# Patient Record
Sex: FEMALE | Race: WHITE | NOT HISPANIC OR LATINO | Employment: FULL TIME | URBAN - METROPOLITAN AREA
[De-identification: names, ages, dates, MRNs, and addresses within clinical notes are randomized per-mention and may not be internally consistent; named-entity substitution may affect disease eponyms.]

---

## 2023-10-25 DIAGNOSIS — M81.0 AGE-RELATED OSTEOPOROSIS WITHOUT CURRENT PATHOLOGICAL FRACTURE: Primary | ICD-10-CM

## 2023-10-25 RX ORDER — ALENDRONATE SODIUM 70 MG/1
70 TABLET ORAL
Qty: 4 TABLET | Refills: 0 | Status: SHIPPED | OUTPATIENT
Start: 2023-10-25

## 2024-02-26 PROBLEM — N18.9 CHRONIC KIDNEY DISEASE: Status: ACTIVE | Noted: 2024-02-26

## 2024-02-26 PROBLEM — E11.9 TYPE 2 DIABETES MELLITUS WITHOUT COMPLICATION (MULTI): Status: ACTIVE | Noted: 2024-02-26

## 2024-02-26 PROBLEM — M81.0 OSTEOPOROSIS: Status: ACTIVE | Noted: 2024-02-26

## 2024-02-26 PROBLEM — E55.9 VITAMIN D DEFICIENCY: Status: ACTIVE | Noted: 2024-02-26

## 2024-02-26 PROBLEM — N18.31 STAGE 3A CHRONIC KIDNEY DISEASE (MULTI): Status: ACTIVE | Noted: 2024-02-26

## 2024-02-26 PROBLEM — E11.8 DISORDER ASSOCIATED WITH TYPE 2 DIABETES MELLITUS (MULTI): Status: ACTIVE | Noted: 2024-02-26

## 2024-02-26 PROBLEM — I10 ESSENTIAL HYPERTENSION: Status: ACTIVE | Noted: 2024-02-26

## 2024-02-26 PROBLEM — E78.5 HYPERLIPIDEMIA: Status: ACTIVE | Noted: 2024-02-26

## 2024-02-26 RX ORDER — GLIMEPIRIDE 2 MG/1
TABLET ORAL EVERY 24 HOURS
COMMUNITY
Start: 2018-08-20 | End: 2024-02-29 | Stop reason: SDUPTHER

## 2024-02-26 RX ORDER — LISINOPRIL 10 MG/1
10 TABLET ORAL DAILY
COMMUNITY
End: 2024-02-29 | Stop reason: SDUPTHER

## 2024-02-26 RX ORDER — AMLODIPINE BESYLATE 10 MG/1
10 TABLET ORAL DAILY
COMMUNITY
End: 2024-02-29 | Stop reason: SDUPTHER

## 2024-02-26 RX ORDER — HYDROCHLOROTHIAZIDE 25 MG/1
TABLET ORAL
COMMUNITY
End: 2024-02-29 | Stop reason: SDUPTHER

## 2024-02-26 RX ORDER — ROSUVASTATIN CALCIUM 10 MG/1
10 TABLET, COATED ORAL DAILY
COMMUNITY

## 2024-02-27 NOTE — PROGRESS NOTES
HPI:       Diabetes Mellitus:          The patient is complaining of nothing         Blood sugar monitoring not done by pt          Hypoglycemic episodes are none         The results of the last HbgA1c were within normal limits at 5.7.  She wants to remain on her sulfonylurea low dose. Today it is 5.8         The microalbumin due.  She does have ckd and was seeing Dr. Camarillo but he says she could return prn based on stable labs and to amanda them yearly.         The feet/last exam done 8/4/23         Last eye exam 8/4/23 by retinavue         Side effects of the medications include none         Compliance with the medical regimen has been Excellent       Hypertension:          The patient has no issues         The patients cardiovascular risk factors include:         Cardiac Risk Factors  Age > 45-male, > 55-female:  YES  +1   Smoking:   YES  +1   Sig. family hx of CHD*:  YES  +1   Hypertension:   YES  +1   Diabetes:   YES  +1   HDL < 35:   NO   HDL > 59:   NO   Total: 5     *- Sig. family h/o CHD per NCEP = MI or sudden death at <56yo in   father or other 1st-degree male relative, or <64yo in mother or   other 1st-degree female relative           The patients adherence to the treatment regimen is Good         Responses to the medications has been Good    Hyperlipidemia:   The patient does not use medications that may worsen dyslipidemias (corticosteroids, progestins, anabolic steroids, diuretics, beta-blockers, amiodarone, cyclosporine, olanzapine). The patient exercises intermittently.  The patient is not known to have coexisting coronary artery disease.      MEDICAL HISTORY:   Past Medical History:   Diagnosis Date    Chronic kidney disease (CKD) stage G3a/A2, moderately decreased glomerular filtration rate (GFR) between 45-59 mL/min/1.73 square meter and albuminuria creatinine ratio between  mg/g (CMS/HCC)     Rabia    HTN (hypertension)     Hyperlipidemia     Osteoporosis     Type 2 diabetes mellitus  (CMS/[facility])     Vitamin D deficiency      MEDICATIONS:   Current Outpatient Medications   Medication Sig Dispense Refill    alendronate (Fosamax) 70 mg tablet TAKE ONE TABLET BY MOUTH ONCE A WEEK 4 tablet 0    rosuvastatin (Crestor) 10 mg tablet Take 1 tablet (10 mg) by mouth once daily.      amLODIPine (Norvasc) 10 mg tablet Take 1 tablet (10 mg) by mouth once daily. 90 tablet 1    glimepiride (AmaryL) 2 mg tablet Take 1 tablet (2 mg) by mouth once daily in the morning. Take before meals. 90 tablet 1    hydroCHLOROthiazide (HYDRODiuril) 25 mg tablet Take 1 tablet (25 mg) by mouth once daily. 90 tablet 1    lisinopril 10 mg tablet Take 1 tablet (10 mg) by mouth once daily. 90 tablet 1     No current facility-administered medications for this visit.     ALLERGIES:   Allergies   Allergen Reactions    Ezetimibe GI Upset    Metformin GI Upset    Pravastatin Sodium Myalgia     FAMILY HISTORY:   Family History   Problem Relation Name Age of Onset    Brain Aneurysm Mother 39 yo     Coronary artery disease Father 53 yo     Heart attack Father 53 yo     Lung cancer Sister       SOCIAL HISTORY:   Social History     Tobacco Use    Smoking status: Every Day     Packs/day: .25     Types: Cigarettes    Smokeless tobacco: Never   Vaping Use    Vaping Use: Never used   Substance Use Topics    Alcohol use: Never    Drug use: Never         ROS:      CONSTITUTION:  alert and oriented     OPHTHALMOLOGY:          no Change in vision.         CARDIOLOGY:          no Chest pain.  no Dyspnea on exertion.  no Shortness of breath.         ENDOCRINOLOGY:          no Excessive thirst.  no Excessive urination.  no Fatigue.  no Skin changes.  no Weight gain.  no Weight loss.         SKIN:          no Healing problems.         NEUROLOGY:          no Loss of sensation in specific body area.  no Burning pain in feet.  no Peripheral neuropathy.  no Tingling/numbness.    LABS: due     VITAL SIGNS:  /76   Pulse 74   Wt 71.7 kg (158 lb)   SpO2  99%   BMI 28.44 kg/m²     General Appearance: awake, alert, oriented, in no acute distress  Lungs: Normal expansion.  Clear to auscultation.  No rales, rhonchi, or wheezing.  Heart: Heart sounds are normal.  Regular rate and rhythm without murmur, gallop or rub.  Extremities: Extremities warm to touch, pink, with no edema.  Neurologic: Alert and oriented x 3, gait normal., reflexes normal and symmetric, strength and  sensation grossly normal    Cynthia was seen today for diabetes and hypertension.  Diagnoses and all orders for this visit:  Type 2 diabetes mellitus without complication, without long-term current use of insulin (CMS/[facility]) (Primary)  -     POCT glycosylated hemoglobin (Hb A1C) manually resulted  -     glimepiride (AmaryL) 2 mg tablet; Take 1 tablet (2 mg) by mouth once daily in the morning. Take before meals.  -     Follow Up In Primary Care - Established; Future  Essential hypertension  -     amLODIPine (Norvasc) 10 mg tablet; Take 1 tablet (10 mg) by mouth once daily.  -     hydroCHLOROthiazide (HYDRODiuril) 25 mg tablet; Take 1 tablet (25 mg) by mouth once daily.  -     lisinopril 10 mg tablet; Take 1 tablet (10 mg) by mouth once daily.  -     Follow Up In Primary Care - Established; Future  Mixed hyperlipidemia  Comments:  cont rosuvastatin  Orders:  -     Lipid Panel; Future  Chronic kidney disease (CKD) stage G3a/A2, moderately decreased glomerular filtration rate (GFR) between 45-59 mL/min/1.73 square meter and albuminuria creatinine ratio between  mg/g (CMS/[facility])  -     Albumin , Urine Random; Future  -     Urinalysis with Reflex Microscopic; Future  -     Renal function panel; Future

## 2024-02-29 ENCOUNTER — LAB (OUTPATIENT)
Dept: LAB | Facility: LAB | Age: 79
End: 2024-02-29
Payer: COMMERCIAL

## 2024-02-29 ENCOUNTER — OFFICE VISIT (OUTPATIENT)
Dept: PRIMARY CARE | Facility: CLINIC | Age: 79
End: 2024-02-29
Payer: COMMERCIAL

## 2024-02-29 VITALS
OXYGEN SATURATION: 99 % | BODY MASS INDEX: 28.44 KG/M2 | DIASTOLIC BLOOD PRESSURE: 76 MMHG | SYSTOLIC BLOOD PRESSURE: 136 MMHG | HEART RATE: 74 BPM | WEIGHT: 158 LBS

## 2024-02-29 DIAGNOSIS — E78.2 MIXED HYPERLIPIDEMIA: ICD-10-CM

## 2024-02-29 DIAGNOSIS — E11.9 TYPE 2 DIABETES MELLITUS WITHOUT COMPLICATION, WITHOUT LONG-TERM CURRENT USE OF INSULIN (MULTI): Primary | ICD-10-CM

## 2024-02-29 DIAGNOSIS — E87.6 HYPOKALEMIA: ICD-10-CM

## 2024-02-29 DIAGNOSIS — N18.31 CHRONIC KIDNEY DISEASE (CKD) STAGE G3A/A2, MODERATELY DECREASED GLOMERULAR FILTRATION RATE (GFR) BETWEEN 45-59 ML/MIN/1.73 SQUARE METER AND ALBUMINURIA CREATININE RATIO BETWEEN 30-299 MG/G (C* (MULTI): ICD-10-CM

## 2024-02-29 DIAGNOSIS — I10 ESSENTIAL HYPERTENSION: ICD-10-CM

## 2024-02-29 DIAGNOSIS — N28.9 ABNORMAL KIDNEY FUNCTION: Primary | ICD-10-CM

## 2024-02-29 LAB
ALBUMIN SERPL BCP-MCNC: 4.4 G/DL (ref 3.4–5)
ANION GAP SERPL CALC-SCNC: 15 MMOL/L (ref 10–20)
APPEARANCE UR: ABNORMAL
BACTERIA #/AREA URNS AUTO: ABNORMAL /HPF
BILIRUB UR STRIP.AUTO-MCNC: NEGATIVE MG/DL
BUN SERPL-MCNC: 36 MG/DL (ref 6–23)
CALCIUM SERPL-MCNC: 9.8 MG/DL (ref 8.6–10.3)
CHLORIDE SERPL-SCNC: 98 MMOL/L (ref 98–107)
CHOLEST SERPL-MCNC: 141 MG/DL (ref 0–199)
CHOLESTEROL/HDL RATIO: 3
CO2 SERPL-SCNC: 29 MMOL/L (ref 21–32)
COLOR UR: ABNORMAL
CREAT SERPL-MCNC: 1.53 MG/DL (ref 0.5–1.05)
CREAT UR-MCNC: 109.7 MG/DL (ref 20–320)
EGFRCR SERPLBLD CKD-EPI 2021: 35 ML/MIN/1.73M*2
GLUCOSE SERPL-MCNC: 100 MG/DL (ref 74–99)
GLUCOSE UR STRIP.AUTO-MCNC: NEGATIVE MG/DL
HDLC SERPL-MCNC: 47.7 MG/DL
KETONES UR STRIP.AUTO-MCNC: NEGATIVE MG/DL
LDLC SERPL CALC-MCNC: 69 MG/DL
LEUKOCYTE ESTERASE UR QL STRIP.AUTO: ABNORMAL
MICROALBUMIN UR-MCNC: 686.8 MG/L
MICROALBUMIN/CREAT UR: 626.1 UG/MG CREAT
MUCOUS THREADS #/AREA URNS AUTO: ABNORMAL /LPF
NITRITE UR QL STRIP.AUTO: POSITIVE
NON HDL CHOLESTEROL: 93 MG/DL (ref 0–149)
PH UR STRIP.AUTO: 5 [PH]
PHOSPHATE SERPL-MCNC: 4.2 MG/DL (ref 2.5–4.9)
POC HEMOGLOBIN A1C: 5.8 % (ref 4.2–6.5)
POTASSIUM SERPL-SCNC: 3.2 MMOL/L (ref 3.5–5.3)
PROT UR STRIP.AUTO-MCNC: ABNORMAL MG/DL
RBC # UR STRIP.AUTO: NEGATIVE /UL
RBC #/AREA URNS AUTO: ABNORMAL /HPF
SODIUM SERPL-SCNC: 139 MMOL/L (ref 136–145)
SP GR UR STRIP.AUTO: 1.01
SQUAMOUS #/AREA URNS AUTO: ABNORMAL /HPF
TRIGL SERPL-MCNC: 123 MG/DL (ref 0–149)
UROBILINOGEN UR STRIP.AUTO-MCNC: <2 MG/DL
VLDL: 25 MG/DL (ref 0–40)
WBC #/AREA URNS AUTO: >50 /HPF

## 2024-02-29 PROCEDURE — 1126F AMNT PAIN NOTED NONE PRSNT: CPT | Performed by: FAMILY MEDICINE

## 2024-02-29 PROCEDURE — 82570 ASSAY OF URINE CREATININE: CPT

## 2024-02-29 PROCEDURE — 1124F ACP DISCUSS-NO DSCNMKR DOCD: CPT | Performed by: FAMILY MEDICINE

## 2024-02-29 PROCEDURE — 36415 COLL VENOUS BLD VENIPUNCTURE: CPT

## 2024-02-29 PROCEDURE — 1159F MED LIST DOCD IN RCRD: CPT | Performed by: FAMILY MEDICINE

## 2024-02-29 PROCEDURE — 3075F SYST BP GE 130 - 139MM HG: CPT | Performed by: FAMILY MEDICINE

## 2024-02-29 PROCEDURE — 83036 HEMOGLOBIN GLYCOSYLATED A1C: CPT | Mod: 91 | Performed by: FAMILY MEDICINE

## 2024-02-29 PROCEDURE — 1160F RVW MEDS BY RX/DR IN RCRD: CPT | Performed by: FAMILY MEDICINE

## 2024-02-29 PROCEDURE — 82043 UR ALBUMIN QUANTITATIVE: CPT

## 2024-02-29 PROCEDURE — 99214 OFFICE O/P EST MOD 30 MIN: CPT | Performed by: FAMILY MEDICINE

## 2024-02-29 PROCEDURE — 3078F DIAST BP <80 MM HG: CPT | Performed by: FAMILY MEDICINE

## 2024-02-29 RX ORDER — LISINOPRIL 10 MG/1
10 TABLET ORAL DAILY
Qty: 90 TABLET | Refills: 1 | Status: SHIPPED | OUTPATIENT
Start: 2024-02-29 | End: 2024-08-27

## 2024-02-29 RX ORDER — GLIMEPIRIDE 2 MG/1
2 TABLET ORAL
Qty: 90 TABLET | Refills: 1 | Status: SHIPPED | OUTPATIENT
Start: 2024-02-29 | End: 2024-08-27

## 2024-02-29 RX ORDER — AMLODIPINE BESYLATE 10 MG/1
10 TABLET ORAL DAILY
Qty: 90 TABLET | Refills: 1 | Status: SHIPPED | OUTPATIENT
Start: 2024-02-29 | End: 2024-08-27

## 2024-02-29 RX ORDER — POTASSIUM CHLORIDE 600 MG/1
8 TABLET, FILM COATED, EXTENDED RELEASE ORAL 2 TIMES DAILY
Qty: 60 TABLET | Refills: 0 | Status: SHIPPED | OUTPATIENT
Start: 2024-02-29 | End: 2024-03-30

## 2024-02-29 RX ORDER — HYDROCHLOROTHIAZIDE 25 MG/1
25 TABLET ORAL DAILY
Qty: 90 TABLET | Refills: 1 | Status: SHIPPED | OUTPATIENT
Start: 2024-02-29 | End: 2024-08-27

## 2024-02-29 ASSESSMENT — ENCOUNTER SYMPTOMS
DEPRESSION: 0
LOSS OF SENSATION IN FEET: 0
OCCASIONAL FEELINGS OF UNSTEADINESS: 0

## 2024-02-29 ASSESSMENT — PATIENT HEALTH QUESTIONNAIRE - PHQ9
1. LITTLE INTEREST OR PLEASURE IN DOING THINGS: NOT AT ALL
2. FEELING DOWN, DEPRESSED OR HOPELESS: NOT AT ALL
SUM OF ALL RESPONSES TO PHQ9 QUESTIONS 1 AND 2: 0

## 2024-02-29 ASSESSMENT — PAIN SCALES - GENERAL: PAINLEVEL: 0-NO PAIN

## 2024-02-29 ASSESSMENT — LIFESTYLE VARIABLES
AUDIT-C TOTAL SCORE: 0
HOW MANY STANDARD DRINKS CONTAINING ALCOHOL DO YOU HAVE ON A TYPICAL DAY: PATIENT DOES NOT DRINK
HOW OFTEN DO YOU HAVE A DRINK CONTAINING ALCOHOL: NEVER
HOW OFTEN DO YOU HAVE SIX OR MORE DRINKS ON ONE OCCASION: NEVER
SKIP TO QUESTIONS 9-10: 1

## 2024-02-29 NOTE — RESULT ENCOUNTER NOTE
1. Bmp is abnormal:  potassium is down so start potassium chloride 8 meq 2 daily and amanda potassium in 1 week.  Her kidney function is also abnormal so will amanda that as well in 1 week; med sent to  and order placed  2.  U/a shows spilling protein but may be due to urine being very concentrated; amanda again in 3 mos; order placed  3.  Lipids are fine; amanda 1 yr

## 2024-03-01 NOTE — RESULT ENCOUNTER NOTE
A/c ratio abnormal: Spilling microprotein into urine c/w other abnormalities.  Will amanda again in 3 mos but drink more fluids before next test; order placed

## 2024-03-14 ENCOUNTER — LAB (OUTPATIENT)
Dept: LAB | Facility: LAB | Age: 79
End: 2024-03-14
Payer: COMMERCIAL

## 2024-03-14 DIAGNOSIS — N28.9 ABNORMAL KIDNEY FUNCTION: ICD-10-CM

## 2024-03-14 LAB
ANION GAP SERPL CALC-SCNC: 14 MMOL/L (ref 10–20)
BUN SERPL-MCNC: 38 MG/DL (ref 6–23)
CALCIUM SERPL-MCNC: 9.7 MG/DL (ref 8.6–10.3)
CHLORIDE SERPL-SCNC: 102 MMOL/L (ref 98–107)
CO2 SERPL-SCNC: 29 MMOL/L (ref 21–32)
CREAT SERPL-MCNC: 1.53 MG/DL (ref 0.5–1.05)
EGFRCR SERPLBLD CKD-EPI 2021: 35 ML/MIN/1.73M*2
GLUCOSE SERPL-MCNC: 171 MG/DL (ref 74–99)
POTASSIUM SERPL-SCNC: 3.3 MMOL/L (ref 3.5–5.3)
SODIUM SERPL-SCNC: 142 MMOL/L (ref 136–145)

## 2024-03-14 PROCEDURE — 36415 COLL VENOUS BLD VENIPUNCTURE: CPT

## 2024-03-14 NOTE — RESULT ENCOUNTER NOTE
Bmp still abnormal; she was supposed to also give urine for amanda on a/c ratio and u/a.  Did she do that as well?

## 2024-09-03 NOTE — PROGRESS NOTES
HPI:       Diabetes Mellitus:          The patient is complaining of nothing         Blood sugar monitoring not done by pt          Hypoglycemic episodes are none         The results of the last HbgA1c were within normal limits at 5.8; today it is: 6.1         The microalbumin is abnormal. She does have ckd and was seeing Dr. Camarillo but he says she could return prn based on stable labs and to amanda them yearly.         The feet/last exam done tpdau 9/5/24         Last eye exam 8/4/23 by retinavue; advised pt she is due         Side effects of the medications include none         Compliance with the medical regimen has been Excellent       Hypertension:          The patient has no issues but ran out of 2 out of her 3 meds for several days.  BP has been elevated; + dizzy.         The patients cardiovascular risk factors include:         Cardiac Risk Factors  Age > 45-male, > 55-female:  YES  +1   Smoking:   YES  +1   Sig. family hx of CHD*:  YES  +1   Hypertension:   YES  +1   Diabetes:   YES  +1   HDL < 35:   NO   HDL > 59:   NO   Total: 5     *- Sig. family h/o CHD per NCEP = MI or sudden death at <56yo in   father or other 1st-degree male relative, or <64yo in mother or   other 1st-degree female relative           The patients adherence to the treatment regimen is Good         Responses to the medications has been Good    Hyperlipidemia:   The patient does not use medications that may worsen dyslipidemias (corticosteroids, progestins, anabolic steroids, diuretics, beta-blockers, amiodarone, cyclosporine, olanzapine). The patient exercises intermittently.  The patient is not known to have coexisting coronary artery disease.      MEDICAL HISTORY:   Past Medical History:   Diagnosis Date    Chronic kidney disease (CKD) stage G3a/A2, moderately decreased glomerular filtration rate (GFR) between 45-59 mL/min/1.73 square meter and albuminuria creatinine ratio between  mg/g (C* (Multi)     Rabia    HTN  (hypertension)     Hyperlipidemia     Osteoporosis     Type 2 diabetes mellitus (Multi)     Vitamin D deficiency      MEDICATIONS:   Current Outpatient Medications   Medication Sig Dispense Refill    alendronate (Fosamax) 70 mg tablet TAKE ONE TABLET BY MOUTH ONCE A WEEK 4 tablet 0    rosuvastatin (Crestor) 10 mg tablet Take 1 tablet (10 mg) by mouth once daily.      amLODIPine (Norvasc) 10 mg tablet Take 1 tablet (10 mg) by mouth once daily. 90 tablet 0    glimepiride (AmaryL) 2 mg tablet Take 1 tablet (2 mg) by mouth once daily in the morning. Take before meals. 90 tablet 1    hydroCHLOROthiazide (HYDRODiuril) 25 mg tablet Take 1 tablet (25 mg) by mouth once daily. 90 tablet 0    lisinopril 10 mg tablet Take 1 tablet (10 mg) by mouth once daily. 90 tablet 0     No current facility-administered medications for this visit.     ALLERGIES:   Allergies   Allergen Reactions    Ezetimibe GI Upset    Metformin GI Upset    Pravastatin Sodium Myalgia     FAMILY HISTORY:   Family History   Problem Relation Name Age of Onset    Brain Aneurysm Mother 37 yo     Coronary artery disease Father 53 yo     Heart attack Father 53 yo     Lung cancer Sister       SOCIAL HISTORY:   Social History     Tobacco Use    Smoking status: Every Day     Current packs/day: 0.25     Types: Cigarettes    Smokeless tobacco: Never   Vaping Use    Vaping status: Never Used   Substance Use Topics    Alcohol use: Never    Drug use: Never         ROS:      CONSTITUTION:  alert and oriented     OPHTHALMOLOGY:          no Change in vision.         CARDIOLOGY:          no Chest pain.  no Dyspnea on exertion.  no Shortness of breath.         ENDOCRINOLOGY:          no Excessive thirst.  no Excessive urination.  no Fatigue.  no Skin changes.  no Weight gain.  no Weight loss.         SKIN:          no Healing problems.         NEUROLOGY:          no Loss of sensation in specific body area.  no Burning pain in feet.  no Peripheral neuropathy.  no  "Tingling/numbness.    LABS:   Lab Results   Component Value Date    GLUCOSE 171 (H) 03/14/2024    CALCIUM 9.7 03/14/2024     03/14/2024    K 3.3 (L) 03/14/2024    CO2 29 03/14/2024     03/14/2024    BUN 38 (H) 03/14/2024    CREATININE 1.53 (H) 03/14/2024     Lab Results   Component Value Date    CHOL 141 02/29/2024    CHOL 130 (L) 08/04/2023    CHOL 141 02/27/2023     Lab Results   Component Value Date    HDL 47.7 02/29/2024    HDL 53 08/04/2023    HDL 52 02/27/2023     Lab Results   Component Value Date    LDLCALC 69 02/29/2024    LDLCALC 42 (L) 08/04/2023    LDLCALC 65 02/27/2023     Lab Results   Component Value Date    TRIG 123 02/29/2024    TRIG 175 (H) 08/04/2023    TRIG 120 02/27/2023     No components found for: \"CHOLHDL\"   Latest Reference Range & Units 02/29/24 10:40   Albumin, Urine Random Not established mg/L 686.8   Creatinine, Urine Random 20.0 - 320.0 mg/dL 109.7   Albumin/Creatinine Ratio <30.0 ug/mg Creat 626.1 (H)   (H): Data is abnormally high     VITAL SIGNS:  /60 (BP Location: Left arm)   Pulse 81   Wt 69.3 kg (152 lb 12.8 oz)   SpO2 98%   BMI 27.50 kg/m²     General Appearance: awake, alert, oriented, in no acute distress  Lungs: Normal expansion.  Clear to auscultation.  No rales, rhonchi, or wheezing.  Heart: Heart sounds are normal.  Regular rate and rhythm without murmur, gallop or rub.  Extremities: Extremities warm to touch, pink, with no edema.  Neurologic: Alert and oriented x 3, gait normal., reflexes normal and symmetric, strength and  sensation grossly normal  Monofilament exam:  normal bilat; pulses palpable; hair present    Cynthia was seen today for diabetes and hypertension.  Diagnoses and all orders for this visit:  Type 2 diabetes mellitus without complication, without long-term current use of insulin (Multi) (Primary)  -     POCT glycosylated hemoglobin (Hb A1C) manually resulted  -     HP Diabetic Foot Exam  -     glimepiride (AmaryL) 2 mg tablet; Take 1 " tablet (2 mg) by mouth once daily in the morning. Take before meals.  Essential hypertension  Comments:  rtc for htn ck once back on meds again  Orders:  -     lisinopril 10 mg tablet; Take 1 tablet (10 mg) by mouth once daily.  -     hydroCHLOROthiazide (HYDRODiuril) 25 mg tablet; Take 1 tablet (25 mg) by mouth once daily.  -     amLODIPine (Norvasc) 10 mg tablet; Take 1 tablet (10 mg) by mouth once daily.  -     Follow Up In Primary Care - Established; Future  Mixed hyperlipidemia  Comments:  cont rosuvastatin  Chronic kidney disease (CKD) stage G3a/A2, moderately decreased glomerular filtration rate (GFR) between 45-59 mL/min/1.73 square meter and albuminuria creatinine ratio between  mg/g (C* (Multi)

## 2024-09-05 ENCOUNTER — OFFICE VISIT (OUTPATIENT)
Dept: PRIMARY CARE | Facility: CLINIC | Age: 79
End: 2024-09-05
Payer: COMMERCIAL

## 2024-09-05 VITALS
SYSTOLIC BLOOD PRESSURE: 180 MMHG | HEART RATE: 81 BPM | BODY MASS INDEX: 27.5 KG/M2 | WEIGHT: 152.8 LBS | DIASTOLIC BLOOD PRESSURE: 60 MMHG | OXYGEN SATURATION: 98 %

## 2024-09-05 DIAGNOSIS — I10 ESSENTIAL HYPERTENSION: ICD-10-CM

## 2024-09-05 DIAGNOSIS — N18.31 CHRONIC KIDNEY DISEASE (CKD) STAGE G3A/A2, MODERATELY DECREASED GLOMERULAR FILTRATION RATE (GFR) BETWEEN 45-59 ML/MIN/1.73 SQUARE METER AND ALBUMINURIA CREATININE RATIO BETWEEN 30-299 MG/G (C* (MULTI): ICD-10-CM

## 2024-09-05 DIAGNOSIS — E78.2 MIXED HYPERLIPIDEMIA: ICD-10-CM

## 2024-09-05 DIAGNOSIS — E11.9 TYPE 2 DIABETES MELLITUS WITHOUT COMPLICATION, WITHOUT LONG-TERM CURRENT USE OF INSULIN (MULTI): Primary | ICD-10-CM

## 2024-09-05 LAB — POC HEMOGLOBIN A1C: 6.1 % (ref 4.2–6.5)

## 2024-09-05 PROCEDURE — 1159F MED LIST DOCD IN RCRD: CPT | Performed by: FAMILY MEDICINE

## 2024-09-05 PROCEDURE — 3077F SYST BP >= 140 MM HG: CPT | Performed by: FAMILY MEDICINE

## 2024-09-05 PROCEDURE — 1160F RVW MEDS BY RX/DR IN RCRD: CPT | Performed by: FAMILY MEDICINE

## 2024-09-05 PROCEDURE — 1126F AMNT PAIN NOTED NONE PRSNT: CPT | Performed by: FAMILY MEDICINE

## 2024-09-05 PROCEDURE — 3078F DIAST BP <80 MM HG: CPT | Performed by: FAMILY MEDICINE

## 2024-09-05 PROCEDURE — 4004F PT TOBACCO SCREEN RCVD TLK: CPT | Performed by: FAMILY MEDICINE

## 2024-09-05 PROCEDURE — 99214 OFFICE O/P EST MOD 30 MIN: CPT | Performed by: FAMILY MEDICINE

## 2024-09-05 PROCEDURE — 83036 HEMOGLOBIN GLYCOSYLATED A1C: CPT | Performed by: FAMILY MEDICINE

## 2024-09-05 RX ORDER — AMLODIPINE BESYLATE 10 MG/1
10 TABLET ORAL DAILY
Qty: 90 TABLET | Refills: 0 | Status: SHIPPED | OUTPATIENT
Start: 2024-09-05 | End: 2024-12-04

## 2024-09-05 RX ORDER — GLIMEPIRIDE 2 MG/1
2 TABLET ORAL
Qty: 90 TABLET | Refills: 1 | Status: SHIPPED | OUTPATIENT
Start: 2024-09-05 | End: 2025-03-04

## 2024-09-05 RX ORDER — LISINOPRIL 10 MG/1
10 TABLET ORAL DAILY
Qty: 90 TABLET | Refills: 0 | Status: SHIPPED | OUTPATIENT
Start: 2024-09-05 | End: 2024-12-04

## 2024-09-05 RX ORDER — HYDROCHLOROTHIAZIDE 25 MG/1
25 TABLET ORAL DAILY
Qty: 90 TABLET | Refills: 0 | Status: SHIPPED | OUTPATIENT
Start: 2024-09-05 | End: 2024-12-04

## 2024-09-05 ASSESSMENT — PATIENT HEALTH QUESTIONNAIRE - PHQ9
1. LITTLE INTEREST OR PLEASURE IN DOING THINGS: NOT AT ALL
SUM OF ALL RESPONSES TO PHQ9 QUESTIONS 1 AND 2: 0
2. FEELING DOWN, DEPRESSED OR HOPELESS: NOT AT ALL

## 2024-09-05 ASSESSMENT — PAIN SCALES - GENERAL: PAINLEVEL: 0-NO PAIN

## 2024-09-27 NOTE — PROGRESS NOTES
HPI:  pt here for htn amanda: was out of 2/3 of her bp meds at appt 3 mos ago and pressures elevated.  Put back on meds and here for amanda       Hypertension:          The patient has no issues          The patients cardiovascular risk factors include:         Cardiac Risk Factors  Age > 45-male, > 55-female:  YES  +1   Smoking:   YES  +1   Sig. family hx of CHD*:  YES  +1   Hypertension:   YES  +1   Diabetes:   YES  +1   HDL < 35:   NO   HDL > 59:   NO   Total: 5     *- Sig. family h/o CHD per NCEP = MI or sudden death at <54yo in   father or other 1st-degree male relative, or <66yo in mother or   other 1st-degree female relative           The patients adherence to the treatment regimen is Good         Responses to the medications has been Good    Hyperlipidemia:   The patient does not use medications that may worsen dyslipidemias (corticosteroids, progestins, anabolic steroids, diuretics, beta-blockers, amiodarone, cyclosporine, olanzapine). The patient exercises intermittently.  The patient is not known to have coexisting coronary artery disease.      MEDICAL HISTORY:   Past Medical History:   Diagnosis Date    Chronic kidney disease (CKD) stage G3a/A2, moderately decreased glomerular filtration rate (GFR) between 45-59 mL/min/1.73 square meter and albuminuria creatinine ratio between  mg/g (C* (Multi)     [place]ock    HTN (hypertension)     Hyperlipidemia     Osteoporosis     Type 2 diabetes mellitus (Multi)     Vitamin D deficiency      MEDICATIONS:   Current Outpatient Medications   Medication Sig Dispense Refill    alendronate (Fosamax) 70 mg tablet TAKE ONE TABLET BY MOUTH ONCE A WEEK 4 tablet 0    amLODIPine (Norvasc) 10 mg tablet Take 1 tablet (10 mg) by mouth once daily. 90 tablet 0    glimepiride (AmaryL) 2 mg tablet Take 1 tablet (2 mg) by mouth once daily in the morning. Take before meals. 90 tablet 1    hydroCHLOROthiazide (HYDRODiuril) 25 mg tablet Take 1 tablet (25 mg) by mouth once daily. 90  tablet 0    lisinopril 10 mg tablet Take 1 tablet (10 mg) by mouth once daily. 90 tablet 0    rosuvastatin (Crestor) 10 mg tablet Take 1 tablet (10 mg) by mouth once daily.       No current facility-administered medications for this visit.     ALLERGIES:   Allergies   Allergen Reactions    Ezetimibe GI Upset    Metformin GI Upset    Pravastatin Sodium Myalgia     FAMILY HISTORY:   Family History   Problem Relation Name Age of Onset    Brain Aneurysm Mother 39 yo     Coronary artery disease Father 51 yo     Heart attack Father 51 yo     Lung cancer Sister       SOCIAL HISTORY:   Social History     Tobacco Use    Smoking status: Every Day     Current packs/day: 0.25     Types: Cigarettes    Smokeless tobacco: Never   Vaping Use    Vaping status: Never Used   Substance Use Topics    Alcohol use: Never    Drug use: Never         ROS:      CONSTITUTION:  alert and oriented     OPHTHALMOLOGY:          no Change in vision.         CARDIOLOGY:          no Chest pain.  no Dyspnea on exertion.  no Shortness of breath.         ENDOCRINOLOGY:          no Excessive thirst.  no Excessive urination.  no Fatigue.  no Skin changes.  no Weight gain.  no Weight loss.         SKIN:          no Healing problems.         NEUROLOGY:          no Loss of sensation in specific body area.  no Burning pain in feet.  no Peripheral neuropathy.  no Tingling/numbness.    LABS:   Lab Results   Component Value Date    GLUCOSE 171 (H) 03/14/2024    CALCIUM 9.7 03/14/2024     03/14/2024    K 3.3 (L) 03/14/2024    CO2 29 03/14/2024     03/14/2024    BUN 38 (H) 03/14/2024    CREATININE 1.53 (H) 03/14/2024     Lab Results   Component Value Date    CHOL 141 02/29/2024    CHOL 130 (L) 08/04/2023    CHOL 141 02/27/2023     Lab Results   Component Value Date    HDL 47.7 02/29/2024    HDL 53 08/04/2023    HDL 52 02/27/2023     Lab Results   Component Value Date    LDLCALC 69 02/29/2024    LDLCALC 42 (L) 08/04/2023    LDLCALC 65 02/27/2023     Lab  Results   Component Value Date    TRIG 123 02/29/2024    TRIG 175 (H) 08/04/2023    TRIG 120 02/27/2023        Latest Reference Range & Units 02/29/24 10:40   Albumin, Urine Random Not established mg/L 686.8   Creatinine, Urine Random 20.0 - 320.0 mg/dL 109.7   Albumin/Creatinine Ratio <30.0 ug/mg Creat 626.1 (H)   (H): Data is abnormally high     VITAL SIGNS:  There were no vitals taken for this visit.    General Appearance: awake, alert, oriented, in no acute distress  Lungs: Normal expansion.  Clear to auscultation.  No rales, rhonchi, or wheezing.  Heart: Heart sounds are normal.  Regular rate and rhythm without murmur, gallop or rub.  Extremities: Extremities warm to touch, pink, with no edema.  Neurologic: Alert and oriented x 3, gait normal., reflexes normal and symmetric, strength and  sensation grossly normal        Diagnoses and all orders for this visit:  Essential hypertension (Primary)  Mixed hyperlipidemia  Chronic kidney disease (CKD) stage G3a/A2, moderately decreased glomerular filtration rate (GFR) between 45-59 mL/min/1.73 square meter and albuminuria creatinine ratio between  mg/g (C* (Multi)

## 2024-09-30 DIAGNOSIS — E78.2 MIXED HYPERLIPIDEMIA: Primary | ICD-10-CM

## 2024-09-30 RX ORDER — ROSUVASTATIN CALCIUM 10 MG/1
10 TABLET, COATED ORAL DAILY
Qty: 90 TABLET | Refills: 1 | Status: SHIPPED | OUTPATIENT
Start: 2024-09-30

## 2024-11-14 ENCOUNTER — APPOINTMENT (OUTPATIENT)
Dept: RADIOLOGY | Facility: HOSPITAL | Age: 79
DRG: 064 | End: 2024-11-14
Payer: COMMERCIAL

## 2024-11-14 ENCOUNTER — HOSPITAL ENCOUNTER (INPATIENT)
Facility: HOSPITAL | Age: 79
DRG: 064 | End: 2024-11-14
Attending: EMERGENCY MEDICINE | Admitting: INTERNAL MEDICINE
Payer: COMMERCIAL

## 2024-11-14 ENCOUNTER — APPOINTMENT (OUTPATIENT)
Dept: CARDIOLOGY | Facility: HOSPITAL | Age: 79
DRG: 064 | End: 2024-11-14
Payer: COMMERCIAL

## 2024-11-14 DIAGNOSIS — E11.9 TYPE 2 DIABETES MELLITUS WITHOUT COMPLICATION, WITHOUT LONG-TERM CURRENT USE OF INSULIN (MULTI): ICD-10-CM

## 2024-11-14 DIAGNOSIS — I10 HYPERTENSION, UNSPECIFIED TYPE: ICD-10-CM

## 2024-11-14 DIAGNOSIS — I63.9 CEREBROVASCULAR ACCIDENT (CVA), UNSPECIFIED MECHANISM (MULTI): Primary | ICD-10-CM

## 2024-11-14 LAB
ALBUMIN SERPL BCP-MCNC: 4.3 G/DL (ref 3.4–5)
ALP SERPL-CCNC: 80 U/L (ref 33–136)
ALT SERPL W P-5'-P-CCNC: 18 U/L (ref 7–45)
ANION GAP SERPL CALCULATED.3IONS-SCNC: 15 MMOL/L (ref 10–20)
APTT PPP: 28.8 SECONDS (ref 22–32.5)
AST SERPL W P-5'-P-CCNC: 19 U/L (ref 9–39)
BASOPHILS # BLD AUTO: 0.1 X10*3/UL (ref 0–0.1)
BASOPHILS NFR BLD AUTO: 0.8 %
BILIRUB SERPL-MCNC: 0.6 MG/DL (ref 0–1.2)
BUN SERPL-MCNC: 50 MG/DL (ref 6–23)
CALCIUM SERPL-MCNC: 9.2 MG/DL (ref 8.6–10.3)
CARDIAC TROPONIN I PNL SERPL HS: 6 NG/L (ref 0–13)
CHLORIDE SERPL-SCNC: 107 MMOL/L (ref 98–107)
CHOLEST SERPL-MCNC: 132 MG/DL (ref 0–199)
CHOLESTEROL/HDL RATIO: 2.8
CO2 SERPL-SCNC: 21 MMOL/L (ref 21–32)
CREAT SERPL-MCNC: 1.8 MG/DL (ref 0.5–1.05)
EGFRCR SERPLBLD CKD-EPI 2021: 29 ML/MIN/1.73M*2
EOSINOPHIL # BLD AUTO: 0.23 X10*3/UL (ref 0–0.4)
EOSINOPHIL NFR BLD AUTO: 1.9 %
ERYTHROCYTE [DISTWIDTH] IN BLOOD BY AUTOMATED COUNT: 15 % (ref 11.5–14.5)
GLUCOSE BLD MANUAL STRIP-MCNC: 119 MG/DL (ref 74–99)
GLUCOSE BLD MANUAL STRIP-MCNC: 121 MG/DL (ref 74–99)
GLUCOSE BLD MANUAL STRIP-MCNC: 124 MG/DL (ref 74–99)
GLUCOSE SERPL-MCNC: 140 MG/DL (ref 74–99)
HCT VFR BLD AUTO: 37.4 % (ref 36–46)
HDLC SERPL-MCNC: 46.6 MG/DL
HGB BLD-MCNC: 12.1 G/DL (ref 12–16)
IMM GRANULOCYTES # BLD AUTO: 0.05 X10*3/UL (ref 0–0.5)
IMM GRANULOCYTES NFR BLD AUTO: 0.4 % (ref 0–0.9)
INR PPP: 1 (ref 0.9–1.2)
LDLC SERPL CALC-MCNC: 59 MG/DL
LYMPHOCYTES # BLD AUTO: 2.75 X10*3/UL (ref 0.8–3)
LYMPHOCYTES NFR BLD AUTO: 22.3 %
MCH RBC QN AUTO: 29.4 PG (ref 26–34)
MCHC RBC AUTO-ENTMCNC: 32.4 G/DL (ref 32–36)
MCV RBC AUTO: 91 FL (ref 80–100)
MONOCYTES # BLD AUTO: 1.29 X10*3/UL (ref 0.05–0.8)
MONOCYTES NFR BLD AUTO: 10.5 %
NEUTROPHILS # BLD AUTO: 7.9 X10*3/UL (ref 1.6–5.5)
NEUTROPHILS NFR BLD AUTO: 64.1 %
NON HDL CHOLESTEROL: 85 MG/DL (ref 0–149)
NRBC BLD-RTO: 0 /100 WBCS (ref 0–0)
PLATELET # BLD AUTO: 280 X10*3/UL (ref 150–450)
POTASSIUM SERPL-SCNC: 3.6 MMOL/L (ref 3.5–5.3)
PROT SERPL-MCNC: 7.7 G/DL (ref 6.4–8.2)
PROTHROMBIN TIME: 10.5 SECONDS (ref 9.3–12.7)
RBC # BLD AUTO: 4.11 X10*6/UL (ref 4–5.2)
SODIUM SERPL-SCNC: 139 MMOL/L (ref 136–145)
TRIGL SERPL-MCNC: 130 MG/DL (ref 0–149)
VLDL: 26 MG/DL (ref 0–40)
WBC # BLD AUTO: 12.3 X10*3/UL (ref 4.4–11.3)

## 2024-11-14 PROCEDURE — 83036 HEMOGLOBIN GLYCOSYLATED A1C: CPT | Mod: TRILAB | Performed by: INTERNAL MEDICINE

## 2024-11-14 PROCEDURE — 2550000001 HC RX 255 CONTRASTS

## 2024-11-14 PROCEDURE — 82947 ASSAY GLUCOSE BLOOD QUANT: CPT

## 2024-11-14 PROCEDURE — 85610 PROTHROMBIN TIME: CPT

## 2024-11-14 PROCEDURE — 36415 COLL VENOUS BLD VENIPUNCTURE: CPT

## 2024-11-14 PROCEDURE — 70551 MRI BRAIN STEM W/O DYE: CPT | Performed by: STUDENT IN AN ORGANIZED HEALTH CARE EDUCATION/TRAINING PROGRAM

## 2024-11-14 PROCEDURE — 85025 COMPLETE CBC W/AUTO DIFF WBC: CPT

## 2024-11-14 PROCEDURE — 2500000004 HC RX 250 GENERAL PHARMACY W/ HCPCS (ALT 636 FOR OP/ED): Mod: JW

## 2024-11-14 PROCEDURE — 80061 LIPID PANEL: CPT | Performed by: INTERNAL MEDICINE

## 2024-11-14 PROCEDURE — 99291 CRITICAL CARE FIRST HOUR: CPT | Mod: 25

## 2024-11-14 PROCEDURE — 70450 CT HEAD/BRAIN W/O DYE: CPT

## 2024-11-14 PROCEDURE — 96374 THER/PROPH/DIAG INJ IV PUSH: CPT

## 2024-11-14 PROCEDURE — 99223 1ST HOSP IP/OBS HIGH 75: CPT | Performed by: INTERNAL MEDICINE

## 2024-11-14 PROCEDURE — 85730 THROMBOPLASTIN TIME PARTIAL: CPT

## 2024-11-14 PROCEDURE — 71045 X-RAY EXAM CHEST 1 VIEW: CPT

## 2024-11-14 PROCEDURE — 2020000001 HC ICU ROOM DAILY

## 2024-11-14 PROCEDURE — 3E03317 INTRODUCTION OF OTHER THROMBOLYTIC INTO PERIPHERAL VEIN, PERCUTANEOUS APPROACH: ICD-10-PCS | Performed by: PSYCHIATRY & NEUROLOGY

## 2024-11-14 PROCEDURE — 93005 ELECTROCARDIOGRAM TRACING: CPT

## 2024-11-14 PROCEDURE — 71045 X-RAY EXAM CHEST 1 VIEW: CPT | Performed by: RADIOLOGY

## 2024-11-14 PROCEDURE — 70450 CT HEAD/BRAIN W/O DYE: CPT | Performed by: RADIOLOGY

## 2024-11-14 PROCEDURE — 70498 CT ANGIOGRAPHY NECK: CPT

## 2024-11-14 PROCEDURE — 70551 MRI BRAIN STEM W/O DYE: CPT

## 2024-11-14 PROCEDURE — 80053 COMPREHEN METABOLIC PANEL: CPT

## 2024-11-14 PROCEDURE — 70496 CT ANGIOGRAPHY HEAD: CPT | Performed by: RADIOLOGY

## 2024-11-14 PROCEDURE — 84484 ASSAY OF TROPONIN QUANT: CPT

## 2024-11-14 PROCEDURE — 70498 CT ANGIOGRAPHY NECK: CPT | Performed by: RADIOLOGY

## 2024-11-14 RX ORDER — HYDROCHLOROTHIAZIDE 25 MG/1
25 TABLET ORAL DAILY
Status: DISCONTINUED | OUTPATIENT
Start: 2024-11-14 | End: 2024-11-18

## 2024-11-14 RX ORDER — ASPIRIN 81 MG/1
81 TABLET ORAL DAILY
Status: DISCONTINUED | OUTPATIENT
Start: 2024-11-16 | End: 2024-11-18 | Stop reason: HOSPADM

## 2024-11-14 RX ORDER — LABETALOL HYDROCHLORIDE 5 MG/ML
10 INJECTION, SOLUTION INTRAVENOUS EVERY 10 MIN PRN
Status: ACTIVE | OUTPATIENT
Start: 2024-11-14 | End: 2024-11-16

## 2024-11-14 RX ORDER — POLYETHYLENE GLYCOL 3350 17 G/17G
17 POWDER, FOR SOLUTION ORAL DAILY
Status: DISCONTINUED | OUTPATIENT
Start: 2024-11-14 | End: 2024-11-18 | Stop reason: HOSPADM

## 2024-11-14 RX ORDER — LISINOPRIL 10 MG/1
10 TABLET ORAL DAILY
Status: DISCONTINUED | OUTPATIENT
Start: 2024-11-14 | End: 2024-11-18 | Stop reason: HOSPADM

## 2024-11-14 RX ORDER — INSULIN LISPRO 100 [IU]/ML
0-5 INJECTION, SOLUTION INTRAVENOUS; SUBCUTANEOUS
Status: DISCONTINUED | OUTPATIENT
Start: 2024-11-14 | End: 2024-11-18 | Stop reason: HOSPADM

## 2024-11-14 RX ORDER — DEXTROSE 50 % IN WATER (D50W) INTRAVENOUS SYRINGE
25
Status: DISCONTINUED | OUTPATIENT
Start: 2024-11-14 | End: 2024-11-18 | Stop reason: HOSPADM

## 2024-11-14 RX ORDER — HYDRALAZINE HYDROCHLORIDE 20 MG/ML
10 INJECTION INTRAMUSCULAR; INTRAVENOUS
Status: ACTIVE | OUTPATIENT
Start: 2024-11-14 | End: 2024-11-16

## 2024-11-14 RX ORDER — GLIMEPIRIDE 2 MG/1
2 TABLET ORAL
Status: DISCONTINUED | OUTPATIENT
Start: 2024-11-15 | End: 2024-11-18 | Stop reason: HOSPADM

## 2024-11-14 RX ORDER — HYDRALAZINE HYDROCHLORIDE 25 MG/1
25 TABLET, FILM COATED ORAL EVERY 6 HOURS PRN
Status: DISCONTINUED | OUTPATIENT
Start: 2024-11-16 | End: 2024-11-18 | Stop reason: HOSPADM

## 2024-11-14 RX ORDER — AMLODIPINE BESYLATE 10 MG/1
10 TABLET ORAL DAILY
Status: DISCONTINUED | OUTPATIENT
Start: 2024-11-14 | End: 2024-11-18 | Stop reason: HOSPADM

## 2024-11-14 RX ORDER — ALENDRONATE SODIUM 10 MG/1
5 TABLET ORAL
Status: DISCONTINUED | OUTPATIENT
Start: 2024-11-15 | End: 2024-11-18 | Stop reason: HOSPADM

## 2024-11-14 RX ORDER — ROSUVASTATIN CALCIUM 10 MG/1
10 TABLET, COATED ORAL DAILY
Status: DISCONTINUED | OUTPATIENT
Start: 2024-11-14 | End: 2024-11-18 | Stop reason: HOSPADM

## 2024-11-14 RX ORDER — DEXTROSE 50 % IN WATER (D50W) INTRAVENOUS SYRINGE
12.5
Status: DISCONTINUED | OUTPATIENT
Start: 2024-11-14 | End: 2024-11-18 | Stop reason: HOSPADM

## 2024-11-14 SDOH — ECONOMIC STABILITY: HOUSING INSECURITY: IN THE LAST 12 MONTHS, WAS THERE A TIME WHEN YOU WERE NOT ABLE TO PAY THE MORTGAGE OR RENT ON TIME?: NO

## 2024-11-14 SDOH — SOCIAL STABILITY: SOCIAL INSECURITY: DO YOU FEEL UNSAFE GOING BACK TO THE PLACE WHERE YOU ARE LIVING?: NO

## 2024-11-14 SDOH — ECONOMIC STABILITY: FOOD INSECURITY: HOW HARD IS IT FOR YOU TO PAY FOR THE VERY BASICS LIKE FOOD, HOUSING, MEDICAL CARE, AND HEATING?: NOT HARD AT ALL

## 2024-11-14 SDOH — SOCIAL STABILITY: SOCIAL INSECURITY: ARE YOU OR HAVE YOU BEEN THREATENED OR ABUSED PHYSICALLY, EMOTIONALLY, OR SEXUALLY BY ANYONE?: NO

## 2024-11-14 SDOH — HEALTH STABILITY: MENTAL HEALTH
DO YOU FEEL STRESS - TENSE, RESTLESS, NERVOUS, OR ANXIOUS, OR UNABLE TO SLEEP AT NIGHT BECAUSE YOUR MIND IS TROUBLED ALL THE TIME - THESE DAYS?: NOT AT ALL

## 2024-11-14 SDOH — HEALTH STABILITY: MENTAL HEALTH: HOW MANY DRINKS CONTAINING ALCOHOL DO YOU HAVE ON A TYPICAL DAY WHEN YOU ARE DRINKING?: PATIENT DOES NOT DRINK

## 2024-11-14 SDOH — ECONOMIC STABILITY: FOOD INSECURITY: WITHIN THE PAST 12 MONTHS, THE FOOD YOU BOUGHT JUST DIDN'T LAST AND YOU DIDN'T HAVE MONEY TO GET MORE.: NEVER TRUE

## 2024-11-14 SDOH — SOCIAL STABILITY: SOCIAL INSECURITY
WITHIN THE LAST YEAR, HAVE YOU BEEN RAPED OR FORCED TO HAVE ANY KIND OF SEXUAL ACTIVITY BY YOUR PARTNER OR EX-PARTNER?: NO

## 2024-11-14 SDOH — ECONOMIC STABILITY: TRANSPORTATION INSECURITY: IN THE PAST 12 MONTHS, HAS LACK OF TRANSPORTATION KEPT YOU FROM MEDICAL APPOINTMENTS OR FROM GETTING MEDICATIONS?: NO

## 2024-11-14 SDOH — HEALTH STABILITY: PHYSICAL HEALTH
HOW OFTEN DO YOU NEED TO HAVE SOMEONE HELP YOU WHEN YOU READ INSTRUCTIONS, PAMPHLETS, OR OTHER WRITTEN MATERIAL FROM YOUR DOCTOR OR PHARMACY?: NEVER

## 2024-11-14 SDOH — ECONOMIC STABILITY: FOOD INSECURITY: WITHIN THE PAST 12 MONTHS, YOU WORRIED THAT YOUR FOOD WOULD RUN OUT BEFORE YOU GOT THE MONEY TO BUY MORE.: NEVER TRUE

## 2024-11-14 SDOH — ECONOMIC STABILITY: HOUSING INSECURITY: AT ANY TIME IN THE PAST 12 MONTHS, WERE YOU HOMELESS OR LIVING IN A SHELTER (INCLUDING NOW)?: NO

## 2024-11-14 SDOH — SOCIAL STABILITY: SOCIAL INSECURITY: ARE THERE ANY APPARENT SIGNS OF INJURIES/BEHAVIORS THAT COULD BE RELATED TO ABUSE/NEGLECT?: NO

## 2024-11-14 SDOH — HEALTH STABILITY: MENTAL HEALTH: HOW OFTEN DO YOU HAVE A DRINK CONTAINING ALCOHOL?: NEVER

## 2024-11-14 SDOH — SOCIAL STABILITY: SOCIAL INSECURITY: WITHIN THE LAST YEAR, HAVE YOU BEEN AFRAID OF YOUR PARTNER OR EX-PARTNER?: NO

## 2024-11-14 SDOH — SOCIAL STABILITY: SOCIAL INSECURITY: HAVE YOU HAD THOUGHTS OF HARMING ANYONE ELSE?: NO

## 2024-11-14 SDOH — HEALTH STABILITY: MENTAL HEALTH: HOW OFTEN DO YOU HAVE SIX OR MORE DRINKS ON ONE OCCASION?: NEVER

## 2024-11-14 SDOH — ECONOMIC STABILITY: HOUSING INSECURITY: IN THE PAST 12 MONTHS, HOW MANY TIMES HAVE YOU MOVED WHERE YOU WERE LIVING?: 0

## 2024-11-14 SDOH — SOCIAL STABILITY: SOCIAL INSECURITY: DOES ANYONE TRY TO KEEP YOU FROM HAVING/CONTACTING OTHER FRIENDS OR DOING THINGS OUTSIDE YOUR HOME?: NO

## 2024-11-14 SDOH — ECONOMIC STABILITY: INCOME INSECURITY: IN THE PAST 12 MONTHS HAS THE ELECTRIC, GAS, OIL, OR WATER COMPANY THREATENED TO SHUT OFF SERVICES IN YOUR HOME?: NO

## 2024-11-14 SDOH — SOCIAL STABILITY: SOCIAL INSECURITY: WITHIN THE LAST YEAR, HAVE YOU BEEN HUMILIATED OR EMOTIONALLY ABUSED IN OTHER WAYS BY YOUR PARTNER OR EX-PARTNER?: NO

## 2024-11-14 SDOH — SOCIAL STABILITY: SOCIAL INSECURITY
WITHIN THE LAST YEAR, HAVE YOU BEEN KICKED, HIT, SLAPPED, OR OTHERWISE PHYSICALLY HURT BY YOUR PARTNER OR EX-PARTNER?: NO

## 2024-11-14 SDOH — SOCIAL STABILITY: SOCIAL INSECURITY: DO YOU FEEL ANYONE HAS EXPLOITED OR TAKEN ADVANTAGE OF YOU FINANCIALLY OR OF YOUR PERSONAL PROPERTY?: NO

## 2024-11-14 SDOH — SOCIAL STABILITY: SOCIAL INSECURITY: ABUSE: ADULT

## 2024-11-14 SDOH — SOCIAL STABILITY: SOCIAL INSECURITY: HAS ANYONE EVER THREATENED TO HURT YOUR FAMILY OR YOUR PETS?: NO

## 2024-11-14 SDOH — SOCIAL STABILITY: SOCIAL INSECURITY: WERE YOU ABLE TO COMPLETE ALL THE BEHAVIORAL HEALTH SCREENINGS?: YES

## 2024-11-14 SDOH — SOCIAL STABILITY: SOCIAL INSECURITY: HAVE YOU HAD ANY THOUGHTS OF HARMING ANYONE ELSE?: NO

## 2024-11-14 ASSESSMENT — ACTIVITIES OF DAILY LIVING (ADL)
HEARING - LEFT EAR: FUNCTIONAL
DRESSING YOURSELF: INDEPENDENT
FEEDING YOURSELF: INDEPENDENT
BATHING: INDEPENDENT
LACK_OF_TRANSPORTATION: NO
GROOMING: INDEPENDENT
PATIENT'S MEMORY ADEQUATE TO SAFELY COMPLETE DAILY ACTIVITIES?: YES
HEARING - RIGHT EAR: FUNCTIONAL
LACK_OF_TRANSPORTATION: NO
TOILETING: INDEPENDENT
JUDGMENT_ADEQUATE_SAFELY_COMPLETE_DAILY_ACTIVITIES: YES
WALKS IN HOME: INDEPENDENT
ADEQUATE_TO_COMPLETE_ADL: YES

## 2024-11-14 ASSESSMENT — LIFESTYLE VARIABLES
PRESCIPTION_ABUSE_PAST_12_MONTHS: NO
AUDIT-C TOTAL SCORE: 0
AUDIT-C TOTAL SCORE: 0
SKIP TO QUESTIONS 9-10: 1
SKIP TO QUESTIONS 9-10: 1
HOW OFTEN DO YOU HAVE A DRINK CONTAINING ALCOHOL: NEVER
AUDIT-C TOTAL SCORE: 0
HOW OFTEN DO YOU HAVE 6 OR MORE DRINKS ON ONE OCCASION: NEVER
SUBSTANCE_ABUSE_PAST_12_MONTHS: NO
HOW MANY STANDARD DRINKS CONTAINING ALCOHOL DO YOU HAVE ON A TYPICAL DAY: PATIENT DOES NOT DRINK

## 2024-11-14 ASSESSMENT — COGNITIVE AND FUNCTIONAL STATUS - GENERAL
MOVING TO AND FROM BED TO CHAIR: A LITTLE
DRESSING REGULAR UPPER BODY CLOTHING: A LITTLE
DRESSING REGULAR UPPER BODY CLOTHING: A LITTLE
DAILY ACTIVITIY SCORE: 18
DRESSING REGULAR LOWER BODY CLOTHING: A LITTLE
TURNING FROM BACK TO SIDE WHILE IN FLAT BAD: A LITTLE
TOILETING: A LITTLE
WALKING IN HOSPITAL ROOM: A LITTLE
WALKING IN HOSPITAL ROOM: A LITTLE
PATIENT BASELINE BEDBOUND: NO
MOVING FROM LYING ON BACK TO SITTING ON SIDE OF FLAT BED WITH BEDRAILS: A LITTLE
HELP NEEDED FOR BATHING: A LITTLE
MOVING FROM LYING ON BACK TO SITTING ON SIDE OF FLAT BED WITH BEDRAILS: A LITTLE
TURNING FROM BACK TO SIDE WHILE IN FLAT BAD: A LITTLE
CLIMB 3 TO 5 STEPS WITH RAILING: A LITTLE
MOVING TO AND FROM BED TO CHAIR: A LITTLE
MOBILITY SCORE: 18
CLIMB 3 TO 5 STEPS WITH RAILING: A LITTLE
HELP NEEDED FOR BATHING: A LITTLE
EATING MEALS: A LITTLE
EATING MEALS: A LITTLE
DAILY ACTIVITIY SCORE: 18
STANDING UP FROM CHAIR USING ARMS: A LITTLE
TOILETING: A LITTLE
STANDING UP FROM CHAIR USING ARMS: A LITTLE
PERSONAL GROOMING: A LITTLE
PERSONAL GROOMING: A LITTLE
DRESSING REGULAR LOWER BODY CLOTHING: A LITTLE
MOBILITY SCORE: 18

## 2024-11-14 ASSESSMENT — COLUMBIA-SUICIDE SEVERITY RATING SCALE - C-SSRS
1. IN THE PAST MONTH, HAVE YOU WISHED YOU WERE DEAD OR WISHED YOU COULD GO TO SLEEP AND NOT WAKE UP?: NO
2. HAVE YOU ACTUALLY HAD ANY THOUGHTS OF KILLING YOURSELF?: NO
6. HAVE YOU EVER DONE ANYTHING, STARTED TO DO ANYTHING, OR PREPARED TO DO ANYTHING TO END YOUR LIFE?: NO

## 2024-11-14 ASSESSMENT — PATIENT HEALTH QUESTIONNAIRE - PHQ9
2. FEELING DOWN, DEPRESSED OR HOPELESS: NOT AT ALL
1. LITTLE INTEREST OR PLEASURE IN DOING THINGS: NOT AT ALL
SUM OF ALL RESPONSES TO PHQ9 QUESTIONS 1 & 2: 0

## 2024-11-14 ASSESSMENT — PAIN - FUNCTIONAL ASSESSMENT: PAIN_FUNCTIONAL_ASSESSMENT: 0-10

## 2024-11-14 ASSESSMENT — PAIN SCALES - GENERAL
PAINLEVEL_OUTOF10: 0 - NO PAIN
PAINLEVEL_OUTOF10: 0 - NO PAIN

## 2024-11-14 NOTE — TELECONSULT
HPI:  79 y.o. female with h/o CKD, HLD, HTN, DM, had acute onset of L sided weakness and facial droop. Strength mildly improving.    Last known Well: 9am  NIH Stroke Scale Reported: 9    Prior Functional Status (Modified Niles Scale):  0 No symptoms at all     Imaging Results:  Head CT: No bleed  Head/Neck CTA/P: No LVO     Assessment:   Working Diagnosis:   Ischemic Stroke       Recommendations:   IV tPA treatment is recommended      Patient is NOT a candidate for endovascular treatment.  Rationale: No LVO     Additional Recommendations:  Admit for stroke care.  MRI brain to eval for stroke     Consult completed by:  TELEPHONE communication was used to provide this telehealth service.  Time includes consultation with ED provider and extensive review of data- history, medical records, test results, and neuroimaging studies: 5 - 10 mins was spent in consultation

## 2024-11-14 NOTE — PROGRESS NOTES
Attestation/Supervisory note for ASA Grayson      The patient is a 79-year-old female presenting to the emergency department by EMS from home for evaluation of a possible stroke.  The patient states that she was with her daughter out in the garage around 0900 and suddenly developed some slurred speech, facial droop, and left-sided weakness.  She states that she does not have any history of prior strokes.  She does smoke daily.  She denies the use of any blood thinners.  She denies any headache or visual changes.  She denies any chest pain or shortness of breath.  No abdominal pain.  No nausea vomiting.  No diarrhea or constipation.  No urinary complaints.  No fever or chills.  No cough or congestion.  All pertinent positives and negatives are recorded above.  All other systems reviewed and otherwise negative.  Vital signs with hypertension but otherwise within normal limits.  Physical exam with a well-nourished well-developed female.  HEENT exam with a left-sided facial droop.  She does have some slurring of her speech as well.  She has no evidence of airway compromise or respiratory distress.  Abdominal exam is benign.  She does have a left-sided upper and lower extremity weakness compared to the right side as well.  NIH stroke scale score of 9 given for her deficits.      EKG with sinus tachycardia at 103 bpm, normal axis, normal voltage, normal ST segment, and a slight diffuse flattening of the T waves      Diagnostic labs with mild leukocytosis and mild renal insufficiency but otherwise unremarkable.      The results of the troponins were pending at the time of my departure.  This is due to the analyzer being inoperable at this facility and the labs having to be taken to another facility by .      XR chest 1 view   Final Result   1.  No evidence of acute cardiopulmonary process.                  MACRO:   None        Signed by: Karolyn Mcdonald 11/14/2024 12:01 PM   Dictation workstation:   [id number]      CT  brain attack angio head and neck W and WO IV contrast   Final Result   1. No large vessel occlusion or significant stenosis of the   intracranial vessels.   2. Atherosclerosis of the bilateral cervical internal carotid   arteries resulting in 50% stenosis on the right and 30% stenosis of   the left by NASCET criteria.        I personally reviewed the images/study and I agree with the findings   as stated by Alejandro Lombardo MD. This study was interpreted at   [facility], [place], [place].        MACRO:   None        Signed by: Von Taylor 11/14/2024 11:31 AM   Dictation workstation:   [id number]      CT brain attack head wo IV contrast   Final Result   No acute intracranial bleed or focal mass effect.        Mild volume loss.        Mild chronic white matter ischemic disease in the deep   periventricular regions.        MACRO:   Vincent Mora discussed the significance and urgency of this critical   finding by epic secure chat with  BRYON ALICEA on 11/14/2024 at   11:07 am.  (**-RCF-**) Findings:  See findings.        Signed by: Vincent Mora 11/14/2024 11:08 AM   Dictation workstation:   [id number]           The patient does have an NIH stroke scale score of 9.  Her last known well time was 0900.  He has not had any significant improvement in her symptoms while in the emergency room.  Her blood pressure was elevated upon arrival and      Telestroke neurology, Dr. Reyes, was consulted and recommended treatment with TNK.  The patient did indicate that she would like to think about it for a little bit.  She was informed that we only have a short window of time.  There was a delay in given TNK because the patient's blood pressure was initially reported as elevated and the patient wanted time to decide whether she was willing to accept the TNK.  It was ultimately given      ASA Alicea will continue manage the patient primarily.  Anticipate that the patient will be admitted for  further management once the troponin results are available.        Impression/diagnosis:  1.  TIA/CVA  2.  Hypertension, unspecified  3.  Left-sided weakness        Critical care time of 33 minutes billed for management of a CVA with assessment of the patient with the NIH stroke scale, consideration for tPA inclusion and exclusion criteria, consultation with telestroke neurology, consultation with the patient regarding her results, monitoring the patient on telemetry and arrangement for admission.  This time excludes time for billable procedures.      critical care time billed for by me is non concurrent with time billed for by ASA Grayson      I personally saw the patient and made/approve the management plan and take responsibility for the patient management.      I independently interpreted the following study (S) EKG and diagnostic labs      I personally discussed the patient's management with the patient      I reviewed the results of the diagnostic labs and diagnostic imaging.  Formal radiology read was completed by the radiologist.      Cora Morton MD

## 2024-11-14 NOTE — CARE PLAN
The patient's goals for the shift include      The clinical goals for the shift include monitor neurological status

## 2024-11-14 NOTE — H&P
History Of Present Illness  Cynthia Muse is a 79 y.o. female presenting with sided facial droop left upper extremity left lower extremity weakness started at 9 AM including dysarthria.  Patient is a very pleasant 79-year-old female with history of CKD 3, hypertension, hyperlipidemia and type 2 diabetes.  Patient states she was feeling unwell around 9 AM she noticed that she was unable to move her upper left extremities along with left lower extremity.  Granddaughter also noticed a left-sided facial droop and changes in her speech.  Therefore patient came into the ED.    Patient was seen by the telemetry neuro stroke team, TNK was recommended which was given at noon.    While in the ED patient's deficits are already improving left upper extremity and left lower extremity weakness is improving along with her facial droop and speech.     Past Medical History  She has a past medical history of Chronic kidney disease (CKD) stage G3a/A2, moderately decreased glomerular filtration rate (GFR) between 45-59 mL/min/1.73 square meter and albuminuria creatinine ratio between  mg/g (C* (Multi), HTN (hypertension), Hyperlipidemia, Osteoporosis, Type 2 diabetes mellitus (Multi), and Vitamin D deficiency.    Surgical History  She has no past surgical history on file.     Social History  She reports that she has been smoking cigarettes. She has never used smokeless tobacco. She reports that she does not drink alcohol and does not use drugs.    Family History  Family History   Problem Relation Name Age of Onset    Brain Aneurysm Mother 37 yo     Coronary artery disease Father 51 yo     Heart attack Father 51 yo     Lung cancer Sister          Allergies  Ezetimibe, Metformin, and Pravastatin sodium    Review of Systems   All other systems reviewed and are negative.       Physical Exam  Vitals reviewed.   Constitutional:       Appearance: Normal appearance.   HENT:      Head: Normocephalic.      Nose: Nose normal.       Mouth/Throat:      Mouth: Mucous membranes are moist.   Cardiovascular:      Rate and Rhythm: Normal rate and regular rhythm.   Pulmonary:      Effort: Pulmonary effort is normal.   Abdominal:      General: Abdomen is flat. Bowel sounds are normal.      Palpations: Abdomen is soft.   Skin:     General: Skin is warm.      Capillary Refill: Capillary refill takes less than 2 seconds.   Neurological:      Mental Status: She is alert.      Comments: Left Sided facial droop left upper extremity left lower extremity weakness 3 out of 5, dysarthria          Last Recorded Vitals  /62   Pulse 94   Temp 36.8 °C (98.2 °F) (Temporal)   Resp (!) 21   Wt 73.3 kg (161 lb 8 oz)   SpO2 96%     Relevant Results  Results for orders placed or performed during the hospital encounter of 11/14/24 (from the past 24 hours)   CBC and Auto Differential   Result Value Ref Range    WBC 12.3 (H) 4.4 - 11.3 x10*3/uL    nRBC 0.0 0.0 - 0.0 /100 WBCs    RBC 4.11 4.00 - 5.20 x10*6/uL    Hemoglobin 12.1 12.0 - 16.0 g/dL    Hematocrit 37.4 36.0 - 46.0 %    MCV 91 80 - 100 fL    MCH 29.4 26.0 - 34.0 pg    MCHC 32.4 32.0 - 36.0 g/dL    RDW 15.0 (H) 11.5 - 14.5 %    Platelets 280 150 - 450 x10*3/uL    Neutrophils % 64.1 40.0 - 80.0 %    Immature Granulocytes %, Automated 0.4 0.0 - 0.9 %    Lymphocytes % 22.3 13.0 - 44.0 %    Monocytes % 10.5 2.0 - 10.0 %    Eosinophils % 1.9 0.0 - 6.0 %    Basophils % 0.8 0.0 - 2.0 %    Neutrophils Absolute 7.90 (H) 1.60 - 5.50 x10*3/uL    Immature Granulocytes Absolute, Automated 0.05 0.00 - 0.50 x10*3/uL    Lymphocytes Absolute 2.75 0.80 - 3.00 x10*3/uL    Monocytes Absolute 1.29 (H) 0.05 - 0.80 x10*3/uL    Eosinophils Absolute 0.23 0.00 - 0.40 x10*3/uL    Basophils Absolute 0.10 0.00 - 0.10 x10*3/uL   Comprehensive metabolic panel   Result Value Ref Range    Glucose 140 (H) 74 - 99 mg/dL    Sodium 139 136 - 145 mmol/L    Potassium 3.6 3.5 - 5.3 mmol/L    Chloride 107 98 - 107 mmol/L    Bicarbonate 21 21 - 32  mmol/L    Anion Gap 15 10 - 20 mmol/L    Urea Nitrogen 50 (H) 6 - 23 mg/dL    Creatinine 1.80 (H) 0.50 - 1.05 mg/dL    eGFR 29 (L) >60 mL/min/1.73m*2    Calcium 9.2 8.6 - 10.3 mg/dL    Albumin 4.3 3.4 - 5.0 g/dL    Alkaline Phosphatase 80 33 - 136 U/L    Total Protein 7.7 6.4 - 8.2 g/dL    AST 19 9 - 39 U/L    Bilirubin, Total 0.6 0.0 - 1.2 mg/dL    ALT 18 7 - 45 U/L   Troponin I, High Sensitivity   Result Value Ref Range    Troponin I, High Sensitivity 6 0 - 13 ng/L   Protime-INR   Result Value Ref Range    Protime 10.5 9.3 - 12.7 seconds    INR 1.0 0.9 - 1.2   APTT   Result Value Ref Range    aPTT 28.8 22.0 - 32.5 seconds   POCT GLUCOSE   Result Value Ref Range    POCT Glucose 124 (H) 74 - 99 mg/dL   ECG 12 lead   Result Value Ref Range    Ventricular Rate 103 BPM    Atrial Rate 103 BPM    NE Interval 168 ms    QRS Duration 58 ms    QT Interval 308 ms    QTC Calculation(Bazett) 403 ms    P Axis 69 degrees    R Axis 50 degrees    T Axis 46 degrees    QRS Count 17 beats    Q Onset 225 ms    P Onset 141 ms    P Offset 188 ms    T Offset 379 ms    QTC Fredericia 369 ms   POCT GLUCOSE   Result Value Ref Range    POCT Glucose 119 (H) 74 - 99 mg/dL     insulin lispro, 0-5 Units, subcutaneous, TID AC      Imaging:  Cta head/neck;  IMPRESSION:  1. No large vessel occlusion or significant stenosis of the  intracranial vessels.  2. Atherosclerosis of the bilateral cervical internal carotid  arteries resulting in 50% stenosis on the right and 30% stenosis of  the left by NASCET criteria.    Head ct:     IMPRESSION:  No acute intracranial bleed or focal mass effect.      Mild volume loss.      Mild chronic white matter ischemic disease in the deep  periventricular regions.         Assessment/Plan   Assessment & Plan  Cerebrovascular accident (CVA), unspecified mechanism (Multi)    1.  Acute CVA s/p TNK  -Patient presented with left-sided facial droop dysarthria left-sided hemiparesis patient was given TNK at noon and patient's  deficits have already started improving  -No platelet agents or blood thinners for 24 hours, blood pressure threshold for as needed medications per stroke protocol post tPA.  -Raul head CT in 24 hours, MRI of the brain ordered, echo with bubble study pending  -Bilateral carotid artery stenosis 50% on the right 30% on the left  -Will be admitted to ICU  -speech, pt/ot    2.  HTN  -hold anti htn    3. DMII  -hold oral meds  -ssi       Ramonita Houston MD

## 2024-11-14 NOTE — ED PROVIDER NOTES
HPI   Chief Complaint   Patient presents with    Cerebrovascular Accident     Patient was at home with family. Patient stated not feeling right and requesting family to stay there with her. This occurred at 0930. LKW 0900. Patient began to have Left sided weakness, slurred speech and Left facial droop. EMS brought to ED. VSS       Patient is a 79-year-old female with past medical history of HTN, HLD, type 2 diabetes, CKD presenting via EMS as a code brain attack.  Report is that for 45 minutes prior to arrival, patient began having left upper and lower extremity weakness as well as slurred speech and left-sided facial droop.  Patient is A and O x 4 upon arrival.  Last known well around 9am.  EMS states patient was also hypertensive.  Patient states she cannot lift her left arm and can barely lift her left leg.  Patient denies fevers, chills, cough, sore throat, runny nose, chest pain, shortness of breath, abdominal pain, nausea, vomiting, diarrhea or urinary complaints.  Patient states she is not on blood thinners at this time.              Patient History   Past Medical History:   Diagnosis Date    Chronic kidney disease (CKD) stage G3a/A2, moderately decreased glomerular filtration rate (GFR) between 45-59 mL/min/1.73 square meter and albuminuria creatinine ratio between  mg/g (C* (Multi)     [place]    HTN (hypertension)     Hyperlipidemia     Osteoporosis     Type 2 diabetes mellitus (Multi)     Vitamin D deficiency      No past surgical history on file.  Family History   Problem Relation Name Age of Onset    Brain Aneurysm Mother 39 yo     Coronary artery disease Father 53 yo     Heart attack Father 53 yo     Lung cancer Sister       Social History     Tobacco Use    Smoking status: Every Day     Current packs/day: 0.25     Types: Cigarettes    Smokeless tobacco: Never   Vaping Use    Vaping status: Never Used   Substance Use Topics    Alcohol use: Never    Drug use: Never       Physical Exam   ED Triage  Vitals [11/14/24 1100]   Temperature Heart Rate Respirations BP   36.8 °C (98.2 °F) 96 20 (!) 176/118      Pulse Ox Temp Source Heart Rate Source Patient Position   96 % Temporal Monitor --      BP Location FiO2 (%)     -- --       Physical Exam  Vitals and nursing note reviewed.   Constitutional:       Appearance: She is well-developed.      Comments: Awake, laying in examination bed   HENT:      Head: Normocephalic and atraumatic.      Mouth/Throat:      Comments: Appreciable left-sided facial droop with dysarthria  Eyes:      Extraocular Movements: Extraocular movements intact.      Conjunctiva/sclera: Conjunctivae normal.      Pupils: Pupils are equal, round, and reactive to light.   Cardiovascular:      Rate and Rhythm: Normal rate and regular rhythm.      Pulses: Normal pulses.      Heart sounds: Normal heart sounds. No murmur heard.  Pulmonary:      Effort: Pulmonary effort is normal. No respiratory distress.      Breath sounds: Normal breath sounds.   Abdominal:      General: Abdomen is flat.      Palpations: Abdomen is soft.      Tenderness: There is no abdominal tenderness.   Musculoskeletal:         General: No swelling.      Cervical back: Normal range of motion and neck supple.      Comments: Right upper and lower extremity range of motion within normal.  Very limited range of motion to left lower extremity and some effort to gravity of the left upper extremity   Skin:     General: Skin is warm and dry.      Capillary Refill: Capillary refill takes less than 2 seconds.   Neurological:      General: No focal deficit present.      Mental Status: She is alert and oriented to person, place, and time.   Psychiatric:         Mood and Affect: Mood normal.         Behavior: Behavior normal.           ED Course & MDM   ED Course as of 11/14/24 1722   Thu Nov 14, 2024   6972 I spoke with the stroke neurology team and after discussion, they recommended going over TNK contraindication checklist with the patient  prior to pushing TNK.   [AR]   1150 Patient was educated on TNK and due to patient being A and O x 4, she states that she would like to consider TNK prior to us administering.  She was given the risks and benefits of medication administration.  Additional delay in TNK administration was related to patients systolic BP being elevated. [AR]      ED Course User Index  [AR] Alf CID OLGA LIDIA Grayson         Diagnoses as of 11/14/24 1722   Cerebrovascular accident (CVA), unspecified mechanism (Multi)   Hypertension, unspecified type                 No data recorded     [place] Coma Scale Score: 15 (11/14/24 1400 : Simi Sarabia, RN)       NIH Stroke Scale: 7 (11/14/24 1400 : Simi Sarabia, ESCOBAR)                   Medical Decision Making  Patient is a 79-year-old female with past medical history of HTN, HLD, type 2 diabetes, CKD presenting via EMS as a code brain attack.  Code brain attack called.  Orders placed.  NIH stroke scale of 9.  Test of skew is negative.  There is no cerebral ataxia present.  Conditions considered include but are not limited to: CVA, TIA, brain hemorrhage.    I saw this patient in conjunction with Dr. Morton.  Radiology reached out and stated that there is no acute findings on CT head.  CT angio without large vessel occlusions.  CBC does show mild leukocytosis with WC of 12.3 but is without signs of anemia.  CMP without significant electrolyte abnormality or renal impairment.  Reevaluation by nurse after TNK administration shows NIH of 7.  PTT and INR normal limits.  Troponin within normal limits.  Dissection without acute cardiopulmonary process.    After discussions with the neurologist and administering TNK, attending physician spoke with admitting provider, Dr. Houston.  After discussion, patient will be admitted to the ICU for further management of stroke requiring TNK.  As I deemed necessary from the patient's history, physical, laboratory and imaging findings about ED course, consider the  above listed diagnoses.    Upon my evaluation, this patient had a high probability of imminent or life threatening deterioration due to CVA requiring TNK, which required my direct attention, intervention, and personal management.    I personally provided 32 minutes to nonconcurrent critical care time exclusive of time spent on separately billable procedures.  Time includes review of laboratory data, radiology results, discussion with consultants, and monitoring for potential decompensation.  Interventions were performed as documented above.    Portions of this note made with Dragon software, please be mindful of potential grammatical errors.        Medications   glucagon (Glucagen) injection 1 mg (has no administration in time range)   dextrose 50 % injection 25 g (has no administration in time range)   glucagon (Glucagen) injection 1 mg (has no administration in time range)   dextrose 50 % injection 12.5 g (has no administration in time range)   insulin lispro injection 0-5 Units (has no administration in time range)   iohexol (OMNIPaque) 350 mg iodine/mL solution 75 mL (75 mL intravenous Given 11/14/24 1101)   tenecteplase (TNKASE) injection for STROKE 18 mg (18 mg intravenous Given 11/14/24 1220)         Labs Reviewed   CBC WITH AUTO DIFFERENTIAL - Abnormal       Result Value    WBC 12.3 (*)     nRBC 0.0      RBC 4.11      Hemoglobin 12.1      Hematocrit 37.4      MCV 91      MCH 29.4      MCHC 32.4      RDW 15.0 (*)     Platelets 280      Neutrophils % 64.1      Immature Granulocytes %, Automated 0.4      Lymphocytes % 22.3      Monocytes % 10.5      Eosinophils % 1.9      Basophils % 0.8      Neutrophils Absolute 7.90 (*)     Immature Granulocytes Absolute, Automated 0.05      Lymphocytes Absolute 2.75      Monocytes Absolute 1.29 (*)     Eosinophils Absolute 0.23      Basophils Absolute 0.10     COMPREHENSIVE METABOLIC PANEL - Abnormal    Glucose 140 (*)     Sodium 139      Potassium 3.6      Chloride 107       Bicarbonate 21      Anion Gap 15      Urea Nitrogen 50 (*)     Creatinine 1.80 (*)     eGFR 29 (*)     Calcium 9.2      Albumin 4.3      Alkaline Phosphatase 80      Total Protein 7.7      AST 19      Bilirubin, Total 0.6      ALT 18     POCT GLUCOSE - Abnormal    POCT Glucose 124 (*)    POCT GLUCOSE - Abnormal    POCT Glucose 119 (*)    TROPONIN I, HIGH SENSITIVITY - Normal    Troponin I, High Sensitivity 6      Narrative:     Less than 99th percentile of normal range cutoff-  Female and children under 18 years old <14 ng/L; Male <21 ng/L: Negative  Repeat testing should be performed if clinically indicated.     Female and children under 18 years old 14-50 ng/L; Male 21-50 ng/L:  Consistent with possible cardiac damage and possible increased clinical   risk. Serial measurements may help to assess extent of myocardial damage.     >50 ng/L: Consistent with cardiac damage, increased clinical risk and  myocardial infarction. Serial measurements may help assess extent of   myocardial damage.      NOTE: Children less than 1 year old may have higher baseline troponin   levels and results should be interpreted in conjunction with the overall   clinical context.     NOTE: Troponin I testing is performed using a different   testing methodology at [facility] than at other   [facility]. Direct result comparisons should only   be made within the same method.   PROTIME-INR - Normal    Protime 10.5      INR 1.0      Narrative:     INR Therapeutic Range: 2.0-3.5   APTT - Normal    aPTT 28.8     POCT GLUCOSE METER         XR chest 1 view   Final Result   1.  No evidence of acute cardiopulmonary process.                  MACRO:   None        Signed by: Karolyn Mcdonald 11/14/2024 12:01 PM   Dictation workstation:   [id number]      CT brain attack angio head and neck W and WO IV contrast   Final Result   1. No large vessel occlusion or significant stenosis of the   intracranial vessels.   2. Atherosclerosis of the  bilateral cervical internal carotid   arteries resulting in 50% stenosis on the right and 30% stenosis of   the left by NASCET criteria.        I personally reviewed the images/study and I agree with the findings   as stated by Alejandro Lombardo MD. This study was interpreted at   [facility], [place], [place].        MACRO:   None        Signed by: Von Taylor 11/14/2024 11:31 AM   Dictation workstation:   [id number]      CT brain attack head wo IV contrast   Final Result   No acute intracranial bleed or focal mass effect.        Mild volume loss.        Mild chronic white matter ischemic disease in the deep   periventricular regions.        MACRO:   Vincent Mora discussed the significance and urgency of this critical   finding by epic secure chat with  BRYON ALICEA on 11/14/2024 at   11:07 am.  (**-RCF-**) Findings:  See findings.        Signed by: Vincent Mora 11/14/2024 11:08 AM   Dictation workstation:   [id number]      MR brain wo IV contrast    (Results Pending)         Procedure  Procedures     Bryon Alicea PA-C  11/14/24 1739

## 2024-11-15 ENCOUNTER — APPOINTMENT (OUTPATIENT)
Dept: CARDIOLOGY | Facility: HOSPITAL | Age: 79
DRG: 061 | End: 2024-11-15
Payer: COMMERCIAL

## 2024-11-15 ENCOUNTER — APPOINTMENT (OUTPATIENT)
Dept: RADIOLOGY | Facility: HOSPITAL | Age: 79
DRG: 064 | End: 2024-11-15
Payer: COMMERCIAL

## 2024-11-15 LAB
ALBUMIN SERPL BCP-MCNC: 4 G/DL (ref 3.4–5)
ALP SERPL-CCNC: 81 U/L (ref 33–136)
ALT SERPL W P-5'-P-CCNC: 13 U/L (ref 7–45)
ANION GAP SERPL CALCULATED.3IONS-SCNC: 18 MMOL/L (ref 10–20)
AORTIC VALVE PEAK VELOCITY: 1.85 M/S
AST SERPL W P-5'-P-CCNC: 17 U/L (ref 9–39)
ATRIAL RATE: 103 BPM
AV PEAK GRADIENT: 14 MMHG
AVA (PEAK VEL): 2.45 CM2
BASOPHILS # BLD AUTO: 0.07 X10*3/UL (ref 0–0.1)
BASOPHILS NFR BLD AUTO: 0.6 %
BILIRUB SERPL-MCNC: 0.7 MG/DL (ref 0–1.2)
BUN SERPL-MCNC: 43 MG/DL (ref 6–23)
CALCIUM SERPL-MCNC: 9.3 MG/DL (ref 8.6–10.3)
CHLORIDE SERPL-SCNC: 107 MMOL/L (ref 98–107)
CO2 SERPL-SCNC: 20 MMOL/L (ref 21–32)
CREAT SERPL-MCNC: 1.69 MG/DL (ref 0.5–1.05)
EGFRCR SERPLBLD CKD-EPI 2021: 31 ML/MIN/1.73M*2
EJECTION FRACTION APICAL 4 CHAMBER: 64.8
EJECTION FRACTION: 58 %
EOSINOPHIL # BLD AUTO: 0.13 X10*3/UL (ref 0–0.4)
EOSINOPHIL NFR BLD AUTO: 1.2 %
ERYTHROCYTE [DISTWIDTH] IN BLOOD BY AUTOMATED COUNT: 15.1 % (ref 11.5–14.5)
EST. AVERAGE GLUCOSE BLD GHB EST-MCNC: 126 MG/DL
GLUCOSE BLD MANUAL STRIP-MCNC: 105 MG/DL (ref 74–99)
GLUCOSE BLD MANUAL STRIP-MCNC: 118 MG/DL (ref 74–99)
GLUCOSE BLD MANUAL STRIP-MCNC: 120 MG/DL (ref 74–99)
GLUCOSE BLD MANUAL STRIP-MCNC: 130 MG/DL (ref 74–99)
GLUCOSE SERPL-MCNC: 101 MG/DL (ref 74–99)
HBA1C MFR BLD: 6 %
HCT VFR BLD AUTO: 36.4 % (ref 36–46)
HGB BLD-MCNC: 11.8 G/DL (ref 12–16)
IMM GRANULOCYTES # BLD AUTO: 0.05 X10*3/UL (ref 0–0.5)
IMM GRANULOCYTES NFR BLD AUTO: 0.5 % (ref 0–0.9)
LEFT ATRIUM VOLUME AREA LENGTH INDEX BSA: 18.3 ML/M2
LEFT VENTRICLE INTERNAL DIMENSION DIASTOLE: 4.22 CM (ref 3.5–6)
LEFT VENTRICULAR OUTFLOW TRACT DIAMETER: 1.8 CM
LYMPHOCYTES # BLD AUTO: 1.54 X10*3/UL (ref 0.8–3)
LYMPHOCYTES NFR BLD AUTO: 14 %
MCH RBC QN AUTO: 29.4 PG (ref 26–34)
MCHC RBC AUTO-ENTMCNC: 32.4 G/DL (ref 32–36)
MCV RBC AUTO: 91 FL (ref 80–100)
MITRAL VALVE E/A RATIO: 0.63
MONOCYTES # BLD AUTO: 1.08 X10*3/UL (ref 0.05–0.8)
MONOCYTES NFR BLD AUTO: 9.8 %
NEUTROPHILS # BLD AUTO: 8.13 X10*3/UL (ref 1.6–5.5)
NEUTROPHILS NFR BLD AUTO: 73.9 %
NRBC BLD-RTO: 0 /100 WBCS (ref 0–0)
P AXIS: 69 DEGREES
P OFFSET: 188 MS
P ONSET: 141 MS
PLATELET # BLD AUTO: 319 X10*3/UL (ref 150–450)
POTASSIUM SERPL-SCNC: 3.6 MMOL/L (ref 3.5–5.3)
PR INTERVAL: 168 MS
PROT SERPL-MCNC: 7.3 G/DL (ref 6.4–8.2)
Q ONSET: 225 MS
QRS COUNT: 17 BEATS
QRS DURATION: 58 MS
QT INTERVAL: 308 MS
QTC CALCULATION(BAZETT): 403 MS
QTC FREDERICIA: 369 MS
R AXIS: 50 DEGREES
RBC # BLD AUTO: 4.01 X10*6/UL (ref 4–5.2)
RIGHT VENTRICLE FREE WALL PEAK S': 18.3 CM/S
RIGHT VENTRICLE PEAK SYSTOLIC PRESSURE: 35.7 MMHG
SODIUM SERPL-SCNC: 141 MMOL/L (ref 136–145)
T AXIS: 46 DEGREES
T OFFSET: 379 MS
TRICUSPID ANNULAR PLANE SYSTOLIC EXCURSION: 2.6 CM
VENTRICULAR RATE: 103 BPM
WBC # BLD AUTO: 11 X10*3/UL (ref 4.4–11.3)

## 2024-11-15 PROCEDURE — 92526 ORAL FUNCTION THERAPY: CPT | Mod: GN | Performed by: SPEECH-LANGUAGE PATHOLOGIST

## 2024-11-15 PROCEDURE — 97530 THERAPEUTIC ACTIVITIES: CPT | Mod: GP

## 2024-11-15 PROCEDURE — G0427 INPT/ED TELECONSULT70: HCPCS | Performed by: STUDENT IN AN ORGANIZED HEALTH CARE EDUCATION/TRAINING PROGRAM

## 2024-11-15 PROCEDURE — 92610 EVALUATE SWALLOWING FUNCTION: CPT | Mod: GN | Performed by: SPEECH-LANGUAGE PATHOLOGIST

## 2024-11-15 PROCEDURE — 2500000001 HC RX 250 WO HCPCS SELF ADMINISTERED DRUGS (ALT 637 FOR MEDICARE OP): Performed by: STUDENT IN AN ORGANIZED HEALTH CARE EDUCATION/TRAINING PROGRAM

## 2024-11-15 PROCEDURE — 70450 CT HEAD/BRAIN W/O DYE: CPT | Performed by: RADIOLOGY

## 2024-11-15 PROCEDURE — 70450 CT HEAD/BRAIN W/O DYE: CPT

## 2024-11-15 PROCEDURE — 92523 SPEECH SOUND LANG COMPREHEN: CPT | Mod: GN | Performed by: SPEECH-LANGUAGE PATHOLOGIST

## 2024-11-15 PROCEDURE — 36415 COLL VENOUS BLD VENIPUNCTURE: CPT | Performed by: INTERNAL MEDICINE

## 2024-11-15 PROCEDURE — 80053 COMPREHEN METABOLIC PANEL: CPT | Performed by: INTERNAL MEDICINE

## 2024-11-15 PROCEDURE — 2020000001 HC ICU ROOM DAILY

## 2024-11-15 PROCEDURE — 93306 TTE W/DOPPLER COMPLETE: CPT | Performed by: INTERNAL MEDICINE

## 2024-11-15 PROCEDURE — 2500000002 HC RX 250 W HCPCS SELF ADMINISTERED DRUGS (ALT 637 FOR MEDICARE OP, ALT 636 FOR OP/ED): Performed by: INTERNAL MEDICINE

## 2024-11-15 PROCEDURE — 97161 PT EVAL LOW COMPLEX 20 MIN: CPT | Mod: GP

## 2024-11-15 PROCEDURE — 85025 COMPLETE CBC W/AUTO DIFF WBC: CPT | Performed by: INTERNAL MEDICINE

## 2024-11-15 PROCEDURE — 82947 ASSAY GLUCOSE BLOOD QUANT: CPT

## 2024-11-15 PROCEDURE — 99233 SBSQ HOSP IP/OBS HIGH 50: CPT | Performed by: INTERNAL MEDICINE

## 2024-11-15 PROCEDURE — 92507 TX SP LANG VOICE COMM INDIV: CPT | Mod: GN | Performed by: SPEECH-LANGUAGE PATHOLOGIST

## 2024-11-15 PROCEDURE — 2500000004 HC RX 250 GENERAL PHARMACY W/ HCPCS (ALT 636 FOR OP/ED): Performed by: INTERNAL MEDICINE

## 2024-11-15 PROCEDURE — 97530 THERAPEUTIC ACTIVITIES: CPT | Mod: GO

## 2024-11-15 PROCEDURE — 93306 TTE W/DOPPLER COMPLETE: CPT

## 2024-11-15 PROCEDURE — 97166 OT EVAL MOD COMPLEX 45 MIN: CPT | Mod: GO

## 2024-11-15 RX ORDER — CLOPIDOGREL BISULFATE 75 MG/1
75 TABLET ORAL DAILY
Status: DISCONTINUED | OUTPATIENT
Start: 2024-11-15 | End: 2024-11-18 | Stop reason: HOSPADM

## 2024-11-15 ASSESSMENT — PAIN - FUNCTIONAL ASSESSMENT
PAIN_FUNCTIONAL_ASSESSMENT: 0-10

## 2024-11-15 ASSESSMENT — PAIN SCALES - GENERAL
PAINLEVEL_OUTOF10: 0 - NO PAIN

## 2024-11-15 ASSESSMENT — COGNITIVE AND FUNCTIONAL STATUS - GENERAL
MOBILITY SCORE: 9
DRESSING REGULAR LOWER BODY CLOTHING: A LOT
DRESSING REGULAR LOWER BODY CLOTHING: TOTAL
PERSONAL GROOMING: A LOT
CLIMB 3 TO 5 STEPS WITH RAILING: TOTAL
TURNING FROM BACK TO SIDE WHILE IN FLAT BAD: A LOT
DRESSING REGULAR UPPER BODY CLOTHING: A LOT
TOILETING: TOTAL
WALKING IN HOSPITAL ROOM: TOTAL
HELP NEEDED FOR BATHING: TOTAL
DAILY ACTIVITIY SCORE: 13
MOVING FROM LYING ON BACK TO SITTING ON SIDE OF FLAT BED WITH BEDRAILS: A LOT
CLIMB 3 TO 5 STEPS WITH RAILING: TOTAL
MOVING TO AND FROM BED TO CHAIR: TOTAL
MOVING TO AND FROM BED TO CHAIR: TOTAL
DAILY ACTIVITIY SCORE: 9
WALKING IN HOSPITAL ROOM: TOTAL
MOVING FROM LYING ON BACK TO SITTING ON SIDE OF FLAT BED WITH BEDRAILS: A LITTLE
HELP NEEDED FOR BATHING: A LOT
PERSONAL GROOMING: A LITTLE
STANDING UP FROM CHAIR USING ARMS: A LOT
STANDING UP FROM CHAIR USING ARMS: TOTAL
EATING MEALS: A LITTLE
MOBILITY SCORE: 9
TURNING FROM BACK TO SIDE WHILE IN FLAT BAD: A LOT
EATING MEALS: A LITTLE
TOILETING: TOTAL
DRESSING REGULAR UPPER BODY CLOTHING: TOTAL

## 2024-11-15 ASSESSMENT — ACTIVITIES OF DAILY LIVING (ADL)
LACK_OF_TRANSPORTATION: NO
ADL_ASSISTANCE: INDEPENDENT
ADL_ASSISTANCE: INDEPENDENT
BATHING_ASSISTANCE: MODERATE

## 2024-11-15 NOTE — PROGRESS NOTES
Inpatient Speech-Language Pathology Clinical Swallow Evaluation             Reason for Consultation: Pt was referred for a Clinical Swallow Evaluation d/t CVA to assess swallow function.     Recommendations:   Risk for Aspiration: Yes   Solid Diet Recommendations : Soft & bite sized/chopped (IDDSI Level 6)   Liquid Diet Recommendations: Thin (IDDSI Level 0)   Compensatory Swallowing Strategies: Check for pocketing of food, Left lingual sweep     Medication Administration Recommendations: Whole, With Liquid      Skilled dysphagia treatment is warranted  at next level of care.     Assessment:  Consistencies Trialed: Consistencies Trialed: Ice Chips, Thin (IDDSI Level 0) - Straw, Thin (IDDSI Level 0) - Cup, Pureed/extremely thick (IDDSI Level 4), Regular (IDDSI Level 7)     SLP Assessment:   Pt participated in a clinical bedside swallowing evaluation. Pt denied hx of dysphagia, PNA, and weight loss. Pt was assessed using the [place] 3oz. Water protocol, an Oral Mechanism Exam, and multiple PO trials. The results are as follows:    Oral Phase: Pt demonstrated adequate mastication during trial of solids with a neha cracker. Pt had adequate oral prep time of less than 10 seconds. Pt was noted to have pocketing in the L buccal cavity post swallow which she was unaware of despite cues to clear. She was unable to clear residue with a finger and lingual sweep, but was successful with utilization of a toothette swab to clear. Pt demonstrated anterior spillage with straw sips of thin liquids but it was reduced with use of cup instead.     Pharyngeal Phase: Pt showed no overt s/s of aspiration during PO trials. Vocal quality remained consistent after PO trials and there was an absence of coughing.      Throughout evaluation pt benefited from cues to clear bolus from her lips on the L side as well as oral residue. Pt was able to feed herself if food was placed in her L hand while she used her right hand to manipulate the bolus or  utensil.     Relevant Imaging Results:  Impressions from Chest X-ray om 11/14/24:  1.  No evidence of acute cardiopulmonary process.    Plan:  SLP Plan: Skilled SLP    Discussed POC: Patient, Nursing, Physician   Discussed Risks/Benefits: Yes, Patient, Nursing   Patient/Caregiver Agreeable: Yes     Skilled dysphagia treatment is warranted due to for further education and training on dysphagia management including instruction on oropharyngeal therapeutic exercises and/or compensatory swallowing strategies      Subjective   Pt was pleasant and cooperative. Pt was upright in a bed upon clinical assessment. Gave verbal consent for participation in evaluation.      General Visit Information:  Past medical history:   Per H&P:   Cynthia Muse is a 79 y.o. female presenting with sided facial droop left upper extremity left lower extremity weakness started at 9 AM including dysarthria.  Patient is a very pleasant 79-year-old female with history of CKD 3, hypertension, hyperlipidemia and type 2 diabetes.  Patient states she was feeling unwell around 9 AM she noticed that she was unable to move her upper left extremities along with left lower extremity.  Granddaughter also noticed a left-sided facial droop and changes in her speech.  Therefore patient came into the ED.     Patient was seen by the telemetry neuro stroke team, TNK was recommended which was given at noon.     While in the ED patient's deficits are already improving left upper extremity and left lower extremity weakness is improving along with her facial droop and speech.     Pain:  Pain Assessment  Pain Assessment: 0-10  0-10 (Numeric) Pain Score: 0 - No pain     Objective:  Dysphagia goals (Established 11/15/24  Target: 2 weeks)    LTG: Pt will tolerate recommended diet without overt s/s of aspiration (I.e coughing or choking) in order to meet nutrition and hydration standards.     STG 1: Pt will tolerate current diet recommendation without overt s/s of aspiration  (I.e. coughing/choking) with 90% accuracy during observed PO trials.   Goals Initiated 11/15/24  Target Date: 2 weeks  Status: Goal Initiated    Progress this Visit: N/A  STG 2: Pt will participate in PO trials with upgraded diet textures without overt s/s of aspiration with 80% accuracy.  Goals Initiated 11/15/24 Target Date: 2 weeks  Status: Goal Intiated   Progress this Visit: N/A  STG 3: Pt will participate in OMEs to increase ability to clear residue out of the oral cavity after the swallow has been completed with 80% accuracy and maximum cues.  Goals Initiated 11/15/24 Target Date: 2 weeks  Status: Goal Intiated   Progress this Visit: N/A     Education:   Patient education completed regarding results and recommendations of SLP evaluation. Pt verbalizes understanding with all patient questions answered to satisfaction.     Care Team involvement:   Communicated with bedside nurse prior to evaluation with clearance for patient participation obtained, Results of the bedside swallowing evaluation were discussed with nurse and verbalized understanding was noted. , and Results of bedside evaluation verbally discussed with physician with verbalized understanding noted.     Disclaimer: This was completed in collaboration with supervising therapist, Corinne Casey M.A. CCC-SLP

## 2024-11-15 NOTE — PROGRESS NOTES
Evaluation/Treatment    Patient Name: Cynthia Muse  MRN: [id number]  Department: [facility] 4 ICU  Room: [address] [place], [place] [address]  Today's Date: 11/15/24  Time Calculation  Start Time: 1352  Stop Time: 1421  Time Calculation (min): 29 min       Assessment:  OT Assessment: 78 yo female admitted with left-sided hemiparesis d/t +right corona radiata infarct presents significantly below baseline functional status.  Pt would benefit from high intensity rehab to provide neuromuscular re-ed and maximize functional capacity.  Prognosis: Good  Barriers to Discharge: Other (Comment) (2 person assist with transfers; assist with ADLs)  Evaluation/Treatment Tolerance: Other (Comment) (Patient limited by medical status (ie dizziness))  Medical Staff Made Aware: Yes  End of Session Communication: Bedside nurse  End of Session Patient Position: Bed, 3 rail up, Alarm on  OT Assessment Results: Decreased ADL status, Decreased endurance, Decreased functional mobility, Decreased gross motor control, Non-functional left upper extremity  Prognosis: Good  Barriers to Discharge: Other (Comment) (2 person assist with transfers; assist with ADLs)  Evaluation/Treatment Tolerance: Other (Comment) (Patient limited by medical status (ie dizziness))  Medical Staff Made Aware: Yes  Strengths: Ability to acquire knowledge, Premorbid level of function  Barriers to Participation: Comorbidities    Plan:  Treatment Interventions: ADL retraining, Functional transfer training, UE strengthening/ROM, Endurance training, Patient/family training, Equipment evaluation/education, Neuromuscular reeducation, Compensatory technique education  OT Frequency: 4 times per week  OT Discharge Recommendations: High intensity level of continued care (robb-walker)  Equipment Recommended upon Discharge: Other (comment) (robb-walker with therapy, otherwise lift)  OT Recommended Transfer Status: Assist of 2  OT - OK to Discharge: Yes  Treatment Interventions: ADL retraining, Functional transfer  training, UE strengthening/ROM, Endurance training, Patient/family training, Equipment evaluation/education, Neuromuscular reeducation, Compensatory technique education        Subjective   Current Problem:  1. Cerebrovascular accident (CVA), unspecified mechanism (Multi)  Transthoracic Echo (TTE) Complete    Transthoracic Echo (TTE) Complete      2. Hypertension, unspecified type          General:   OT Received On: 11/15/24  General  Reason for Referral: Impaired ADLs d/t CVA  Referred By: Dr Houston  Past Medical History Relevant to Rehab: HTN, DM, CKD III, tobacco abuse  Family/Caregiver Present: Yes  Caregiver Feedback: Daughter - Tayler - supportive  Co-Treatment: PT  Co-Treatment Reason: to maximize safety and outcome  Prior to Session Communication: Bedside nurse  Patient Position Received: Bed, 3 rail up, Alarm off, not on at start of session  Preferred Learning Style: verbal, visual  General Comment: Pt admitted with an acute CVA was cleared by nursing for therapy following repeat CT 24 hours after receiving TNK showing no further progression of the infarct and agreeable to same.    Precautions:  Hearing/Visual Limitations: WFL  Medical Precautions: Fall precautions    Vital Sign (Past 2hrs)        Date/Time Vitals Session Patient Position Pulse Resp SpO2 BP MAP (mmHg)    11/15/24 1352 Pre OT  --  92  --  94 %  151/57  --              Pain:  Pain Assessment  Pain Assessment: 0-10  0-10 (Numeric) Pain Score: 0 - No pain    Objective   Cognition:  Overall Cognitive Status: Within Functional Limits  Orientation Level: Oriented X4  Cognition Comments: +dysarthria d/t facial droop    Home Living:  Type of Home: House  Lives With: Alone  Home Adaptive Equipment: Crutches  Home Layout: Multi-level (Split)  Alternate Level Stairs-Rails:  (unilateral)  Alternate Level Stairs-Number of Steps: ~7 up, ~5 down  Home Access: Stairs to enter without rails  Entrance Stairs-Rails: None  Entrance Stairs-Number of Steps:  1  Bathroom Shower/Tub: Tub/shower unit (on both floors)  Bathroom Toilet: Standard  Bathroom Equipment: None  Bathroom Accessibility: full bath on both upstairs and downstairs    Prior Function:  Level of [place]: Independent with ADLs and functional transfers, Independent with homemaking with ambulation  Receives Help From: Family (Daughter, granddaughter if needed)  ADL Assistance: Independent  Homemaking Assistance: Independent  Ambulatory Assistance: Independent  Vocational: Full time employment (at a [employer])  Hand Dominance: Right  Prior Function Comments: Pt drives, denies history of falls    ADL:  Eating Assistance: Minimal  Eating Deficit: Other (Comment), Increased time to complete (cutting food)  Grooming Assistance: Minimal  Grooming Deficit: Increased time to complete, Other (Comment) (managing containers)  Bathing Assistance: Moderate  Bathing Deficit: Right arm, Buttocks, Right lower leg including foot, Left lower leg including foot  UE Dressing Assistance: Maximal  UE Dressing Deficit: Pull around back, Pull down in back, Thread RUE, Fasteners  LE Dressing Assistance: Maximal  LE Dressing Deficit:  (total assist donning socks - remainder of ADLs=per clinical judgement this date)  Toileting Assistance with Device: Total  Toileting Deficit: Urinary Catheter (+pure wick)    Activity Tolerance:  Endurance: Decreased tolerance for upright activites    Bed Mobility/Transfers: Bed Mobility  Bed Mobility: Yes  Bed Mobility 1  Bed Mobility 1: Supine to sitting  Level of Assistance 1: Moderate assistance, +2  Bed Mobility Comments 1: assist with trunk toward the left side of bed with head elevated and to scoot toward the edge of bed  Bed Mobility 2  Bed Mobility  2: Sitting to supine  Level of Assistance 2: Maximum assistance, +2    Transfers  Transfer: Yes  Transfer 1  Transfer From 1: Bed to  Transfer to 1: Stand  Technique 1: Sit to stand, Stand to sit  Transfer Device 1: Gait belt  Transfer Level of  Assistance 1: Maximum assistance, +2  Trials/Comments 1: x2 blocking left LE d/t significant buckling    Sensation:  Sensation Comment: Denies tingling/numbness    Strength:  Strength Comments: RUE=~4-/5; LUE=~1-/5    Hand Function:  Hand Function  Gross Grasp: Impaired (left; right=WFL)  Coordination: Impaired (left; right=intact)    Extremities: RUE   RUE : Within Functional Limits and LUE   LUE: Exceptions to WFL (minimal active mobility throughout; +edema-hand)    Outcome Measures: [facility] Daily Activity  Putting on and taking off regular lower body clothing: A lot  Bathing (including washing, rinsing, drying): A lot  Putting on and taking off regular upper body clothing: A lot  Toileting, which includes using toilet, bedpan or urinal: Total  Taking care of personal grooming such as brushing teeth: A little  Eating Meals: A little  Daily Activity - Total Score: 13    Education Documentation  ADL Training, taught by Kymberly Lyles OT at 11/15/2024  3:18 PM.  Learner: Patient  Readiness: Acceptance  Method: Explanation  Response: Demonstrated Understanding, Needs Reinforcement  Comment: Functional transfer retraining initiated      OP EDUCATION:  Education  Individual(s) Educated: Patient  Education Provided: Fall precautons, Risk and benefits of OT discussed with patient or other, POC discussed and agreed upon  Risk and Benefits Discussed with Patient/Caregiver/Other: yes  Patient/Caregiver Demonstrated Understanding: yes  Plan of Care Discussed and Agreed Upon: yes  Patient Response to Education: Patient/Caregiver Verbalized Understanding of Information    Goals:  Encounter Problems       Encounter Problems (Active)       OT Goals       ADLs (Progressing)       Start:  11/15/24    Expected End:  12/06/24       Patient will complete ADL tasks, with set-up and supervision using AE need in order to increase patient's safety and independence with self-care tasks.           Functional transfers (Progressing)        Start:  11/15/24    Expected End:  12/06/24       Patient will complete functional transfers with minimal assist  using least restrictive device, in order to increase patient's safety and independence with daily tasks.           LUE (Progressing)       Start:  11/15/24    Expected End:  12/06/24       Pt will demonstrate increased LUE mobility, strength, and coordination as evidenced by formal testing and function.

## 2024-11-15 NOTE — CONSULTS
HPI: Telestroke consult at [facility]  79 y.o. female with HTN, CKD, HLD, current smoking who presents with acute onset L sided weakness. S/p TNK. Neurology consulted for this.     Last known Well: 9am   NIH Stroke Scale Reported: 9     Prior Functional Status (Modified Juan Scale):  0 No symptoms at all    Pt presented with sudden onset L sided weakness at home with slurred speech and facial droop. She came to ED for evaluation. Initial /118, glucose 101, NIHSS 9. Pt received TNK 1220. CTA negative for LVO or significant stenosis. Admitted for post TNK care.     Review of imaging, echocardiogram, labs and other data:   MRI: R corona radiata infarct   Vessel imaging: SARAH 50% stenosis, LICA 30% stenosis, intracranially no significant stenosis   Echo: EF 55-60%, LA normal in size, PFO not tested   LDL:   69   A1c:   Results from last 7 days   Lab Units 11/14/24  1107   HEMOGLOBIN A1C % 6.0*     Tobacco use?: current use   Current antithrombotics:  none -- started on aspirin and plavix post 24h TNK      Patient Active Problem List    Diagnosis Date Noted    Cerebrovascular accident (CVA), unspecified mechanism (Multi) 11/14/2024    Essential hypertension 02/26/2024    Hyperlipidemia 02/26/2024    Osteoporosis 02/26/2024    Chronic kidney disease (CKD) stage G3a/A2, moderately decreased glomerular filtration rate (GFR) between 45-59 mL/min/1.73 square meter and albuminuria creatinine ratio between  mg/g (C* (Multi) 02/26/2024    Type 2 diabetes mellitus without complication (Multi) 02/26/2024    Vitamin D deficiency 02/26/2024     Current Facility-Administered Medications   Medication Dose Route Frequency Provider Last Rate Last Admin    [Held by provider] alendronate (Fosamax) tablet 5 mg  5 mg oral Daily before breakfast Ramonita Houston MD        [Held by provider] amLODIPine (Norvasc) tablet 10 mg  10 mg oral Daily Ramonita Houston MD        [START ON 11/16/2024] aspirin EC tablet 81 mg  81 mg oral Daily  Ramonita Houston MD        clopidogrel (Plavix) tablet 75 mg  75 mg oral Daily Jad Lancaster MD   75 mg at 11/15/24 1254    dextrose 50 % injection 12.5 g  12.5 g intravenous q15 min PRN Ramonita Houston MD        dextrose 50 % injection 25 g  25 g intravenous q15 min PRN Ramonita Houston MD        [Held by provider] glimepiride (Amaryl) tablet 2 mg  2 mg oral Daily before breakfast Ramonita Houston MD        glucagon (Glucagen) injection 1 mg  1 mg intramuscular q15 min PRN Ramonita Houston MD        glucagon (Glucagen) injection 1 mg  1 mg intramuscular q15 min PRN Ramonita Houston MD        hydrALAZINE (Apresoline) injection 10 mg  10 mg intravenous q20 min PRN Ramonita Houston MD        Followed by    [START ON 11/16/2024] hydrALAZINE (Apresoline) tablet 25 mg  25 mg oral q6h PRN Ramonita Houston MD        [Held by provider] hydroCHLOROthiazide (HYDRODiuril) tablet 25 mg  25 mg oral Daily Ramonita Houston MD        insulin lispro injection 0-5 Units  0-5 Units subcutaneous TID AC Ramonita Houston MD        labetaloL (Normodyne,Trandate) injection 10 mg  10 mg intravenous q10 min PRN Ramonita Houston MD        [Held by provider] lisinopril tablet 10 mg  10 mg oral Daily Ramonita Houston MD        oxygen (O2) therapy   inhalation Continuous PRN - O2/gases Ramonita Houston MD        perflutren lipid microspheres (Definity) injection 0.5-10 mL of dilution  0.5-10 mL of dilution intravenous Once in imaging Ramonita Houston MD        perflutren protein A microsphere (Optison) injection 0.5 mL  0.5 mL intravenous Once in imaging Ramonita Houston MD        polyethylene glycol (Glycolax, Miralax) packet 17 g  17 g oral Daily Ramonita Houston MD   17 g at 11/15/24 1047    rosuvastatin (Crestor) tablet 10 mg  10 mg oral Daily Ramonita Houston MD   10 mg at 11/15/24 1047    sulfur hexafluoride microsphr (Lumason) injection 24.28 mg  2 mL intravenous Once in imaging Ramonita Houston MD         Allergies: Ezetimibe, Metformin, and Pravastatin  sodium  Past Medical History:   Diagnosis Date    Chronic kidney disease (CKD) stage G3a/A2, moderately decreased glomerular filtration rate (GFR) between 45-59 mL/min/1.73 square meter and albuminuria creatinine ratio between  mg/g (C* (Multi)     Rosplock    HTN (hypertension)     Hyperlipidemia     Osteoporosis     Type 2 diabetes mellitus     Vitamin D deficiency      History reviewed. No pertinent surgical history.  Family History   Problem Relation Name Age of Onset    Brain Aneurysm Mother 39 yo     Coronary artery disease Father 51 yo     Heart attack Father 51 yo     Lung cancer Sister       Social History     Tobacco Use    Smoking status: Every Day     Current packs/day: 0.25     Types: Cigarettes    Smokeless tobacco: Never   Substance Use Topics    Alcohol use: Never       Exam:  Vitals:    11/15/24 1230   BP: 158/64   Pulse: 85   Resp: 18   Temp:    SpO2: 94%     Virtual, limited exam performed  Pt is laying in bed, in NAD  There is significant dysarthria but able to convey information well, no aphasia.   EOM full, no gaze preference   There is L facial droop  L elbow and arm unable to move, LLE strength improved   Sensation intact    NIH Stroke Scale: 6          Imaging Results:  MRI: MR brain wo IV contrast    Result Date: 11/14/2024  Acute ischemic infarct in the right corona radiata on a background of mild chronic small vessel ischemic changes.   No acute intracranial hemorrhage or mass effect.   MACRO: None.   Signed by: Vincent Proctor 11/14/2024 8:17 PM Dictation workstation:   [id number]   CT Head: CT brain attack head wo IV contrast    Result Date: 11/14/2024  No acute intracranial bleed or focal mass effect.   Mild volume loss.   Mild chronic white matter ischemic disease in the deep periventricular regions.   MACRO: Vincent Mora discussed the significance and urgency of this critical finding by epic secure chat with  BRYON ALICEA on 11/14/2024 at 11:07 am.  (**-RCF-**) Findings:   See findings.   Signed by: Vincent Mora 11/14/2024 11:08 AM Dictation workstation:   [id number]   Echo: No echocardiogram results found for the past 14 days    ASSESSMENT:  79 y.o. female with HTN, CKD, HLD, current smoking who presents with acute onset L sided weakness. S/p TNK. MRI shows R corona radiata infarct. Strength still poor but somewhat improved LLE.     Etiology: Ischemic Stroke: Small-vessel disease from HTN, HLD tobacco use    Diagnosis:   Ischemic Stroke   HTN  Active smoking   HLD          RECOMMENDATIONS:   - DAPT with aspirin and plavix 21 days   - can space out neurochecks q4-8h   - continue home statin, LDL at goal   - pt now outside of permissive HTN window, can start to reintroduce home antihypertensives   - smoking cessation education   - PT/OP/SLP as appropriate, anticipate pt would likely benefit from further therapy     Will continue to follow.      Consult completed by: TELEPHONE and VIDEO interactive communication was used to provide this telehealth service. Time includes consultation with ED provider and extensive review of data- history, medical records, lab/radiology/medical test results, neuroimaging studies:  70 minutes was spent in INITIAL telehealth consultation

## 2024-11-15 NOTE — CARE PLAN
"The patient's goals for the shift include  \"get better\"    The clinical goals for the shift include monitor neurological status, document changes, education re: stroke and stroke care    Over the shift, the patient did  make progress toward the following goals.     Barriers to progression include none.     Recommendations to address these barriers include completion of q 1 hr neurological checks, transition to q 4 hrs neuro checks.  Introduction to stroke education, medication management, follow-up care: rehab    "

## 2024-11-15 NOTE — PROGRESS NOTES
Spiritual Care Visit    Clinical Encounter Type  Visited With: Patient, Family  Routine Visit: Introduction     Annotation:  provided patient support while rounding the Unit.  introduced  services of    explained the role of the  in providing emotional and spiritual support for patient's and family while in admitted to the hospital. Patient was resting, stating she was feeling about the same as when she was admitted. Patient states that she had a stroke and was not able to move her right arm.  offered support. Patient states that therapy had her sitting on the edge of the bed.  greeted the patient's daughter at the end of the visit.  No spiritual or Jain needs were expressed. Spiritual care will remain available for support as requested.                                         Taxonomy  Intended Effects: Establish rapport and connectedness, Build relationship of care and support, Demonstrate caring and concern  Methods: Offer support  Interventions: Active listening, Ask guided questions

## 2024-11-15 NOTE — PROGRESS NOTES
Speech-Language Pathology    SLP Adult Inpatient Speech-Language Cognition    Patient Name: Cynthia Muse  MRN: [id number]  Today's Date: 11/15/2024   Time Calculation  Start Time: 1050  Stop Time: 1120  Time Calculation (min): 30 min         Current Problem:   1. Cerebrovascular accident (CVA), unspecified mechanism (Multi)  Transthoracic Echo (TTE) Complete    Transthoracic Echo (TTE) Complete      2. Hypertension, unspecified type              SLP Assessment:  SLP Assessment  SLP Assessment Results: Motor Speech Deficits  Treatment Provided: Yes   Treatment Tolerance: Patient tolerated treatment well  Education Provided: Yes      SLP Plan:  Plan  Inpatient/Swing Bed or Outpatient: Inpatient  Treatment/Interventions: Oral motor exercises (Clear Speech Strategies)  SLP Plan: Skilled SLP  SLP Frequency: 3x per week  Duration: 2 weeks  Next Treatment Priority: diet reinaldo  Discussed POC: Patient, Caregiver/family  Discussed Risks/Benefits: Yes, Patient, Caregiver/Family  Patient/Caregiver Agreeable: Yes      Subjective   Current Problem: Dysarthria s/p acute right corona radiata CVA    Pt was pleasant and participative. A family member was present during evaluation. Pt verbalized agreement to participate in evaluation.     Objective   Pt participated in the Quick Aphasia Battery with scores as follows:  Pt participated in assessment via the Quick Aphasia Battery with the following results:      Summary        Word comprehension 10.00   Sentence comprehension 6.67   Word finding 10.00   Grammatical construction 10.00   Speech motor programming 0.00   Repetition 10.00   Reading 10.00   QAB overall 8.55     Speech and Language Goals: (established 11/15/24  Target: 2 weeks)  LTG 1: Pt will improve increase intelligibility in multiple contexts (I.e words, phrases, conversation) to better communicate personal/medical wants and needs. (Established: 11/15/24  Target Date: 2 weeks)    STG 1: Pt will learn and utilize clear speech  strategies to improve speech intelligibility to be better understood in her environment per report or as observed during the session with 80% accuracy.    Goal Initiated: 11/15/24    Target Date: 2 weeks   Today's Progress: Pt participated in introduction of clear speech strategies. Pt learned the strategies of S.O.S (Speak Up, Over-do, Slow down). She demonstrated understanding of all strategies.   Status: Progressing    STG 2: Pt will participate in OMEs (I.e labial exercises) to increase strength and ROM for adequate motor speech function to increase intelligibly with 80% accuracy.    Goal Initiated: 11/15/24    Target Date: 2 weeks    Today's Progress: Pt participated in 3x5 labial exercises as follows:      - Labial Protrusion     - Labial Retraction     - Labial Press   Status: Progressing  STG 3: Pt will participate in articulatory precision tasks with words/phrase to improve rate of speech with 80% accuracy.  Goal Initiated: 11/15/24  Target Date: 2 weeks   Status: Goal Initiated   Today's Progress: N/A     Pain:  Pain Assessment  Pain Assessment: 0-10  0-10 (Numeric) Pain Score: 0 - No pain      Cognition:  Cognition  Overall Cognitive Status: Within Functional Limits  Orientation Level: Oriented X4  Attention: Within Functional Limits       Motor Speech Production:  Motor Speech Production  Oral Motor : Labial deviation L, Impaired  Automatic Speech: WFL, Counting to 10  Repetition: Impaired  Diadochokinetic Rate: Impaired  Paralinguistic Features: Impaired, Rate  Motor Speech Disorder: Dysarthria      Auditory Comprehension:   Auditory Comprehension  Yes/No Questions: Within Functional Limits  Commands: Within Functional Limits      Reading Comprehension:  Reading Comprehension  Reading Status: Within funtional limits      Verbal:  Verbal Expression  Primary Mode of Expression: Verbal  Primary Language: English  Confrontation Naming: Within Functional Limits  Repetition: Within Functional  Limits  Conversation: Within Functional Limits        Education:  Pt/Family member were educated on results of assessment, techniques/strategies, and POC. Pt verbalized understanding and agreement.    Disclaimer: This was completed in collaboration with supervising therapist, Corinne Casey M.A. CCC-SLP

## 2024-11-15 NOTE — PROGRESS NOTES
11/15/24 1622   Discharge Planning   Living Arrangements Alone   Support Systems Children   Assistance Needed None - Independent and worked PTA   Type of Residence Private residence   Number of Stairs to Enter Residence 2   Number of Stairs Within Residence 6   Type of Animals or Pets Dog   Home or Post Acute Services Post acute facilities (Rehab/SNF/etc)  (Patient prefers Acute Rehab ([facility]) if recommended.  Awaiting therapy evals.  Patient will need AR/SNF lists as appropriate.)   Expected Discharge Disposition Rehab   Does the patient need discharge transport arranged? Yes   RoundTrip coordination needed? Yes   Has discharge transport been arranged? No   Financial Resource Strain   How hard is it for you to pay for the very basics like food, housing, medical care, and heating? Not hard   Housing Stability   In the last 12 months, was there a time when you were not able to pay the mortgage or rent on time? N   At any time in the past 12 months, were you homeless or living in a shelter (including now)? N   Transportation Needs   In the past 12 months, has lack of transportation kept you from medical appointments or from getting medications? no   In the past 12 months, has lack of transportation kept you from meetings, work, or from getting things needed for daily living? No     Patient will need precert prior to discharge.

## 2024-11-15 NOTE — PROGRESS NOTES
Physical Therapy    Physical Therapy Evaluation & Treatment    Patient Name: Cynthia Muse  MRN: [id number]  Department: [facility] 4 ICU  Room: [address] [place], [place] [address]  Today's Date: 11/15/2024   Time Calculation  Start Time: 1349  Stop Time: 1422  Time Calculation (min): 33 min    Assessment/Plan   PT Assessment  PT Assessment Results: Decreased strength, Decreased range of motion, Decreased endurance, Impaired balance, Decreased mobility, Decreased coordination, Impaired tone  Rehab Prognosis: Good  Barriers to Discharge: significant decline from IND baseline, mod/max assist of 2, unable to take steps at this time. HIgh fall risk.  Evaluation/Treatment Tolerance: Patient limited by fatigue (gives good effort)  Medical Staff Made Aware: Yes  Strengths: Ability to acquire knowledge, Premorbid level of function  Barriers to Participation: Comorbidities  End of Session Communication: Bedside nurse  Assessment Comment: Pt is a 79 y.o. female adm for CVA w/ Lt sided weakness and dysarthria. Pt was independent and working full time PTA. Pt required mod/max assist of 2 on eval, gives good effort, but unable to progress to ambulation. Pt would benefit from continued PT services to progress functional strength, balance, endurance nad safe mobility.  End of Session Patient Position: Bed, 3 rail up, Alarm on   IP OR SWING BED PT PLAN  Inpatient or Swing Bed: Inpatient  PT Plan  Treatment/Interventions: Bed mobility, Transfer training, Gait training, Balance training, Strengthening, Endurance training, Range of motion, Therapeutic exercise, Therapeutic activity  PT Plan: Ongoing PT  PT Frequency: 6 times per week  PT Discharge Recommendations: High intensity level of continued care  Equipment Recommended upon Discharge: Other (comment) (TBD)  PT Recommended Transfer Status: Assist x2, Other (comment) (gait belt; non-amb at time of eval)  PT - OK to Discharge: Yes      Subjective     General Visit Information:  General  Reason for Referral:  CVA  Referred By: Dr Houston  Past Medical History Relevant to Rehab: HTN, DM, CKD III, tobacco abuse  Family/Caregiver Present: Yes  Caregiver Feedback: Dtr, [place], supportive  Co-Treatment: OT  Co-Treatment Reason: pt safety and treatment  Prior to Session Communication: Bedside nurse  Patient Position Received: Bed, 3 rail up, Alarm off, not on at start of session  Preferred Learning Style: verbal, visual  General Comment: Pt agreeable to PT eval, cleared per nurse after having spoken w/ neurologist regarding repeat CT, noting no progression of CVA.  Home Living:  Home Living  Type of Home: House  Lives With: Alone  Home Adaptive Equipment: Crutches  Home Layout: Multi-level (split level)  Alternate Level Stairs-Rails:  (unilateral)  Alternate Level Stairs-Number of Steps: ~7 up, ~5 down  Home Access: Stairs to enter without rails  Entrance Stairs-Rails: None  Entrance Stairs-Number of Steps: 1  Bathroom Shower/Tub: Tub/shower unit (both up and downstairs)  Bathroom Toilet: Standard  Bathroom Equipment: None  Bathroom Accessibility: full bath on both upstairs and downstairs  Prior Level of Function:  Prior Function Per Pt/Caregiver Report  Level of [place]: Independent with ADLs and functional transfers, Independent with homemaking with ambulation  Receives Help From: Family (dtr, granddtr if needed)  ADL Assistance: Independent  Homemaking Assistance: Independent  Ambulatory Assistance: Independent  Vocational: Full time employment (deli)  Hand Dominance: Right  Prior Function Comments: Pt IND, drives, no falls, sleeps in a regular bed  Precautions:  Precautions  Hearing/Visual Limitations: no deficits  Medical Precautions: Fall precautions    Vital Signs (Past 2hrs)        Date/Time Vitals Session Patient Position Pulse Resp SpO2 BP MAP (mmHg)    11/15/24 1530 --  --  88  21  90 %  157/65  --     11/15/24 1536 --  --  86  17  94 %  --  --     11/15/24 1600 --  --  83  18  93 %  149/56  --                    Vital Signs Comment: BP at end of session w/ pt supine: 143/66 MAP 88    Objective   Pain:  Pain Assessment  Pain Assessment: 0-10  0-10 (Numeric) Pain Score: 0 - No pain  Cognition:  Cognition  Overall Cognitive Status: Within Functional Limits  Orientation Level: Oriented X4  Cognition Comments: pleasant, cooperative, good eye contact    General Assessments:  General Observation  General Observation: decreased tone LUE>LLE, facial droop, dysarthric speech    Activity Tolerance  Endurance: Decreased tolerance for upright activites  Activity Tolerance Comments: Fair- (easily fatigued)    Sensation  Sensation Comment: pt denied abn sensation     Coordination  Coordination Comment: decreased LUE> LLE    Static Sitting Balance  Static Sitting-Balance Support: Feet supported, Right upper extremity supported  Static Sitting-Level of Assistance: Close supervision  Static Sitting-Comment/Number of Minutes: x 7-8 minutes    Static Standing Balance  Static Standing-Balance Support: Bilateral upper extremity supported  Static Standing-Level of Assistance: Moderate assistance (+2)  Static Standing-Comment/Number of Minutes: ~ 1 minute x 1, 10 sceonds x 1 (cues to weight shift Rt, for increased YANI and erect posture/head up)  Functional Assessments:  Bed Mobility  Bed Mobility: Yes  Bed Mobility 1  Bed Mobility 1: Supine to sitting  Level of Assistance 1: Moderate assistance, +2, Minimal verbal cues  Bed Mobility Comments 1: to Lt EOB, pt able to initiate LEs to EOB, cues to direct activity, assist for trunk up and scooting hips to EOB  Bed Mobility 2  Bed Mobility  2: Sitting to supine  Level of Assistance 2: Maximum assistance, +2  Bed Mobility Comments 2: assist for trunk down and LEs into bed    Transfers  Transfer: Yes  Transfer 1  Technique 1: Sit to stand, Stand to sit  Transfer Device 1: Gait belt  Transfer Level of Assistance 1: Maximum assistance, +2, Moderate verbal cues  Trials/Comments 1: Performed ~ 1 minute x  1, HHA of 2 (LUE supported, Rt hand holding therapist's arm, cues for Rt weight shift and block to Lt knee due to LLE buckle, cues to increase YANI, cues for erect posture/head up. Performed transfer a second time to sit further back on bed. Pt fatigued.    Ambulation/Gait Training  Ambulation/Gait Training Performed: No  Extremity/Trunk Assessments:  RLE   RLE : Within Functional Limits  LLE   LLE : Exceptions to WFL  Strength LLE  L Hip Flexion: 2+/5  L Hip ABduction: 3-/5  L Hip ADduction: 3-/5  L Knee Extension: 2-/5  L Ankle Dorsiflexion: 2-/5  Treatments:  Bed Mobility  Bed Mobility: Yes  Bed Mobility 1  Bed Mobility 1: Supine to sitting  Level of Assistance 1: Moderate assistance, +2, Minimal verbal cues  Bed Mobility Comments 1: to Lt EOB, pt able to initiate LEs to EOB, cues to direct activity, assist for trunk up and scooting hips to EOB  Bed Mobility 2  Bed Mobility  2: Sitting to supine  Level of Assistance 2: Maximum assistance, +2  Bed Mobility Comments 2: assist for trunk down and LEs into bed    Transfers  Transfer: Yes  Transfer 1  Technique 1: Sit to stand, Stand to sit  Transfer Device 1: Gait belt  Transfer Level of Assistance 1: Maximum assistance, +2, Moderate verbal cues  Trials/Comments 1: Performed ~ 1 minute x 1, HHA of 2 (LUE supported, Rt hand holding therapist's arm, cues for Rt weight shift and block to Lt knee due to LLE buckle, cues to increase YANI, cues for erect posture/head up. Performed transfer a second time to sit further back on bed. Pt fatigued.  Outcome Measures:  [facility] Basic Mobility  Turning from your back to your side while in a flat bed without using bedrails: A lot  Moving from lying on your back to sitting on the side of a flat bed without using bedrails: A lot  Moving to and from bed to chair (including a wheelchair): Total  Standing up from a chair using your arms (e.g. wheelchair or bedside chair): A lot  To walk in hospital room: Total  Climbing 3-5 steps with  railing: Total  Basic Mobility - Total Score: 9    Encounter Problems       Encounter Problems (Active)       Balance       LTG - Patient will maintain balance to allow for safe activity (Progressing)       Start:  11/15/24    Expected End:  11/22/24               Mobility       STG - Patient will ambulate 10' w/ LRAD and mod assist of 2 (Progressing)       Start:  11/15/24    Expected End:  11/22/24            Pt will tolerate BLE exercises consistently to promote functional strength, balance and endurance (Progressing)       Start:  11/15/24    Expected End:  11/22/24               PT Transfers       STG - Patient to transfer to and from sit to supine w/ min assist (Progressing)       Start:  11/15/24    Expected End:  11/22/24            STG - Patient will transfer sit to and from stand w/ min assist of 2 (Progressing)       Start:  11/15/24    Expected End:  11/22/24                   Education Documentation  Precautions, taught by Yi Herzog, PT at 11/15/2024  5:03 PM.  Learner: Patient  Readiness: Acceptance  Method: Explanation, Demonstration  Response: Needs Reinforcement    Mobility Training, taught by Yi Herzog, PT at 11/15/2024  5:03 PM.  Learner: Patient  Readiness: Acceptance  Method: Explanation, Demonstration  Response: Needs Reinforcement    Education Comments  No comments found.

## 2024-11-15 NOTE — PROGRESS NOTES
"Cynthia Muse is a 79 y.o. female on day 1 of admission presenting with Cerebrovascular accident (CVA), unspecified mechanism (Multi).    Subjective   Speech seems to have improved, left upper extremity and left lower extremity strength has decreased per the patient.  Was seen by speech cleared for diet.  MRI did confirm a right corona radiata stroke.  Repeat head CT scheduled for noon, resumed patient's home statin blood pressure stable       Objective     Physical Exam  Vitals reviewed.   Constitutional:       Appearance: Normal appearance.   HENT:      Head: Normocephalic.      Mouth/Throat:      Mouth: Mucous membranes are moist.   Cardiovascular:      Rate and Rhythm: Normal rate and regular rhythm.   Pulmonary:      Effort: Pulmonary effort is normal.   Abdominal:      General: Abdomen is flat. Bowel sounds are normal.      Palpations: Abdomen is soft.   Skin:     Capillary Refill: Capillary refill takes less than 2 seconds.   Neurological:      General: No focal deficit present.      Mental Status: She is alert.      Comments: Left upper extremity and left lower extremity strength is 2 out of 5, left-sided facial droop, dysarthria         Last Recorded Vitals  Blood pressure 156/62, pulse 92, temperature 36.6 °C (97.9 °F), temperature source Temporal, resp. rate 20, height 1.6 m (5' 3\"), weight 70.6 kg (155 lb 10.3 oz), SpO2 91%.  Intake/Output last 3 Shifts:  I/O last 3 completed shifts:  In: - (0 mL/kg)   Out: 600 (8.5 mL/kg) [Urine:600 (0.2 mL/kg/hr)]  Weight: 70.6 kg     Relevant Results  Results for orders placed or performed during the hospital encounter of 11/14/24 (from the past 24 hours)   CBC and Auto Differential   Result Value Ref Range    WBC 12.3 (H) 4.4 - 11.3 x10*3/uL    nRBC 0.0 0.0 - 0.0 /100 WBCs    RBC 4.11 4.00 - 5.20 x10*6/uL    Hemoglobin 12.1 12.0 - 16.0 g/dL    Hematocrit 37.4 36.0 - 46.0 %    MCV 91 80 - 100 fL    MCH 29.4 26.0 - 34.0 pg    MCHC 32.4 32.0 - 36.0 g/dL    RDW 15.0 (H) " 11.5 - 14.5 %    Platelets 280 150 - 450 x10*3/uL    Neutrophils % 64.1 40.0 - 80.0 %    Immature Granulocytes %, Automated 0.4 0.0 - 0.9 %    Lymphocytes % 22.3 13.0 - 44.0 %    Monocytes % 10.5 2.0 - 10.0 %    Eosinophils % 1.9 0.0 - 6.0 %    Basophils % 0.8 0.0 - 2.0 %    Neutrophils Absolute 7.90 (H) 1.60 - 5.50 x10*3/uL    Immature Granulocytes Absolute, Automated 0.05 0.00 - 0.50 x10*3/uL    Lymphocytes Absolute 2.75 0.80 - 3.00 x10*3/uL    Monocytes Absolute 1.29 (H) 0.05 - 0.80 x10*3/uL    Eosinophils Absolute 0.23 0.00 - 0.40 x10*3/uL    Basophils Absolute 0.10 0.00 - 0.10 x10*3/uL   Comprehensive metabolic panel   Result Value Ref Range    Glucose 140 (H) 74 - 99 mg/dL    Sodium 139 136 - 145 mmol/L    Potassium 3.6 3.5 - 5.3 mmol/L    Chloride 107 98 - 107 mmol/L    Bicarbonate 21 21 - 32 mmol/L    Anion Gap 15 10 - 20 mmol/L    Urea Nitrogen 50 (H) 6 - 23 mg/dL    Creatinine 1.80 (H) 0.50 - 1.05 mg/dL    eGFR 29 (L) >60 mL/min/1.73m*2    Calcium 9.2 8.6 - 10.3 mg/dL    Albumin 4.3 3.4 - 5.0 g/dL    Alkaline Phosphatase 80 33 - 136 U/L    Total Protein 7.7 6.4 - 8.2 g/dL    AST 19 9 - 39 U/L    Bilirubin, Total 0.6 0.0 - 1.2 mg/dL    ALT 18 7 - 45 U/L   Troponin I, High Sensitivity   Result Value Ref Range    Troponin I, High Sensitivity 6 0 - 13 ng/L   Protime-INR   Result Value Ref Range    Protime 10.5 9.3 - 12.7 seconds    INR 1.0 0.9 - 1.2   APTT   Result Value Ref Range    aPTT 28.8 22.0 - 32.5 seconds   Lipid Panel   Result Value Ref Range    Cholesterol 132 0 - 199 mg/dL    HDL-Cholesterol 46.6 mg/dL    Cholesterol/HDL Ratio 2.8     LDL Calculated 59 <=99 mg/dL    VLDL 26 0 - 40 mg/dL    Triglycerides 130 0 - 149 mg/dL    Non HDL Cholesterol 85 0 - 149 mg/dL   Hemoglobin A1C   Result Value Ref Range    Hemoglobin A1C 6.0 (H) See comment %    Estimated Average Glucose 126 Not Established mg/dL   POCT GLUCOSE   Result Value Ref Range    POCT Glucose 124 (H) 74 - 99 mg/dL   ECG 12 lead   Result Value  Ref Range    Ventricular Rate 103 BPM    Atrial Rate 103 BPM    IA Interval 168 ms    QRS Duration 58 ms    QT Interval 308 ms    QTC Calculation(Bazett) 403 ms    P Axis 69 degrees    R Axis 50 degrees    T Axis 46 degrees    QRS Count 17 beats    Q Onset 225 ms    P Onset 141 ms    P Offset 188 ms    T Offset 379 ms    QTC Fredericia 369 ms   POCT GLUCOSE   Result Value Ref Range    POCT Glucose 119 (H) 74 - 99 mg/dL   POCT GLUCOSE   Result Value Ref Range    POCT Glucose 121 (H) 74 - 99 mg/dL   CBC and Auto Differential   Result Value Ref Range    WBC 11.0 4.4 - 11.3 x10*3/uL    nRBC 0.0 0.0 - 0.0 /100 WBCs    RBC 4.01 4.00 - 5.20 x10*6/uL    Hemoglobin 11.8 (L) 12.0 - 16.0 g/dL    Hematocrit 36.4 36.0 - 46.0 %    MCV 91 80 - 100 fL    MCH 29.4 26.0 - 34.0 pg    MCHC 32.4 32.0 - 36.0 g/dL    RDW 15.1 (H) 11.5 - 14.5 %    Platelets 319 150 - 450 x10*3/uL    Neutrophils % 73.9 40.0 - 80.0 %    Immature Granulocytes %, Automated 0.5 0.0 - 0.9 %    Lymphocytes % 14.0 13.0 - 44.0 %    Monocytes % 9.8 2.0 - 10.0 %    Eosinophils % 1.2 0.0 - 6.0 %    Basophils % 0.6 0.0 - 2.0 %    Neutrophils Absolute 8.13 (H) 1.60 - 5.50 x10*3/uL    Immature Granulocytes Absolute, Automated 0.05 0.00 - 0.50 x10*3/uL    Lymphocytes Absolute 1.54 0.80 - 3.00 x10*3/uL    Monocytes Absolute 1.08 (H) 0.05 - 0.80 x10*3/uL    Eosinophils Absolute 0.13 0.00 - 0.40 x10*3/uL    Basophils Absolute 0.07 0.00 - 0.10 x10*3/uL   Comprehensive Metabolic Panel   Result Value Ref Range    Glucose 101 (H) 74 - 99 mg/dL    Sodium 141 136 - 145 mmol/L    Potassium 3.6 3.5 - 5.3 mmol/L    Chloride 107 98 - 107 mmol/L    Bicarbonate 20 (L) 21 - 32 mmol/L    Anion Gap 18 10 - 20 mmol/L    Urea Nitrogen 43 (H) 6 - 23 mg/dL    Creatinine 1.69 (H) 0.50 - 1.05 mg/dL    eGFR 31 (L) >60 mL/min/1.73m*2    Calcium 9.3 8.6 - 10.3 mg/dL    Albumin 4.0 3.4 - 5.0 g/dL    Alkaline Phosphatase 81 33 - 136 U/L    Total Protein 7.3 6.4 - 8.2 g/dL    AST 17 9 - 39 U/L     Bilirubin, Total 0.7 0.0 - 1.2 mg/dL    ALT 13 7 - 45 U/L   POCT GLUCOSE   Result Value Ref Range    POCT Glucose 105 (H) 74 - 99 mg/dL   Transthoracic Echo (TTE) Complete   Result Value Ref Range    BSA 1.77 m2       Imaging Results  Cta head/neck;  IMPRESSION:  1. No large vessel occlusion or significant stenosis of the  intracranial vessels.  2. Atherosclerosis of the bilateral cervical internal carotid  arteries resulting in 50% stenosis on the right and 30% stenosis of  the left by NASCET criteria.     Head ct:     IMPRESSION:  No acute intracranial bleed or focal mass effect.      Mild volume loss.      Mild chronic white matter ischemic disease in the deep  periventricular regions.    MRI brain:  IMPRESSION:  Acute ischemic infarct in the right corona radiata on a background of  mild chronic small vessel ischemic changes.      No acute intracranial hemorrhage or mass effect.    Medications:  [Held by provider] alendronate, 5 mg, oral, Daily before breakfast  [Held by provider] amLODIPine, 10 mg, oral, Daily  [START ON 11/16/2024] aspirin, 81 mg, oral, Daily  [Held by provider] glimepiride, 2 mg, oral, Daily before breakfast  [Held by provider] hydroCHLOROthiazide, 25 mg, oral, Daily  insulin lispro, 0-5 Units, subcutaneous, TID AC  [Held by provider] lisinopril, 10 mg, oral, Daily  perflutren lipid microspheres, 0.5-10 mL of dilution, intravenous, Once in imaging  perflutren protein A microsphere, 0.5 mL, intravenous, Once in imaging  polyethylene glycol, 17 g, oral, Daily  rosuvastatin, 10 mg, oral, Daily  sulfur hexafluoride microsphr, 2 mL, intravenous, Once in imaging       PRN medications: dextrose, dextrose, glucagon, glucagon, hydrALAZINE **FOLLOWED BY** [START ON 11/16/2024] hydrALAZINE, labetaloL, oxygen     Assessment/Plan   1.  Acute right corona radiata CVA s/p TNK of ember 14th  -Patient presented with left-sided facial droop dysarthria left-sided hemiparesis patient was given TNK at noon member  14th  -No platelet agents or blood thinners for 24 hours, blood pressure threshold for as needed medications per stroke protocol post tPA.  -Head CT at noon today, echo with bubble studies pending  -Bilateral carotid artery stenosis 50% on the right 30% on the left  -speech, pt/ot  -resume crestor  -LDL at goal currently 59     2.  HTN  -hold anti htn     3. DMII  -hold oral meds  -ssi  -pending hga1c    4.CKDIII  -stable    DVT Prophylaxis:  SIDDHARTH Houston MD  [facility]

## 2024-11-16 LAB
ALBUMIN SERPL BCP-MCNC: 4.2 G/DL (ref 3.4–5)
ANION GAP SERPL CALCULATED.3IONS-SCNC: 14 MMOL/L (ref 10–20)
BUN SERPL-MCNC: 36 MG/DL (ref 6–23)
CALCIUM SERPL-MCNC: 9.2 MG/DL (ref 8.6–10.3)
CHLORIDE SERPL-SCNC: 106 MMOL/L (ref 98–107)
CO2 SERPL-SCNC: 23 MMOL/L (ref 21–32)
CREAT SERPL-MCNC: 1.68 MG/DL (ref 0.5–1.05)
EGFRCR SERPLBLD CKD-EPI 2021: 31 ML/MIN/1.73M*2
GLUCOSE BLD MANUAL STRIP-MCNC: 121 MG/DL (ref 74–99)
GLUCOSE BLD MANUAL STRIP-MCNC: 134 MG/DL (ref 74–99)
GLUCOSE BLD MANUAL STRIP-MCNC: 165 MG/DL (ref 74–99)
GLUCOSE BLD MANUAL STRIP-MCNC: 186 MG/DL (ref 74–99)
GLUCOSE SERPL-MCNC: 162 MG/DL (ref 74–99)
MAGNESIUM SERPL-MCNC: 2.04 MG/DL (ref 1.6–2.4)
PHOSPHATE SERPL-MCNC: 3.2 MG/DL (ref 2.5–4.9)
POTASSIUM SERPL-SCNC: 3.8 MMOL/L (ref 3.5–5.3)
SODIUM SERPL-SCNC: 139 MMOL/L (ref 136–145)

## 2024-11-16 PROCEDURE — G0406 INPT/TELE FOLLOW UP 15: HCPCS | Performed by: STUDENT IN AN ORGANIZED HEALTH CARE EDUCATION/TRAINING PROGRAM

## 2024-11-16 PROCEDURE — 97530 THERAPEUTIC ACTIVITIES: CPT | Mod: GP

## 2024-11-16 PROCEDURE — 36415 COLL VENOUS BLD VENIPUNCTURE: CPT | Performed by: INTERNAL MEDICINE

## 2024-11-16 PROCEDURE — 2500000002 HC RX 250 W HCPCS SELF ADMINISTERED DRUGS (ALT 637 FOR MEDICARE OP, ALT 636 FOR OP/ED): Performed by: INTERNAL MEDICINE

## 2024-11-16 PROCEDURE — 80069 RENAL FUNCTION PANEL: CPT | Performed by: INTERNAL MEDICINE

## 2024-11-16 PROCEDURE — 97110 THERAPEUTIC EXERCISES: CPT | Mod: GO

## 2024-11-16 PROCEDURE — 2500000004 HC RX 250 GENERAL PHARMACY W/ HCPCS (ALT 636 FOR OP/ED): Performed by: INTERNAL MEDICINE

## 2024-11-16 PROCEDURE — 2500000001 HC RX 250 WO HCPCS SELF ADMINISTERED DRUGS (ALT 637 FOR MEDICARE OP): Performed by: INTERNAL MEDICINE

## 2024-11-16 PROCEDURE — 82947 ASSAY GLUCOSE BLOOD QUANT: CPT

## 2024-11-16 PROCEDURE — 99233 SBSQ HOSP IP/OBS HIGH 50: CPT | Performed by: INTERNAL MEDICINE

## 2024-11-16 PROCEDURE — 97110 THERAPEUTIC EXERCISES: CPT | Mod: GP

## 2024-11-16 PROCEDURE — 2500000001 HC RX 250 WO HCPCS SELF ADMINISTERED DRUGS (ALT 637 FOR MEDICARE OP): Performed by: STUDENT IN AN ORGANIZED HEALTH CARE EDUCATION/TRAINING PROGRAM

## 2024-11-16 PROCEDURE — 83735 ASSAY OF MAGNESIUM: CPT | Performed by: INTERNAL MEDICINE

## 2024-11-16 PROCEDURE — 2020000001 HC ICU ROOM DAILY

## 2024-11-16 PROCEDURE — 97530 THERAPEUTIC ACTIVITIES: CPT | Mod: GO

## 2024-11-16 RX ORDER — HEPARIN SODIUM 5000 [USP'U]/ML
5000 INJECTION, SOLUTION INTRAVENOUS; SUBCUTANEOUS EVERY 8 HOURS SCHEDULED
Status: DISCONTINUED | OUTPATIENT
Start: 2024-11-16 | End: 2024-11-18 | Stop reason: HOSPADM

## 2024-11-16 RX ORDER — ACETAMINOPHEN 325 MG/1
650 TABLET ORAL EVERY 6 HOURS PRN
Status: DISCONTINUED | OUTPATIENT
Start: 2024-11-16 | End: 2024-11-17

## 2024-11-16 ASSESSMENT — PAIN SCALES - GENERAL
PAINLEVEL_OUTOF10: 0 - NO PAIN
PAINLEVEL_OUTOF10: 7
PAINLEVEL_OUTOF10: 0 - NO PAIN

## 2024-11-16 ASSESSMENT — COGNITIVE AND FUNCTIONAL STATUS - GENERAL
PERSONAL GROOMING: A LITTLE
EATING MEALS: A LITTLE
MOVING TO AND FROM BED TO CHAIR: A LOT
MOBILITY SCORE: 11
MOVING FROM LYING ON BACK TO SITTING ON SIDE OF FLAT BED WITH BEDRAILS: A LOT
HELP NEEDED FOR BATHING: A LOT
DRESSING REGULAR LOWER BODY CLOTHING: A LOT
TOILETING: TOTAL
DAILY ACTIVITIY SCORE: 13
DRESSING REGULAR UPPER BODY CLOTHING: A LOT
WALKING IN HOSPITAL ROOM: A LOT
CLIMB 3 TO 5 STEPS WITH RAILING: TOTAL
STANDING UP FROM CHAIR USING ARMS: A LOT
TURNING FROM BACK TO SIDE WHILE IN FLAT BAD: A LOT

## 2024-11-16 ASSESSMENT — PAIN - FUNCTIONAL ASSESSMENT
PAIN_FUNCTIONAL_ASSESSMENT: 0-10
PAIN_FUNCTIONAL_ASSESSMENT: WONG-BAKER FACES
PAIN_FUNCTIONAL_ASSESSMENT: 0-10
PAIN_FUNCTIONAL_ASSESSMENT: 0-10

## 2024-11-16 ASSESSMENT — PAIN SCALES - WONG BAKER: WONGBAKER_NUMERICALRESPONSE: NO HURT

## 2024-11-16 ASSESSMENT — PAIN DESCRIPTION - LOCATION: LOCATION: FOOT

## 2024-11-16 ASSESSMENT — PAIN DESCRIPTION - ORIENTATION: ORIENTATION: RIGHT

## 2024-11-16 NOTE — PROGRESS NOTES
11/16/24 0841   Discharge Planning   Expected Discharge Disposition Rehab   Does the patient need discharge transport arranged? Yes   RoundTrip coordination needed? Yes   Has discharge transport been arranged? No     MSW was advised that family prefers [facility] upon discharge if appropriate. Upon chart review therapies are recommending high intensity. Referral submitted to  [facility] at this time.     11:01 AM  [facility] received the referral and will follow. Report they will follow up on Monday to see patient's progress from over the weekend. Physician updated. Care Transitions will follow.

## 2024-11-16 NOTE — PROGRESS NOTES
Physical Therapy    Physical Therapy Treatment    Patient Name: Cynthia Muse  MRN: [id number]  Department: [facility] 4 ICU  Room: [address] [place], [place] [address]  Today's Date: 11/16/2024  Time Calculation  Start Time: 1345  Stop Time: 1410  Time Calculation (min): 25 min         Assessment/Plan   PT Assessment  PT Assessment Results: Decreased strength, Decreased range of motion, Decreased endurance, Impaired balance, Decreased mobility, Decreased coordination, Impaired tone  Rehab Prognosis: Good  Barriers to Discharge: significant decline from IND baseline, mod assist of 2. High fall risk.  Evaluation/Treatment Tolerance: Patient tolerated treatment well  Medical Staff Made Aware: Yes  Strengths: Ability to acquire knowledge, Premorbid level of function, Support of extended family/friends  Barriers to Participation: Comorbidities  End of Session Communication: Bedside nurse  Assessment Comment: Pt gave great effort throughout session. Improved LLE AROM range with increased practice duration and repetition. Able to take steps and perform stand pivot transfer from bed to chair with R hemiwalker and modA x 2 for safety and L knee buckling, though recommend nursing transfer her with Marco at this time.  End of Session Patient Position: Alarm on, Up in chair  PT Plan  Inpatient/Swing Bed or Outpatient: Inpatient  PT Plan  Treatment/Interventions: Bed mobility, Transfer training, Gait training, Balance training, Neuromuscular re-education, Strengthening, Endurance training, Range of motion, Therapeutic exercise, Therapeutic activity  PT Plan: Ongoing PT  PT Frequency: 6 times per week  PT Discharge Recommendations: High intensity level of continued care  Equipment Recommended upon Discharge:  (TBD)  PT Recommended Transfer Status: Assist x2, Assistive device, Other (comment) (hemwalker and gait belt with therapy, papo elias with nursing)  PT - OK to Discharge: Yes      General Visit Information:   PT  Visit  PT Received On:  11/16/24  General  Reason for Referral: CVA  Referred By: Dr Houston  Past Medical History Relevant to Rehab: HTN, DM, CKD III, tobacco abuse  Co-Treatment: OT  Co-Treatment Reason: pt safety and treatment  Prior to Session Communication: Bedside nurse  Patient Position Received: Bed, 3 rail up, Alarm off, not on at start of session  Preferred Learning Style: verbal, visual  General Comment: Pt agreeable to PT eval. Pleasant and motivated.    Subjective   Precautions:  Precautions  Hearing/Visual Limitations: no deficits  Medical Precautions: Fall precautions    Vital Signs (Past 2hrs)        Date/Time Vitals Session Patient Position Pulse Resp SpO2 BP MAP (mmHg)    11/16/24 1345 --  --  --  --  --  --  --                   Objective   Pain:  Pain Assessment  Pain Assessment: 0-10  0-10 (Numeric) Pain Score: 0 - No pain  Response to Interventions: No change in pain  Cognition:  Cognition  Overall Cognitive Status: Within Functional Limits  Orientation Level: Oriented X4  Cognition Comments: +dysarthria d/t facial droop  Attention: Within Functional Limits  Coordination:  Coordination Comment: decreased LUE> LLE  Postural Control:  Postural Control  Posture Comment: Slouched seated/standing posture but able to sit/stand more upright with cues, fatigues quickly  Static Sitting Balance  Static Sitting-Balance Support: Feet supported, Right upper extremity supported  Static Sitting-Level of Assistance: Close supervision  Static Standing Balance  Static Standing-Balance Support: Bilateral upper extremity supported  Static Standing-Level of Assistance: Moderate assistance, Minimum assistance (+2)  Extremity/Trunk Assessments:    RLE   RLE : Within Functional Limits  LLE   LLE : Exceptions to WFL  Activity Tolerance:  Activity Tolerance  Endurance: Decreased tolerance for upright activites  Treatments:  Therapeutic Exercise  Therapeutic Exercise Performed: Yes  Therapeutic Exercise Activity 2: Seated L/R hip march, LAQ, ankle  pumps, toe curls, glute sets, isometric hip add/abd all x10 with cues for pacing and bigger movements    Bed Mobility  Bed Mobility: Yes  Bed Mobility 1  Bed Mobility 1: Supine to sitting  Level of Assistance 1: +2, Minimal verbal cues, Maximum assistance  Bed Mobility Comments 1: toward the left side of bed with head elevated ~40 degrees    Ambulation/Gait Training  Ambulation/Gait Training Performed: Yes  Ambulation/Gait Training 1  Surface 1: Level tile  Device 1: Wes Walker (Right side)  Gait Support Devices: Gait belt  Assistance 1: Moderate assistance  Quality of Gait 1: Narrow base of support, Shuffling gait, Foot drop/steppage gait, Forward flexed posture, Ataxic, Knee(s) buckle  Comments/Distance (ft) 1: Standing lateral weight shifts with R hemiwalker and modA x 2 prior to taking steps. Patient took 3-4 steps forward with R hemiwalker prior to pivot transfer to armchair. ModA x2 for trunk and LLE control, L knee block to prevent knee buckling. Retropulsive. Cues for upright posture.  Transfers  Transfer: Yes  Transfer 1  Transfer From 1: Bed to  Transfer to 1: Stand  Technique 1: Sit to stand  Transfer Device 1: Gait belt, Wes-walker  Transfer Level of Assistance 1: +2, Moderate assistance  Transfers 2  Transfer From 2: Chair with arms to  Transfer to 2: Stand  Technique 2: Sit to stand, Stand to sit  Transfer Device 2: Xena Salgado, Gait belt  Transfer Level of Assistance 2: Moderate assistance  Trials/Comments 2: numerous reps for patient and nursing to be familiar with technique    Stairs  Stairs: No    Outcome Measures:  [facility] Basic Mobility  Turning from your back to your side while in a flat bed without using bedrails: A lot  Moving from lying on your back to sitting on the side of a flat bed without using bedrails: A lot  Moving to and from bed to chair (including a wheelchair): A lot  Standing up from a chair using your arms (e.g. wheelchair or bedside chair): A lot  To walk in hospital room: A  lot  Climbing 3-5 steps with railing: Total  Basic Mobility - Total Score: 11    Education Documentation  Precautions, taught by Britton oMore, PT at 11/16/2024  2:33 PM.  Learner: Patient  Readiness: Acceptance  Method: Explanation  Response: Needs Reinforcement    Body Mechanics, taught by Britton Moore, PT at 11/16/2024  2:33 PM.  Learner: Patient  Readiness: Acceptance  Method: Explanation  Response: Needs Reinforcement    Mobility Training, taught by Britton Moore, PT at 11/16/2024  2:33 PM.  Learner: Patient  Readiness: Acceptance  Method: Explanation  Response: Needs Reinforcement    Education Comments  No comments found.        OP EDUCATION:       Encounter Problems       Encounter Problems (Active)       Balance       LTG - Patient will maintain balance to allow for safe activity (Progressing)       Start:  11/15/24    Expected End:  11/22/24               Mobility       STG - Patient will ambulate 10' w/ LRAD and mod assist of 2 (Progressing)       Start:  11/15/24    Expected End:  11/22/24            Pt will tolerate BLE exercises consistently to promote functional strength, balance and endurance (Progressing)       Start:  11/15/24    Expected End:  11/22/24               PT Transfers       STG - Patient to transfer to and from sit to supine w/ min assist (Progressing)       Start:  11/15/24    Expected End:  11/22/24            STG - Patient will transfer sit to and from stand w/ min assist of 2 (Progressing)       Start:  11/15/24    Expected End:  11/22/24

## 2024-11-16 NOTE — CARE PLAN
Problem: Skin  Goal: Participates in plan/prevention/treatment measures  11/16/2024 0722 by Lillie Cornejo RN  Outcome: Progressing  Flowsheets (Taken 11/16/2024 0722)  Participates in plan/prevention/treatment measures:   Discuss with provider PT/OT consult   Elevate heels   Increase activity/out of bed for meals  11/16/2024 0722 by Lillie Cornejo RN  Outcome: Progressing  Flowsheets (Taken 11/16/2024 0722)  Participates in plan/prevention/treatment measures:   Discuss with provider PT/OT consult   Elevate heels   Increase activity/out of bed for meals  Goal: Prevent/manage excess moisture  11/16/2024 0722 by Lillie Cornejo RN  Outcome: Progressing  Flowsheets (Taken 11/16/2024 0722)  Prevent/manage excess moisture:   Cleanse incontinence/protect with barrier cream   Use wicking fabric (obtain order)   Moisturize dry skin   Follow provider orders for dressing changes   Monitor for/manage infection if present  11/16/2024 0722 by Lillie Cornejo RN  Outcome: Progressing  Flowsheets (Taken 11/16/2024 0722)  Prevent/manage excess moisture:   Cleanse incontinence/protect with barrier cream   Use wicking fabric (obtain order)   Moisturize dry skin   Follow provider orders for dressing changes   Monitor for/manage infection if present  Goal: Prevent/minimize sheer/friction injuries  11/16/2024 0722 by Lillie Cornejo RN  Outcome: Progressing  Flowsheets (Taken 11/16/2024 0722)  Prevent/minimize sheer/friction injuries:   Complete micro-shifts as needed if patient unable. Adjust patient position to relieve pressure points, not a full turn   Increase activity/out of bed for meals   HOB 30 degrees or less   Turn/reposition every 2 hours/use positioning/transfer devices   Use pull sheet   Utilize specialty bed per algorithm  11/16/2024 0722 by Lillie Cornejo RN  Outcome: Progressing  Flowsheets (Taken 11/16/2024 0722)  Prevent/minimize sheer/friction injuries:   Complete micro-shifts as needed if patient unable. Adjust patient  position to relieve pressure points, not a full turn   Increase activity/out of bed for meals   HOB 30 degrees or less   Turn/reposition every 2 hours/use positioning/transfer devices   Use pull sheet   Utilize specialty bed per algorithm  Goal: Promote/optimize nutrition  11/16/2024 0722 by Lillie Cornejo RN  Outcome: Progressing  Flowsheets (Taken 11/16/2024 0722)  Promote/optimize nutrition:   Assist with feeding   Offer water/supplements/favorite foods   Discuss with provider if NPO > 2 days   Consume > 50% meals/supplements   Monitor/record intake including meals   Reassess MST if dietician not consulted  11/16/2024 0722 by Lillie Cornejo RN  Outcome: Progressing  Flowsheets (Taken 11/16/2024 0722)  Promote/optimize nutrition:   Assist with feeding   Offer water/supplements/favorite foods   Discuss with provider if NPO > 2 days   Consume > 50% meals/supplements   Monitor/record intake including meals   Reassess MST if dietician not consulted  Goal: Promote skin healing  11/16/2024 0722 by Lillie Cornejo RN  Outcome: Progressing  Flowsheets (Taken 11/16/2024 0722)  Promote skin healing:   Assess skin/pad under line(s)/device(s)   Ensure correct size (line/device) and apply per  instructions   Protective dressings over bony prominences   Rotate device position/do not position patient on device   Turn/reposition every 2 hours/use positioning/transfer devices  11/16/2024 0722 by Lillie Cornejo RN  Outcome: Progressing  Flowsheets (Taken 11/16/2024 0722)  Promote skin healing:   Assess skin/pad under line(s)/device(s)   Ensure correct size (line/device) and apply per  instructions   Protective dressings over bony prominences   Rotate device position/do not position patient on device   Turn/reposition every 2 hours/use positioning/transfer devices     Problem: Discharge Planning  Goal: Discharge to home or other facility with appropriate resources  11/16/2024 0722 by Lillie Cornejo RN  Outcome:  Progressing  11/16/2024 0722 by Lillie Cornejo RN  Outcome: Progressing     Problem: Chronic Conditions and Co-morbidities  Goal: Patient's chronic conditions and co-morbidity symptoms are monitored and maintained or improved  11/16/2024 0722 by Lillie Cornejo RN  Outcome: Progressing  11/16/2024 0722 by Lillie Cornejo RN  Outcome: Progressing     Problem: Fall/Injury  Goal: Verbalize understanding of personal risk factors for fall in the hospital  11/16/2024 0722 by Lillie Cornejo RN  Outcome: Progressing  11/16/2024 0722 by Lillie Cornejo RN  Outcome: Progressing  Goal: Verbalize understanding of risk factor reduction measures to prevent injury from fall in the home  11/16/2024 0722 by Lillie Cornejo RN  Outcome: Progressing  11/16/2024 0722 by Lillie Cornejo RN  Outcome: Progressing  Goal: Use assistive devices by end of the shift  11/16/2024 0722 by Lillie Cornejo RN  Outcome: Progressing  11/16/2024 0722 by Lillie Cornejo RN  Outcome: Progressing  Goal: Pace activities to prevent fatigue by end of the shift  11/16/2024 0722 by Lillie Cornejo RN  Outcome: Progressing  11/16/2024 0722 by Lillie Cornejo RN  Outcome: Progressing     Problem: Diabetes  Goal: Vital signs within normal range for age by end of shift  11/16/2024 0722 by Lillie Cornejo RN  Outcome: Progressing  11/16/2024 0722 by Lillie Cornejo RN  Outcome: Progressing     Problem: General Stroke  Goal: Establish a mutual long term goal with patient by discharge  11/16/2024 0722 by Lillie Cornejo RN  Outcome: Progressing  11/16/2024 0722 by Lillie Cornejo RN  Outcome: Progressing  Goal: Demonstrate improvement in neurological exam throughout the shift  11/16/2024 0722 by Lillie Cornejo RN  Outcome: Progressing  11/16/2024 0722 by Lillie oCrnejo RN  Outcome: Progressing  Goal: Maintain BP within ordered limits throughout shift  11/16/2024 0722 by Lillie Cornejo RN  Outcome: Progressing  11/16/2024 0722 by Lillie Cornejo RN  Outcome: Progressing  Goal: Participate in  treatment (ie., meds, therapy) throughout shift  11/16/2024 0722 by Lillie Cornejo RN  Outcome: Progressing  11/16/2024 0722 by Lillie Cornejo RN  Outcome: Progressing  Goal: No symptoms of aspiration throughout shift  11/16/2024 0722 by Lillie Cornejo RN  Outcome: Progressing  11/16/2024 0722 by Lillie Cornejo RN  Outcome: Progressing  Goal: Out of bed three times today  11/16/2024 0722 by Lillie Cornejo RN  Outcome: Progressing  11/16/2024 0722 by Lillie Cornejo RN  Outcome: Progressing   The patient's goals for the shift include      The clinical goals for the shift include Monitor Neurological status    Over the shift, the patient did not make progress toward the following goals. Barriers to progression include . Recommendations to address these barriers include .

## 2024-11-16 NOTE — PROGRESS NOTES
Occupational Therapy Treatment    Name: Cynthia Muse  MRN: [id number]  Department: [facility] 4 ICU  Room: [address] [place], [place] [address]  Date: 11/16/24  Time Calculation  Start Time: 1330  Stop Time: 1358  Time Calculation (min): 28 min    Assessment:  OT Assessment: Pt demonstrated progress  with functional transfers/mobility this date and was highly receptive to therapy and instructions throughout.  Pt denies dizziness this date and thus demonstrated increased activity tolerance as well.  Prognosis: Good  Barriers to Discharge: Other (Comment) (2 person assist with transfers)  Evaluation/Treatment Tolerance: Patient tolerated treatment well  Medical Staff Made Aware: Yes  End of Session Communication: Bedside nurse  End of Session Patient Position: Up in chair, Alarm on    Plan:  Treatment Interventions: Functional transfer training, UE strengthening/ROM, Endurance training, Patient/family training, Compensatory technique education  OT Frequency: 4 times per week  OT Discharge Recommendations: High intensity level of continued care  Equipment Recommended upon Discharge: Other (comment) (hemwalker and gait belt with therapy, papo elias with nursing)  OT Recommended Transfer Status: Assist of 2  OT - OK to Discharge: Yes        Subjective   Previous Visit Info:  OT Last Visit  OT Received On: 11/16/24    General:  General  Reason for Referral: Impaired ADLs s/p CVA  Past Medical History Relevant to Rehab: HTN, DM, CKD III, tobacco abuse  Family/Caregiver Present: No  Co-Treatment: PT  Co-Treatment Reason: partial cotreat to maximize safety with mobility and outcome  Prior to Session Communication: Bedside nurse  Patient Position Received: Bed, 3 rail up, Alarm off, not on at start of session  Preferred Learning Style: verbal, visual  General Comment: Cleared for therapy and agreeable.    Precautions:  Medical Precautions: Fall precautions    Pain Assessment:  Pain Assessment  Pain Assessment: 0-10  0-10 (Numeric) Pain Score: 0 - No pain      Objective   Cognition:  Overall Cognitive Status: Within Functional Limits  Orientation Level: Oriented X4  Cognition Comments: +dysarthria d/t facial droop    Bed Mobility/Transfers: Bed Mobility 1  Bed Mobility 1: Supine to sitting  Level of Assistance 1: Maximum assistance  Bed Mobility Comments 1: toward the left side of bed with head elevated ~a40 degrees    Transfer 1  Transfer From 1: Bed to  Transfer to 1: Stand  Technique 1: Sit to stand  Transfer Device 1: Robb-walker, Gait belt  Transfer Level of Assistance 1: Moderate assistance, +2  Transfers 2  Transfer From 2: Chair with arms to  Transfer to 2: Stand  Technique 2: Sit to stand, Stand to sit  Transfer Device 2: Xena Stedy, Gait belt  Transfer Level of Assistance 2: Moderate assistance  Trials/Comments 2: multiple times    Functional Mobility:  Functional Mobility  Functional Mobility Performed: Yes (Max assist x 1, mod assist x 1 to take ~5 small turning steps with a robb-walker.  Gait belt used and assist required to advance A.D. as well as block left knee to prevent buckling.)    Sitting Balance:  Static Sitting Balance  Static Sitting-Balance Support: Feet supported, Right upper extremity supported  Static Sitting-Level of Assistance: Close supervision  Static Sitting-Comment/Number of Minutes: with cues for posture    Therapy/Activity: Therapeutic Exercise  Therapeutic Exercise Performed: Yes  Therapeutic Exercise Activity 1:  (Instructed in/performed EMA MCDOWELL exercises x 10 reps each)    Outcome Measures:  [facility] Daily Activity  Putting on and taking off regular lower body clothing: A lot  Bathing (including washing, rinsing, drying): A lot  Putting on and taking off regular upper body clothing: A lot  Toileting, which includes using toilet, bedpan or urinal: Total  Taking care of personal grooming such as brushing teeth: A little  Eating Meals: A little  Daily Activity - Total Score: 13    Education Documentation  Home Exercise Program,  taught by Kymberly Lyles OT at 11/16/2024  2:11 PM.  Learner: Patient  Readiness: Acceptance  Method: Explanation  Response: Demonstrated Understanding, Needs Reinforcement  Comment: UE exercises for progressive strengthening    ADL Training, taught by Kymberly Lyles OT at 11/15/2024  3:18 PM.  Learner: Patient  Readiness: Acceptance  Method: Explanation  Response: Demonstrated Understanding, Needs Reinforcement  Comment: Functional transfer retraining initiated    Goals:  Encounter Problems       Encounter Problems (Active)       OT Goals       ADLs (Progressing)       Start:  11/15/24    Expected End:  12/06/24       Patient will complete ADL tasks, with set-up and supervision using AE need in order to increase patient's safety and independence with self-care tasks.           Functional transfers (Progressing)       Start:  11/15/24    Expected End:  12/06/24       Patient will complete functional transfers with minimal assist  using least restrictive device, in order to increase patient's safety and independence with daily tasks.           LUE (Progressing)       Start:  11/15/24    Expected End:  12/06/24       Pt will demonstrate increased LUE mobility, strength, and coordination as evidenced by formal testing and function.

## 2024-11-16 NOTE — PROGRESS NOTES
Subjective:  Patient seen virtually today.   Reports doing ok. LLE strength improved, speech pt does not feel improved but family notices some improvement.     Objective:  Exam:  Vitals:    11/16/24 1103   BP: 159/84   Pulse: 81   Resp: 21   Temp: 37.1 °C (98.8 °F)   SpO2: 93%     Exam performed virtually via video.  Pt is laying in bed, in NAD  There is significant dysarthria but able to convey information well, no aphasia.   EOM full, no gaze preference   There is L facial droop  L elbow and arm unable to move, LLE strength improved   Sensation intact      NIHSS 6      ASSESSMENT:  79 y.o. female with HTN, CKD, HLD, current smoking who presents with acute onset L sided weakness. S/p TNK. MRI shows R corona radiata infarct. Strength still poor but somewhat improved LLE.      Etiology: Ischemic Stroke: Small-vessel disease from HTN, HLD tobacco use     Diagnosis:   Ischemic Stroke   HTN  Active smoking   HLD          RECOMMENDATIONS:   - DAPT with aspirin and plavix 21 days   - can space out neurochecks q8h   - continue home statin, LDL at goal   - pt now outside of permissive HTN window, can start to reintroduce home antihypertensives, long term goal <130/80  - smoking cessation education   - PT/OP/SLP as appropriate, pt awaiting acceptance at [facility]    Pt is neurologically cleared for discharge.   Pt's stroke evaluation is complete. No further evaluation necessary. Pt should follow up with me in 4-6 weeks post discharge in stroke clinic.     Will sign off but please do not hesitate to reach out with any questions.     Jad Lancaster MD   Neurology and Vascular Neurology     [facility] [place] at [address], [address]  [place] [id number]   Phone: [phone]  Fax: [phone]          Consult completed by: TELEPHONE and VIDEO interactive communication was used to provide this telehealth service. Time includes consultation with ED provider and extensive review of data-  history, medical records, lab/radiology/medical test results, neuroimaging studies:  15 minutes was spent in FOLLOW-UP telehealth consultation

## 2024-11-16 NOTE — PROGRESS NOTES
Cynthia Muse is a 79 y.o. female on day 2 of admission presenting with Cerebrovascular accident (CVA), unspecified mechanism (Multi).      Subjective   Patient was seen and examined at bedside. Patient is doing well. She has Left upper extremity weakness and she feels her left lower extremity is improving     Objective     Last Recorded Vitals  /84 (BP Location: Left arm, Patient Position: Lying)   Pulse 81   Temp 37.1 °C (98.8 °F) (Temporal)   Resp 21   Wt 71.5 kg (157 lb 10.1 oz)   SpO2 93%   Intake/Output last 3 Shifts:    Intake/Output Summary (Last 24 hours) at 11/16/2024 1305  Last data filed at 11/16/2024 0400  Gross per 24 hour   Intake 220 ml   Output 225 ml   Net -5 ml       Admission Weight  Weight: 73.3 kg (161 lb 8 oz) (11/14/24 1203)    Daily Weight  11/16/24 : 71.5 kg (157 lb 10.1 oz)    Image Results      Physical Exam  Constitutional: lying in bed with no distress  HEENT: moist oral mucosa  Neck: No JVD  Lungs: Clear to auscultation bilaterally, no wheezing, no rhonchi   Cardiac: regular rate and rhythm, S1 and S2 present, no rubs, no murmurs, no gallop  Abdomen: bowel sounds present, non tender, non distended  Ext: Left upper extremity weakness 1/5 and left lower extremity improved to 3/5.        Relevant Results  Results for orders placed or performed during the hospital encounter of 11/14/24 (from the past 24 hours)   POCT GLUCOSE   Result Value Ref Range    POCT Glucose 118 (H) 74 - 99 mg/dL   POCT GLUCOSE   Result Value Ref Range    POCT Glucose 120 (H) 74 - 99 mg/dL   POCT GLUCOSE   Result Value Ref Range    POCT Glucose 121 (H) 74 - 99 mg/dL   POCT GLUCOSE   Result Value Ref Range    POCT Glucose 165 (H) 74 - 99 mg/dL      Scheduled medications  [Held by provider] alendronate, 5 mg, oral, Daily before breakfast  amLODIPine, 10 mg, oral, Daily  aspirin, 81 mg, oral, Daily  clopidogrel, 75 mg, oral, Daily  [Held by provider] glimepiride, 2 mg, oral, Daily before breakfast  [Held by  "provider] hydroCHLOROthiazide, 25 mg, oral, Daily  insulin lispro, 0-5 Units, subcutaneous, TID AC  [Held by provider] lisinopril, 10 mg, oral, Daily  perflutren lipid microspheres, 0.5-10 mL of dilution, intravenous, Once in imaging  perflutren protein A microsphere, 0.5 mL, intravenous, Once in imaging  polyethylene glycol, 17 g, oral, Daily  rosuvastatin, 10 mg, oral, Daily  sulfur hexafluoride microsphr, 2 mL, intravenous, Once in imaging      Continuous medications     PRN medications  PRN medications: dextrose, dextrose, glucagon, glucagon, hydrALAZINE **FOLLOWED BY** hydrALAZINE, labetaloL, oxygen     Assessment:   79 year old female with PMHX of HTN, CKD stage III, Hypercholesteremia presented for left sided weakness and dysarthria and found to have acute stroke     PLAN:  Left sided weakness 2/2 to acute stroke        1. Neurology following , appreciate recs        2.  Continue aspirin indefinitely         3.  Continue plavix for 21 days        2. HTN       1. Amlodipine 10 mg po daily resume today         3. BINH on CKD stage III      1. Patient is on lisinopril 10 mg po daily and hydrochlorothiazide 25 mg po daily       2.  Currently on hold due to BINH. Will monitor and adjust bp meds as appropriate     4.  DM      1. Hemoglobin A1C 6      2. Patient is on glimepiride in the outpatient       3. Continue sliding scale while inpatient     5. Prophylaxis        1. SCD\"s + Heparin 5000 units q 8 hours    Disposition:  will continue to monitor creatinine and adjust medication for blood pressure control..   currently working on rehab placement    55 minutes spent coordinating care       Joanna Anderson DO      "

## 2024-11-17 VITALS
RESPIRATION RATE: 20 BRPM | HEIGHT: 63 IN | DIASTOLIC BLOOD PRESSURE: 76 MMHG | BODY MASS INDEX: 28.44 KG/M2 | OXYGEN SATURATION: 96 % | WEIGHT: 160.5 LBS | TEMPERATURE: 97.2 F | HEART RATE: 72 BPM | SYSTOLIC BLOOD PRESSURE: 146 MMHG

## 2024-11-17 LAB
ALBUMIN SERPL BCP-MCNC: 4 G/DL (ref 3.4–5)
ANION GAP SERPL CALCULATED.3IONS-SCNC: 16 MMOL/L (ref 10–20)
BUN SERPL-MCNC: 33 MG/DL (ref 6–23)
CALCIUM SERPL-MCNC: 8.9 MG/DL (ref 8.6–10.3)
CHLORIDE SERPL-SCNC: 106 MMOL/L (ref 98–107)
CO2 SERPL-SCNC: 22 MMOL/L (ref 21–32)
CREAT SERPL-MCNC: 1.61 MG/DL (ref 0.5–1.05)
EGFRCR SERPLBLD CKD-EPI 2021: 33 ML/MIN/1.73M*2
ERYTHROCYTE [DISTWIDTH] IN BLOOD BY AUTOMATED COUNT: 14.8 % (ref 11.5–14.5)
GLUCOSE BLD MANUAL STRIP-MCNC: 128 MG/DL (ref 74–99)
GLUCOSE BLD MANUAL STRIP-MCNC: 129 MG/DL (ref 74–99)
GLUCOSE BLD MANUAL STRIP-MCNC: 135 MG/DL (ref 74–99)
GLUCOSE BLD MANUAL STRIP-MCNC: 237 MG/DL (ref 74–99)
GLUCOSE SERPL-MCNC: 120 MG/DL (ref 74–99)
HCT VFR BLD AUTO: 38.1 % (ref 36–46)
HGB BLD-MCNC: 12.3 G/DL (ref 12–16)
MAGNESIUM SERPL-MCNC: 2.02 MG/DL (ref 1.6–2.4)
MCH RBC QN AUTO: 29.1 PG (ref 26–34)
MCHC RBC AUTO-ENTMCNC: 32.3 G/DL (ref 32–36)
MCV RBC AUTO: 90 FL (ref 80–100)
NRBC BLD-RTO: 0 /100 WBCS (ref 0–0)
PHOSPHATE SERPL-MCNC: 3.5 MG/DL (ref 2.5–4.9)
PLATELET # BLD AUTO: 336 X10*3/UL (ref 150–450)
POTASSIUM SERPL-SCNC: 3.7 MMOL/L (ref 3.5–5.3)
RBC # BLD AUTO: 4.22 X10*6/UL (ref 4–5.2)
SODIUM SERPL-SCNC: 140 MMOL/L (ref 136–145)
WBC # BLD AUTO: 13.4 X10*3/UL (ref 4.4–11.3)

## 2024-11-17 PROCEDURE — 2500000004 HC RX 250 GENERAL PHARMACY W/ HCPCS (ALT 636 FOR OP/ED): Performed by: INTERNAL MEDICINE

## 2024-11-17 PROCEDURE — 2500000001 HC RX 250 WO HCPCS SELF ADMINISTERED DRUGS (ALT 637 FOR MEDICARE OP): Performed by: INTERNAL MEDICINE

## 2024-11-17 PROCEDURE — 2020000001 HC ICU ROOM DAILY

## 2024-11-17 PROCEDURE — 80069 RENAL FUNCTION PANEL: CPT | Performed by: INTERNAL MEDICINE

## 2024-11-17 PROCEDURE — 36415 COLL VENOUS BLD VENIPUNCTURE: CPT | Performed by: INTERNAL MEDICINE

## 2024-11-17 PROCEDURE — 82947 ASSAY GLUCOSE BLOOD QUANT: CPT

## 2024-11-17 PROCEDURE — 2500000001 HC RX 250 WO HCPCS SELF ADMINISTERED DRUGS (ALT 637 FOR MEDICARE OP): Performed by: STUDENT IN AN ORGANIZED HEALTH CARE EDUCATION/TRAINING PROGRAM

## 2024-11-17 PROCEDURE — 85027 COMPLETE CBC AUTOMATED: CPT | Performed by: INTERNAL MEDICINE

## 2024-11-17 PROCEDURE — 83735 ASSAY OF MAGNESIUM: CPT | Performed by: INTERNAL MEDICINE

## 2024-11-17 PROCEDURE — 99232 SBSQ HOSP IP/OBS MODERATE 35: CPT | Performed by: INTERNAL MEDICINE

## 2024-11-17 PROCEDURE — 2500000005 HC RX 250 GENERAL PHARMACY W/O HCPCS: Performed by: INTERNAL MEDICINE

## 2024-11-17 PROCEDURE — 2500000002 HC RX 250 W HCPCS SELF ADMINISTERED DRUGS (ALT 637 FOR MEDICARE OP, ALT 636 FOR OP/ED): Performed by: INTERNAL MEDICINE

## 2024-11-17 RX ORDER — LIDOCAINE 560 MG/1
1 PATCH PERCUTANEOUS; TOPICAL; TRANSDERMAL DAILY
Status: DISCONTINUED | OUTPATIENT
Start: 2024-11-17 | End: 2024-11-18 | Stop reason: HOSPADM

## 2024-11-17 RX ORDER — CARVEDILOL 6.25 MG/1
6.25 TABLET ORAL 2 TIMES DAILY
Status: DISCONTINUED | OUTPATIENT
Start: 2024-11-17 | End: 2024-11-18 | Stop reason: HOSPADM

## 2024-11-17 RX ORDER — OXYCODONE HYDROCHLORIDE 5 MG/1
2.5 TABLET ORAL EVERY 8 HOURS PRN
Status: DISCONTINUED | OUTPATIENT
Start: 2024-11-17 | End: 2024-11-18 | Stop reason: HOSPADM

## 2024-11-17 RX ORDER — ACETAMINOPHEN 325 MG/1
975 TABLET ORAL EVERY 8 HOURS PRN
Status: DISCONTINUED | OUTPATIENT
Start: 2024-11-17 | End: 2024-11-18 | Stop reason: HOSPADM

## 2024-11-17 ASSESSMENT — PAIN SCALES - WONG BAKER: WONGBAKER_NUMERICALRESPONSE: NO HURT

## 2024-11-17 ASSESSMENT — PAIN SCALES - GENERAL
PAINLEVEL_OUTOF10: 0 - NO PAIN
PAINLEVEL_OUTOF10: 1
PAINLEVEL_OUTOF10: 1
PAINLEVEL_OUTOF10: 0 - NO PAIN
PAINLEVEL_OUTOF10: 2
PAINLEVEL_OUTOF10: 10 - WORST POSSIBLE PAIN

## 2024-11-17 ASSESSMENT — PAIN DESCRIPTION - LOCATION
LOCATION: SHOULDER
LOCATION: BACK
LOCATION: HIP

## 2024-11-17 ASSESSMENT — PAIN - FUNCTIONAL ASSESSMENT
PAIN_FUNCTIONAL_ASSESSMENT: 0-10
PAIN_FUNCTIONAL_ASSESSMENT: 0-10

## 2024-11-17 ASSESSMENT — PAIN SCALES - PAIN ASSESSMENT IN ADVANCED DEMENTIA (PAINAD): TOTALSCORE: MEDICATION (SEE MAR)

## 2024-11-17 ASSESSMENT — PAIN DESCRIPTION - ORIENTATION
ORIENTATION: LEFT
ORIENTATION: RIGHT

## 2024-11-17 NOTE — CARE PLAN
Problem: Skin  Goal: Participates in plan/prevention/treatment measures  Outcome: Progressing  Flowsheets (Taken 11/17/2024 0739)  Participates in plan/prevention/treatment measures:   Discuss with provider PT/OT consult   Elevate heels   Increase activity/out of bed for meals  Goal: Prevent/manage excess moisture  Outcome: Progressing  Flowsheets (Taken 11/17/2024 0739)  Prevent/manage excess moisture:   Cleanse incontinence/protect with barrier cream   Monitor for/manage infection if present   Follow provider orders for dressing changes   Use wicking fabric (obtain order)   Moisturize dry skin  Goal: Prevent/minimize sheer/friction injuries  Outcome: Progressing  Flowsheets (Taken 11/17/2024 0739)  Prevent/minimize sheer/friction injuries:   Complete micro-shifts as needed if patient unable. Adjust patient position to relieve pressure points, not a full turn   Increase activity/out of bed for meals   Use pull sheet   HOB 30 degrees or less   Turn/reposition every 2 hours/use positioning/transfer devices   Utilize specialty bed per algorithm  Goal: Promote/optimize nutrition  Outcome: Progressing  Flowsheets (Taken 11/17/2024 0739)  Promote/optimize nutrition:   Assist with feeding   Monitor/record intake including meals   Consume > 50% meals/supplements   Offer water/supplements/favorite foods   Discuss with provider if NPO > 2 days   Reassess MST if dietician not consulted  Goal: Promote skin healing  Outcome: Progressing  Flowsheets (Taken 11/17/2024 0739)  Promote skin healing:   Protective dressings over bony prominences   Assess skin/pad under line(s)/device(s)   Turn/reposition every 2 hours/use positioning/transfer devices   Rotate device position/do not position patient on device   Ensure correct size (line/device) and apply per  instructions     Problem: Discharge Planning  Goal: Discharge to home or other facility with appropriate resources  Outcome: Progressing     Problem: Chronic  Conditions and Co-morbidities  Goal: Patient's chronic conditions and co-morbidity symptoms are monitored and maintained or improved  Outcome: Progressing     Problem: Fall/Injury  Goal: Verbalize understanding of personal risk factors for fall in the hospital  Outcome: Progressing  Goal: Verbalize understanding of risk factor reduction measures to prevent injury from fall in the home  Outcome: Progressing  Goal: Use assistive devices by end of the shift  Outcome: Progressing  Goal: Pace activities to prevent fatigue by end of the shift  Outcome: Progressing     Problem: Diabetes  Goal: Vital signs within normal range for age by end of shift  Outcome: Progressing     Problem: General Stroke  Goal: Establish a mutual long term goal with patient by discharge  Outcome: Progressing  Goal: Demonstrate improvement in neurological exam throughout the shift  Outcome: Progressing  Goal: Maintain BP within ordered limits throughout shift  Outcome: Progressing  Goal: Participate in treatment (ie., meds, therapy) throughout shift  Outcome: Progressing  Goal: No symptoms of aspiration throughout shift  Outcome: Progressing  Goal: Out of bed three times today  Outcome: Progressing   The patient's goals for the shift include      The clinical goals for the shift include NIH, Neuro's, hemodynamic stability    Over the shift, the patient did not make progress toward the following goals. Barriers to progression include . Recommendations to address these barriers include .

## 2024-11-17 NOTE — PROGRESS NOTES
Cynthia Muse is a 79 y.o. female on day 3 of admission presenting with Cerebrovascular accident (CVA), unspecified mechanism (Multi).      Subjective   Patient was seen and examined at bedside. Patient admitted to back pain but feels is mostly due to the bed.  No other concerns at this time    Objective     Last Recorded Vitals  /76 (BP Location: Left arm, Patient Position: Lying)   Pulse 89   Temp 36.7 °C (98.1 °F) (Temporal)   Resp 17   Wt 72.8 kg (160 lb 7.9 oz)   SpO2 96%   Intake/Output last 3 Shifts:    Intake/Output Summary (Last 24 hours) at 11/17/2024 1030  Last data filed at 11/17/2024 0900  Gross per 24 hour   Intake --   Output 850 ml   Net -850 ml       Admission Weight  Weight: 73.3 kg (161 lb 8 oz) (11/14/24 1203)    Daily Weight  11/17/24 : 72.8 kg (160 lb 7.9 oz)    Image Results    Physical Exam  Constitutional: lying in bed with no distress  HEENT: moist oral mucosa, right facial droop  Neck: No JVD  Lungs: Clear to auscultation bilaterally, no wheezing, no rhonchi   Cardiac: regular rate and rhythm, S1 and S2 present, no rubs, no murmurs, no gallop  Abdomen: bowel sounds present, non tender, non distended  Ext: Left upper extremity weakness 1/5 and left lower extremity 2-3/5.       Relevant Results  Results for orders placed or performed during the hospital encounter of 11/14/24 (from the past 24 hours)   POCT GLUCOSE   Result Value Ref Range    POCT Glucose 165 (H) 74 - 99 mg/dL   Renal Function Panel   Result Value Ref Range    Glucose 162 (H) 74 - 99 mg/dL    Sodium 139 136 - 145 mmol/L    Potassium 3.8 3.5 - 5.3 mmol/L    Chloride 106 98 - 107 mmol/L    Bicarbonate 23 21 - 32 mmol/L    Anion Gap 14 10 - 20 mmol/L    Urea Nitrogen 36 (H) 6 - 23 mg/dL    Creatinine 1.68 (H) 0.50 - 1.05 mg/dL    eGFR 31 (L) >60 mL/min/1.73m*2    Calcium 9.2 8.6 - 10.3 mg/dL    Phosphorus 3.2 2.5 - 4.9 mg/dL    Albumin 4.2 3.4 - 5.0 g/dL   Magnesium   Result Value Ref Range    Magnesium 2.04 1.60 - 2.40  mg/dL   POCT GLUCOSE   Result Value Ref Range    POCT Glucose 186 (H) 74 - 99 mg/dL   POCT GLUCOSE   Result Value Ref Range    POCT Glucose 134 (H) 74 - 99 mg/dL   CBC   Result Value Ref Range    WBC 13.4 (H) 4.4 - 11.3 x10*3/uL    nRBC 0.0 0.0 - 0.0 /100 WBCs    RBC 4.22 4.00 - 5.20 x10*6/uL    Hemoglobin 12.3 12.0 - 16.0 g/dL    Hematocrit 38.1 36.0 - 46.0 %    MCV 90 80 - 100 fL    MCH 29.1 26.0 - 34.0 pg    MCHC 32.3 32.0 - 36.0 g/dL    RDW 14.8 (H) 11.5 - 14.5 %    Platelets 336 150 - 450 x10*3/uL   Renal Function Panel   Result Value Ref Range    Glucose 120 (H) 74 - 99 mg/dL    Sodium 140 136 - 145 mmol/L    Potassium 3.7 3.5 - 5.3 mmol/L    Chloride 106 98 - 107 mmol/L    Bicarbonate 22 21 - 32 mmol/L    Anion Gap 16 10 - 20 mmol/L    Urea Nitrogen 33 (H) 6 - 23 mg/dL    Creatinine 1.61 (H) 0.50 - 1.05 mg/dL    eGFR 33 (L) >60 mL/min/1.73m*2    Calcium 8.9 8.6 - 10.3 mg/dL    Phosphorus 3.5 2.5 - 4.9 mg/dL    Albumin 4.0 3.4 - 5.0 g/dL   Magnesium   Result Value Ref Range    Magnesium 2.02 1.60 - 2.40 mg/dL   POCT GLUCOSE   Result Value Ref Range    POCT Glucose 128 (H) 74 - 99 mg/dL      Scheduled medications  [Held by provider] alendronate, 5 mg, oral, Daily before breakfast  amLODIPine, 10 mg, oral, Daily  aspirin, 81 mg, oral, Daily  clopidogrel, 75 mg, oral, Daily  [Held by provider] glimepiride, 2 mg, oral, Daily before breakfast  heparin (porcine), 5,000 Units, subcutaneous, q8h ROSE  [Held by provider] hydroCHLOROthiazide, 25 mg, oral, Daily  insulin lispro, 0-5 Units, subcutaneous, TID AC  lidocaine, 1 patch, transdermal, Daily  [Held by provider] lisinopril, 10 mg, oral, Daily  perflutren lipid microspheres, 0.5-10 mL of dilution, intravenous, Once in imaging  perflutren protein A microsphere, 0.5 mL, intravenous, Once in imaging  polyethylene glycol, 17 g, oral, Daily  rosuvastatin, 10 mg, oral, Daily  sulfur hexafluoride microsphr, 2 mL, intravenous, Once in imaging      Continuous medications    "  PRN medications  PRN medications: acetaminophen, dextrose, dextrose, glucagon, glucagon, [] hydrALAZINE **FOLLOWED BY** hydrALAZINE, oxygen     Assessment:   79 year old female with PMHX of HTN, CKD stage III, Hypercholesteremia presented for left sided weakness and dysarthria and found to have acute stroke      PLAN:  Left sided weakness 2/2 to acute stroke        1. Neurology following , appreciate recs        2. Continue aspirin indefinitely         3. Continue plavix for 21 days        2. HTN       1. Amlodipine 10 mg po daily       2. Lisinopril and hydrochlorothiazide were on hold due to creatinine elevation        3. BP this am after amlodipine 138/76        3. CKD stage III      1. lisinopril 10 mg po daily and hydrochlorothiazide 25 mg were on hold due to elevated creatinine       2. Creatinine now is 1.61  from 1.80 (on admission) back in march seemed to be around 1.53         4.  DM      1. Hemoglobin A1C 6      2. Patient is on glimepiride in the outpatient       3. Continue sliding scale while inpatient      5. Prophylaxis        1. SCD\"s + Heparin 5000 units q 8 hours     Disposition:  Blood pressure improved with amlodipine.  Patient admitted to back pain which she described more like spasm and discomfort due to bed.  Possible SNF tomorrow      55 minutes spent coordinating care     Joanna Anderson DO      "

## 2024-11-18 ENCOUNTER — HOSPITAL ENCOUNTER (INPATIENT)
Facility: HOSPITAL | Age: 79
End: 2024-11-18
Attending: INTERNAL MEDICINE | Admitting: INTERNAL MEDICINE
Payer: COMMERCIAL

## 2024-11-18 ENCOUNTER — APPOINTMENT (OUTPATIENT)
Dept: CARDIOLOGY | Facility: HOSPITAL | Age: 79
DRG: 064 | End: 2024-11-18
Payer: COMMERCIAL

## 2024-11-18 VITALS
HEIGHT: 63 IN | HEART RATE: 75 BPM | BODY MASS INDEX: 28.32 KG/M2 | RESPIRATION RATE: 20 BRPM | SYSTOLIC BLOOD PRESSURE: 142 MMHG | TEMPERATURE: 98.6 F | DIASTOLIC BLOOD PRESSURE: 66 MMHG | WEIGHT: 159.83 LBS | OXYGEN SATURATION: 97 %

## 2024-11-18 DIAGNOSIS — I63.9 ISCHEMIC STROKE (MULTI): Primary | ICD-10-CM

## 2024-11-18 DIAGNOSIS — I63.9 CEREBROVASCULAR ACCIDENT (CVA), UNSPECIFIED MECHANISM (MULTI): ICD-10-CM

## 2024-11-18 DIAGNOSIS — I10 ESSENTIAL HYPERTENSION: ICD-10-CM

## 2024-11-18 LAB
ALBUMIN SERPL BCP-MCNC: 3.8 G/DL (ref 3.4–5)
ANION GAP SERPL CALCULATED.3IONS-SCNC: 17 MMOL/L (ref 10–20)
BUN SERPL-MCNC: 34 MG/DL (ref 6–23)
CALCIUM SERPL-MCNC: 8.7 MG/DL (ref 8.6–10.3)
CHLORIDE SERPL-SCNC: 105 MMOL/L (ref 98–107)
CO2 SERPL-SCNC: 23 MMOL/L (ref 21–32)
CREAT SERPL-MCNC: 1.63 MG/DL (ref 0.5–1.05)
EGFRCR SERPLBLD CKD-EPI 2021: 32 ML/MIN/1.73M*2
ERYTHROCYTE [DISTWIDTH] IN BLOOD BY AUTOMATED COUNT: 14.8 % (ref 11.5–14.5)
GLUCOSE BLD MANUAL STRIP-MCNC: 115 MG/DL (ref 74–99)
GLUCOSE BLD MANUAL STRIP-MCNC: 124 MG/DL (ref 74–99)
GLUCOSE BLD MANUAL STRIP-MCNC: 127 MG/DL (ref 74–99)
GLUCOSE BLD MANUAL STRIP-MCNC: 135 MG/DL (ref 74–99)
GLUCOSE BLD MANUAL STRIP-MCNC: 141 MG/DL (ref 74–99)
GLUCOSE SERPL-MCNC: 117 MG/DL (ref 74–99)
HCT VFR BLD AUTO: 37.8 % (ref 36–46)
HGB BLD-MCNC: 12.4 G/DL (ref 12–16)
MAGNESIUM SERPL-MCNC: 2.03 MG/DL (ref 1.6–2.4)
MCH RBC QN AUTO: 29.6 PG (ref 26–34)
MCHC RBC AUTO-ENTMCNC: 32.8 G/DL (ref 32–36)
MCV RBC AUTO: 90 FL (ref 80–100)
NRBC BLD-RTO: 0 /100 WBCS (ref 0–0)
PHOSPHATE SERPL-MCNC: 3.8 MG/DL (ref 2.5–4.9)
PLATELET # BLD AUTO: 309 X10*3/UL (ref 150–450)
POTASSIUM SERPL-SCNC: 3.8 MMOL/L (ref 3.5–5.3)
RBC # BLD AUTO: 4.19 X10*6/UL (ref 4–5.2)
SODIUM SERPL-SCNC: 141 MMOL/L (ref 136–145)
WBC # BLD AUTO: 14.9 X10*3/UL (ref 4.4–11.3)

## 2024-11-18 PROCEDURE — 2500000005 HC RX 250 GENERAL PHARMACY W/O HCPCS: Performed by: INTERNAL MEDICINE

## 2024-11-18 PROCEDURE — 80069 RENAL FUNCTION PANEL: CPT | Performed by: INTERNAL MEDICINE

## 2024-11-18 PROCEDURE — 83735 ASSAY OF MAGNESIUM: CPT | Performed by: INTERNAL MEDICINE

## 2024-11-18 PROCEDURE — 97535 SELF CARE MNGMENT TRAINING: CPT | Mod: GO,CO

## 2024-11-18 PROCEDURE — 2500000001 HC RX 250 WO HCPCS SELF ADMINISTERED DRUGS (ALT 637 FOR MEDICARE OP): Performed by: INTERNAL MEDICINE

## 2024-11-18 PROCEDURE — 82947 ASSAY GLUCOSE BLOOD QUANT: CPT

## 2024-11-18 PROCEDURE — 2500000004 HC RX 250 GENERAL PHARMACY W/ HCPCS (ALT 636 FOR OP/ED): Performed by: INTERNAL MEDICINE

## 2024-11-18 PROCEDURE — 2500000001 HC RX 250 WO HCPCS SELF ADMINISTERED DRUGS (ALT 637 FOR MEDICARE OP): Performed by: STUDENT IN AN ORGANIZED HEALTH CARE EDUCATION/TRAINING PROGRAM

## 2024-11-18 PROCEDURE — 85027 COMPLETE CBC AUTOMATED: CPT | Performed by: INTERNAL MEDICINE

## 2024-11-18 PROCEDURE — 92526 ORAL FUNCTION THERAPY: CPT | Mod: GN

## 2024-11-18 PROCEDURE — 36415 COLL VENOUS BLD VENIPUNCTURE: CPT | Performed by: INTERNAL MEDICINE

## 2024-11-18 PROCEDURE — 2500000002 HC RX 250 W HCPCS SELF ADMINISTERED DRUGS (ALT 637 FOR MEDICARE OP, ALT 636 FOR OP/ED): Performed by: INTERNAL MEDICINE

## 2024-11-18 PROCEDURE — 97110 THERAPEUTIC EXERCISES: CPT | Mod: GP,CQ

## 2024-11-18 PROCEDURE — 97110 THERAPEUTIC EXERCISES: CPT | Mod: GO,CO

## 2024-11-18 PROCEDURE — 97530 THERAPEUTIC ACTIVITIES: CPT | Mod: GP,CQ

## 2024-11-18 PROCEDURE — 93005 ELECTROCARDIOGRAM TRACING: CPT

## 2024-11-18 PROCEDURE — 1180000001 HC REHAB PRIVATE ROOM DAILY

## 2024-11-18 PROCEDURE — 92507 TX SP LANG VOICE COMM INDIV: CPT | Mod: GN

## 2024-11-18 PROCEDURE — 99239 HOSP IP/OBS DSCHRG MGMT >30: CPT | Performed by: INTERNAL MEDICINE

## 2024-11-18 RX ORDER — ALENDRONATE SODIUM 10 MG/1
10 TABLET ORAL
Status: DISCONTINUED | OUTPATIENT
Start: 2024-11-19 | End: 2024-12-07 | Stop reason: HOSPADM

## 2024-11-18 RX ORDER — BISACODYL 5 MG
10 TABLET, DELAYED RELEASE (ENTERIC COATED) ORAL DAILY PRN
Status: DISCONTINUED | OUTPATIENT
Start: 2024-11-18 | End: 2024-12-07 | Stop reason: HOSPADM

## 2024-11-18 RX ORDER — ROSUVASTATIN CALCIUM 10 MG/1
10 TABLET, COATED ORAL DAILY
Status: DISCONTINUED | OUTPATIENT
Start: 2024-11-19 | End: 2024-12-07 | Stop reason: HOSPADM

## 2024-11-18 RX ORDER — CARVEDILOL 6.25 MG/1
6.25 TABLET ORAL 2 TIMES DAILY
Status: ON HOLD
Start: 2024-11-18

## 2024-11-18 RX ORDER — ASPIRIN 81 MG/1
81 TABLET ORAL DAILY
Status: DISCONTINUED | OUTPATIENT
Start: 2024-11-19 | End: 2024-12-07 | Stop reason: HOSPADM

## 2024-11-18 RX ORDER — GLIMEPIRIDE 2 MG/1
2 TABLET ORAL
Status: DISCONTINUED | OUTPATIENT
Start: 2024-11-19 | End: 2024-12-07 | Stop reason: HOSPADM

## 2024-11-18 RX ORDER — INSULIN LISPRO 100 [IU]/ML
0-5 INJECTION, SOLUTION INTRAVENOUS; SUBCUTANEOUS
Status: ON HOLD
Start: 2024-11-18

## 2024-11-18 RX ORDER — ACETAMINOPHEN 160 MG/5ML
650 SOLUTION ORAL EVERY 4 HOURS PRN
Status: DISCONTINUED | OUTPATIENT
Start: 2024-11-18 | End: 2024-12-07 | Stop reason: HOSPADM

## 2024-11-18 RX ORDER — CLOPIDOGREL BISULFATE 75 MG/1
75 TABLET ORAL DAILY
Status: ON HOLD
Start: 2024-11-19 | End: 2024-12-05

## 2024-11-18 RX ORDER — CARVEDILOL 6.25 MG/1
6.25 TABLET ORAL 2 TIMES DAILY
Status: DISCONTINUED | OUTPATIENT
Start: 2024-11-18 | End: 2024-12-07 | Stop reason: HOSPADM

## 2024-11-18 RX ORDER — ACETAMINOPHEN 325 MG/1
650 TABLET ORAL EVERY 4 HOURS PRN
Status: DISCONTINUED | OUTPATIENT
Start: 2024-11-18 | End: 2024-12-07 | Stop reason: HOSPADM

## 2024-11-18 RX ORDER — POLYETHYLENE GLYCOL 3350 17 G/17G
17 POWDER, FOR SOLUTION ORAL DAILY PRN
Status: DISCONTINUED | OUTPATIENT
Start: 2024-11-18 | End: 2024-12-07 | Stop reason: HOSPADM

## 2024-11-18 RX ORDER — LIDOCAINE 560 MG/1
1 PATCH PERCUTANEOUS; TOPICAL; TRANSDERMAL DAILY
Status: DISCONTINUED | OUTPATIENT
Start: 2024-11-19 | End: 2024-12-07 | Stop reason: HOSPADM

## 2024-11-18 RX ORDER — GLIMEPIRIDE 2 MG/1
2 TABLET ORAL
Status: ON HOLD
Start: 2024-11-19

## 2024-11-18 RX ORDER — ASPIRIN 81 MG/1
81 TABLET ORAL DAILY
Status: ON HOLD
Start: 2024-11-19

## 2024-11-18 RX ORDER — CLOPIDOGREL BISULFATE 75 MG/1
75 TABLET ORAL DAILY
Status: COMPLETED | OUTPATIENT
Start: 2024-11-19 | End: 2024-12-04

## 2024-11-18 RX ORDER — AMLODIPINE BESYLATE 10 MG/1
10 TABLET ORAL DAILY
Status: DISCONTINUED | OUTPATIENT
Start: 2024-11-19 | End: 2024-12-07 | Stop reason: HOSPADM

## 2024-11-18 RX ORDER — ALUMINUM HYDROXIDE, MAGNESIUM HYDROXIDE, AND SIMETHICONE 2400; 240; 2400 MG/30ML; MG/30ML; MG/30ML
30 SUSPENSION ORAL EVERY 6 HOURS PRN
Status: DISCONTINUED | OUTPATIENT
Start: 2024-11-18 | End: 2024-12-07 | Stop reason: HOSPADM

## 2024-11-18 RX ORDER — LIDOCAINE 560 MG/1
1 PATCH PERCUTANEOUS; TOPICAL; TRANSDERMAL DAILY
Status: ON HOLD
Start: 2024-11-19

## 2024-11-18 SDOH — SOCIAL STABILITY: SOCIAL INSECURITY: DOES ANYONE TRY TO KEEP YOU FROM HAVING/CONTACTING OTHER FRIENDS OR DOING THINGS OUTSIDE YOUR HOME?: NO

## 2024-11-18 SDOH — SOCIAL STABILITY: SOCIAL NETWORK: HOW OFTEN DO YOU GET TOGETHER WITH FRIENDS OR RELATIVES?: THREE TIMES A WEEK

## 2024-11-18 SDOH — ECONOMIC STABILITY: HOUSING INSECURITY: IN THE PAST 12 MONTHS, HOW MANY TIMES HAVE YOU MOVED WHERE YOU WERE LIVING?: 0

## 2024-11-18 SDOH — SOCIAL STABILITY: SOCIAL INSECURITY: ARE YOU MARRIED, WIDOWED, DIVORCED, SEPARATED, NEVER MARRIED, OR LIVING WITH A PARTNER?: DIVORCED

## 2024-11-18 SDOH — HEALTH STABILITY: PHYSICAL HEALTH: ON AVERAGE, HOW MANY MINUTES DO YOU ENGAGE IN EXERCISE AT THIS LEVEL?: 0 MIN

## 2024-11-18 SDOH — ECONOMIC STABILITY: HOUSING INSECURITY: IN THE LAST 12 MONTHS, WAS THERE A TIME WHEN YOU WERE NOT ABLE TO PAY THE MORTGAGE OR RENT ON TIME?: NO

## 2024-11-18 SDOH — ECONOMIC STABILITY: FOOD INSECURITY: WITHIN THE PAST 12 MONTHS, YOU WORRIED THAT YOUR FOOD WOULD RUN OUT BEFORE YOU GOT THE MONEY TO BUY MORE.: NEVER TRUE

## 2024-11-18 SDOH — SOCIAL STABILITY: SOCIAL INSECURITY: WITHIN THE LAST YEAR, HAVE YOU BEEN AFRAID OF YOUR PARTNER OR EX-PARTNER?: NO

## 2024-11-18 SDOH — SOCIAL STABILITY: SOCIAL INSECURITY: WITHIN THE LAST YEAR, HAVE YOU BEEN HUMILIATED OR EMOTIONALLY ABUSED IN OTHER WAYS BY YOUR PARTNER OR EX-PARTNER?: NO

## 2024-11-18 SDOH — ECONOMIC STABILITY: HOUSING INSECURITY: AT ANY TIME IN THE PAST 12 MONTHS, WERE YOU HOMELESS OR LIVING IN A SHELTER (INCLUDING NOW)?: NO

## 2024-11-18 SDOH — HEALTH STABILITY: PHYSICAL HEALTH: ON AVERAGE, HOW MANY DAYS PER WEEK DO YOU ENGAGE IN MODERATE TO STRENUOUS EXERCISE (LIKE A BRISK WALK)?: 0 DAYS

## 2024-11-18 SDOH — ECONOMIC STABILITY: TRANSPORTATION INSECURITY
IN THE PAST 12 MONTHS, HAS THE LACK OF TRANSPORTATION KEPT YOU FROM MEDICAL APPOINTMENTS OR FROM GETTING MEDICATIONS?: NO

## 2024-11-18 SDOH — SOCIAL STABILITY: SOCIAL INSECURITY: WERE YOU ABLE TO COMPLETE ALL THE BEHAVIORAL HEALTH SCREENINGS?: YES

## 2024-11-18 SDOH — SOCIAL STABILITY: SOCIAL INSECURITY: HAS ANYONE EVER THREATENED TO HURT YOUR FAMILY OR YOUR PETS?: NO

## 2024-11-18 SDOH — ECONOMIC STABILITY: FOOD INSECURITY: WITHIN THE PAST 12 MONTHS, THE FOOD YOU BOUGHT JUST DIDN'T LAST AND YOU DIDN'T HAVE MONEY TO GET MORE.: NEVER TRUE

## 2024-11-18 SDOH — SOCIAL STABILITY: SOCIAL INSECURITY: ARE THERE ANY APPARENT SIGNS OF INJURIES/BEHAVIORS THAT COULD BE RELATED TO ABUSE/NEGLECT?: NO

## 2024-11-18 SDOH — ECONOMIC STABILITY: INCOME INSECURITY: IN THE PAST 12 MONTHS HAS THE ELECTRIC, GAS, OIL, OR WATER COMPANY THREATENED TO SHUT OFF SERVICES IN YOUR HOME?: NO

## 2024-11-18 SDOH — SOCIAL STABILITY: SOCIAL NETWORK: HOW OFTEN DO YOU ATTEND MEETINGS OF THE CLUBS OR ORGANIZATIONS YOU BELONG TO?: NEVER

## 2024-11-18 SDOH — SOCIAL STABILITY: SOCIAL INSECURITY: HAVE YOU HAD ANY THOUGHTS OF HARMING ANYONE ELSE?: NO

## 2024-11-18 SDOH — SOCIAL STABILITY: SOCIAL NETWORK
IN A TYPICAL WEEK, HOW MANY TIMES DO YOU TALK ON THE PHONE WITH FAMILY, FRIENDS, OR NEIGHBORS?: MORE THAN THREE TIMES A WEEK

## 2024-11-18 SDOH — SOCIAL STABILITY: SOCIAL INSECURITY: ABUSE: ADULT

## 2024-11-18 SDOH — ECONOMIC STABILITY: TRANSPORTATION INSECURITY
IN THE PAST 12 MONTHS, HAS LACK OF TRANSPORTATION KEPT YOU FROM MEETINGS, WORK, OR FROM GETTING THINGS NEEDED FOR DAILY LIVING?: NO

## 2024-11-18 SDOH — SOCIAL STABILITY: SOCIAL INSECURITY: HAVE YOU HAD THOUGHTS OF HARMING ANYONE ELSE?: NO

## 2024-11-18 SDOH — SOCIAL STABILITY: SOCIAL INSECURITY: DO YOU FEEL ANYONE HAS EXPLOITED OR TAKEN ADVANTAGE OF YOU FINANCIALLY OR OF YOUR PERSONAL PROPERTY?: NO

## 2024-11-18 SDOH — SOCIAL STABILITY: SOCIAL NETWORK: HOW OFTEN DO YOU ATTEND CHURCH OR RELIGIOUS SERVICES?: NEVER

## 2024-11-18 SDOH — SOCIAL STABILITY: SOCIAL INSECURITY: DO YOU FEEL UNSAFE GOING BACK TO THE PLACE WHERE YOU ARE LIVING?: NO

## 2024-11-18 SDOH — ECONOMIC STABILITY: FOOD INSECURITY: HOW HARD IS IT FOR YOU TO PAY FOR THE VERY BASICS LIKE FOOD, HOUSING, MEDICAL CARE, AND HEATING?: NOT HARD AT ALL

## 2024-11-18 SDOH — SOCIAL STABILITY: SOCIAL NETWORK
DO YOU BELONG TO ANY CLUBS OR ORGANIZATIONS SUCH AS CHURCH GROUPS, UNIONS, FRATERNAL OR ATHLETIC GROUPS, OR SCHOOL GROUPS?: NO

## 2024-11-18 SDOH — SOCIAL STABILITY: SOCIAL INSECURITY: ARE YOU OR HAVE YOU BEEN THREATENED OR ABUSED PHYSICALLY, EMOTIONALLY, OR SEXUALLY BY ANYONE?: NO

## 2024-11-18 ASSESSMENT — BRIEF INTERVIEW FOR MENTAL STATUS (BIMS)
WHAT DAY OF THE WEEK IS IT: CORRECT
ASKED TO RECALL SOCK: YES, NO CUE REQUIRED
INITIAL REPETITION OF BED BLUE SOCK - FIRST ATTEMPT: 3
ASKED TO RECALL BED: YES, NO CUE REQUIRED
ASKED TO RECALL BLUE: YES, AFTER CUEING ("A COLOR")
WHAT MONTH IS IT: ACCURATE WITHIN 5 DAYS
BIMS SUMMARY SCORE: 14
WHAT YEAR IS IT: CORRECT
COGNITIVE PATTERN ASSESSMENT USED: BIMS

## 2024-11-18 ASSESSMENT — COGNITIVE AND FUNCTIONAL STATUS - GENERAL
TURNING FROM BACK TO SIDE WHILE IN FLAT BAD: A LOT
DRESSING REGULAR LOWER BODY CLOTHING: A LOT
MOVING FROM LYING ON BACK TO SITTING ON SIDE OF FLAT BED WITH BEDRAILS: A LITTLE
TOILETING: A LOT
DRESSING REGULAR LOWER BODY CLOTHING: A LOT
WALKING IN HOSPITAL ROOM: A LOT
DAILY ACTIVITIY SCORE: 13
MOVING TO AND FROM BED TO CHAIR: A LOT
TURNING FROM BACK TO SIDE WHILE IN FLAT BAD: A LOT
MOVING TO AND FROM BED TO CHAIR: A LOT
PERSONAL GROOMING: A LOT
EATING MEALS: A LITTLE
MOBILITY SCORE: 11
DRESSING REGULAR UPPER BODY CLOTHING: A LOT
STANDING UP FROM CHAIR USING ARMS: A LOT
DAILY ACTIVITIY SCORE: 13
TOILETING: TOTAL
HELP NEEDED FOR BATHING: A LOT
CLIMB 3 TO 5 STEPS WITH RAILING: TOTAL
CLIMB 3 TO 5 STEPS WITH RAILING: TOTAL
PERSONAL GROOMING: A LITTLE
MOVING FROM LYING ON BACK TO SITTING ON SIDE OF FLAT BED WITH BEDRAILS: A LOT
WALKING IN HOSPITAL ROOM: TOTAL
DRESSING REGULAR UPPER BODY CLOTHING: A LOT
STANDING UP FROM CHAIR USING ARMS: A LOT
HELP NEEDED FOR BATHING: A LOT
EATING MEALS: A LITTLE
MOBILITY SCORE: 11

## 2024-11-18 ASSESSMENT — PAIN SCALES - GENERAL
PAINLEVEL_OUTOF10: 2
PAINLEVEL_OUTOF10: 0 - NO PAIN
PAINLEVEL_OUTOF10: 0 - NO PAIN
PAINLEVEL_OUTOF10: 5 - MODERATE PAIN
PAINLEVEL_OUTOF10: 0 - NO PAIN
PAINLEVEL_OUTOF10: 5 - MODERATE PAIN
PAINLEVEL_OUTOF10: 2
PAINLEVEL_OUTOF10: 2

## 2024-11-18 ASSESSMENT — PAIN DESCRIPTION - LOCATION
LOCATION: HIP

## 2024-11-18 ASSESSMENT — LIFESTYLE VARIABLES
PRESCIPTION_ABUSE_PAST_12_MONTHS: NO
HOW MANY STANDARD DRINKS CONTAINING ALCOHOL DO YOU HAVE ON A TYPICAL DAY: PATIENT DOES NOT DRINK
HOW OFTEN DO YOU HAVE A DRINK CONTAINING ALCOHOL: NEVER
SKIP TO QUESTIONS 9-10: 1
AUDIT-C TOTAL SCORE: 0
HOW OFTEN DO YOU HAVE 6 OR MORE DRINKS ON ONE OCCASION: NEVER
AUDIT-C TOTAL SCORE: 0
SUBSTANCE_ABUSE_PAST_12_MONTHS: NO

## 2024-11-18 ASSESSMENT — PAIN SCALES - WONG BAKER
WONGBAKER_NUMERICALRESPONSE: NO HURT
WONGBAKER_NUMERICALRESPONSE: HURTS LITTLE BIT

## 2024-11-18 ASSESSMENT — PATIENT HEALTH QUESTIONNAIRE - PHQ9
1. LITTLE INTEREST OR PLEASURE IN DOING THINGS: NOT AT ALL
2. FEELING DOWN, DEPRESSED OR HOPELESS: NOT AT ALL
SUM OF ALL RESPONSES TO PHQ9 QUESTIONS 1 & 2: 0
SUM OF ALL RESPONSES TO PHQ9 QUESTIONS 1 & 2: 0
1. LITTLE INTEREST OR PLEASURE IN DOING THINGS: NOT AT ALL
2. FEELING DOWN, DEPRESSED OR HOPELESS: NOT AT ALL

## 2024-11-18 ASSESSMENT — COLUMBIA-SUICIDE SEVERITY RATING SCALE - C-SSRS
1. IN THE PAST MONTH, HAVE YOU WISHED YOU WERE DEAD OR WISHED YOU COULD GO TO SLEEP AND NOT WAKE UP?: NO
6. HAVE YOU EVER DONE ANYTHING, STARTED TO DO ANYTHING, OR PREPARED TO DO ANYTHING TO END YOUR LIFE?: NO
2. HAVE YOU ACTUALLY HAD ANY THOUGHTS OF KILLING YOURSELF?: NO

## 2024-11-18 ASSESSMENT — PAIN DESCRIPTION - ORIENTATION
ORIENTATION: RIGHT
ORIENTATION: RIGHT
ORIENTATION: LEFT

## 2024-11-18 ASSESSMENT — PAIN - FUNCTIONAL ASSESSMENT
PAIN_FUNCTIONAL_ASSESSMENT: 0-10

## 2024-11-18 ASSESSMENT — ACTIVITIES OF DAILY LIVING (ADL): ASSISTIVE_DEVICE: DENTURES LOWER;DENTURES UPPER

## 2024-11-18 ASSESSMENT — PAIN DESCRIPTION - DESCRIPTORS: DESCRIPTORS: DISCOMFORT

## 2024-11-18 NOTE — PROGRESS NOTES
11/18/24 0936   Discharge Planning   Expected Discharge Disposition Rehab   Does the patient need discharge transport arranged? Yes   RoundTrip coordination needed? Yes   Has discharge transport been arranged? No      [facility] has accepted the patient and are initiating pre-cert at this time as it is a commercial plan. They will advise when determination is received.     11:53 AM   [facility] has obtained authorization for the patient to admit today. Medical team updated.     12:38 PM  Discharge order has been placed. Transportation arranged for pick-up at 3:00 PM.  [facility] and nurse updated to the same.     SonGuero updated and will notify his sister, the patient's daughter.     3:50 PM  Granddaughter at bedside inquiring about cost of CCR with insurance; advised her to talk to the business office after admission. Address for CCR provided to granddaughter at this time also.

## 2024-11-18 NOTE — PROGRESS NOTES
Physical Therapy    Physical Therapy Treatment    Patient Name: Cynthia Muse  MRN: [id number]  Department: [address]  Room: [address] [place], [place] [address]  Today's Date: 11/18/2024  Time Calculation  Start Time: 1050  Stop Time: 1120  Time Calculation (min): 30 min         Assessment/Plan   PT Assessment  Rehab Prognosis: Good  Barriers to Discharge: significant decline from IND baseline, mod assist of 2. High fall risk.  End of Session Communication: Bedside nurse  Assessment Comment: Gives effort but fatiques easily Mod A x 2 for stand pivot transfers with gait belt and wes walker  End of Session Patient Position: Up in chair, Alarm on (Needs at reach)  PT Plan  Inpatient/Swing Bed or Outpatient: Inpatient  PT Plan  Treatment/Interventions: Bed mobility, Transfer training, Gait training, Balance training, Neuromuscular re-education, Endurance training, Strengthening, Range of motion, Therapeutic exercise, Therapeutic activity  PT Plan: Ongoing PT  PT Frequency: 6 times per week  PT Discharge Recommendations: High intensity level of continued care  Equipment Recommended upon Discharge:  (Wes walker with therapy Xena elias with staff)  PT Recommended Transfer Status: Assist x2, Assistive device (Gait belt)  PT - OK to Discharge: Yes      General Visit Information:   PT  Visit  PT Received On: 11/18/24  General  Reason for Referral: CVA  Referred By: Dr Houston  Past Medical History Relevant to Rehab: HTN, DM, CKD III, tobacco abuse  Family/Caregiver Present: No  Co-Treatment: PT  Co-Treatment Reason: 2 person assist for safety with functional mobility  Prior to Session Communication: Bedside nurse  Patient Position Received: Bed, 3 rail up, Alarm on  Preferred Learning Style: verbal, visual  General Comment: Cleared by nurse to see. Patient agreeable to therapy    Subjective   Precautions:  Precautions  Hearing/Visual Limitations: no deficits  Medical Precautions: Fall precautions    Vital Signs (Past 2hrs)        Date/Time Vitals Session  Patient Position Pulse Resp SpO2 BP MAP (mmHg)    11/18/24 1217 --  --  75  20  97 %  142/66  80                         Objective   Pain:  Pain Assessment  Pain Assessment: 0-10  0-10 (Numeric) Pain Score: 2  Pain Type: Chronic pain  Pain Location: Hip  Pain Orientation: Right  Cognition:  Cognition  Overall Cognitive Status: Within Functional Limits  Orientation Level: Oriented X4  Cognition Comments: dysarthiria d/t facial droop  Attention: Within Functional Limits  Coordination:  Coordination Comment: decreased LUE> LLE  Postural Control:  Postural Control  Posture Comment: fatiques quickly  Static Sitting Balance  Static Sitting-Level of Assistance: Close supervision  Static Sitting-Comment/Number of Minutes: 5 minutes  Static Standing Balance  Static Standing-Balance Support: Right upper extremity supported  Static Standing-Level of Assistance: Moderate assistance (x 2)  Static Standing-Comment/Number of Minutes: Cues to weight shift right  Dynamic Standing Balance  Dynamic Standing-Balance Support: Right upper extremity supported (wes walker)  Dynamic Standing-Level of Assistance: Moderate assistance (x 2)  Dynamic Standing-Balance: Turning  Dynamic Standing-Comments: Mod A x 2 to steady Min A x 2 to steady  Extremity/Trunk Assessments:    Activity Tolerance:  Activity Tolerance  Endurance: Decreased tolerance for upright activites  Activity Tolerance Comments: Fair - Fatiques easily  Treatments:  Therapeutic Exercise  Therapeutic Exercise Performed: Yes  Therapeutic Exercise Activity 1: B LE supine ther ex:ankle pumps,quad/gluteal sets, heelslides, SAQs, hip abduction/adduction x 15 AAROM L LE         Bed Mobility  Bed Mobility: Yes  Bed Mobility 1  Bed Mobility 1: Supine to sitting  Level of Assistance 1: Moderate assistance    Transfers  Transfer: Yes  Transfer 1  Transfer From 1: Bed to  Transfer to 1: Stand  Technique 1: Stand pivot  Transfer Device 1: Gait belt, Wes-walker  Transfer Level of  Assistance 1: Moderate assistance, +2  Trials/Comments 1: STS stand pivot transfer with Mod A x 2  Transfers 2  Transfer From 2: Stand to  Transfer to 2: Sit  Technique 2: Stand pivot  Transfer Device 2: Gait belt, Wes-walker  Transfer Level of Assistance 2: Moderate assistance, +2  Trials/Comments 2: STS stand pivot transfers with Mod A x 2  bed to chair  Transfers 3  Transfer From 3: Wheelchair to  Transfer to 3: Chair with arms  Technique 3: Stand pivot  Transfer Device 3: Gait belt, Wes-walker  Transfer Level of Assistance 3: Moderate assistance, +2  Trials/Comments 3: STS stand pivot transfer wheelchair to bedside chair with Mod A x 2    Outcome Measures:  [facility] Basic Mobility  Turning from your back to your side while in a flat bed without using bedrails: A lot  Moving from lying on your back to sitting on the side of a flat bed without using bedrails: A lot  Moving to and from bed to chair (including a wheelchair): A lot  Standing up from a chair using your arms (e.g. wheelchair or bedside chair): A lot  To walk in hospital room: A lot  Climbing 3-5 steps with railing: Total  Basic Mobility - Total Score: 11    Education Documentation  Precautions, taught by Tati Odell PTA at 11/18/2024 12:59 PM.  Learner: Patient  Readiness: Acceptance  Method: Explanation  Response: Verbalizes Understanding, Needs Reinforcement    Body Mechanics, taught by Tati Odell PTA at 11/18/2024 12:59 PM.  Learner: Patient  Readiness: Acceptance  Method: Explanation  Response: Verbalizes Understanding, Needs Reinforcement    Home Exercise Program, taught by Tati Odell PTA at 11/18/2024 12:59 PM.  Learner: Patient  Readiness: Acceptance  Method: Explanation  Response: Verbalizes Understanding, Needs Reinforcement    Mobility Training, taught by Tati Odell PTA at 11/18/2024 12:59 PM.  Learner: Patient  Readiness: Acceptance  Method: Explanation  Response: Verbalizes Understanding, Needs  Reinforcement    Education Comments  No comments found.        OP EDUCATION:       Encounter Problems       Encounter Problems (Active)       Balance       LTG - Patient will maintain balance to allow for safe activity (Progressing)       Start:  11/15/24    Expected End:  11/22/24               Mobility       STG - Patient will ambulate 10' w/ LRAD and mod assist of 2 (Progressing)       Start:  11/15/24    Expected End:  11/22/24            Pt will tolerate BLE exercises consistently to promote functional strength, balance and endurance (Progressing)       Start:  11/15/24    Expected End:  11/22/24               PT Transfers       STG - Patient to transfer to and from sit to supine w/ min assist (Progressing)       Start:  11/15/24    Expected End:  11/22/24            STG - Patient will transfer sit to and from stand w/ min assist of 2 (Progressing)       Start:  11/15/24    Expected End:  11/22/24

## 2024-11-18 NOTE — PROGRESS NOTES
Occupational Therapy    OT Treatment    Patient Name: Cynthia Muse  MRN: [id number]  Department: [address]  Room: [address] [place], [place] [address]  Today's Date: 11/18/2024  Time Calculation  Start Time: 1105  Stop Time: 1158  Time Calculation (min): 53 min        Assessment:  OT Assessment: Pt presents with small movements in LUE during assisted therex. Making good progress with transfer techniques.  Barriers to Discharge:  (2 person assist, needs self care assist)  Evaluation/Treatment Tolerance: Patient tolerated treatment well  End of Session Communication: Bedside nurse  End of Session Patient Position: Up in chair, Alarm on (All needs in reach.)  OT Assessment Results: Decreased ADL status, Decreased endurance, Decreased functional mobility, Decreased gross motor control, Non-functional left upper extremity  Barriers to Discharge:  (2 person assist, needs self care assist)  Evaluation/Treatment Tolerance: Patient tolerated treatment well  Strengths: Attitude of self  Plan:  Treatment Interventions: Functional transfer training, UE strengthening/ROM  OT Frequency: 4 times per week  OT Discharge Recommendations: High intensity level of continued care  Equipment Recommended upon Discharge:  (robb walker with therapy, otherwise Lift)  OT Recommended Transfer Status: Assist of 2  OT - OK to Discharge: Yes  Treatment Interventions: Functional transfer training, UE strengthening/ROM    Subjective   Previous Visit Info:  OT Last Visit  OT Received On: 11/18/24  General:  General  Reason for Referral: CVA  Referred By: Dr Houston  Past Medical History Relevant to Rehab: HTN, DM, CKD III, tobacco abuse  Prior to Session Communication: Bedside nurse  Patient Position Received: Bed, 3 rail up, Alarm on  General Comment: Agreeable to treatment, nurse requesting therapy to transfer to new room.  Precautions:  Medical Precautions: Fall precautions    Vital Signs (Past 2hrs)        Date/Time Vitals Session Patient Position Pulse Resp SpO2 BP MAP (mmHg)     11/18/24 1217 --  --  75  20  97 %  142/66  80                   Pain:  Pain Assessment  Pain Assessment: 0-10  0-10 (Numeric) Pain Score: 0 - No pain    Objective    Cognition:  Cognition  Overall Cognitive Status: Within Functional Limits  Orientation Level: Oriented X4  Coordination:  Movements are Fluid and Coordinated: No  Upper Body Coordination:  (LUE impaired)     Bed Mobility/Transfers: Bed Mobility 1  Bed Mobility 1: Supine to sitting  Level of Assistance 1: Moderate assistance  Bed Mobility Comments 1: HOB elevated ~ 30 degrees    Transfer 1  Transfer From 1: Bed to  Transfer to 1: Wheelchair  Technique 1: Stand pivot, To right  Transfer Device 1: Wes-walker, Gait belt  Transfer Level of Assistance 1: Moderate assistance (x2)  Trials/Comments 1: cues for hand placement  Transfers 2  Transfer From 2: Wheelchair to  Transfer to 2: Chair with arms  Technique 2: To right  Transfer Device 2: Wes-walker, Gait belt  Transfer Level of Assistance 2: Moderate assistance (x2)     Therapy/Activity: Therapeutic Exercise  Therapeutic Exercise Activity 1: Hand over Hand tabletop slides forward/backward, side to side. 2 sets x 10 reps.  Therapeutic Exercise Activity 2: LUE AAROM scap protraction/retraction 1 set x 10 reps, WB therex to all LUE joints x 5 reps with 5 sec. holds  Therapeutic Exercise Activity 3: Pt completes 3 self ranging exercises to LUE with therapist cuing/demonstrating correct techniques.    Outcome Measures:[facility] Daily Activity  Putting on and taking off regular lower body clothing: A lot  Bathing (including washing, rinsing, drying): A lot  Putting on and taking off regular upper body clothing: A lot  Toileting, which includes using toilet, bedpan or urinal: Total  Taking care of personal grooming such as brushing teeth: A little  Eating Meals: A little  Daily Activity - Total Score: 13    IP EDUCATION:  Education  Individual(s) Educated: Patient  Education Provided: Fall precautons, Risk and  benefits of OT discussed with patient or other, POC discussed and agreed upon  Patient Response to Education: Patient/Caregiver Verbalized Understanding of Information, Patient/Caregiver Performed Return Demonstration of Exercises/Activities    Goals:  Encounter Problems       Encounter Problems (Active)       OT Goals       ADLs (Progressing)       Start:  11/15/24    Expected End:  12/06/24       Patient will complete ADL tasks, with set-up and supervision using AE need in order to increase patient's safety and independence with self-care tasks.           Functional transfers (Progressing)       Start:  11/15/24    Expected End:  12/06/24       Patient will complete functional transfers with minimal assist  using least restrictive device, in order to increase patient's safety and independence with daily tasks.           LUE (Progressing)       Start:  11/15/24    Expected End:  12/06/24       Pt will demonstrate increased LUE mobility, strength, and coordination as evidenced by formal testing and function.

## 2024-11-18 NOTE — CARE PLAN
The patient's goals for the shift include  improve mobility    The clinical goals for the shift include  maintain safety

## 2024-11-18 NOTE — NURSING NOTE
Assumed care of patient. Patient arrived via [facility]. Transferred into bed via stretcher. Patient here s/p CVA with left arm flaccid and left leg has very limited movement.. Alert and oriented. Incontinent of B/B. AC/HS. On RA. Educated to the use of call light. Denies any pain or needs at this time. Call light is within reach. Will continue to monitor.

## 2024-11-18 NOTE — DISCHARGE SUMMARY
Discharge Diagnosis  Cerebrovascular accident (CVA), unspecified mechanism (Multi)    Issues Requiring Follow-Up  Acute rehab for recent stroke    Discharge Meds     Medication List      START taking these medications     aspirin 81 mg EC tablet; Take 1 tablet (81 mg) by mouth once daily.;   Start taking on: November 19, 2024   carvedilol 6.25 mg tablet; Commonly known as: Coreg; Take 1 tablet (6.25   mg) by mouth 2 times a day.   clopidogrel 75 mg tablet; Commonly known as: Plavix; Take 1 tablet (75   mg) by mouth once daily for 16 doses.; Start taking on: November 19, 2024   insulin lispro 100 unit/mL injection; Inject 0-5 Units under the skin 3   times a day before meals. Take as directed per insulin instructions.   lidocaine 4 % patch; Place 1 patch over 12 hours on the skin once daily.   Remove & discard patch within 12 hours or as directed by MD.; Start taking   on: November 19, 2024   oxygen gas therapy; Commonly known as: O2; Inhale 1 each once every 24   hours.     CONTINUE taking these medications     alendronate 70 mg tablet; Commonly known as: Fosamax; TAKE ONE TABLET BY   MOUTH ONCE A WEEK   amLODIPine 10 mg tablet; Commonly known as: Norvasc; Take 1 tablet (10   mg) by mouth once daily.   glimepiride 2 mg tablet; Commonly known as: AmaryL; Take 1 tablet (2 mg)   by mouth once daily in the morning. Take before meals. Do not fill before   November 19, 2024.; Start taking on: November 19, 2024   rosuvastatin 10 mg tablet; Commonly known as: Crestor; TAKE ONE TABLET   BY MOUTH EVERY DAY     STOP taking these medications     hydroCHLOROthiazide 25 mg tablet; Commonly known as: HYDRODiuril   lisinopril 10 mg tablet       Test Results Pending At Discharge  Pending Labs       No current pending labs.            Hospital Course   Patient admitted to hospital after having TNK for acute stroke.  She was monitored in the intensive care unit.  Patient had evaluation by neurology.  Neurology recommended DAPT therapy  with Plavix for 21 days and aspirin to continue after.  Patient was evaluated by physical therapy as well as Occupational Therapy patient was a good candidate for acute rehab.  Patient's blood pressure is being managed with Norvasc and carvedilol.  Patient was on lisinopril, patient does have a history of chronic renal insufficiency and had acute renal insufficiency on admission.  Lisinopril was held and we will continue to hold this until evaluation by patient's primary care physician or nephrologist.  Titration of carvedilol can be done at acute rehab if needed.  Restarted the patient's Amaryl sugars will need to be monitored at acute rehab.  Patient's hemoglobin A1c was at goal.  Otherwise from a medical standpoint patient is stable for discharge.    Pertinent Physical Exam At Time of Discharge  Physical Exam  No acute distress  HEENT PERRL EOMI  Cardiovascular S1-S2 regular rate rhythm  Lungs clear to auscultation  Neuro left-sided hemiparesis, unable to lift left upper lower extremity to gravity.  Outpatient Follow-Up  No future appointments.  Total time spent in discharge was 35 minutes    Masood Salcedo MD

## 2024-11-18 NOTE — NURSING NOTE
Pt discharged to [facility] transported by [facility] ambulance . Report was called to receiving nurse at [facility] at 1500

## 2024-11-18 NOTE — PROGRESS NOTES
Speech-Language Pathology    SLP Adult Inpatient   Motor Speech and Dysphagia Treatment Note     Patient Name: Cynthia Muse  MRN: [id number]  Today's Date: 11/18/2024  Time Calculation  Start Time: 1010  Stop Time: 1050  Time Calculation (min): 40 min     Visits in POC: 1/3 sp, 1/3 sw    Current Problem:   1. Cerebrovascular accident (CVA), unspecified mechanism (Multi)  Transthoracic Echo (TTE) Complete    Transthoracic Echo (TTE) Complete      2. Hypertension, unspecified type              SLP Assessment:  SLP TX Intervention Outcome: Making Progress Towards Goals  SLP Assessment Results: Motor Speech Deficits, Dysphagia  Treatment Tolerance: Patient tolerated treatment well  Medical Staff Made Aware: Yes      Plan:  Inpatient/Swing Bed or Outpatient: Inpatient  Treatment/Interventions: Articulation, Oral motor exercises, Other (Comment) (Dysphagia Tx)  SLP TX Plan: Continue Plan of Care  SLP Plan: Skilled SLP  SLP Frequency: 3x per week  Duration: 2 weeks  SLP Discharge Recommendations: Continue skilled SLP services at the next level of care  Next Treatment Priority: Diet tolerance, clear speech strategies  Discussed POC: Patient  Discussed Risks/Benefits: Yes  Patient/Caregiver Agreeable: Yes      Subjective   Current Problem:  Pt pleasant and cooperative, upright in bed for session.    Pain Assessment:  Pain Assessment  Pain Assessment: 0-10  0-10 (Numeric) Pain Score: 0 - No pain      Objective     Therapeutic Swallow:  Therapeutic Swallow Intervention : Compensatory Strategies, PO Trials, Oral Strengthening Techniques  Solid Diet Recommendations: Soft & bite sized/chopped (IDDSI Level 6)  Liquid Diet Recommendations: Thin (IDDSI Level 0)    Dysphagia goals (Established 11/15/24  Target: 2 weeks)     LTG: Pt will tolerate recommended diet without overt s/s of aspiration (I.e coughing or choking) in order to meet nutrition and hydration standards.      STG 1: Pt will tolerate current diet recommendation without  overt s/s of aspiration (I.e. coughing/choking) with 90% accuracy during observed PO trials.   Goals Initiated 11/15/24  Target Date: 2 weeks  Status: 90%; occasional throat clearing noted post swallow successive swallows thin liquids              Progress this Visit: Improving  STG 2: Pt will participate in PO trials with upgraded diet textures without overt s/s of aspiration with 80% accuracy.  Goals Initiated 11/15/24 Target Date: 2 weeks  Status: Not addressed; pt full from breakfast              Progress this Visit: N/A  STG 3: Pt will participate in OMEs to increase ability to clear residue out of the oral cavity after the swallow has been completed with 80% accuracy and maximum cues.  Goals Initiated 11/15/24 Target Date: 2 weeks  Status: Pt completed 10 reps of lingual lateralization and protrusion exercises              Progress this Visit: Improving; good response to skilled instruction    Motor Speech:  Motor Speech Intervention : Compensatory Speech Strategies, Oral Lingual Strengthening Techniques    Speech and Language Goals: (established 11/15/24  Target: 2 weeks)  LTG 1: Pt will improve increase intelligibility in multiple contexts (I.e words, phrases, conversation) to better communicate personal/medical wants and needs. (Established: 11/15/24  Target Date: 2 weeks)     STG 1: Pt will learn and utilize clear speech strategies to improve speech intelligibility to be better understood in her environment per report or as observed during the session with 80% accuracy.               Goal Initiated: 11/15/24    Target Date: 2 weeks   Today's Progress: Pt recalled 3/3 clear speech strategies that had been introduced during previous session using acronym S.O.S (Speak Up, Over-do, Slow down). She demonstrated understanding of all strategies.   Status: Progressing awareness; further instruction/practice for consistent implementation and carryover.     STG 2: Pt will participate in OMEs (I.e labial exercises)  to increase strength and ROM for adequate motor speech function to increase intelligibly with 80% accuracy.               Goal Initiated: 11/15/24    Target Date: 2 weeks               Today's Progress: Pt participated in 3x5 labial exercises as follows:                                         - Labial Protrusion                                      - Labial Retraction                                      - Labial Press              Status: Progressing  STG 3: Pt will participate in articulatory precision tasks with words/phrase to improve rate of speech with 80% accuracy.  Goal Initiated: 11/15/24  Target Date: 2 weeks   Status: Not addressed this session   Today's Progress: N/A        Inpatient Education:   Individual(s) Educated: Patient  Verbal Education : Strategies, POC, diet recommendations, continued treatment in IRF setting  Risk and Benefits Discussed with Patient/Caregiver/Other: yes  Patient/Caregiver Demonstrated Understanding: yes  Plan of Care Discussed and Agreed Upon: yes  Patient Response to Education: Patient/Caregiver Verbalized Understanding of Information

## 2024-11-18 NOTE — DOCUMENTATION CLARIFICATION NOTE
"    PATIENT:               KAUR POOLE  ACCT #:                  [id number]  MRN:                       [id number]  :                       1945  ADMIT DATE:       2024 10:48 AM  DISCH DATE:  RESPONDING PROVIDER #:        [id number]          PROVIDER RESPONSE TEXT:    Non-infectious SIRS as evident by elevated HR & RR with acute organ dysfunction of Acute Ischemic stroke CVA and BINH    CDI QUERY TEXT:    Clarification    Instruction:    Based on your assessment of the patient and the clinical information, please provide the requested documentation by clicking on the appropriate radio button and enter any additional information if prompted.    Question: Please further clarify if there is a diagnosis related to the clinical information    When answering this query, please exercise your independent professional judgment. The fact that a question is being asked, does not imply that any particular answer is desired or expected.    The patient's clinical indicators include:  Clinical Information: 79y.o. F presents via EMS for left-sided weakness, facial droop & slurred speech.  Admitted with Acute CVA, left-sided Hemiparesis, bilateral carotid artery stenosis and HTN.  NIH stroke scale: 9     VS: 36.8, 96, 20, 176/118, 96% on RA     H&P: Acute CVA s/p TNK. Patient presented with left-sided facial droop dysarthria left-sided hemiparesis patient was given TNK at noon and patient's deficits have already started improving. Bilateral carotid artery stenosis 50% on the right 30% on the left     MRI Brain: \" Acute ischemic infarct in the right corona radiata on a background of mild chronic small vessel ischemic changes. \"    11/15 Neuro Consult: MRI shows R corona radiata infarct. Etiology: Ischemic Stroke: Small-vessel disease from HTN, HLD tobacco use    Clinical Indicators:   HR: 96, 95, 94, 92, 98, 101, 97   RR: 22, 25, 23, 31, 27, 22, 25  11/15 HR: 92, 94, 98, 99, 96  11/15 RR: 21, 24, 27, 22, 26, " 21, 27, 23    11/14 Cr 1.80, GFR 29, BUN 50  11/15 Cr 1.69, GFR 31, BUN 43    11/14 H&H 12.1/37.4, WBC 12.3, Plts 280    Treatment:  11/14 TNKASE 18mg IV x1 dose  11/16 Aspirin 81mg po x1 dose  11/15-11/16 Plavix 75mg po x2 doses    Risk Factors: PMHx: HTN, HLD, CKD, current daily smoker.  BMI: 27.57  Options provided:  -- Non-infectious SIRS as evident by elevated HR & RR with acute organ dysfunction of Acute Ischemic stroke CVA and BINH  -- Non-infectious SIRS without acute organ dysfunction  -- Other - I will add my own diagnosis  -- Refer to Clinical Documentation Reviewer    Query created by: Ynes Harrington on 11/16/2024 11:48 AM      Electronically signed by:  KAYE VELASCO MD 11/18/2024 12:04 PM

## 2024-11-19 PROBLEM — G81.94 LEFT HEMIPARESIS (MULTI): Status: ACTIVE | Noted: 2024-11-19

## 2024-11-19 LAB
ANION GAP SERPL CALCULATED.3IONS-SCNC: 15 MMOL/L (ref 10–20)
APPEARANCE UR: ABNORMAL
BACTERIA #/AREA URNS AUTO: ABNORMAL /HPF
BILIRUB UR STRIP.AUTO-MCNC: NEGATIVE MG/DL
BUN SERPL-MCNC: 41 MG/DL (ref 6–23)
CALCIUM SERPL-MCNC: 9.3 MG/DL (ref 8.6–10.3)
CHLORIDE SERPL-SCNC: 106 MMOL/L (ref 98–107)
CO2 SERPL-SCNC: 23 MMOL/L (ref 21–32)
COLOR UR: YELLOW
CREAT SERPL-MCNC: 1.77 MG/DL (ref 0.5–1.05)
EGFRCR SERPLBLD CKD-EPI 2021: 29 ML/MIN/1.73M*2
ERYTHROCYTE [DISTWIDTH] IN BLOOD BY AUTOMATED COUNT: 14.8 % (ref 11.5–14.5)
GLUCOSE BLD MANUAL STRIP-MCNC: 114 MG/DL (ref 74–99)
GLUCOSE BLD MANUAL STRIP-MCNC: 67 MG/DL (ref 74–99)
GLUCOSE BLD MANUAL STRIP-MCNC: 89 MG/DL (ref 74–99)
GLUCOSE SERPL-MCNC: 106 MG/DL (ref 74–99)
GLUCOSE UR STRIP.AUTO-MCNC: NORMAL MG/DL
HCT VFR BLD AUTO: 37.7 % (ref 36–46)
HGB BLD-MCNC: 12.2 G/DL (ref 12–16)
HYALINE CASTS #/AREA URNS AUTO: ABNORMAL /LPF
KETONES UR STRIP.AUTO-MCNC: NEGATIVE MG/DL
LEUKOCYTE ESTERASE UR QL STRIP.AUTO: ABNORMAL
MCH RBC QN AUTO: 29.4 PG (ref 26–34)
MCHC RBC AUTO-ENTMCNC: 32.4 G/DL (ref 32–36)
MCV RBC AUTO: 91 FL (ref 80–100)
MUCOUS THREADS #/AREA URNS AUTO: ABNORMAL /LPF
NITRITE UR QL STRIP.AUTO: NEGATIVE
NRBC BLD-RTO: 0 /100 WBCS (ref 0–0)
PH UR STRIP.AUTO: 5.5 [PH]
PLATELET # BLD AUTO: 352 X10*3/UL (ref 150–450)
POTASSIUM SERPL-SCNC: 3.9 MMOL/L (ref 3.5–5.3)
PROT UR STRIP.AUTO-MCNC: ABNORMAL MG/DL
RBC # BLD AUTO: 4.15 X10*6/UL (ref 4–5.2)
RBC # UR STRIP.AUTO: NEGATIVE /UL
RBC #/AREA URNS AUTO: ABNORMAL /HPF
SODIUM SERPL-SCNC: 140 MMOL/L (ref 136–145)
SP GR UR STRIP.AUTO: 1.02
SQUAMOUS #/AREA URNS AUTO: ABNORMAL /HPF
UROBILINOGEN UR STRIP.AUTO-MCNC: NORMAL MG/DL
WBC # BLD AUTO: 13.7 X10*3/UL (ref 4.4–11.3)
WBC #/AREA URNS AUTO: >50 /HPF
WBC CLUMPS #/AREA URNS AUTO: ABNORMAL /HPF

## 2024-11-19 PROCEDURE — 2500000001 HC RX 250 WO HCPCS SELF ADMINISTERED DRUGS (ALT 637 FOR MEDICARE OP): Performed by: INTERNAL MEDICINE

## 2024-11-19 PROCEDURE — 2500000002 HC RX 250 W HCPCS SELF ADMINISTERED DRUGS (ALT 637 FOR MEDICARE OP, ALT 636 FOR OP/ED): Performed by: INTERNAL MEDICINE

## 2024-11-19 PROCEDURE — 82947 ASSAY GLUCOSE BLOOD QUANT: CPT

## 2024-11-19 PROCEDURE — 97535 SELF CARE MNGMENT TRAINING: CPT | Mod: GO

## 2024-11-19 PROCEDURE — 36415 COLL VENOUS BLD VENIPUNCTURE: CPT | Performed by: INTERNAL MEDICINE

## 2024-11-19 PROCEDURE — 97116 GAIT TRAINING THERAPY: CPT | Mod: GP,CQ

## 2024-11-19 PROCEDURE — 99223 1ST HOSP IP/OBS HIGH 75: CPT | Performed by: INTERNAL MEDICINE

## 2024-11-19 PROCEDURE — 97112 NEUROMUSCULAR REEDUCATION: CPT | Mod: GO,CO

## 2024-11-19 PROCEDURE — 97110 THERAPEUTIC EXERCISES: CPT | Mod: GP

## 2024-11-19 PROCEDURE — 85027 COMPLETE CBC AUTOMATED: CPT | Performed by: INTERNAL MEDICINE

## 2024-11-19 PROCEDURE — 97163 PT EVAL HIGH COMPLEX 45 MIN: CPT | Mod: GP

## 2024-11-19 PROCEDURE — 1180000001 HC REHAB PRIVATE ROOM DAILY

## 2024-11-19 PROCEDURE — 92610 EVALUATE SWALLOWING FUNCTION: CPT | Mod: GN

## 2024-11-19 PROCEDURE — 80048 BASIC METABOLIC PNL TOTAL CA: CPT | Performed by: INTERNAL MEDICINE

## 2024-11-19 PROCEDURE — 97530 THERAPEUTIC ACTIVITIES: CPT | Mod: GP

## 2024-11-19 PROCEDURE — 97166 OT EVAL MOD COMPLEX 45 MIN: CPT | Mod: GO

## 2024-11-19 PROCEDURE — 2500000004 HC RX 250 GENERAL PHARMACY W/ HCPCS (ALT 636 FOR OP/ED): Performed by: INTERNAL MEDICINE

## 2024-11-19 PROCEDURE — 2500000005 HC RX 250 GENERAL PHARMACY W/O HCPCS: Performed by: INTERNAL MEDICINE

## 2024-11-19 PROCEDURE — 87086 URINE CULTURE/COLONY COUNT: CPT | Mod: WESLAB | Performed by: INTERNAL MEDICINE

## 2024-11-19 PROCEDURE — 81001 URINALYSIS AUTO W/SCOPE: CPT | Performed by: INTERNAL MEDICINE

## 2024-11-19 PROCEDURE — 92523 SPEECH SOUND LANG COMPREHEN: CPT | Mod: GN

## 2024-11-19 PROCEDURE — 97530 THERAPEUTIC ACTIVITIES: CPT | Mod: GO

## 2024-11-19 RX ORDER — ENOXAPARIN SODIUM 100 MG/ML
40 INJECTION SUBCUTANEOUS DAILY
Status: DISCONTINUED | OUTPATIENT
Start: 2024-11-19 | End: 2024-11-19

## 2024-11-19 RX ORDER — ENOXAPARIN SODIUM 100 MG/ML
30 INJECTION SUBCUTANEOUS DAILY
Status: DISCONTINUED | OUTPATIENT
Start: 2024-11-19 | End: 2024-12-07 | Stop reason: HOSPADM

## 2024-11-19 RX ORDER — GUAIFENESIN 100 MG/5ML
200 SOLUTION ORAL EVERY 4 HOURS PRN
Status: DISCONTINUED | OUTPATIENT
Start: 2024-11-19 | End: 2024-12-07 | Stop reason: HOSPADM

## 2024-11-19 RX ORDER — CEPHALEXIN 500 MG/1
500 CAPSULE ORAL EVERY 12 HOURS SCHEDULED
Status: DISCONTINUED | OUTPATIENT
Start: 2024-11-19 | End: 2024-12-02

## 2024-11-19 RX ORDER — IBUPROFEN 200 MG
1 TABLET ORAL DAILY
Status: DISCONTINUED | OUTPATIENT
Start: 2024-11-19 | End: 2024-11-21

## 2024-11-19 ASSESSMENT — ACTIVITIES OF DAILY LIVING (ADL)
HOME_MANAGEMENT_TIME_ENTRY: 30
ADL_ASSISTANCE: INDEPENDENT
ADL_ASSISTANCE: INDEPENDENT
BATHING_ASSISTANCE: MODERATE

## 2024-11-19 ASSESSMENT — PAIN SCALES - GENERAL
PAINLEVEL_OUTOF10: 0 - NO PAIN
PAINLEVEL_OUTOF10: 3
PAINLEVEL_OUTOF10: 0 - NO PAIN

## 2024-11-19 ASSESSMENT — COGNITIVE AND FUNCTIONAL STATUS - GENERAL
DAILY ACTIVITIY SCORE: 15
HELP NEEDED FOR BATHING: A LOT
TOILETING: TOTAL
DRESSING REGULAR UPPER BODY CLOTHING: A LOT
DRESSING REGULAR LOWER BODY CLOTHING: A LOT

## 2024-11-19 ASSESSMENT — ENCOUNTER SYMPTOMS
ABDOMINAL PAIN: 0
DIARRHEA: 0
SHORTNESS OF BREATH: 0
FEVER: 0
APPETITE CHANGE: 0
VOMITING: 0
NAUSEA: 0
HEADACHES: 0
COUGH: 0
CHILLS: 0

## 2024-11-19 ASSESSMENT — PAIN SCALES - WONG BAKER: WONGBAKER_NUMERICALRESPONSE: NO HURT

## 2024-11-19 ASSESSMENT — PAIN - FUNCTIONAL ASSESSMENT
PAIN_FUNCTIONAL_ASSESSMENT: 0-10

## 2024-11-19 ASSESSMENT — PAIN DESCRIPTION - ORIENTATION: ORIENTATION: RIGHT

## 2024-11-19 ASSESSMENT — PAIN DESCRIPTION - LOCATION: LOCATION: HIP

## 2024-11-19 NOTE — PROGRESS NOTES
Physical Therapy       11/19/24 [id number]   PT  Visit   PT Received On 11/19/24   Response to Previous Treatment Patient with no complaints from previous session.   General   Reason for Referral CVA   Referred By Dr. Dubose   Past Medical History Relevant to Rehab HTN, DM, CKD III, tobacco abuse   Patient Position Received Up in chair   Preferred Learning Style verbal;visual   General Comment Agreeable to therapy.   Precautions   Medical Precautions Fall precautions   Precautions Comment LUE shoulder support and sling   Pain Assessment   Pain Assessment 0-10   0-10 (Numeric) Pain Score 0 - No pain   Ambulation/Gait Training 1   Surface 1 Level tile   Device 1 Fong rail   Gait Support Devices Gait belt;Wheelchair follow;L AFO   Assistance 1 Moderate assistance   Quality of Gait 1 NBOS;Shuffling gait;Forward flexed posture;Diminished heel strike;Ataxic;Soft knee(s)   Comments/Distance (ft) 1 15 ft x 2, L knee blocked during loading due to buckling.  Toe off AFO trialed and appears to be workihng well for pt at this time.  Pt able to advance LLE without assist but vc's needed for positioning to maintain WBOS.  Vc's also for upright posture.   Transfer 1   Technique 1 Sit to stand;Stand to sit   Transfer Device 1 Gait belt   Transfer Level of Assistance 1 Minimum assistance;Moderate assistance   Trials/Comments 1 vc's for hand placement   Activity Tolerance   Endurance Decreased tolerance for upright activites   PT Assessment   PT Assessment Results Decreased strength;Decreased range of motion;Decreased endurance;Impaired balance;Decreased mobility;Decreased coordination;Impaired tone   Rehab Prognosis Good   Barriers to Discharge lives alone   Evaluation/Treatment Tolerance Patient tolerated treatment well   End of Session Patient Position Up in bathroom   PT Plan   Inpatient/Swing Bed or Outpatient Inpatient   PT Plan   Treatment/Interventions Bed mobility;Transfer training;Gait training;Stair training;Neuromuscular  re-education;Therapeutic exercise;Therapeutic activity   PT Plan Ongoing PT   PT Recommended Transfer Status Assist x2  (Xena Salgado)

## 2024-11-19 NOTE — PROGRESS NOTES
Speech-Language Pathology    SLP Adult IRF Speech-Language Pathology Clinical Swallow Evaluation    Patient Name: Cynthia Muse  MRN: [id number]  Today's Date: 11/19/2024   Time Calculation  Start Time: 1035  Stop Time: 1050  Time Calculation (min): 15 min     1/5sw    Current Problem:   R corona radiata infarct (left body involvement)     Recommendations:  Additional Recommendations: Other (Comment) (dysphagia)  Solid Diet Recommendations : Soft & bite sized/chopped (IDDSI Level 6)  Liquid Diet Recommendations: Thin (IDDSI Level 0)  Compensatory Swallowing Strategies: Small bites/sips, Eat/feed slowly, Alternate solids and liquids  Medication Administration Recommendations: Whole, With Liquid    Assessment:  Prognosis: Good  Medical Staff Made Aware: Yes  Strengths: Motivation    Clinical Swallow Evaluation completed consisting of patient/caregiver interview, oral motor assessment, and analysis of swallow abilities with PO trials (3oz thin liquid via cup and via straw, and Fig Ricks soft cookie.)    ORAL PHASE:   Dentures appear in adequate condition. Oral mucosa appears moist. Mastication of soft solids was rapid. Bolus formation was rapid with ant bolus loss. Pt did not appear aware of bolus loss, A-P transport appeared timely. There were scattered oral residues which pt was able to clear with liquid wash.     PHARYNGEAL PHASE:   Laryngeal movement was visualized with all trials, however adequacy of hyolaryngeal elevation/excursion cannot be determined at bedside. Palpated laryngeal excursion with thin via cup. No overt (immediate or delayed) s/s aspiration were demonstrated with any consistencies. Vocal quality clear post all swallows.     Plan:  Inpatient/Swing Bed or Outpatient: Inpatient  Treatment/Interventions: Assess diet tolerance, Oral motor exercises  SLP Plan: Skilled SLP  SLP Frequency: 5x per week  Duration: 3 weeks  SLP Discharge Recommendations: Other (Comment) (TBD pending progress)  Next Treatment  Priority: diet reinaldo. strat's  Discussed POC: Patient, Nursing  Discussed Risks/Benefits: Yes, Patient, Nursing  Patient/Caregiver Agreeable: Yes    Dysphagia goals (Established 11/19/24, projected discontinuation 12/10/24):  STG 1: Pt will tolerate current diet recommendation without overt s/s of aspiration with 90% accuracy during observed PO trials.   STG 2: Pt will participate in PO trials with upgraded diet textures without overt s/s of aspiration with 80% accuracy.  STG 3: Pt to participate in oral ROM and strengthening exercises to 85% accuracy with cues as needed to improve ability to clear oral residues with least restrictive diet.  STG 4: Pt to demonstrate functional use of compensatory swallowing strategies to 90% to reduce risk of aspiration and s/s dysphagia.    LTG: Pt will tolerate least restrictive diet without overt s/s of aspiration in order to meet nutrition and hydration needs.    Subjective   Pt seen upright in wheelchair. Identity confirmed via pt wristband. Pt seen in conjunction with student SLP, under direct supervision of this SLP.     General Visit Information:  Patient Class: IRF  Reason for Referral: Pt admitted to [facility] with modified diet. R corona radiata infarct with left body involvement  Referred By: Dr. Dubose  Past Medical History Relevant to Rehab: HTN, DM, CKD III, tobacco abuse  Prior to Session Communication: Bedside nurse  [employer] Diet: regular/thin per pt report  Current Diet : soft, bite-size with thin liquids      Objective     Baseline Assessment:  Temperature Spikes Noted: No  Respiratory Status: Room air  Behavior/Cognition: Alert, Cooperative  Vision: Functional for self-feeding  Baseline Vocal Quality: Normal      Pain:  Pain Assessment  0-10 (Numeric) Pain Score: 0 - No pain    Oral/Motor Assessment:  Oral Hygiene: oral mucosa appears moist  Dentition: Dentures (Lower Full), Dentures (Upper Full)  Oral Motor: Impaired Function  Labial Agility: Reduced  Labial Deviation  Left: Reduced  Labial ROM: Reduced left  Labial Symmetry: Abnormal symmetry left  Lingual Deviation Left: Reduced  Lingual ROM: Reduced left  Lingual Symmetry: Abnormal symmetry left  Vocal Quality: Within Functional Limits  Intelligibility: Intelligibility reduced  Intelligibility Ratin%-80%    Consistencies Trialed:  Consistencies Trialed: Yes  Consistencies Trialed: Thin (IDDSI Level 0) - Cup, Thin (IDDSI Level 0) - Straw, Soft & bite sized/chopped (IDDSI Level 6)    Clinical Observations:  Patient Positioning: Upright in Chair  Management of Oral Secretions: Adequate  Anterior Spillage: Soft & Bite Sized/Chopped (IDDSI Level 6)  Oral Residue: Soft & Bite Sized/Chopped (IDDSI Level 6)    Inpatient Education:  Adult Inpatient Education  Individual(s) Educated: Patient  Verbal Education : soft, bite-size solids with thin  Risk and Benefits Discussed with Patient/Caregiver/Other: yes  Patient/Caregiver Demonstrated Understanding: yes  Plan of Care Discussed and Agreed Upon: yes  Patient Response to Education: Patient/Caregiver Verbalized Understanding of Information    Consultations/Referrals/Coordination of Services: Discussed results and recommendations with RN

## 2024-11-19 NOTE — PROGRESS NOTES
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 Subtest performance responses indicate presence of moderate dysarthria. Significant listener effort for responses as well as reduced articulatory precision. L facial, labial, lingual asymmetry.    SLUMS Examination score (see results below) is 13/30 which indicates moderate cognitive-linguistic difficulties (dementia range). Areas of difficulty include calculations, STM/delayed recall/paragraph recall, verbal sequencing, and clock construction task. Only upper left quadrant of clock was filled in with numbers. R side was blank, suggestive of possible visual scanning difficulties.     SLP Plan:  Plan  Inpatient/Swing Bed or Outpatient: Inpatient  Treatment/Interventions: Articulation, Cognitive communication functioning, Oral motor exercises  SLP Plan: Skilled SLP  SLP Frequency: 5x per week  Duration: 3 weeks  SLP Discharge Recommendations: Other (Comment) (TBD pending progress)  Next Treatment Priority: CLQT, OMEs, dysarthria  Discussed POC: Patient, Nursing  Discussed Risks/Benefits: Yes, Patient, Nursing  Patient/Caregiver Agreeable: Yes    Speech and Language Goals: (established 11/19/24, projected discontinuation 12/10/24)  Pt will complete oral-motor exercises with 80% acc given model and cues as needed in order to improve motor speech/articulation skills.              PROGRESS   STATUS  Pt will  utilize speech intelligibility strategies with 75% acc  to improve communication skills.  PROGRESS  STATUS  3.    Pt will  complete articulatory precision tasks with 75% accuracy to improve intelligibility.               PROGRESS   STATUS    LTG: Pt will increase motor speech function to the highest possible speech level for improved functional communication within daily living tasks.      4. Pt will increase STM/recall to 80% accuracy with cues to improve recall of functional daily information.  PROGRESS:  STATUS   5. Pt will improve visual scanning to 75% accuracy with cues to increase visual awareness of environment.  PROGRESS:  STATUS   6. Ongoing assessment if indicated for further goal development or to determine progress, as needed.  PROGRESS:  STATUS   LTG 1: Pt will optimize cognitive-linguistic skills necessary for return to least restrictive living environment and to reduce caregiver dependence.      Subjective   Pt seen upright in wheelchair. Identity confirmed via pt wristband. Pt seen in conjunction with student SLP, under direct supervision of this SLP.     General Visit Information:  General Information  Chart Reviewed: Yes  Reason for Referral: Speech-Language Evaluation on the setting of  Referred By: Dr. Dubose  Past Medical History Relevant to Rehab: HTN, DM, CKD III, tobacco abuse  Prior to Session Communication: Bedside nurse    Objective     Pain:  Pain Assessment  0-10 (Numeric) Pain Score: 0 - No pain      Cognition:  Cognition  Overall Cognitive Status: Impaired  Orientation Level: Oriented X4  Memory: Exceptions to WFL  Short-Term Memory: Impaired  Problem Solving: Exceptions to WFL  Simple Functional Tasks: Impaired  Numeric Reasoning: Exceptions to WFL   Simple Calculations: Impaired        Motor Speech Production:  Motor Speech Production  Assessments Used: QAB  Oral Motor : Impaired, Facial symmetry, Labial deviation L, Labial agility, Lingual deviation L, Labial symmetry, Lingual agility, Lingual symmetry  Automatic Speech:  WFL  Intelligibility: Impaired  Diadochokinetic Rate: Impaired  Paralinguistic Features: Impaired  Motor Speech Disorder: Dysarthria      Auditory Comprehension:   Auditory Comprehension  Yes/No Questions: Within Functional Limits  Commands: Within Functional Limits    Verbal:  Verbal Expression  Primary Mode of Expression: Verbal  Primary Language: English  Confrontation Naming: Within Functional Limits  Repetition: Within Functional Limits  Conversation: Within Functional Limits    Written Expression:  Written Expression  Dominant Hand: Right      SLP Outcome Measures:   The Quick Aphasia Battery (QAB) is a multidimensional language assessment that quantifies strengths and weaknesses across core language domains.  Eight subtest measures are used to develop a language profile : (1) Level of consciousness; (2) Connected speech; (3) Word comprehension; (4) Sentence comprehension; (5) Picture naming; (6) Repetition; (7) Reading aloud; and (8) Motor speech     Summary        Word comprehension 10.00   Sentence comprehension 5.00   Word finding 10.00   Grammatical construction 9.50   Speech motor programming 10.00   Repetition 10.00   Reading 10.00   QAB overall 8.98     QAB overall score        Severity  0.00-4.99                     severe  5.00-7.49                     moderate  7.50-8.89                     mild  8.90-10.00                   no aphasia     The ([facility] [employer] Mental Status : 13/30 = moderate cognitive-linguistic difficulties (dementia range) for level of education  (orientation 3/3, money calculations 1/3, generative naming 3/3, 5 word recall 1/5, mental manipulation 1/2, clock drawing 0/4, shape ID 2/2, paragraph recall 2/8)      The total possible UMS score ranges from 1-30. For persons with high school education a score from 1-20 = Dementia, 21-26 = Mild Neurocognitive Disorder, and 27-30 = Normal.  For persons with less than high school education a score from 1-19 = Dementia,  20-24 = Mild Neurocognitive Disorder, and 25-30 = Normal.     Inpatient Education:  Adult Inpatient Education  Individual(s) Educated: Patient  Verbal Education : QAB and SLUMS Examination results  Risk and Benefits Discussed with Patient/Caregiver/Other: yes  Patient/Caregiver Demonstrated Understanding: yes  Plan of Care Discussed and Agreed Upon: yes  Patient Response to Education: Patient/Caregiver Verbalized Understanding of Information

## 2024-11-19 NOTE — PROGRESS NOTES
Physical Therapy Initial Evaluation and Treatment       11/19/24 [id number]   PT  Visit   PT Received On 11/19/24   General   Reason for Referral CVA   Referred By Dr. Dubose   Past Medical History Relevant to Rehab HTN, DM, CKD III, tobacco abuse   Patient Position Received Up in chair   Preferred Learning Style verbal;visual   Precautions   Hearing/Visual Limitations No glasses. Smooth pursuits, saccades and convergence/ divergence slowed but intact. No visual field cuts identified.   Medical Precautions Fall precautions   Pain Assessment   Pain Assessment 0-10   0-10 (Numeric) Pain Score 0 - No pain   Cognition   Overall Cognitive Status WFL   Orientation Level Oriented X4   Cognition Comments dysarthria   Home Living   Type of Home House   Lives With Alone   Home Adaptive Equipment None   Home Layout Multi-level  (split)   Alternate Level Stairs-Rails   (left handrail to lower level, bilateral rails to upper level)   Alternate Level Stairs-Number of Steps ~7 up, ~5 down   Home Access Stairs to enter without rails   Entrance Stairs-Rails None   Entrance Stairs-Number of Steps 1   Bathroom Shower/Tub Tub/shower unit;Curtain   Bathroom Toilet Standard   Bathroom Equipment None   Bathroom Accessibility full bath on both upstairs and downstairs   Prior Function Per Pt/Caregiver Report   Level of [place] Independent with ADLs and functional transfers;Independent with homemaking with ambulation   Receives Help From Family  (receives help as needed from daughter and grandaughter)   ADL Assistance Independent   Homemaking Assistance Independent   Ambulatory Assistance Independent   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)   Hand Dominance Right   Prior Function Comments Pt drives, denies history of falls   Activity Tolerance   Endurance Decreased tolerance for upright activites   Sensation   Light Touch No apparent deficits   Sensation Comment Denies tingling/numbness   Perception    Inattention/Neglect Appears intact   Initiation Appears intact   Perseveration Not present   Coordination   Movements are Fluid and Coordinated No   Lower Body Coordination slowed rate of movement left LE   Rapid Alternating Movements Bradykinesia   Alternating Toe Taps Bradykinesia   Postural Control   Posture Comment rounded shoulders, forward head, flexed at hips and knees   Static Sitting Balance   Static Sitting-Balance Support Feet supported   Static Sitting-Level of Assistance Close supervision   Dynamic Sitting Balance   Dynamic Sitting-Balance Support Feet supported   Dynamic Sitting-Level of Assistance Contact guard   Dynamic Sitting-Comments FIST 25/56   Static Standing Balance   Static Standing-Balance Support Right upper extremity supported   Static Standing-Level of Assistance Moderate assistance   Static Standing-Comment/Number of Minutes at [facility]   Dynamic Standing Balance   Dynamic Standing-Balance Support Right upper extremity supported   Dynamic Standing-Level of Assistance Moderate assistance   Dynamic Standing-Comments at [facility]   Bed Mobility 1   Bed Mobility 1 Rolling right;Rolling left   Level of Assistance 1 Moderate assistance   Bed Mobility 2   Bed Mobility  2 Sitting to supine;Supine to sitting   Level of Assistance 2 Moderate assistance;Maximum assistance   Bed Mobility Comments 2 assist for left LE and trunk. Mod assist in, max assist out. HOB flat, no rail. Dizziness with position changes that resolves with rest break. Patient reports this is chronic.   Transfer 1   Technique 1 Sit to stand;Stand to sit   Transfer Device 1 Gait belt   Transfer Level of Assistance 1 Moderate assistance   Trials/Comments 1 at right beltrán rail   Transfers 2   Transfer From 2 Wheelchair to;Mat to   Transfer to 2 Mat;Wheelchair   Technique 2 Stand pivot;To right   Transfer Device 2 Gait belt   Transfer Level of Assistance 2 Maximum assistance   Trials/Comments 2 step bt step cues for safe technqiue    Ambulation/Gait Training 1   Surface 1 Level tile   Device 1 Beltrán rail   Gait Support Devices Gait belt;Wheelchair follow;L AFO   Assistance 1 Moderate assistance   Quality of Gait 1 NBOS;Shuffling gait;Forward flexed posture;Diminished heel strike;Ataxic;Soft knee(s)   Comments/Distance (ft) 1 15' x 2 right beltrán rail. First amb without AFO. Effortful advancement of left LE, foot sweep, buckling left knee. Knee blocked. Second amb, left toe off AFO applied with imporved advancement and placement of left LE. Soft left knee and blocked to prevent buckle. Cues for weight shift right to ease advancement of left LE. Flexed posture with cues for as upright as possible.   Stairs   Stairs No  (unsafe due to soft/buckling left knee)    Object From Floor   Comments unsafe at this time   Wheelchair Activities   Propulsion Type 1 Manual   Level 1 Level tile  (carpet)   Method 1 Right upper extremity;Right lower extremity  (left leg rest and lap tray)   Level of Assistance 1 Minimum assistance   Description/Details 1 50' includes turns. Cues for technique and direction due to veers left.   RLE    RLE  WFL  (strength 4+/5 to 5/5 throughout)   LLE    LLE    (ankle df lacks 10 degrees to neutral)   Strength LLE   L Hip Flexion 2+/5   L Hip ABduction 3-/5   L Hip ADduction 3-/5   L Knee Extension 2/5   L Ankle Dorsiflexion 2-/5   L Hip Extension 2+/5   L Knee Flexion 3-/5   L Ankle Plantar Flexion 2-/5   Supine Therapeutic Exercise   Activity Repetitions Weights Comments   SAQ  [] 1 x 15   [x] 2 x 15   []  [x] Bilateral    [x] Right 2#   [] Left        Bridging     [] 1 x 15   [x] 2 x 15   []  [] Bilateral   [] Right    [] Left     [x] Bolster   [] No Bolster   Hip abd /add  [] 1 x 15   [x] 2 x 15   []  [x] Bilateral   [x] Right 2#   [] Left        Heel slides  [] 1 x 15   [x] 2 x 15   []  [x] Bilateral   [x] Right 2#   [] Left            PT Assessment   PT Assessment Results Decreased strength;Decreased range of  motion;Decreased endurance;Impaired balance;Decreased mobility;Decreased coordination;Impaired tone   Rehab Prognosis Good   Barriers to Discharge lives alone   Evaluation/Treatment Tolerance Patient tolerated treatment well   Strengths Attitude of self;Ability to acquire knowledge;Premorbid level of function   Assessment Comment Patient is a 79year old female who was independent prior to CVA . Patient is currently below baseline function. Patient now requires assist for all mobility. Patient presents with decreased strength, balance, activity tolerance, coordination. This has resulted in the inability to transfer, ambulate, and negotiate stairs safely. Patient will benefit from therapeutic exercise to improve strength, ROM and activity tolerance; therapeutic activity to improve safe mobility; neuromuscular reeducation to improve coordination, balance; gait training to promote safe ambulation. Patient will benefit from intense rehab to address all impairments and activity limitations to facilitate functional independence and return to home safely. Will continue to assess for need for AFO.   End of Session Patient Position Up in chair;Alarm on   IP OR SWING BED PT PLAN   Inpatient or Swing Bed Inpatient   PT Plan   Treatment/Interventions Bed mobility;Transfer training;Gait training;Stair training;Balance training;Neuromuscular re-education;Strengthening;Endurance training;Range of motion;Therapeutic exercise;Therapeutic activity;Home exercise program;Orthotic fitting/training;Wheelchair management;Postural re-education   PT Plan Ongoing PT   PT Recommended Transfer Status Assist x2  (Xena Salgado with nursing staff)   PT - OK to Discharge Yes     Problem: IRF PT STG Problem  Goal: Patient will roll right and left with minimal assist to facilitate mobility.  Outcome: Progressing  Goal: Patient will transfer supine to sit and sit to supine with minimal assist to facilitate mobility.  Outcome: Progressing  Goal: Patient  will transfer sit to stand and stand to sit with minimal assist to facilitate mobility.  Outcome: Progressing  Goal: Patient will transfer bed to chair and chair to bed with moderate assist to facilitate mobility.  Outcome: Progressing  Goal: Patient will amb 30 feet least restrictive device including no turns on even surface with moderate assist to facilitate safe mobility.    Outcome: Progressing  Goal: Patient will propel wheelchair 100 feet with two turns on even surface with supervision assist to facilitate safe mobility.   Outcome: Progressing  Goal: Patient will improve FIST score to  35/56  to promote mobility and safety with seated activities.   Outcome: Progressing     Problem: IRF PT LTG Problem  Goal: Patient will roll right and left with independent assist to facilitate mobility.  Outcome: Progressing  Goal: Patient will transfer supine to sit and sit to supine with independent assist to facilitate mobility.  Outcome: Progressing  Goal: Patient will transfer sit to stand and stand to sit with independent assist to facilitate mobility.  Outcome: Progressing  Goal: Patient will transfer bed to chair and chair to bed with independent assist to facilitate mobility.  Outcome: Progressing  Goal: Patient will amb 150 feet least restrictive device including two turns on even surface with supervision assist to facilitate safe mobility.    Outcome: Progressing  Goal: Patient will negotiate 12 stairs with two rail(s) and supervision} assist with no device for in home and community.  Outcome: Progressing  Goal: Patient will propel wheelchair 150 feet with two turns on even surface with independent assist to facilitate safe mobility.   Outcome: Progressing  Goal: Patient will perform TUG with least restrictive assistive device in 30 seconds or less to promote mobility and reduce fall risk with dynamic standing tasks.   Outcome: Progressing  Goal: Patient will improve FIST score to  50/56  to promote mobility and  safety with seated activities.   Outcome: Progressing  Goal: Patient will increase left LE strength to 4-/5 to improve functional mobility.   Outcome: Progressing  Goal: Patient will increase ROM by full range throughout left lower extremity to improve functional mobility.  Outcome: Progressing

## 2024-11-19 NOTE — CARE PLAN
Problem: IRF PT STG Problem  Goal: Patient will roll right and left with minimal assist to facilitate mobility.  Outcome: Progressing  Goal: Patient will transfer supine to sit and sit to supine with minimal assist to facilitate mobility.  Outcome: Progressing  Goal: Patient will transfer sit to stand and stand to sit with minimal assist to facilitate mobility.  Outcome: Progressing  Goal: Patient will transfer bed to chair and chair to bed with moderate assist to facilitate mobility.  Outcome: Progressing  Goal: Patient will amb 30 feet least restrictive device including no turns on even surface with moderate assist to facilitate safe mobility.    Outcome: Progressing  Goal: Patient will propel wheelchair 100 feet with two turns on even surface with supervision assist to facilitate safe mobility.   Outcome: Progressing  Goal: Patient will improve FIST score to  35/56  to promote mobility and safety with seated activities.   Outcome: Progressing     Problem: IRF PT LTG Problem  Goal: Patient will roll right and left with independent assist to facilitate mobility.  Outcome: Progressing  Goal: Patient will transfer supine to sit and sit to supine with independent assist to facilitate mobility.  Outcome: Progressing  Goal: Patient will transfer sit to stand and stand to sit with independent assist to facilitate mobility.  Outcome: Progressing  Goal: Patient will transfer bed to chair and chair to bed with independent assist to facilitate mobility.  Outcome: Progressing  Goal: Patient will amb 150 feet least restrictive device including two turns on even surface with supervision assist to facilitate safe mobility.    Outcome: Progressing  Goal: Patient will negotiate 12 stairs with two rail(s) and supervision} assist with no device for in home and community.  Outcome: Progressing  Goal: Patient will propel wheelchair 150 feet with two turns on even surface with independent assist to facilitate safe mobility.    Outcome: Progressing  Goal: Patient will perform TUG with least restrictive assistive device in 30 seconds or less to promote mobility and reduce fall risk with dynamic standing tasks.   Outcome: Progressing  Goal: Patient will improve FIST score to  50/56  to promote mobility and safety with seated activities.   Outcome: Progressing  Goal: Patient will increase left LE strength to 4-/5 to improve functional mobility.   Outcome: Progressing  Goal: Patient will increase ROM by full range throughout left lower extremity to improve functional mobility.  Outcome: Progressing      No indicators present

## 2024-11-19 NOTE — H&P
[facility]  Admission History and Physical    History of present illness    This is a 79-year-old woman who presented to the hospital with left-sided weakness.  She was given TNK for acute stroke symptoms.  MRI did show acute ischemic infarct in the right corona radiata and chronic small vessel ischemic changes.  She has had some improvement in her strength more so in the leg than in the arm.  He was seen by neurology and placed on dual antiplatelet therapy for 21 days as well as statin.  She says that she is not getting much movement in the hand or arm.  She denies pain.  She is eating well.  The bowels are moving.  She developed acute kidney injury and lisinopril was held and this is improved.  This is in the setting of stage III chronic kidney disease.    Pre-admission functional status: Dependent with ADLs and IADLs    Support System and Family Circumstances: Unknown.    Past Medical History:   Diagnosis Date    Cerebrovascular accident (CVA), unspecified mechanism (Multi) 11/14/2024    Chronic kidney disease (CKD) stage G3a/A2, moderately decreased glomerular filtration rate (GFR) between 45-59 mL/min/1.73 square meter and albuminuria creatinine ratio between  mg/g (C* (Multi)     [place]    HTN (hypertension)     Hyperlipidemia     Ischemic stroke (Multi) 11/18/2024    Osteoporosis     Type 2 diabetes mellitus     Vitamin D deficiency         No past surgical history on file.     Family History   Problem Relation Name Age of Onset    Brain Aneurysm Mother 39 yo     Coronary artery disease Father 53 yo     Heart attack Father 53 yo     Lung cancer Sister         Social History     Socioeconomic History    Marital status: Single     Spouse name: Not on file    Number of children: Not on file    Years of education: Not on file    Highest education level: Not on file   Occupational History     Employer: [employer]   Tobacco Use    Smoking  status: Every Day     Current packs/day: 0.25     Types: Cigarettes    Smokeless tobacco: Never   Vaping Use    Vaping status: Never Used   Substance and Sexual Activity    Alcohol use: Never    Drug use: Never    Sexual activity: Not on file   Other Topics Concern    Not on file   Social History Narrative    Not on file     Social Drivers of Health     Financial Resource Strain: Low Risk  (11/18/2024)    Overall Financial Resource Strain (CARDIA)     Difficulty of Paying Living Expenses: Not hard at all   Food Insecurity: No Food Insecurity (11/18/2024)    Hunger Vital Sign     Worried About Running Out of Food in the Last Year: Never true     Ran Out of Food in the Last Year: Never true   Transportation Needs: No Transportation Needs (11/18/2024)    PRAPARE - Transportation     Lack of Transportation (Medical): No     Lack of Transportation (Non-Medical): No   Physical Activity: Inactive (11/18/2024)    Exercise Vital Sign     Days of Exercise per Week: 0 days     Minutes of Exercise per Session: 0 min   Stress: No Stress Concern Present (11/18/2024)    [place] [employer] - Occupational Stress Questionnaire     Feeling of Stress : Not at all   Social Connections: Socially Isolated (11/18/2024)    Social Connection and Isolation Panel [NHANES]     Frequency of Communication with Friends and Family: More than three times a week     Frequency of Social Gatherings with Friends and Family: Three times a week     Attends Church Services: Never     Active Member of Clubs or Organizations: No     Attends Club or Organization Meetings: Never     Marital Status:    Intimate Partner Violence: Not At Risk (11/18/2024)    Humiliation, Afraid, Rape, and Kick questionnaire     Fear of Current or Ex-Partner: No     Emotionally Abused: No     Physically Abused: No     Sexually Abused: No   Housing Stability: Low Risk  (11/18/2024)    Housing Stability Vital Sign     Unable to Pay for Housing in the  "Last Year: No     Number of Times Moved in the Last Year: 0     Homeless in the Last Year: No       Review of Systems   Constitutional:  Negative for appetite change, chills and fever.   Respiratory:  Negative for cough and shortness of breath.    Cardiovascular:  Negative for chest pain.   Gastrointestinal:  Negative for abdominal pain, diarrhea, nausea and vomiting.   Neurological:  Negative for headaches.   All other systems reviewed and are negative.       Objective   /71 (BP Location: Right arm, Patient Position: Lying)   Pulse 80   Temp 37 °C (98.6 °F) (Temporal)   Resp 16   Ht 1.6 m (5' 2.99\")   Wt 72 kg (158 lb 11.7 oz)   SpO2 93%   BMI 28.13 kg/m²     Physical Exam  Vitals reviewed.   Constitutional:       General: She is not in acute distress.     Appearance: She is not toxic-appearing.   HENT:      Head: Normocephalic and atraumatic.      Mouth/Throat:      Mouth: Mucous membranes are moist.   Eyes:      Pupils: Pupils are equal, round, and reactive to light.   Cardiovascular:      Rate and Rhythm: Normal rate and regular rhythm.      Heart sounds: No murmur heard.  Pulmonary:      Breath sounds: Normal breath sounds. No wheezing, rhonchi or rales.   Abdominal:      General: There is no distension.      Palpations: Abdomen is soft.   Musculoskeletal:      Right lower leg: No edema.      Left lower leg: No edema.   Neurological:      Mental Status: She is alert and oriented to person, place, and time.     Left hemiparesis.  She is essentially flaccid through the hand and wrist.  She can shrug her left shoulder.  She is able to plantarflex, has significant weakness in dorsiflexion on the left.  She can lift her left leg up off the chair from a seated position.    Recent Lab Results:  Results for orders placed or performed during the hospital encounter of 11/18/24 (from the past 24 hours)   POCT GLUCOSE   Result Value Ref Range    POCT Glucose 124 (H) 74 - 99 mg/dL   POCT GLUCOSE   Result Value " Ref Range    POCT Glucose 115 (H) 74 - 99 mg/dL   CBC   Result Value Ref Range    WBC 13.7 (H) 4.4 - 11.3 x10*3/uL    nRBC 0.0 0.0 - 0.0 /100 WBCs    RBC 4.15 4.00 - 5.20 x10*6/uL    Hemoglobin 12.2 12.0 - 16.0 g/dL    Hematocrit 37.7 36.0 - 46.0 %    MCV 91 80 - 100 fL    MCH 29.4 26.0 - 34.0 pg    MCHC 32.4 32.0 - 36.0 g/dL    RDW 14.8 (H) 11.5 - 14.5 %    Platelets 352 150 - 450 x10*3/uL   Basic Metabolic Panel   Result Value Ref Range    Glucose 106 (H) 74 - 99 mg/dL    Sodium 140 136 - 145 mmol/L    Potassium 3.9 3.5 - 5.3 mmol/L    Chloride 106 98 - 107 mmol/L    Bicarbonate 23 21 - 32 mmol/L    Anion Gap 15 10 - 20 mmol/L    Urea Nitrogen 41 (H) 6 - 23 mg/dL    Creatinine 1.77 (H) 0.50 - 1.05 mg/dL    eGFR 29 (L) >60 mL/min/1.73m*2    Calcium 9.3 8.6 - 10.3 mg/dL   POCT GLUCOSE   Result Value Ref Range    POCT Glucose 114 (H) 74 - 99 mg/dL   POCT GLUCOSE   Result Value Ref Range    POCT Glucose 67 (L) 74 - 99 mg/dL        Imaging Results:  CT head wo IV contrast    Result Date: 11/15/2024  Interpreted By:  Benton Arita and Hooper Grayson STUDY: [id number] CT HEAD WO IV CONTRAST   INDICATION: Signs/Symptoms:s/p 24 hrs after tpa   COMPARISON: CT of the head obtained November 14, 2024.   ACCESSION NUMBER(S): [id number]   ORDERING CLINICIAN: KAYE VELASCO   TECHNIQUE: Noncontrast helical CT of the head was performed. Multiplanar reformations in bone and soft tissue algorithm were provided.   FINDINGS: Midline brain structures appear normal on mid sagittal imaging.   No evidence of acute loss of gray-white differentiation. Focal right basal ganglia hypodensity appear stable from the comparison exam and likely represents a remote lacunar infarct. Additional left posterior parietal hypodensity consistent with prominent sulcus.   No evidence of acute intracranial hemorrhage.   No intracranial mass or mass effect.   No midline shift or herniation.   No ventriculomegaly. Normal appearance of the basilar  cisterns.   Minimal mucosal thickening observed in the left maxillary sinus. Otherwise, the paranasal sinuses appear clear. Bilateral benign scleral calcifications noted.. No mastoid effusion. No acute or aggressive appearing calvarial lesion.       1. No CT evidence of acute intracranial hemorrhage. 2. Ischemic microvascular changes of the white matter with evidence of remote right basal ganglia infarct.   I personally reviewed the images/study and I agree with the findings as stated. This study was interpreted at [place], [place].   Signed by: Benton Arita 11/15/2024 4:20 PM Dictation workstation:   [id number]    ECG 12 lead    Result Date: 11/15/2024   Poor data quality, interpretation may be adversely affected Sinus tachycardia Nonspecific ST and T wave abnormality Abnormal ECG No previous ECGs available Confirmed by Pranay Allen ([id number]) on 11/15/2024 11:18:02 AM    Transthoracic Echo (TTE) Complete    Result Date: 11/15/2024            [id number] [facility], [place] [address]             Phone [phone] TRANSTHORACIC ECHOCARDIOGRAM REPORT Patient Name:       KAUR Quiroz Physician:    [id number] Pranay Allen DO Study Date:         11/15/2024           Ordering Provider:    [id number] KAYE VELASCO MRN/PID:            [id number]             Fellow: Accession#:         [id number]         Nurse: Date of Birth/Age:  1945 / 79 years Sonographer:          Cheyenne Herron RDCS Gender Assigned at  F                    Additional Staff: Birth: Height:             160.02 cm            Admit Date: Weight:             70.31 kg             Admission Status:     Inpatient -                                                                Routine BSA  / BMI:          1.74 m2 / 27.46      Department Location:  [facility]                     kg/m2 Blood Pressure: 153 /62 mmHg Study Type:    TRANSTHORACIC ECHO (TTE) COMPLETE Diagnosis/ICD: Cerebral Infarction, unspecified-I63.9 Indication:    Cerebrovascular Accident CPT Codes:     Echo Complete w Full Doppler-[id number] Patient History: Diabetes:          Yes Pertinent History: HTN, Hyperlipidemia and CVA, CKD. Study Detail: The following Echo studies were performed: 2D, M-Mode, Doppler and               color flow.  PHYSICIAN INTERPRETATION: Left Ventricle: Left ventricular ejection fraction is normal, by visual estimate at 55-60%. There are no regional wall motion abnormalities. The left ventricular cavity size is normal. There is normal posterior left ventricular wall thickness. Spectral Doppler shows a Grade I (impaired relaxation pattern) of left ventricular diastolic filling with normal left atrial filling pressure. Left Atrium: The left atrium is normal in size. Right Ventricle: The right ventricle is normal in size. There is normal right ventricular global systolic function. Right Atrium: The right atrium is normal in size. Aortic Valve: The aortic valve is trileaflet. There is no evidence of aortic valve regurgitation. The peak instantaneous gradient of the aortic valve is 14 mmHg. Mitral Valve: The mitral valve is normal in structure. There is trace mitral valve regurgitation. Tricuspid Valve: The tricuspid valve is structurally normal. There is trace tricuspid regurgitation. Pulmonic Valve: The pulmonic valve is not well visualized. The pulmonic valve regurgitation was not assessed. Pericardium: No pericardial effusion noted. Aorta: The aortic root is normal.  CONCLUSIONS:  1. Left ventricular ejection fraction is normal, by visual estimate at 55-60%.  2. There is normal right ventricular global systolic function. QUANTITATIVE DATA SUMMARY:  2D MEASUREMENTS:           Normal Ranges: LAs:             2.10 cm    (2.7-4.0cm) IVSd:            0.47 cm   (0.6-1.1cm) LVPWd:           0.63 cm   (0.6-1.1cm) LVIDd:           4.22 cm   (3.9-5.9cm) LVIDs:           2.76 cm LV Mass Index:   36.7 g/m2 LV % FS          34.6 %  LA VOLUME:                    Normal Ranges: LA Vol A4C:        32.0 ml    (22+/-6mL/m2) LA Vol A2C:        30.3 ml LA Vol BP:         31.7 ml LA Vol Index A4C:  18.4ml/m2 LA Vol Index A2C:  17.4 ml/m2 LA Vol Index BP:   18.3 ml/m2 LA Area A4C:       13.3 cm2 LA Area A2C:       12.7 cm2 LA Major Axis A4C: 4.7 cm LA Major Axis A2C: 4.5 cm LA Volume Index:   17.0 ml/m2 LA Vol A4C:        29.7 ml LA Vol A2C:        28.9 ml LA Vol Index BSA:  16.9 ml/m2  RA VOLUME BY A/L METHOD:          Normal Ranges: RA Area A4C:             12.6 cm2  M-MODE MEASUREMENTS:         Normal Ranges: Ao Root:             2.25 cm (2.0-3.7cm)  AORTA MEASUREMENTS:         Normal Ranges: Asc Ao, d:          2.60 cm (2.1-3.4cm)  LV SYSTOLIC FUNCTION BY 2D PLANIMETRY (MOD):                      Normal Ranges: EF-A4C View:    65 % (>=55%) EF-Visual:      58 % LV EF Reported: 58 %  LV DIASTOLIC FUNCTION:           Normal Ranges: MV Peak E:             0.71 m/s  (0.7-1.2 m/s) MV Peak A:             1.13 m/s  (0.42-0.7 m/s) E/A Ratio:             0.63      (1.0-2.2) MV e'                  0.057 m/s (>8.0) MV lateral e'          0.07 m/s MV medial e'           0.05 m/s E/e' Ratio:            12.42     (<8.0)  MITRAL VALVE:          Normal Ranges: MV DT:        199 msec (150-240msec)  AORTIC VALVE:            Normal Ranges: AoV Vmax:      1.85 m/s  (<=1.7m/s) AoV Peak P.7 mmHg (<20mmHg) LVOT Max Odng:  1.78 m/s  (<=1.1m/s) LVOT Diameter: 1.80 cm   (1.8-2.4cm) AoV Area,Vmax: 2.45 cm2  (2.5-4.5cm2)  RIGHT VENTRICLE: RV Basal 2.84 cm RV Mid   1.70 cm RV Major 6.2 cm TAPSE:   25.7 mm RV s'    0.18 m/s  TRICUSPID VALVE/RVSP:          Normal Ranges: Peak TR Velocity:     2.86 m/s RV Syst Pressure:     36 mmHg  (< 30mmHg) IVC Diam:             0.96  cm  [id number] Pranay Allen DO Electronically signed on 11/15/2024 at 10:43:25 AM  ** Final **     MR brain wo IV contrast    Result Date: 11/14/2024  Interpreted By:  Vincent Proctor, STUDY: MR BRAIN WO IV CONTRAST;  11/14/2024 7:52 pm   INDICATION: Signs/Symptoms:s/p tpa.     COMPARISON: CT head without contrast 11/14/2024   ACCESSION NUMBER(S): [id number]   ORDERING CLINICIAN: KAYE VELASCO   TECHNIQUE: Multiplanar, multi-sequence images of the brain were obtained without contrast.   FINDINGS:   Diffusion weighted images show acute ischemic infarct in the right corona radiata. This demonstrates mild hyperintensity on T2/FLAIR images.   Mild periventricular and subcortical hemispheric T2/FLAIR white matter hyperintensities are most compatible with chronic small vessel ischemic disease.   The major intracranial flow voids are preserved.   There is no acute intraparenchymal hemorrhage, mass, mass-effect, or an extra-axial fluid collection. There is no pathologic susceptibility artifact on GRE images.   The ventricular size and cerebral volume are age-concordant.   Normal morphology of midline structures. The craniovertebral junction is normal.   The orbits and globes are unremarkable.   The paranasal sinuses show no air-fluid levels or hemorrhage. Retention cyst in the left maxillary sinus.   The mastoid air cells are clear.       Acute ischemic infarct in the right corona radiata on a background of mild chronic small vessel ischemic changes.   No acute intracranial hemorrhage or mass effect.   MACRO: None.   Signed by: Vincent Proctor 11/14/2024 8:17 PM Dictation workstation:   [id number]    XR chest 1 view    Result Date: 11/14/2024  Interpreted By:  Karolyn Mcdonald, STUDY: XR CHEST 1 VIEW;  11/14/2024 11:39 am   INDICATION: Signs/Symptoms:left sided weakness, concern for stroke.     COMPARISON: None.   ACCESSION NUMBER(S): [id number]   ORDERING CLINICIAN: BRYON ALICEA   FINDINGS:         CARDIOMEDIASTINAL  SILHOUETTE: Cardiomediastinal silhouette is normal in size and configuration.   LUNGS: Lungs are clear.   ABDOMEN: No remarkable upper abdominal findings.   BONES: No acute osseous changes.       1.  No evidence of acute cardiopulmonary process.       MACRO: None   Signed by: Karolyn Mcdonlad 11/14/2024 12:01 PM Dictation workstation:   [id number]    CT brain attack angio head and neck W and WO IV contrast    Result Date: 11/14/2024  Interpreted By:  Von Taylor,  and Munira Allen STUDY: CT BRAIN ATTACK ANGIO HEAD AND NECK W AND WO IV CONTRAST;  11/14/2024 11:00 am   INDICATION: Signs/Symptoms:left sided weakness, left sided facial droop.   COMPARISON: Same day CT head. CT head 03/19/2012.   ACCESSION NUMBER(S): [id number]   ORDERING CLINICIAN: BRYON ALICEA   TECHNIQUE: Axial images of the head and neck were obtained after uneventful intravenous administration of 75 mL Omnipaque 350. 3D reconstructions were created at a separate workstation and provided for review.   FINDINGS: CTA HEAD:   Anterior circulation: Mild calcification of the cavernous segment of the left internal carotid artery.  The bilateral intracranial internal carotid arteries, bilateral carotid terminals, bilateral proximal anterior and middle cerebral arteries are normal.   Posterior circulation: The bilateral posterior communicating arteries are diminutive. Bilateral intracranial vertebral arteries, vertebrobasilar junction, basilar artery and proximal posterior cerebral arteries are normal.   CTA NECK:   A normal three-vessel arch configuration is noted. Moderate atherosclerosis is noted at the origin of the right vertebral artery.   Right carotid vessels: The common carotid artery is normal. The carotid bifurcation is normal. Focal calcification of the proximal internal carotid artery is noted. There is 50% stenosis by NASCET criteria.   Left carotid vessels: The common carotid artery is normal. Moderate carotid bulb atherosclerosis  extending into the proximal internal carotid artery.  There is 30% stenosis by NASCET criteria.   Vertebral vessels: Mild focal calcification of the distal left V3 segment. The visualized segments of the cervical vertebral arteries are normal in caliber.   Emphysematous changes are noted within the partially visualized lungs.       1. No large vessel occlusion or significant stenosis of the intracranial vessels. 2. Atherosclerosis of the bilateral cervical internal carotid arteries resulting in 50% stenosis on the right and 30% stenosis of the left by NASCET criteria.   I personally reviewed the images/study and I agree with the findings as stated by Alejandro Lombardo MD. This study was interpreted at [place], [place].   MACRO: None   Signed by: Von Taylor 11/14/2024 11:31 AM Dictation workstation:   [id number]    CT brain attack head wo IV contrast    Result Date: 11/14/2024  Interpreted By:  Vincent Mora, STUDY: CT BRAIN ATTACK HEAD WO IV CONTRAST;  11/14/2024 10:59 am   INDICATION: Signs/Symptoms:Stroke Evaluation.   COMPARISON: None.   ACCESSION NUMBER(S): [id number]   ORDERING CLINICIAN: BRYON ALICEA   TECHNIQUE: Routine axial images were obtained from the skull base through the vertex.  Sagittal and coronal reconstruction images were generated. Brain, subdural, and bone windows were reviewed. N/A   N/A   FINDINGS: INTRACRANIAL: Mild prominence of ventricles and sulci. There is mild patchy hypodensity throughout the deep periventricular white matter. No acute intracranial bleed, midline shift, or focal mass effect. No destructive bone lesion. No depressed skull fracture. Skullbase arterial calcifications in the carotid siphons and vertebral arteries.   EXTRACRANIAL: Visualized paranasal sinuses were clear. Visualized mastoid air cells were clear.       No acute intracranial bleed or focal mass effect.   Mild volume loss.   Mild chronic white matter  ischemic disease in the deep periventricular regions.   MACRO: Vincent Morgan discussed the significance and urgency of this critical finding by epic secure chat with  BRYON SANTAMARIARYDER on 11/14/2024 at 11:07 am.  (**-RCF-**) Findings:  See findings.   Signed by: Vincent Mora 11/14/2024 11:08 AM Dictation workstation:   [id number]       Medications:  Scheduled medications  alendronate, 10 mg, oral, Daily before breakfast  amLODIPine, 10 mg, oral, Daily  aspirin, 81 mg, oral, Daily  carvedilol, 6.25 mg, oral, BID  clopidogrel, 75 mg, oral, Daily  glimepiride, 2 mg, oral, Daily with breakfast  lidocaine, 1 patch, transdermal, Daily  nicotine, 1 patch, transdermal, Daily  rosuvastatin, 10 mg, oral, Daily      Continuous medications     PRN medications  PRN medications: acetaminophen **OR** acetaminophen, alum-mag hydroxide-simeth, bisacodyl, polyethylene glycol    Assessment/Plan   Admitted for comprehensive inpatient rehabilitation including PT, OT, RT, SLP, and supportive services to improve functional outcome of impaired mobility, ADLs, transfers, and self-care.     Problem   Ischemic Stroke (Multi)   Left Hemiparesis (Multi)   Essential Hypertension   Hyperlipidemia   Chronic Kidney Disease (Ckd) Stage G3a/A2, Moderately Decreased Glomerular Filtration Rate (Gfr) Between 45-59 Ml/Min/1.73 Square Meter and Albuminuria Creatinine Ratio Between  Mg/G (C* (Multi)      Ischemic stroke - Continue with medical management for secondary prevention: DAPT x 21 days, statin  DVT prophylaxis has been ordered: LMWH  Lifestyle interventions for stroke prevention will also be emphasized.  A falls prevention strategy will be employed.  The importance of follow up with the neurologist will be emphasized.    BINH/CKD - fairly stable; monitor    DM2 - cont URENA, monitor glucose    HTN - amlodipine, coreg    Tobacco use disorder - nicotine patch ordered    HPL - stat    Left hemiparesis    Dysphagia - The patient will be seen and  evaluated by the speech language pathologist and an individualized plan of care in regard to dysphagia treatment will be implemented.  Repeat the modified barium swallow in one to two weeks.  Diet orders have been reviewed and approved.       Rehab Plan of Care :  The Interdisciplinary Team will work collaboratively to address the patient problems and goals to be managed by the Individualized Interdisciplinary Plan of Care. See the IPOC note for a complete review of the plan of care.    Medical Necessity:  This patient requires, and is capable of participating in, an intensive and coordinated interdisciplinary acute inpatient rehabilitation program. He requires close rehabilitation physician monitoring and management to monitor his complex medical conditions and coordinate rehabilitation care. The patient's rehabilitation goals and medical complexity cannot adequately be managed in a less intensive setting. Potential risks for clinical complications include: cardiac complications, falls.    Rehabilitation Potential: felix Dubose MD

## 2024-11-19 NOTE — PROGRESS NOTES
11/19/24 1400 to 1430   General   Reason for Referral CVA   Referred By Dr. Dubose   Past Medical History Relevant to Rehab HTN, DM, CKD III, tobacco abuse   Prior to Session Communication Bedside nurse  (Discussed POC with OTR)   Patient Position Received Up in chair   Preferred Learning Style verbal;visual   General Comment Agreeable to therapy.   Precautions   Hearing/Visual Limitations No glasses. Smooth pursuits, saccades and convergence/ divergence slowed but intact. No visual field cuts identified.   Medical Precautions Fall precautions   Pain Assessment   Pain Assessment 0-10   0-10 (Numeric) Pain Score 0 - No pain   LUE    LUE   (fitted for shoulder harness to prevent subluxation patient educated on wearing schedule and patient showed good teach back.)   IP OT Assessment   End of Session Communication   (Nurse educated on wearing schedule of shoulder harness LUE and skin checks not to be worn at night.)   End of Session Patient Position Up in chair;Alarm on   Education   Individual(s) Educated Patient   Education Provided Other  (Patient educated on subluxation and shoulder harness wearing schedule)   Equipment   (sign in room with wearing schedule donning and doffing)   Risk and Benefits Discussed with Patient/Caregiver/Other yes   Patient/Caregiver Demonstrated Understanding yes   Plan of Care Discussed and Agreed Upon yes   Patient Response to Education Patient/Caregiver Asked Appropriate Questions;Patient/Caregiver Verbalized Understanding of Information   Inpatient/Swing Bed or Outpatient   Inpatient/Swing Bed or Outpatient Inpatient   Inpatient Plan   Treatment Interventions Fine motor coordination activities   OT - OK to Discharge Yes      11/19/24 1400   OT Adult Other Outcome Measures   9 Hole Peg Test RUE 18 sec LUE unable   Box and Block RUE 78 blk/min LUE unable   Other Outcome Measures  strength RUE 41 lbs  LUE unable. bell cancellation using right upper extremity with stylus 3 mins 5  misses and ! distractor.

## 2024-11-19 NOTE — PROGRESS NOTES
Occupational Therapy Evaluation and Treatment     11/19/24 1092-5847   OT Last Visit   OT Received On 11/19/24   General   Reason for Referral CVA   Referred By Dr. Dubose   Past Medical History Relevant to Rehab HTN, DM, CKD III, tobacco abuse   Missed Visit No   Family/Caregiver Present No   Prior to Session Communication Bedside nurse   Patient Position Received Bed, 3 rail up;Alarm on   Preferred Learning Style verbal;visual   General Comment Agreeable to therapy.   Precautions   Hearing/Visual Limitations no deficits   Medical Precautions Fall precautions   Precautions Comment LUE shoulder support and sling   Pain Assessment   Pain Assessment 0-10   0-10 (Numeric) Pain Score 0 - No pain   [place] FACES Pain Rating 0   Cognition   Overall Cognitive Status WFL   Orientation Level Oriented X4   Cognition Comments dysarthria due to facial droop   Attention WFL   Home Living   Type of Home House   Lives With Alone   Home Adaptive Equipment None   Home Layout Multi-level  (split)   Alternate Level Stairs-Rails   (unilateral HR)   Alternate Level Stairs-Number of Steps ~7 up, ~5 down   Home Access Stairs to enter without rails   Entrance Stairs-Rails None   Entrance Stairs-Number of Steps 1   Bathroom Shower/Tub Tub/shower unit;Curtain  (on both showers)   Bathroom Toilet Standard   Bathroom Equipment None   Bathroom Accessibility full bath on both upstairs and downstairs   Prior Function Per Pt/Caregiver Report   Level of [place] Independent with ADLs and functional transfers;Independent with homemaking with ambulation   Receives Help From Family  (receives help as needed from daughter and grandaughter)   ADL Assistance Independent   Homemaking Assistance Independent   Ambulatory Assistance Independent   Vocational Full time employment  (at the [employer])   Hand Dominance Right   Prior Function Comments Pt drives, denies history of falls   IADL History   Homemaking Responsibilities Yes   Meal Prep Responsibility  Primary   Laundry Responsibility Primary  (down the 7 steps)   Cleaning Responsibility Primary   Bill Paying/Finance Responsibility Primary   Shopping Responsibility Primary   Current License Yes   Mode of Transportation Car   Occupation Full time employment   Type of Occupation works in the [employer]   ADL   Equipment Long-handled shoe horn   Eating Assistance Stand by  (thin liquids; soft and bite sized diet)   Eating Deficit Setup  (assist to open containers and packages)   Grooming Assistance Stand by   Grooming Deficit Setup;Wash/dry face  (comb hair)   Bathing Assistance Moderate   Bathing Deficit Steadying;Supervision/safety;Verbal cueing;Increased time to complete ;Left arm;Buttocks;Right lower leg including foot;Left lower leg including foot   UE Dressing Assistance Moderate  (pt ed on robb dressing technique)   UE Dressing Deficit Setup;Verbal cueing;Increased time to complete;Thread LUE;Pull over head;Pull down in back   LE Dressing Assistance Total   LE Dressing Deficit Steadying;Requires assistive device for steadying;Verbal cueing;Don/doff R sock;Don/doff L sock;Tie shoes;Thread RLE into pants;Thread LLE into pants;Pull up over hips;Don/doff R shoe;Don/doff L shoe;Use of adaptive equipment   Toileting Assistance with Device Total   Toileting Deficit Steadying;Verbal cueing;Increased time to complete;Bedside commode;Clothing management up;Clothing management down;Perineal hygiene  (BSC placed over toilet; use of Xena Stedy in stance for posterior care and to manage pants over hips)   ADL Comments pt presents with flaccid LUE affecting pts current functional status at this time.   Activity Tolerance   Endurance Endurance does not limit participation in activity   Activity Tolerance Comments Fair +; requires extra time to complete task   Bed Mobility   Bed Mobility Yes   Bed Mobility 1   Bed Mobility 1 Supine to sitting   Level of Assistance 1 Moderate assistance   Bed Mobility Comments 1 HOB elevated and use  of bed rail with assist for trunk up and BLEs OOB   Functional Mobility   Functional Mobility Performed No   Transfers   Transfer Yes   Transfer 1   Transfer From 1 Bed to   Transfer to 1 Toilet   Technique 1 Sit to stand;Stand to sit  (with use of Xena Stedy)   Transfer Device 1 Gait belt;Xena Stedy   Transfer Level of Assistance 1 Moderate assistance   Trials/Comments 1 Pt presents with L lateral lean reaquiring vcs to correct posture when in stance.   Transfers 2   Transfer From 2 Toilet to   Transfer to 2 Chair without arms  (shower chair)   Technique 2 Sit to stand;Stand to sit   Transfer Device 2 Xena Stedy   Transfer Level of Assistance 2 Dependent  (mod assist x 1)   Trials/Comments 2 pt presents with L lateral lean when in stance requiring vcs to correct posture   Transfers 3   Transfer From 3 Chair without arms to  (shower chair)   Transfer to 3 Wheelchair   Technique 3 To right;To left   Transfer Device 3 Xena Stedy   Transfer Level of Assistance 3 Dependent  (mod assist x 1)   Modalities   Modalities Used No   Static Sitting Balance   Static Sitting-Balance Support Right upper extremity supported;Feet supported   Static Sitting-Level of Assistance Close supervision   Static Sitting-Comment/Number of Minutes seated at EOB with feet on the floor   Dynamic Sitting Balance   Dynamic Sitting-Balance Support Right upper extremity supported   Dynamic Sitting-Level of Assistance Contact guard   Dynamic Sitting-Balance Reaching across midline;Reaching for objects   Dynamic Sitting-Comments while seated on shower chair during shower; pt utilizes right grab bar for support   Static Standing Balance   Static Standing-Balance Support Right upper extremity supported   Static Standing-Level of Assistance Moderate assistance   Static Standing-Comment/Number of Minutes pt presents with L lateral lean and requires verbal and tactile cues to maintain upright standing posture   Vision - Basic Assessment   Current Vision  Does not wear glasses   Vision - Complex Assessment   Tracking WFL   Saccades Decreased speed of pursuit between targets   Convergence WFL   Visual Fields WFL   Visual Screen Results WFL   Diplopia Assessment WFL   Vision Comments appears WFL   Sensation   Sensation Comment Denies tingling/numbness   Strength   Strength Comments RUE 4-/5  LUE 1-/5   Coordination   Movements are Fluid and Coordinated No   Upper Body Coordination LUE flaacid   Lower Body Coordination slowed rate of movement LLE   Coordination Comment LUE flaacid   Hand Function   Gross Grasp Impaired  (LUE)   Coordination Impaired  (LUE)   RUE    RUE  WFL   RUE Strength   RUE Overall Strength Deficits   R Shoulder Flexion 4-/5   R Shoulder Extension 4-/5   R Shoulder ABduction 4-/5   R Shoulder Horizontal ABduction 4-/5   R Shoulder Horizontal ADduction 4-/5   R Elbow Flexion 4-/5   R Elbow Extension 4-/5   R Wrist Flexion 4-/5   R Wrist Extension 4-/5   LUE    LUE X  (LUE flaacid)   LUE Strength   LUE Overall Strength Deficits   L Shoulder Flexion 1/5   IP OT Assessment   OT Assessment Pt presents with decreased balance, strength, LUE ROM, activity tolerance and coordination.  Prior to this hospitalization pt has no recent falls, worked FT in the "[employer] drove, and performed self care and home management activities with MI.  Pt is not currently functioning at baseline and will benefit from skilled OT intervention to promote greater independence with ADLs, IADLS, functional transfers and functional mobility.   Prognosis Good   Barriers to Discharge Inaccessible home environment  (split level home)   Evaluation/Treatment Tolerance Patient tolerated treatment well   Medical Staff Made Aware Yes   End of Session Communication Bedside nurse  (physical therapist)   End of Session Patient Position Up in chair;Alarm on  (physical therapist)   OT Assessment   OT Assessment Results Decreased ADL status;Decreased endurance;Decreased functional mobility;Decreased  gross motor control;Non-functional left upper extremity   Strengths Attitude of self   Barriers to Participation Ability to acquire knowledge   Education   Individual(s) Educated Patient   Education Provided Fall precautons;Risk and benefits of OT discussed with patient or other;POC discussed and agreed upon  (robb dressing technique)   Diagnosis and Precautions fall precautions   Equipment   (LH shoehorn)   Risk and Benefits Discussed with Patient/Caregiver/Other yes   Patient/Caregiver Demonstrated Understanding yes   Plan of Care Discussed and Agreed Upon yes   Patient Response to Education Patient/Caregiver Verbalized Understanding of Information;Patient/Caregiver Performed Return Demonstration of Exercises/Activities   Education Comment pt receptive to education   Inpatient/Swing Bed or Outpatient   Inpatient/Swing Bed or Outpatient Inpatient   Inpatient Plan   Treatment Interventions ADL retraining;Functional transfer training;UE strengthening/ROM;Endurance training;Cognitive reorientation;Patient/family training   OT Frequency 5 times per week   OT Discharge Recommendations High intensity level of continued care   OT Recommended Transfer Status Assist of 1;Moderate assist  (with use of Xena Stedy)   OT - OK to Discharge Yes      11/19/24 0800   [facility] Daily Activity   Putting on and taking off regular lower body clothing 2   Bathing (including washing, rinsing, drying) 2   Putting on and taking off regular upper body clothing 2   Toileting, which includes using toilet, bedpan or urinal 1   Taking care of personal grooming such as brushing teeth 4   Eating Meals 4   Daily Activity - Total Score 15     Problem: ADLs STM Goals  Goal: Patient will perform UB and LB bathing seated with contact guard assist level of assistance and grab bars and shower chair.  Outcome: Progressing  Goal: Patient with complete upper body dressing with contact guard assist level of assistance donning and doffing all UE clothes with no  adaptive equipment while supported sitting.  Outcome: Progressing  Goal: Patient with complete lower body dressing with contact guard assist level of assistance donning and doffing all LE clothes  with PRN adaptive equipment while supported sitting.  Outcome: Progressing  Goal: Patient will complete toileting including  clothing management/hygiene with contact guard assist level of assistance and grab bars and bedside commode.  Outcome: Progressing     Problem: TRANSFERS STM Goals  Goal: Patient will complete functional transfer to toilet with least restrictive device with contact guard assist level of assistance.  Outcome: Progressing  Goal: Pt will complete functional transfer to car with least restrictive device with contact guard assist level.  Outcome: Progressing     Problem: ADLs LTM Goals  Goal: Patient will perform UB and LB bathing seated with modified independent level of assistance and grab bars and shower chair.  Outcome: Progressing  Goal: Patient with complete upper body dressing with modified independent level of assistance donning and doffing all UE clothes with PRN adaptive equipment while supported sitting.  Outcome: Progressing  Goal: Patient with complete lower body dressing with modified independent level of assistance donning and doffing all LE clothes  with PRN adaptive equipment while supported sitting.  Outcome: Progressing  Goal: Patient will complete toileting including clothing management/hygiene with modified independent level of assistance and grab bars and bedside commode.  Outcome: Progressing  Goal: Pt will perform light home management and meal prep activity with modified independence with use of least restrictive device and good use of EC/WS techniques and safety.  Outcome: Progressing     Problem: TRANSFERS LTM Goals  Goal: Patient will complete functional transfer to toilet with least restrictive device with modified independent level of assistance.  Outcome: Progressing  Goal:  Patient will complete functional car transfer with least restrictive device with modified independence.  Outcome: Progressing

## 2024-11-19 NOTE — INDIVIDUALIZED OVERALL PLAN OF CARE NOTE
Physical Medicine and Rehabilitation  Individualized Overall Plan of Care    Patient Name: Cynthia Muse  Medical Record Number: [id number]  YOB: 1945  Sex: Female  Payor Info: Payor: [employer] [place] / Plan: [facility] MED / Product Type: *No Product type* /     Primary Rehab (Etiologic) Diagnosis: Ischemic stroke (Multi)   IGC: Stroke: 01.1 Left Body Involvement (Right Brain)    Estimated Length of Stay: 21 days    Medical functional prognosis is good for the patient to be discharged home at modified independent} with higher level assist for higher level ADL's.    Patient/Family anticipated outcome: Home as independent as possible.    Functional Outcome/Goal:  Bed mobility: modified independent  Transfer sit to stand: modified independent  Ambulation with: modified independent  Upper extremity dressing: modified independent  Lower extremity dressing: minimal assist   Stairs: moderate assist  Communicate needs and wants as independent as able  Tolerate least restrictive diet independently     Anticipated Interventions:  Therapeutic exercises  Gait Training   Transfer training  Balance and high-level mobility training  Exercises to improve balance and mobility  ADL retraining for grooming, bathing, ADL activities  Exercises to improve strength and endurance  Cognitive skills and training  Speech and language training  High level executive functioning training  Swallow advancement and training    Required Therapy:  Physical therapy 90 minutes 5 days/week for 21 days.  Occupational therapy 90 minutes 5 days/week for 21 days.  Speech therapy 45 minutes 5 days/week for 21 days.    Patient has Fair Rehab potential.    Assessment/Plan   Assessment & Plan  Ischemic stroke (Multi)    Essential hypertension    Hyperlipidemia    Chronic kidney disease (CKD) stage G3a/A2, moderately decreased glomerular filtration rate (GFR) between 45-59 mL/min/1.73 square meter and albuminuria creatinine ratio  between  mg/g (C* (Multi)    Left hemiparesis (Multi)             Yunior Dubose MD  11/19/2024 2:04 PM

## 2024-11-20 LAB
GLUCOSE BLD MANUAL STRIP-MCNC: 59 MG/DL (ref 74–99)
GLUCOSE BLD MANUAL STRIP-MCNC: 97 MG/DL (ref 74–99)
GLUCOSE BLD MANUAL STRIP-MCNC: 99 MG/DL (ref 74–99)
HOLD SPECIMEN: NORMAL

## 2024-11-20 PROCEDURE — 92526 ORAL FUNCTION THERAPY: CPT | Mod: GN

## 2024-11-20 PROCEDURE — 97530 THERAPEUTIC ACTIVITIES: CPT | Mod: GO

## 2024-11-20 PROCEDURE — 1180000001 HC REHAB PRIVATE ROOM DAILY

## 2024-11-20 PROCEDURE — 97112 NEUROMUSCULAR REEDUCATION: CPT | Mod: CO,GO

## 2024-11-20 PROCEDURE — 2500000002 HC RX 250 W HCPCS SELF ADMINISTERED DRUGS (ALT 637 FOR MEDICARE OP, ALT 636 FOR OP/ED): Performed by: INTERNAL MEDICINE

## 2024-11-20 PROCEDURE — 97112 NEUROMUSCULAR REEDUCATION: CPT | Mod: GP

## 2024-11-20 PROCEDURE — 97535 SELF CARE MNGMENT TRAINING: CPT | Mod: GO

## 2024-11-20 PROCEDURE — 2500000005 HC RX 250 GENERAL PHARMACY W/O HCPCS: Performed by: INTERNAL MEDICINE

## 2024-11-20 PROCEDURE — S4991 NICOTINE PATCH NONLEGEND: HCPCS | Performed by: INTERNAL MEDICINE

## 2024-11-20 PROCEDURE — 92507 TX SP LANG VOICE COMM INDIV: CPT | Mod: GN

## 2024-11-20 PROCEDURE — 2500000004 HC RX 250 GENERAL PHARMACY W/ HCPCS (ALT 636 FOR OP/ED): Performed by: INTERNAL MEDICINE

## 2024-11-20 PROCEDURE — 97110 THERAPEUTIC EXERCISES: CPT | Mod: GP

## 2024-11-20 PROCEDURE — 97116 GAIT TRAINING THERAPY: CPT | Mod: GP

## 2024-11-20 PROCEDURE — 97530 THERAPEUTIC ACTIVITIES: CPT | Mod: GP

## 2024-11-20 PROCEDURE — 2500000001 HC RX 250 WO HCPCS SELF ADMINISTERED DRUGS (ALT 637 FOR MEDICARE OP): Performed by: INTERNAL MEDICINE

## 2024-11-20 PROCEDURE — 82947 ASSAY GLUCOSE BLOOD QUANT: CPT

## 2024-11-20 ASSESSMENT — ACTIVITIES OF DAILY LIVING (ADL)
HOME_MANAGEMENT_TIME_ENTRY: 30
BATHING_EQUIPMENT_NEEDED: LONG-HANDLED SPONGE;REMOVEABLE SHOWER HEAD
BATHING_LEVEL_OF_ASSISTANCE: MAXIMUM ASSISTANCE
BATHING_WHERE_ASSESSED: SHOWER

## 2024-11-20 ASSESSMENT — PAIN SCALES - GENERAL
PAINLEVEL_OUTOF10: 0 - NO PAIN
PAINLEVEL_OUTOF10: 2
PAINLEVEL_OUTOF10: 0 - NO PAIN
PAINLEVEL_OUTOF10: 2

## 2024-11-20 ASSESSMENT — PAIN SCALES - PAIN ASSESSMENT IN ADVANCED DEMENTIA (PAINAD)
TOTALSCORE: MEDICATION (SEE MAR)
BODYLANGUAGE: RELAXED
FACIALEXPRESSION: SMILING OR INEXPRESSIVE
TOTALSCORE: 0
CONSOLABILITY: NO NEED TO CONSOLE
BREATHING: NORMAL

## 2024-11-20 ASSESSMENT — PAIN SCALES - WONG BAKER
WONGBAKER_NUMERICALRESPONSE: HURTS LITTLE BIT
WONGBAKER_NUMERICALRESPONSE: NO HURT

## 2024-11-20 ASSESSMENT — PAIN - FUNCTIONAL ASSESSMENT
PAIN_FUNCTIONAL_ASSESSMENT: 0-10

## 2024-11-20 ASSESSMENT — PAIN DESCRIPTION - DESCRIPTORS
DESCRIPTORS: DISCOMFORT
DESCRIPTORS: DISCOMFORT

## 2024-11-20 ASSESSMENT — PAIN DESCRIPTION - ORIENTATION: ORIENTATION: RIGHT

## 2024-11-20 NOTE — NURSING NOTE
Nurse to nurse report provided.  The patient is resting in bed at this time, had complaints of generalized pain.  PRN Tylenol provided, Call light within reach.

## 2024-11-20 NOTE — PROGRESS NOTES
11/20/24 1436 to 1506   General   Reason for Referral CVA   Referred By Dr. Dubose   Past Medical History Relevant to Rehab HTN, DM, CKD III, tobacco abuse   Prior to Session Communication Bedside nurse   Patient Position Received Up in chair;Alarm on   Preferred Learning Style verbal;visual   General Comment Seated up in wc upon arrival. Agreeable to participate.   Precautions   Medical Precautions Fall precautions   Precautions Comment left shoulder harness   Pain Assessment   Pain Assessment 0-10   0-10 (Numeric) Pain Score 0 - No pain   Modalities   Modality 1 Timed BEBETO - Electric Stimulation Attended  (e stim on middle deltoid and anterior deltoid. Bicep. Patient instructed to attempt flexion with light on estim)   Balance/Neuromuscular Re-Education   Balance/Neuromuscular Re-Education Activity Performed Yes   Balance/Neuromuscular Re-Education Activity 1 Tapping and joint compression all joints LUE to facillitate movement. Patient educated on neuro recovery.   Balance/Neuromuscular Re-Education Activity 2 table top with resistance applied patient able to complete horizontal adduction 80 degrees no abduction.   Balance/Neuromuscular Re-Education Activity 3 10 reps shoulder shrugs with assist with weight of arm. Scapular retraction 10 reps   Balance/Neuromuscular Re-Education Activity 4 Patient able to complete forward reach and retraction on table top with resistance applied for musle recruitment completed 5 reps.   Balance/Neuromuscular Re-Education Activity 5 trace pronation table top with tapping and joint compression prior.   OT Assessment   OT Assessment Results Decreased ADL status;Decreased endurance;Decreased functional mobility;Decreased gross motor control;Non-functional left upper extremity   Strengths Attitude of self   Barriers to Participation Comorbidities   Inpatient/Swing Bed or Outpatient   Inpatient/Swing Bed or Outpatient Inpatient   Inpatient Plan   Treatment Interventions Neuromuscular  reeducation   OT - OK to Discharge Yes

## 2024-11-20 NOTE — PROGRESS NOTES
Cynthia Muse is a 79 year old female admitted to  [facility] 11.18.24. LSW is following for discharge planning. At baseline, patient lives alone and is independent with ADL's and IADL's. There is 1 step to enter the [facility] level home. No advanced directives in HIM and LSW is available for document completion upon request. PCP is Dr. Izquierdo. Pharmacy of choice is [facility]. LSW will follow as a resource for transition of care and will facilitate family training if needed, updates regarding insurance, meeting with patient to answer questions or concerns, and collaborate with IDT on patient needs to ensure safe return home. IDT held this date and ADOD is evolving due to new admission status.

## 2024-11-20 NOTE — PROGRESS NOTES
"   11/20/24 [id number]   OT Last Visit   OT Received On 11/20/24   General   Reason for Referral CVA   Referred By Dr. Dubose   Past Medical History Relevant to Rehab HTN, DM, CKD III, tobacco abuse   Missed Visit No   Family/Caregiver Present No   Prior to Session Communication Bedside nurse   Patient Position Received Bed, 3 rail up   Preferred Learning Style verbal;visual   General Comment Reports feeling sore from yesterday's therapy sessions. Pt stated, \"that is to be expected\".  Pt agreeable to OT tx session.   Precautions   Hearing/Visual Limitations No glasses. Smooth pursuits, saccades and convergence/ divergence slowed but intact. No visual field cuts identified.   Medical Precautions Fall precautions   Precautions Comment LUE shoulder support and sling   Pain Assessment   Pain Assessment 0-10   0-10 (Numeric) Pain Score 0 - No pain   [place] FACES Pain Rating 0   Cognition   Overall Cognitive Status Impaired   Orientation Level Oriented X4   Cognition Comments dysarthria   Attention WFL   Memory X   Short-Term Memory Impaired   Problem Solving X   Coordination   Movements are Fluid and Coordinated No   Upper Body Coordination LUE flaacid   Lower Body Coordination slowed rate of movement left LE   Coordination Comment LUE flaacid   RUE    RUE  WFL   RUE Strength   RUE Overall Strength Deficits   LUE Strength   LUE Overall Strength Deficits   Grooming   Grooming Level of Assistance Setup   Grooming Where Assessed Sitting sinkside   Grooming Comments oral care and to comb hair   UE Bathing   UE Bathing Level of Assistance Minimum assistance   UE Bathing Where Assessed Shower   UE Bathing Comments seated on shower chair; assist with RUE   LE Bathing   LE Bathing Adaptive Equipment Long-handled sponge;Removeable shower head   LE Bathing Level of Assistance Maximum assistance   LE Bathing Where Assessed Shower   LE Bathing Comments BLEs seated on shower chair and in stance for posterior hygiene   UE Dressing "   UE Dressing Level of Assistance Moderate assistance   UE Dressing Where Assessed Wheelchair   UE Dressing Comments assist to thread LUE through shirt sleeve and overhead; able to thread RUE through shirt sleeve  (pt ed on robb dressing techniques)   LE Dressing   LE Dressing Yes   LE Dressing Adaptive Equipment Reacher   Pants Level of Assistance Dependent   Sock Level of Assistance Dependent   Shoe Level of Assistance Dependent   Adult Briefs Level of Assistance Dependent   LE Dressing Where Assessed Wheelchair   LE Dressing Comments with use of Xena Stedy when in stance to manage brief and pants over hips; pt presents with L lateral lean requiring vcs to correct posture   Toileting   Toileting Level of Assistance Dependent   Where Assessed Bedside commode;Toilet   Toileting Comments with use of Xena Stedy in stance to manage pants over hips; assist with hygiene   Functional Standing Tolerance   Time 1 min   Activity self care   Functional Standing Tolerance Comments with support of Xena Stedy when in stance   Bed Mobility   Bed Mobility Yes   Bed Mobility 1   Bed Mobility 1 Supine to sitting   Level of Assistance 1 Moderate assistance   Bed Mobility Comments 1 assist for LLE OOB and trunk up   Functional Mobility   Functional Mobility Performed No   Transfers   Transfer Yes   Transfer 1   Transfer From 1 Bed to   Transfer to 1 Toilet   Technique 1 Sit to stand;Stand to sit  (with use of Xena Stedy)   Transfer Device 1 Gait belt   Transfer Level of Assistance 1 Moderate assistance   Trials/Comments 1 cues for hand placement   Modalities   Modalities Used Yes   Modality 1 Timed BEBETO - Electric Stimulation Attended  (NMES to L wrist extensors to elicit a motor response; pt demos good response to tx)   Toilet Transfers   Toilet Transfer From Bed   Toilet Transfer Type To   Toilet Transfer to Standard bedside commode  (toilet)   Toilet Transfer Technique Mechanical lift   Toilet Transfers Dependent   Toilet Transfers  Comments mod assist x 1   Shower Transfers   Shower Transfer From Other (Comment)  (Xena Naomi)   Shower Transfer Type To   Shower Transfer to Shower seat with back   Shower Transfer Technique   (Xena Naomi)   Shower Transfers Dependent   Shower Transfers Comments vcs for hand placement   IP OT Assessment   OT Assessment Pt pleasant and cooperative and progressing towards established goals. Initiated NMES tx to LUE to elicit motor response with good tolerance.  Continue OT per POC to promote greater safety and independence with ADLs and ADL transfers,   Prognosis Good   Barriers to Discharge Decreased caregiver support   Evaluation/Treatment Tolerance Patient tolerated treatment well   Medical Staff Made Aware Yes   End of Session Communication Bedside nurse   End of Session Patient Position Up in chair   OT Assessment   OT Assessment Results Decreased ADL status;Decreased endurance;Decreased functional mobility;Decreased gross motor control;Non-functional left upper extremity   Strengths Attitude of self;Ability to acquire knowledge   Barriers to Participation Comorbidities   Education   Individual(s) Educated Patient   Education Provided Fall precautons   Equipment   (reacher, long handled sponge)   Risk and Benefits Discussed with Patient/Caregiver/Other yes   Patient/Caregiver Demonstrated Understanding yes   Plan of Care Discussed and Agreed Upon yes   Patient Response to Education Patient/Caregiver Asked Appropriate Questions;Patient/Caregiver Verbalized Understanding of Information   Education Comment pt receptive to education   Inpatient/Swing Bed or Outpatient   Inpatient/Swing Bed or Outpatient Inpatient   Inpatient Plan   Treatment Interventions ADL retraining;Functional transfer training;Endurance training;Patient/family training;Equipment evaluation/education;Neuromuscular reeducation;Compensatory technique education   OT Frequency 5 times per week   OT Discharge Recommendations High intensity level of  continued care   Equipment Recommended upon Discharge   (TBD)   OT Recommended Transfer Status Assist of 1  (with use of Xena Stedy; and LUE sling)   OT - OK to Discharge Yes

## 2024-11-20 NOTE — CARE PLAN
Problem: Skin  Goal: Prevent/manage excess moisture  Outcome: Progressing  Flowsheets (Taken 11/20/2024 1052)  Prevent/manage excess moisture: Moisturize dry skin  Note: .  Goal: Prevent/minimize sheer/friction injuries  Flowsheets (Taken 11/20/2024 1052)  Prevent/minimize sheer/friction injuries: Increase activity/out of bed for meals  Note: .  Goal: Promote/optimize nutrition  Flowsheets (Taken 11/19/2024 0950 by Laura Whipple RN)  Promote/optimize nutrition:   Monitor/record intake including meals   Consume > 50% meals/supplements  Note: .   The patient's goals for the shift include      The clinical goals for the shift include maintain safety

## 2024-11-20 NOTE — PROGRESS NOTES
Speech-Language Pathology    SLP Adult [facility] Dysphagia and Speech-Language Pathology Treatment     Patient Name: Cynthia Muse  MRN: [id number]  Today's Date: 11/20/2024  Time Calculation  Start Time: 1004  Stop Time: 1054  Time Calculation (min): 50 min     2/5sw +sp    Current Problem:   Ischemic stroke with left body involvement (right brain)    SLP Assessment:  SLP TX Intervention Outcome: Making Progress Towards Goals  SLP Assessment Results: Cognitive Deficits, Motor Speech Deficits, Other (Comment) (dysphagia)  Prognosis: Good  Treatment Tolerance: Patient tolerated treatment well  Medical Staff Made Aware: Yes  Strengths: Motivation  Education Provided: Yes     Plan:  Inpatient/Swing Bed or Outpatient: Inpatient  Treatment/Interventions: Articulation, Cognitive communication functioning, Communication functioning, Other (Comment) (dysphagia)  SLP TX Plan: Continue Plan of Care  SLP Plan: Skilled SLP  SLP Frequency: 5x per week  Duration: 3 weeks  SLP Discharge Recommendations: Other (Comment) (TBD pending progress)  Next Treatment Priority: diet reinaldo., strat's, OMEs, dysarthria  Discussed POC: Patient, Nursing  Discussed Risks/Benefits: Yes, Patient, Nursing  Patient/Caregiver Agreeable: Yes      Subjective   Pt seen upright in wheelchair. L facial, labial weakness.     General Visit Information:   Reason for Referral: Follow-up dysphagia and speech-language treatments  Referred By: Dr. Dubose  Past Medical History Relevant to Rehab: HTN, DM, CKD III, tobacco abuse  Prior to Session Communication: Bedside nurse    Pain Assessment:  Pain Assessment  0-10 (Numeric) Pain Score: 0 - No pain      Objective   Dysphagia goals (Established 11/19/24, projected discontinuation 12/10/24):  STG 1: Pt will tolerate current diet recommendation without overt s/s of aspiration with 90% accuracy during observed PO trials.   PROGRESS: No reported s/s aspiration. Pt demonstrated cough x1 post swallow with soda from can. No  difficulties noted when drinking cola from cup. Vocal quality remained unchanged.   STATUS: continue soft, bite-size textures with thin liquids  STG 2: Pt will participate in PO trials with upgraded diet textures without overt s/s of aspiration with 80% accuracy.   PROGRESS: not clinically indicated at this time.   STATUS: Pt does not wish any diet changes at this time.  STG 3: Pt to participate in oral ROM and strengthening exercises to 85% accuracy with cues as needed to improve ability to clear oral residues with least restrictive diet.  PROGRESS: Provided handout for OMEs. Provided direct model, mirror for feedback, and cues for pacing, full ROM, jaw stability. Able to complete x5 of the following - labial protrusion/retraction/alternating protrusion and retraction/press/vowels/attempted cheek puff and suction, lingual protrusion/retraction/lateralization/pops.   STATUS: continue, progressing with cues  STG 4: Pt to demonstrate functional use of compensatory swallowing strategies to 90% to reduce risk of aspiration and s/s dysphagia.   PROGRESS: Cues to utilize slow rate, and small sips   STATUS: continue, progressing with cues  LTG: Pt will tolerate least restrictive diet without overt s/s of aspiration in order to meet nutrition and hydration needs.    Therapeutic Swallow:  Therapeutic Swallow Intervention : Compensatory Strategies, PO Trials, Oral Strengthening Techniques  Solid Diet Recommendations: Soft & bite sized/chopped (IDDSI Level 6)  Liquid Diet Recommendations: Thin (IDDSI Level 0)    Speech and Language Goals: (established 11/19/24, projected discontinuation 12/10/24)  Pt will complete oral-motor exercises with 80% acc given model and cues as needed in order to improve motor speech/articulation skills.              PROGRESS: see above              STATUS: continue  Pt will  utilize speech intelligibility strategies with 75% acc to improve communication skills.  PROGRESS: Provided handout for clear  speech strategies. Focused on slow rate and overarticulation.  STATUS: continue  3.    Pt will  complete articulatory precision tasks with 75% accuracy to improve intelligibility.   PROGRESS: Pt was able to complete 1-unit and 2-unit DDKs and 2 word phrases with 80% accuracy.              STATUS: continue    LTG: Pt will increase motor speech function to the highest possible speech level for improved functional communication within daily living tasks.       4. Pt will increase STM/recall to 80% accuracy with cues to improve recall of functional daily information.  PROGRESS: See below for CLQT.  STATUS: continue  5. Pt will improve visual scanning to 75% accuracy with cues to increase visual awareness of environment.  PROGRESS: See below for CLQT  STATUS: continue   6. Ongoing assessment if indicated for further goal development or to determine progress, as needed.  PROGRESS: CLQT completed. See below.  STATUS: continue as needed.   LTG 1: Pt will optimize cognitive-linguistic skills necessary for return to least restrictive living environment and to reduce caregiver dependence.     The Cognitive Linguistic Quick Test (CLQT) is a criterion-referenced measure to quickly assess strengths and weaknesses in five cognitive domains.  Attention Domain:   147, mild (3)  Memory Domain:   157, WNL (4)  Executive Function Domain:  14, mild (3)  Language Domain:   29, WNL (4)  Visuospatial Skills Domain:  63, WNL (4)    Composite Severity Rating:   3.6, WNL    Motor Speech:   Motor Speech Intervention : Compensatory Speech Strategies, Oral Lingual Strengthening Techniques, Oral/Facial Agility Techniques    Inpatient Education:  Adult Inpatient Education  Individual(s) Educated: Patient  Verbal Education : soft, bite-size solids with thin  Risk and Benefits Discussed with Patient/Caregiver/Other: yes  Patient/Caregiver Demonstrated Understanding: yes  Plan of Care Discussed and Agreed Upon: yes  Patient Response to Education:  Patient/Caregiver Verbalized Understanding of Information

## 2024-11-20 NOTE — PROGRESS NOTES
Physical Therapy Treatment       11/20/24 3331-4235   PT  Visit   PT Received On 11/20/24   General   Reason for Referral CVA   Referred By Dr. Dubose   Past Medical History Relevant to Rehab HTN, DM, CKD III, tobacco abuse   Missed Visit No   Family/Caregiver Present No   Prior to Session Communication Bedside nurse   Patient Position Received Up in chair;Alarm on   Preferred Learning Style verbal;visual   General Comment Seated up in wc upon arrival. Agreeable to participate.   Precautions   Medical Precautions Fall precautions   Pain Assessment   Pain Assessment 0-10   0-10 (Numeric) Pain Score 0 - No pain   Cognition   Cognition Comments able to follow commands with repeated cueing.  Flat affect.   Coordination   Lower Body Coordination slowed rate of movement left LE   Therapeutic Exercise   Therapeutic Exercise Activity 1 Seated LAQs, RLE 2#, LLE 1# 2x15   Therapeutic Exercise Activity 2 Seated marches B LEs, RLE 2#, LLE 1#   Therapeutic Exercise Activity 3 Seated abduction with green theraband, emphasis on LLE 2x15   Ambulation/Gait Training 1   Surface 1 Level tile   Device 1 Wes Walker   Gait Support Devices Gait belt;Wheelchair follow;L AFO   Assistance 1 Moderate assistance   Quality of Gait 1 Forward flexed posture;Soft knee(s);Inconsistent stride length;Decreased step length;Listing;NBOS   Comments/Distance (ft) 1 12ft x 1, straight path   Ambulation/Gait Training 2   Surface 2 Level tile   Device 2 Wes Walker   Gait Support Devices Gait belt;Wheelchair follow;L AFO   Assistance 2 Moderate assistance   Quality of Gait 2 Forward flexed posture;Listing;NBOS;Inconsistent stride length;Decreased step length;Soft knee(s)   Comments/Distance (ft) 2 about 20 ft with turns.  Instruction provided for each step fwd for wt shifting, proper L foot placement, and to widen YANI. Repeated cueing to promtoe cervical extension into neutral.   Transfer 1   Technique 1 Sit to stand;Stand to sit   Transfer Device 1 Gait  belt;Wes-walker   Transfer Level of Assistance 1 Minimum assistance   Trials/Comments 1 multiple trials during session. cueing for hand placement and control.   Wheelchair Activities   Propulsion Type 1 Manual   Level 1 Level tile   Method 1 Right upper extremity;Right lower extremity  (LLE leg rest, L lap tray)   Level of Assistance 1 Moderate assistance   Activity Tolerance   Endurance Tolerates 30 min exercise with multiple rests   Activity Tolerance Comments requires rest breaks   PT Assessment   PT Assessment Results Decreased strength;Decreased range of motion;Decreased endurance;Impaired balance;Decreased mobility;Decreased coordination;Impaired tone   Rehab Prognosis Good   Barriers to Discharge lives alone   Evaluation/Treatment Tolerance Patient tolerated treatment well   Medical Staff Made Aware Yes   End of Session Communication Bedside nurse   Assessment Comment Amb with wes-cane and mod assist. Improved tolerance and ability with repetition. Fatigues easily.   End of Session Patient Position Up in chair;Alarm on  (with OT)   PT Plan   Treatment/Interventions Transfer training;Gait training;Balance training;Neuromuscular re-education;Strengthening;Endurance training;Therapeutic exercise;Therapeutic activity   PT Plan Ongoing PT   PT Recommended Transfer Status Assist x1   PT - OK to Discharge Yes

## 2024-11-20 NOTE — PROGRESS NOTES
Physical Therapy       11/20/24 [id number]   PT  Visit   PT Received On 11/20/24   Response to Previous Treatment Patient with no complaints from previous session.   General   Reason for Referral CVA   Referred By Dr. Dubose   Past Medical History Relevant to Rehab HTN, DM, CKD III, tobacco abuse   Patient Position Received Up in chair;Alarm on   Preferred Learning Style verbal;visual   General Comment Reports limited sleep last night due to frequent need to use bathroom to urinate. Patient reports this  is not a new situation. Agreeable to therapy.   Precautions   Hearing/Visual Limitations No glasses. Smooth pursuits, saccades and convergence/ divergence slowed but intact. No visual field cuts identified.   Medical Precautions Fall precautions   Precautions Comment left shoulder harness   Pain Assessment   Pain Assessment 0-10   0-10 (Numeric) Pain Score 0 - No pain   Therapeutic Exercise   Therapeutic Exercise Activity 1 SAQ 2 x 15, 2# right, 0# left   Therapeutic Exercise Activity 2 bridging 2 x 15 without roll   Therapeutic Exercise Activity 3 supine hip abd/add 2 x 15, 2# right, 0# left   Therapeutic Exercise Activity 4 supine heel slides 2 x 15, 2# right, 0# left   Balance/Neuromuscular Re-Education   Balance/Neuromuscular Re-Education Activity 1 sit to stand from wheelchair to robb cane x 10 reps, use of mirror for visual feedback of midline, min assist. Left list with left knee flexion. Patient able to self correct.   Balance/Neuromuscular Re-Education Activity 2 cone reach with hemicane min/mod assist   Balance/Neuromuscular Re-Education Activity 3 balloon toss with hemicane min/mod assist   Bed Mobility 1   Bed Mobility 1 Sitting to supine   Level of Assistance 1 Minimum assistance   Bed Mobility Comments 1 for left LE   Bed Mobility 2   Bed Mobility  2 Supine to sitting   Level of Assistance 2 Moderate assistance   Bed Mobility Comments 2 assist for left LE and trunk up   Ambulation/Gait Training 1    Surface 1 Level tile   Device 1 Beltrán rail   Gait Support Devices Gait belt;Wheelchair follow;L AFO   Assistance 1 Moderate assistance   Quality of Gait 1 Forward flexed posture;Soft knee(s);Inconsistent stride length;Decreased step length;Listing;NBOS   Comments/Distance (ft) 1 15' x 2 at right beltrán rail   Ambulation/Gait Training 2   Surface 2 Level tile   Gait Support Devices Gait belt;Wheelchair follow   Assistance 2 Moderate assistance   Quality of Gait 2 Forward flexed posture;Listing;NBOS;Inconsistent stride length;Decreased step length;Soft knee(s)   Comments/Distance (ft) 2 hemicane, 20' step by step cues for sequencing, hemicane placement, step length, weight shift.   Transfer 1   Technique 1 Sit to stand;Stand to sit   Transfer Device 1 Gait belt   Transfer Level of Assistance 1 Minimum assistance   Trials/Comments 1 cues for hand placement   Transfers 2   Transfer From 2 Mat to;Wheelchair to   Transfer to 2 Wheelchair;Mat   Technique 2 Stand pivot;To right   Transfer Device 2 Gait belt   Transfer Level of Assistance 2 Moderate assistance;Maximum assistance   Trials/Comments 2 step by step cues for safe technique   Activity Tolerance   Endurance Tolerates 30 min exercise with multiple rests   PT Assessment   PT Assessment Results Decreased strength;Decreased range of motion;Decreased endurance;Impaired balance;Decreased mobility;Decreased coordination;Impaired tone   Rehab Prognosis Good   Evaluation/Treatment Tolerance Patient tolerated treatment well   Assessment Comment Progressed to amb with robb cane. Transfers improved. AFO improves left foot clearance.   PT Plan   Inpatient/Swing Bed or Outpatient Inpatient   PT Plan   Treatment/Interventions Bed mobility;Transfer training;Gait training;Balance training;Neuromuscular re-education;Strengthening;Therapeutic exercise;Therapeutic activity;Postural re-education;Orthotic fitting/training   PT Plan Ongoing PT   PT Recommended Transfer Status Assist  x1  (transfer towards right)   PT - OK to Discharge Yes

## 2024-11-21 ENCOUNTER — APPOINTMENT (OUTPATIENT)
Dept: PRIMARY CARE | Facility: CLINIC | Age: 79
End: 2024-11-21
Payer: MEDICARE

## 2024-11-21 DIAGNOSIS — E78.2 MIXED HYPERLIPIDEMIA: ICD-10-CM

## 2024-11-21 DIAGNOSIS — I10 ESSENTIAL HYPERTENSION: Primary | ICD-10-CM

## 2024-11-21 DIAGNOSIS — N18.31 CHRONIC KIDNEY DISEASE (CKD) STAGE G3A/A2, MODERATELY DECREASED GLOMERULAR FILTRATION RATE (GFR) BETWEEN 45-59 ML/MIN/1.73 SQUARE METER AND ALBUMINURIA CREATININE RATIO BETWEEN 30-299 MG/G (C* (MULTI): ICD-10-CM

## 2024-11-21 LAB
GLUCOSE BLD MANUAL STRIP-MCNC: 154 MG/DL (ref 74–99)
GLUCOSE BLD MANUAL STRIP-MCNC: 66 MG/DL (ref 74–99)
GLUCOSE BLD MANUAL STRIP-MCNC: 87 MG/DL (ref 74–99)

## 2024-11-21 PROCEDURE — 97530 THERAPEUTIC ACTIVITIES: CPT | Mod: GP,CQ

## 2024-11-21 PROCEDURE — 97116 GAIT TRAINING THERAPY: CPT | Mod: GP,CQ

## 2024-11-21 PROCEDURE — 92526 ORAL FUNCTION THERAPY: CPT | Mod: GN

## 2024-11-21 PROCEDURE — 2500000002 HC RX 250 W HCPCS SELF ADMINISTERED DRUGS (ALT 637 FOR MEDICARE OP, ALT 636 FOR OP/ED): Performed by: INTERNAL MEDICINE

## 2024-11-21 PROCEDURE — 97530 THERAPEUTIC ACTIVITIES: CPT | Mod: GO

## 2024-11-21 PROCEDURE — 1180000001 HC REHAB PRIVATE ROOM DAILY

## 2024-11-21 PROCEDURE — 2500000001 HC RX 250 WO HCPCS SELF ADMINISTERED DRUGS (ALT 637 FOR MEDICARE OP): Performed by: INTERNAL MEDICINE

## 2024-11-21 PROCEDURE — 97112 NEUROMUSCULAR REEDUCATION: CPT | Mod: GP,CQ

## 2024-11-21 PROCEDURE — 2500000004 HC RX 250 GENERAL PHARMACY W/ HCPCS (ALT 636 FOR OP/ED): Performed by: INTERNAL MEDICINE

## 2024-11-21 PROCEDURE — 97535 SELF CARE MNGMENT TRAINING: CPT | Mod: GO,CO

## 2024-11-21 PROCEDURE — 92507 TX SP LANG VOICE COMM INDIV: CPT | Mod: GN

## 2024-11-21 PROCEDURE — 97112 NEUROMUSCULAR REEDUCATION: CPT | Mod: GO

## 2024-11-21 PROCEDURE — 99232 SBSQ HOSP IP/OBS MODERATE 35: CPT | Performed by: INTERNAL MEDICINE

## 2024-11-21 PROCEDURE — 82947 ASSAY GLUCOSE BLOOD QUANT: CPT

## 2024-11-21 ASSESSMENT — PAIN - FUNCTIONAL ASSESSMENT
PAIN_FUNCTIONAL_ASSESSMENT: 0-10

## 2024-11-21 ASSESSMENT — PAIN SCALES - GENERAL
PAINLEVEL_OUTOF10: 0 - NO PAIN

## 2024-11-21 ASSESSMENT — ACTIVITIES OF DAILY LIVING (ADL)
BATHING_WHERE_ASSESSED: SHOWER
BATHING_EQUIPMENT_NEEDED: LONG-HANDLED SPONGE;REMOVEABLE SHOWER HEAD
BATHING_LEVEL_OF_ASSISTANCE: MODERATE ASSISTANCE
HOME_MANAGEMENT_TIME_ENTRY: 30

## 2024-11-21 ASSESSMENT — PAIN SCALES - WONG BAKER: WONGBAKER_NUMERICALRESPONSE: NO HURT

## 2024-11-21 NOTE — PROGRESS NOTES
Physical Therapy       11/21/24 [id number]   PT  Visit   PT Received On 11/21/24   Response to Previous Treatment Patient reporting fatigue but able to participate.   General   Reason for Referral CVA   Referred By Dr. Dubose   Past Medical History Relevant to Rehab HTN, DM, CKD III, tobacco abuse   Patient Position Received Up in chair;Alarm on   Preferred Learning Style verbal;visual   General Comment Agreeable to tx   Precautions   Medical Precautions Fall precautions   Precautions Comment left shoulder harness   Pain Assessment   Pain Assessment 0-10   0-10 (Numeric) Pain Score 0 - No pain   Therapeutic Activity   Therapeutic Activity 1 sit/stands 2x10 reps, Bob.  Vc's for upright posture   Ambulation/Gait Training 1   Surface 1 Level tile   Device 1   (robb cane)   Gait Support Devices Gait belt;Wheelchair follow;L AFO   Assistance 1 Minimum assistance;Moderate assistance   Quality of Gait 1 Forward flexed posture;Soft knee(s);Inconsistent stride length;Decreased step length;Listing;NBOS   Comments/Distance (ft) 1 40 ft, 60 ft, vc's for weight shifting to R and for posture.  Improved weight shifting noted this PM.   Transfer 1   Technique 1 Sit to stand;Stand to sit   Transfer Device 1 Gait belt   Transfer Level of Assistance 1 Minimum assistance   Trials/Comments 1 vc's for ahnd placement   Wheelchair Activities   Propulsion Type 1 Manual   Level 1 Level tile   Method 1 Right upper extremity;Right lower extremity   Level of Assistance 1 Close supervision   Description/Details 1 50 ft, turns, low pile carpet   Activity Tolerance   Endurance Tolerates 30 min exercise with multiple rests   PT Assessment   PT Assessment Results Decreased strength;Decreased range of motion;Decreased endurance;Impaired balance;Decreased mobility;Decreased coordination;Impaired tone   Rehab Prognosis Good   Evaluation/Treatment Tolerance Patient tolerated treatment well   End of Session Patient Position Up in chair;Alarm on   PT Plan    Inpatient/Swing Bed or Outpatient Inpatient   PT Plan   Treatment/Interventions Bed mobility;Transfer training;Gait training;Stair training;Neuromuscular re-education;Therapeutic exercise;Therapeutic activity   PT Plan Ongoing PT   PT Recommended Transfer Status Assist x1

## 2024-11-21 NOTE — PROGRESS NOTES
Speech-Language Pathology    SLP Adult [facility] Speech-Language Pathology Treatment     Patient Name: Cynthia Muse  MRN: [id number]  Today's Date: 11/21/2024  Time Calculation  Start Time: 0815  Stop Time: 0855  Time Calculation (min): 40 min     3/5 sp+sw    Current Problem:   Ischemic stroke with left body involvement (right brain)       SLP Assessment:  SLP TX Intervention Outcome: Making Progress Towards Goals  SLP Assessment Results: Cognitive Deficits, Motor Speech Deficits  Prognosis: Good  Treatment Tolerance: Patient tolerated treatment well  Medical Staff Made Aware: Yes  Strengths: Motivation  Education Provided: Yes      Plan:  Inpatient/Swing Bed or Outpatient: Inpatient  Treatment/Interventions: Articulation, Cognitive communication functioning, Communication functioning, dysphagia  SLP TX Plan: Continue Plan of Care  SLP Plan: Skilled SLP  SLP Frequency: 5x per week  Duration: 3 weeks  SLP Discharge Recommendations: Other (Comment) (TBD pending progress)  Next Treatment Priority: diet reinaldo., strat's, OMEs, dysarthria  Discussed POC: Patient, Nursing  Discussed Risks/Benefits: Yes, Patient, Nursing  Patient/Caregiver Agreeable: Yes      Subjective   Pt pleasant and cooperative, upright in wheelchair for treatment in Speech Therapy Office.    General Visit Information:  Referred By: Dr. Dubose  Past Medical History Relevant to Rehab: HTN, DM, CKD III, tobacco abuse  Prior to Session Communication: Bedside nurse    Pain Assessment:  Pain Assessment  Pain Assessment: 0-10  0-10 (Numeric) Pain Score: 0 - No pain      Objective     Therapeutic Swallow:  Therapeutic Swallow Intervention : Compensatory Strategies  Solid Diet Recommendations: Soft & bite sized/chopped (IDDSI Level 6)  Liquid Diet Recommendations: Thin (IDDSI Level 0)    Dysphagia goals (Established 11/19/24, projected discontinuation 12/10/24):  STG 1: Pt will tolerate current diet recommendation without overt s/s of aspiration with 90% accuracy  during observed PO trials.   PROGRESS: No reported s/s aspiration. No s/s aspiration observed with thin liquids via cup. Vocal quality remained unchanged.              STATUS: continue soft, bite-size textures with thin liquids  STG 2: Pt will participate in PO trials with upgraded diet textures without overt s/s of aspiration with 80% accuracy.  PROGRESS: Not formally addressed with trials; however, pt reported some items received have been pureed (grilled cheese), so pt requesting upgrade from that.                STATUS: SLP to collaborate with Dietary Dep't to ensure diet order is accurate.  STG 3: Pt to participate in oral ROM and strengthening exercises to 85% accuracy with cues as needed to improve ability to clear oral residues with least restrictive diet.  PROGRESS: Provided handout for OMEs. Provided direct model, mirror for feedback, and cues for pacing, full ROM, jaw stability. Able to complete 10 repetitions of each of the following - labial protrusion/retraction/alternating protrusion and retraction/press/vowels/attempted cheek puff and suction, lingual protrusion/retraction/lateralization/pops.              STATUS: continue, progressing with cues  STG 4: Pt to demonstrate functional use of compensatory swallowing strategies to 90% to reduce risk of aspiration and s/s dysphagia.              PROGRESS: Cues to utilize slow rate, and small sips, and tighten lips on cup to prevent anterior oral spillage              STATUS: continue, progressing with cues  LTG: Pt will tolerate least restrictive diet without overt s/s of aspiration in order to meet nutrition and hydration needs.    Motor Speech:  Motor Speech Intervention : Compensatory Speech Strategies    Speech and Language Goals: (established 11/19/24, projected discontinuation 12/10/24)  Pt will complete oral-motor exercises with 80% acc given model and cues as needed in order to improve motor speech/articulation skills.              PROGRESS: see  above              STATUS: continue  Pt will  utilize speech intelligibility strategies with 75% acc to improve communication skills.  PROGRESS: Pt recalled 2/3 strategies previously discussed. Focused on slow rate and overarticulation.  STATUS: continue  3.    Pt will  complete articulatory precision tasks with 75% accuracy to improve intelligibility.   PROGRESS: Not addressed today.  Previous session--Pt was able to complete 1-unit and 2-unit DDKs and 2 word phrases with 80% accuracy.              STATUS: continue    LTG: Pt will increase motor speech function to the highest possible speech level for improved functional communication within daily living tasks.       4. Pt will increase STM/recall to 80% accuracy with cues to improve recall of functional daily information.  PROGRESS: Pt recalled functional info from recent events with 66% acc  STATUS: continue  5. Pt will improve visual scanning to 75% accuracy with cues to increase visual awareness of environment.  PROGRESS: Not addressed this session  STATUS: continue   6. Ongoing assessment if indicated for further goal development or to determine progress, as needed.  PROGRESS: CLQT completed previous session.  STATUS: continue as needed.   LTG 1: Pt will optimize cognitive-linguistic skills necessary for return to least restrictive living environment and to reduce caregiver dependence.      Inpatient Education:  Individual(s) Educated: Patient  Verbal Education : strategies, impairments  Risk and Benefits Discussed with Patient/Caregiver/Other: yes  Patient/Caregiver Demonstrated Understanding: yes  Plan of Care Discussed and Agreed Upon: yes  Patient Response to Education: Patient/Caregiver Verbalized Understanding of Information

## 2024-11-21 NOTE — NURSING NOTE
Assumed care of patient at 1900. Pt. denied pain/discomfort. Call light within reach. Safety measures remain in place  Will cont. to monitor.

## 2024-11-21 NOTE — CARE PLAN
The patient's goals for the shift include      The clinical goals for the shift include Maintain safety/improve ADLs

## 2024-11-21 NOTE — NURSING NOTE
Assumed care of patient at 0730. Patient here s/p CVA with left arm flaccid and left leg has limited movement. Alert and oriented. On RA. Can be incontinent of urine. AC/HS. Small bite sized foods with thin liquids. On Keflex for UTI. Denies any pain or needs at this time. Call light is within reach. Will continue to monitor.

## 2024-11-21 NOTE — PROGRESS NOTES
"[facility]  Physician Progress Note    Subjective   Cynthia Muse is a 79 y.o. female admitted to inpatient rehabilitation unit.    The patient denies chest pain and shortness of breath.  The bowels are moving.  Nursing staff report no new issues.  Daily therapy efforts continue.      Objective   /66 (BP Location: Right arm, Patient Position: Lying)   Pulse 78   Temp 37.1 °C (98.8 °F) (Temporal)   Resp 17   Ht 1.6 m (5' 2.99\")   Wt 72 kg (158 lb 11.7 oz)   SpO2 96%   BMI 28.13 kg/m²      Physical Exam  Constitutional:       General: She is not in acute distress.     Appearance: She is not toxic-appearing.   HENT:      Head: Normocephalic and atraumatic.      Mouth/Throat:      Mouth: Mucous membranes are moist.   Eyes:      Pupils: Pupils are equal, round, and reactive to light.   Cardiovascular:      Rate and Rhythm: Normal rate and regular rhythm.      Heart sounds: No murmur heard.  Pulmonary:      Breath sounds: Normal breath sounds. No wheezing, rhonchi or rales.   Abdominal:      General: There is no distension.      Palpations: Abdomen is soft.   Musculoskeletal:      Right lower leg: No edema.      Left lower leg: No edema.   Neurological:      Mental Status: She is alert and oriented to person, place, and time.      Left hemiparesis.  Getting some movement at the elbow in terms of extension.    Labs  BMP:  Results from last 7 days   Lab Units 11/19/24  0609   CREATININE mg/dL 1.77*   BUN mg/dL 41*   SODIUM mmol/L 140   POTASSIUM mmol/L 3.9   CHLORIDE mmol/L 106   CO2 mmol/L 23     CBC:  Results from last 7 days   Lab Units 11/19/24  0609   WBC AUTO x10*3/uL 13.7*   HEMOGLOBIN g/dL 12.2   HEMATOCRIT % 37.7   MCV fL 91   PLATELETS AUTO x10*3/uL 352     Coagulation:  Results from last 7 days   Lab Units 11/14/24  1107   INR  1.0   PROTIME seconds 10.5        Assesment and Plan    Ischemic Stroke (Multi)   Left Hemiparesis (Multi)   Essential " Hypertension   Hyperlipidemia   Chronic Kidney Disease (Ckd) Stage G3a/A2, Moderately Decreased Glomerular Filtration Rate (Gfr) Between 45-59 Ml/Min/1.73 Square Meter and Albuminuria Creatinine Ratio Between  Mg/G (C* (Multi)      Ischemic stroke - Continue with medical management for secondary prevention: DAPT x 21 days, statin  DVT prophylaxis has been ordered: LMWH       BINH/CKD - fairly stable; monitor    DM2 - cont URENA, monitor glucose     HTN - amlodipine, coreg     Tobacco use disorder - nicotine patch ordered     HPL - stat     Left hemiparesis     Dysphagia - Cont ST efforts.    Yunior Dubose MD

## 2024-11-21 NOTE — PROGRESS NOTES
11/21/24 [id number]   OT Last Visit   OT Received On 11/21/24   General   Reason for Referral CVA   Referred By Dr. Dubose   Past Medical History Relevant to Rehab HTN, DM, CKD III, tobacco abuse   Missed Visit No   Family/Caregiver Present No   Patient Position Received Up in chair;Alarm on   Preferred Learning Style verbal;visual   General Comment Agreeable to tx   Precautions   Hearing/Visual Limitations No glasses. Smooth pursuits, saccades and convergence/ divergence slowed but intact. No visual field cuts identified.   Medical Precautions Fall precautions   Precautions Comment left shoulder harness   Vital Signs   Heart Rate 67   Heart Rate Source Brachial   Resp 18   SpO2 97 %   /63   MAP (mmHg) 97   BP Location Right arm   BP Method Automatic   Pain Assessment   Pain Assessment 0-10   0-10 (Numeric) Pain Score 0 - No pain   [place] FACES Pain Rating 0   Clinical Progression Not changed   Patient's Stated Pain Goal No pain   Cognition   Orientation Level Oriented X4   Attention WFL   Memory X   Short-Term Memory Impaired   Problem Solving X   Simple Functional Tasks Impaired   Numeric Reasoning X   Simple Calculations Impaired   Coordination   Movements are Fluid and Coordinated No   Upper Body Coordination LUE flaacid   Lower Body Coordination slowed rate of movement left LE   Coordination Comment LUE flaacid   RUE    RUE  WFL   RUE Strength   RUE Overall Strength Deficits   LUE    LUE X   LUE Strength   LUE Overall Strength Deficits   UE Dressing   UE Dressing Level of Assistance Minimum assistance   UE Dressing Where Assessed Wheelchair   UE Dressing Comments joan/doff pull over sweatshirt; vcs for robb dressing techniques provided   Modalities   Modalities Used Yes   Modality 1 Timed BEBETO - Electric Stimulation Attended  (NMES treatment to LUE triceps, biceps and wrist extensors to elicit a motor response for functional tasks.  Pt demos good tolerance and response to tx.)   Therapeutic Exercise    Therapeutic Exercise Performed Yes   Therapeutic Exercise Activity 1 L shoulder shrugs 10 reps x 1 set   Therapeutic Exercise Activity 2 L scapular squeezes 10 reps x 1 set   Therapeutic Exercise Activity 3 L shoulder AAROM protraction/retraction   IP OT Assessment   Evaluation/Treatment Tolerance Patient tolerated treatment well   Medical Staff Made Aware Yes   End of Session Communication   (PTA)   End of Session Patient Position Up in chair;Alarm on   OT Assessment   OT Assessment Results Decreased upper extremity range of motion;Decreased upper extremity strength;Decreased endurance;Decreased sensation;Decreased gross motor control   Strengths Ability to acquire knowledge   Barriers to Participation Comorbidities   Education   Individual(s) Educated Patient   Home Program AAROM;Strengthening;Modalities   Risk and Benefits Discussed with Patient/Caregiver/Other yes   Patient/Caregiver Demonstrated Understanding yes   Plan of Care Discussed and Agreed Upon yes   Patient Response to Education Patient/Caregiver Asked Appropriate Questions;Patient/Caregiver Verbalized Understanding of Information   Education Comment pt receptive to education   Inpatient/Swing Bed or Outpatient   Inpatient/Swing Bed or Outpatient Inpatient   Inpatient Plan   Treatment Interventions UE strengthening/ROM;Endurance training;Patient/family training;Neuromuscular reeducation;Compensatory technique education   OT Frequency 5 times per week   OT Discharge Recommendations High intensity level of continued care   OT Recommended Transfer Status Assist of 1   OT - OK to Discharge Yes

## 2024-11-21 NOTE — PROGRESS NOTES
Occupational Therapy       11/21/24 1103-9722   OT Last Visit   OT Received On 11/21/24   General   Reason for Referral CVA   Referred By Dr. Dubose   Past Medical History Relevant to Rehab HTN, DM, CKD III, tobacco abuse   Prior to Session Communication Bedside nurse   Patient Position Received Bed, 3 rail up;Alarm on   Preferred Learning Style verbal;visual   General Comment Pt agreeable to skilled OT intervention   Precautions   Hearing/Visual Limitations No glasses. Smooth pursuits, saccades and convergence/ divergence slowed but intact. No visual field cuts identified.   Medical Precautions Fall precautions   Precautions Comment left shoulder harness   Pain Assessment   Pain Assessment 0-10   0-10 (Numeric) Pain Score 0 - No pain   Cognition   Orientation Level Oriented X4   Coordination   Movements are Fluid and Coordinated No   Upper Body Coordination LUE flaacid   Lower Body Coordination slowed rate of movement left LE   Alternating Toe Taps Bradykinesia   Coordination Comment LUE flaacid   RUE    RUE  WFL   LUE    LUE X  (LUE Flaccid)   Feeding   Feeding Level of Assistance Setup   Feeding Where Assessed Wheelchair   Feeding Comments small bites thin liquids assist cut food, Pt self feed breakfast meal   Grooming   Grooming Level of Assistance Minimum assistance   Grooming Where Assessed Sitting sinkside   Grooming Comments Pt comb hair remove full dentures assist brush   UE Bathing   UE Bathing Adaptive Equipment Removeable shower head   UE Bathing Level of Assistance Contact guard   UE Bathing Where Assessed Shower   UE Bathing Comments all task seated cues robb technique and balance   LE Bathing   LE Bathing Adaptive Equipment Long-handled sponge;Removeable shower head   LE Bathing Level of Assistance Moderate assistance   LE Bathing Where Assessed Shower   LE Bathing Comments LH sponge instructed B feet assist dry L foot and balance in stance Pt wash buttocks 3 point balsance assist balance vc lock L  knee   UE Dressing   UE Dressing Level of Assistance Minimum assistance   UE Dressing Where Assessed Wheelchair   UE Dressing Comments assist  pull down cues robb dressing effortful/increased time   LE Dressing   LE Dressing Yes   LE Dressing Adaptive Equipment Reacher;Sock aide   Pants Level of Assistance Moderate assistance  (Pt thread LH reacher pant partial over L foot full over R foot assist hip in stance trial 3 point balance)   Sock Level of Assistance Moderate assistance  (LH reacher/sock aid Pt doff/don after set up to device)   Shoe Level of Assistance Maximum assistance  (Ptpartial don RLE assist remainder)   Orthotics Level of Assistance Dependent  (don L AFO)   Adult Briefs Level of Assistance Dependent   LE Dressing Where Assessed Wheelchair   LE Dressing Comments instructed robb techniques and AE use balance strategies 3 point balance   Toileting   Toileting Level of Assistance Maximum assistance   Where Assessed Bedside commode  (atop toiloet)   Toileting Comments Pt doff brief prior assist partial hygiene and adjustment over hips   Functional Standing Tolerance   Time 1 minute   Activity ADL skill/transfer   Functional Standing Tolerance Comments RUE ot grab bar   Bed Mobility   Bed Mobility Yes   Bed Mobility 1   Bed Mobility 1 Supine to sitting   Level of Assistance 1 Minimum assistance   Bed Mobility Comments 1 bed to neutral effortful Pt adv ance BLE vc technique and body mechanics use side transfer bar partial  assist trunk up   Bed Mobility 2   Bed Mobility  2 Scooting   Level of Assistance 2 Moderate assistance   Bed Mobility Comments 2 seated to edge of bed draw sheet to advance   Transfers   Transfer Yes   Transfer 1   Transfer From 1 Bed to   Transfer to 1 Wheelchair   Technique 1 Sit pivot;Sit to stand;Stand to sit;To right   Transfer Device 1 Gait belt   Transfer Level of Assistance 1 Minimum assistance   Trials/Comments 1 vc  R hand  placement and LLE positioning sliing in place to  protect  LUE   Dynamic Sitting Balance   Dynamic Sitting-Level of Assistance Contact guard   Dynamic Sitting-Balance Forward lean;Reaching for objects;Trunk control activities   Dynamic Sitting-Comments vc visual locate midline  during ADL skills   Toilet Transfers   Toilet Transfer From Wheelchair   Toilet Transfer Type To and from   Toilet Transfer to Standard bedside commode  (atop toilet)   Toilet Transfer Technique To right;To left;Stand pivot   Toilet Transfers Maximal assistance  (Mod A in/out)   Toilet Transfers Comments vc hand placement , body mechanics to weight shift to advance LLE   Shower Transfers   Shower Transfer From Wheelchair   Shower Transfer Type To and from   Shower Transfer to Shower seat with back   Shower Transfer Technique To right;To left   Shower Transfers Maximal assistance  (Mod A in/out)   Shower Transfers Comments vc grab bar use to off load to advance LLE   IP OT Assessment   OT Assessment Pt highly motivated progressing with established POC.  Will continue to address remaining deficits with skilled OT intervention   Prognosis Good   Barriers to Discharge Decreased caregiver support   Evaluation/Treatment Tolerance Patient tolerated treatment well   Medical Staff Made Aware Yes   End of Session Communication Bedside nurse   End of Session Patient Position Up in chair;Alarm on  (call light in reach Pt demonstrate use eating breakfast meal all needs met.)   OT Assessment   OT Assessment Results Decreased ADL status;Decreased endurance;Decreased functional mobility;Decreased gross motor control;Non-functional left upper extremity   Strengths Ability to acquire knowledge;Attitude of self;Coping skills   Barriers to Participation Comorbidities   Inpatient/Swing Bed or Outpatient   Inpatient/Swing Bed or Outpatient Inpatient   Inpatient Plan   Treatment Interventions ADL retraining;Functional transfer training;Endurance training;Patient/family training;Equipment  evaluation/education;Compensatory technique education   OT Frequency 5 times per week   Equipment Recommended upon Discharge   (TBD)   OT Recommended Transfer Status Assist of 1   OT - OK to Discharge Yes

## 2024-11-21 NOTE — PROGRESS NOTES
"Physical Therapy       11/21/24 6550-0277   PT  Visit   PT Received On 11/21/24   Response to Previous Treatment Patient with no complaints from previous session.   General   Reason for Referral CVA   Referred By Dr. Dubose   Past Medical History Relevant to Rehab HTN, DM, CKD III, tobacco abuse   Patient Position Received Up in chair;Alarm on   Preferred Learning Style verbal;visual   General Comment Agreeable to tx   Precautions   Medical Precautions Fall precautions   Precautions Comment left shoulder harness   Pain Assessment   Pain Assessment 0-10   0-10 (Numeric) Pain Score 0 - No pain   Therapeutic Activity   Therapeutic Activity 1 omnicycle x 5 minutes, level 1 resistance,   Balance/Neuromuscular Re-Education   Balance/Neuromuscular Re-Education Activity 1 side stepping, // bars, UE support.  ModA required with vc's for weight shifting to R and extension of L knee during loading.   Balance/Neuromuscular Re-Education Activity 2 cone reaching, LLE on 6\" step.  Emphasis on leaning to R.  Min/modA with vc's for technique   Balance/Neuromuscular Re-Education Activity 3 // bars with UE support:  forward stepping with emphasis on R hip touching R bar when stepping with L foot and on L foot placement to maintain WBOS.  Effortful for pt.  Vc's for technique.  x4 lengths of // bars.   Ambulation/Gait Training 1   Surface 1 Level tile   Device 1   (robb cane)   Gait Support Devices Gait belt;Wheelchair follow;L AFO   Assistance 1 Minimum assistance;Moderate assistance   Quality of Gait 1 Forward flexed posture;Soft knee(s);Inconsistent stride length;Decreased step length;Listing;NBOS   Comments/Distance (ft) 1 60 ft x 2, turns included.  Pt required consistent vc''s for weight shift to R and for posture.  Cecily initially unitl pt fatigue, then min/modA.  x1 episode of LOB to L with modA to recover.   Transfer 1   Technique 1 Sit to stand;Stand to sit   Transfer Device 1 Gait belt   Transfer Level of Assistance 1 Minimum " assistance   Trials/Comments 1 vc's for technique   Activity Tolerance   Endurance Tolerates 30 min exercise with multiple rests   PT Assessment   PT Assessment Results Decreased strength;Decreased range of motion;Decreased endurance;Impaired balance;Decreased mobility;Decreased coordination;Impaired tone   Rehab Prognosis Good   Assessment Comment Pt continued with use of robb cane during gait training with good results.  Vc's consistently needed for leaning to R throughout all mobility tasks.   End of Session Patient Position Up in chair;Alarm on   PT Plan   Inpatient/Swing Bed or Outpatient Inpatient   PT Plan   Treatment/Interventions Bed mobility;Transfer training;Gait training;Stair training;Neuromuscular re-education;Therapeutic exercise;Therapeutic activity   PT Recommended Transfer Status Assist x1

## 2024-11-22 LAB
BACTERIA UR CULT: ABNORMAL
GLUCOSE BLD MANUAL STRIP-MCNC: 76 MG/DL (ref 74–99)
GLUCOSE BLD MANUAL STRIP-MCNC: 92 MG/DL (ref 74–99)

## 2024-11-22 PROCEDURE — 97116 GAIT TRAINING THERAPY: CPT | Mod: GP,CQ

## 2024-11-22 PROCEDURE — 82947 ASSAY GLUCOSE BLOOD QUANT: CPT

## 2024-11-22 PROCEDURE — 2500000004 HC RX 250 GENERAL PHARMACY W/ HCPCS (ALT 636 FOR OP/ED): Performed by: INTERNAL MEDICINE

## 2024-11-22 PROCEDURE — 97535 SELF CARE MNGMENT TRAINING: CPT | Mod: GO,CO

## 2024-11-22 PROCEDURE — 92507 TX SP LANG VOICE COMM INDIV: CPT | Mod: GN

## 2024-11-22 PROCEDURE — 1180000001 HC REHAB PRIVATE ROOM DAILY

## 2024-11-22 PROCEDURE — 2500000002 HC RX 250 W HCPCS SELF ADMINISTERED DRUGS (ALT 637 FOR MEDICARE OP, ALT 636 FOR OP/ED): Performed by: INTERNAL MEDICINE

## 2024-11-22 PROCEDURE — 97530 THERAPEUTIC ACTIVITIES: CPT | Mod: GP,CQ

## 2024-11-22 PROCEDURE — 99232 SBSQ HOSP IP/OBS MODERATE 35: CPT | Performed by: INTERNAL MEDICINE

## 2024-11-22 PROCEDURE — 92526 ORAL FUNCTION THERAPY: CPT | Mod: GN

## 2024-11-22 PROCEDURE — 97530 THERAPEUTIC ACTIVITIES: CPT | Mod: GO,CO

## 2024-11-22 PROCEDURE — 97110 THERAPEUTIC EXERCISES: CPT | Mod: GO,CO

## 2024-11-22 PROCEDURE — 97110 THERAPEUTIC EXERCISES: CPT | Mod: GP,CQ

## 2024-11-22 PROCEDURE — 2500000001 HC RX 250 WO HCPCS SELF ADMINISTERED DRUGS (ALT 637 FOR MEDICARE OP): Performed by: INTERNAL MEDICINE

## 2024-11-22 ASSESSMENT — PAIN SCALES - GENERAL
PAINLEVEL_OUTOF10: 0 - NO PAIN

## 2024-11-22 ASSESSMENT — ACTIVITIES OF DAILY LIVING (ADL)
BATHING_WHERE_ASSESSED: SHOWER
BATHING_EQUIPMENT_NEEDED: LONG-HANDLED SPONGE;REMOVEABLE SHOWER HEAD
HOME_MANAGEMENT_TIME_ENTRY: 30
BATHING_LEVEL_OF_ASSISTANCE: MINIMUM ASSISTANCE

## 2024-11-22 ASSESSMENT — PAIN - FUNCTIONAL ASSESSMENT
PAIN_FUNCTIONAL_ASSESSMENT: 0-10

## 2024-11-22 NOTE — PROGRESS NOTES
Speech-Language Pathology    SLP Adult IRF CCR Dysphagia and Speech-Language Pathology Treatments     Patient Name: Cynthia Muse  MRN: [id number]  Today's Date: 11/22/2024  Time Calculation  Start Time: 1005  Stop Time: 1050  Time Calculation (min): 45 min     4/5sw +sp    Current Problem:   Ischemic stroke with left body involvement (right brain)     SLP Assessment:  SLP TX Intervention Outcome: Making Progress Towards Goals  SLP Assessment Results: Cognitive Deficits, Motor Speech Deficits, Other (Comment) (dysphagia)  Prognosis: Good  Treatment Tolerance: Patient tolerated treatment well  Medical Staff Made Aware: Yes  Strengths: Motivation  Education Provided: Yes     Plan:  Inpatient/Swing Bed or Outpatient: Inpatient  Treatment/Interventions: Articulation, Cognitive communication functioning, Communication functioning, Other (Comment) (dysphagia)  SLP TX Plan: Continue Plan of Care  SLP Plan: Skilled SLP  SLP Frequency: 5x per week  Duration: 3 weeks  SLP Discharge Recommendations: Other (Comment) (TBD pending progress)  Next Treatment Priority: monitor solids upgrade, OMEs, dysarthria  Discussed POC: Patient, Nursing  Discussed Risks/Benefits: Yes, Patient, Nursing  Patient/Caregiver Agreeable: Yes      Subjective   Pt seen upright in wheelchair.      General Visit Information:   Reason for Referral: Follow-up dysphagia and speech-language treatments  Referred By: Dr. Dubose  Past Medical History Relevant to Rehab: HTN, DM, CKD III, tobacco abuse  Prior to Session Communication: Bedside nurse      Pain Assessment:  Pain Assessment  0-10 (Numeric) Pain Score: 0 - No pain      Objective   Dysphagia goals (Established 11/19/24, projected discontinuation 12/10/24):  STG 1: Pt will tolerate current diet recommendation without overt s/s of aspiration with 90% accuracy during observed PO trials.   PROGRESS: No reported s/s aspiration. No s/s aspiration observed with cola via cup.               STATUS: continue, monitor  upgrade to regular solids with thin liquids. See below.  STG 2: Pt will participate in PO trials with upgraded diet textures without overt s/s of aspiration with 80% accuracy.  PROGRESS: Pt requesting upgrade to more regular textures. She feels that she no longer needs softer foods. Pt was seen with 1 neha cracker. Adequate mastication and bolus formation. A-P transfer appeared adequate. No oral residues were noted. Pt was able to check L buccal cavity with lateral finger sweep or manual finger sweep.              STATUS: Advance diet texture to regular with thin liquids.  STG 3: Pt to participate in oral ROM and strengthening exercises to 85% accuracy with cues as needed to improve ability to clear oral residues with least restrictive diet.  PROGRESS:  Provided direct model, mirror for feedback, and cues for pacing, full ROM, jaw stability. Able to complete x10 repetitions of each of the following - labial protrusion/retraction/alternating protrusion and retraction/press/vowels/attempted cheek puff and suction, lingual protrusion/retraction/lateralization/pops.              STATUS: continue, progressing with cues  STG 4: Pt to demonstrate functional use of compensatory swallowing strategies to 90% to reduce risk of aspiration and s/s dysphagia.  PROGRESS: Cues to utilize slow rate, and small sips, and tighten lips on cup to prevent anterior oral spillage. 11/22: same as previous session.              STATUS: continue, progressing with cues  LTG: Pt will tolerate least restrictive diet without overt s/s of aspiration in order to meet nutrition and hydration needs.    Therapeutic Swallow:  Therapeutic Swallow Intervention : Compensatory Strategies, PO Trials  Solid Diet Recommendations: Regular (IDDSI Level 7)  Liquid Diet Recommendations: Thin (IDDSI Level 0)    Speech and Language Goals: (established 11/19/24, projected discontinuation 12/10/24)  Pt will complete oral-motor exercises with 80% acc given model and  cues as needed in order to improve motor speech/articulation skills.              PROGRESS: see above              STATUS: continue, progressing with cues  Pt will  utilize speech intelligibility strategies with 75% acc to improve communication skills.  PROGRESS: Pt recalled 2/3 strategies previously discussed. Focused on slow rate and overarticulation. 11/22: same as previous tx session.  STATUS: continue  3.    Pt will  complete articulatory precision tasks with 75% accuracy to improve intelligibility.   PROGRESS: 3-unit DDKs, 80% accuracy with cues to differentiate /t/ and /k/ phonemes.              STATUS: continue, progressing with tasks of increased complexity.    LTG: Pt will increase motor speech function to the highest possible speech level for improved functional communication within daily living tasks.       4. Pt will increase STM/recall to 80% accuracy with cues to improve recall of functional daily information.  PROGRESS: 100% for remembering pictures in order via CT cristel.  STATUS: continue  5. Pt will improve visual scanning to 75% accuracy with cues to increase visual awareness of environment.  PROGRESS: 100% for finding the same symbol.  STATUS: continue   6. Ongoing assessment if indicated for further goal development or to determine progress, as needed.  PROGRESS: Not clinically indicated at this time  STATUS: continue as needed.   LTG 1: Pt will optimize cognitive-linguistic skills necessary for return to least restrictive living environment and to reduce caregiver dependence.     Motor Speech:   Motor Speech Intervention : Compensatory Speech Strategies, Oral/Facial Agility Techniques, Oral Lingual Strengthening Techniques    Inpatient Education:  Adult Inpatient Education  Individual(s) Educated: Patient  Verbal Education : regular/thin textures  Risk and Benefits Discussed with Patient/Caregiver/Other: yes  Patient/Caregiver Demonstrated Understanding: yes  Plan of Care Discussed and Agreed  Upon: yes  Patient Response to Education: Patient/Caregiver Verbalized Understanding of Information    Consultations/Referrals/Coordination of Services: Discussed upgrade to regular textures with RN. Notified Dr. Dubose via Secure Chat.

## 2024-11-22 NOTE — PROGRESS NOTES
Occupational Therapy       11/22/24 [id number]   OT Last Visit   OT Received On 11/22/24   General   Reason for Referral CVA   Referred By Dr. Dubose   Past Medical History Relevant to Rehab HTN, DM, CKD III, tobacco abuse   Prior to Session Communication Bedside nurse   Patient Position Received Bed, 3 rail up;Alarm off, not on at start of session   Preferred Learning Style verbal;visual   General Comment Pt agreeable to skilled OT intervention supine upon entrance to room   Precautions   Hearing/Visual Limitations No glasses. Smooth pursuits, saccades and convergence/ divergence slowed but intact. No visual field cuts identified.   Medical Precautions Fall precautions   Precautions Comment left shoulder harness   Pain Assessment   Pain Assessment 0-10   0-10 (Numeric) Pain Score 0 - No pain   Cognition   Overall Cognitive Status Impaired   Orientation Level Oriented X4   Coordination   Movements are Fluid and Coordinated No   Upper Body Coordination LUE flaacid   Lower Body Coordination slowed rate of movement left LE   Coordination Comment LUE flaacid   RUE    RUE  WFL   LUE    LUE X   Grooming   Grooming Level of Assistance Close supervision   Grooming Where Assessed Sitting sinkside   Grooming Comments Pt combing hair remove clean replace full dentures adaptive technique educated  with option suctgion denture brush acquisition provided   UE Bathing   UE Bathing Adaptive Equipment Removeable shower head   UE Bathing Level of Assistance Close supervision   UE Bathing Where Assessed Shower   UE Bathing Comments all task seated cues robb technique and balance , assist LUE shoulder harness   LE Bathing   LE Bathing Adaptive Equipment Long-handled sponge;Removeable shower head   LE Bathing Level of Assistance Minimum assistance   LE Bathing Where Assessed Shower   LE Bathing Comments LH sponge instructed B feet assist dry L foot and balance in stance Pt wash buttocks 3 point balance vc lock L knee   UE Dressing   UE  Dressing Level of Assistance Contact guard   UE Dressing Where Assessed Other (Comment)  (shower chair)   UE Dressing Comments don/doff pull over garment  vc for wes dressing techniques provided   LE Dressing   LE Dressing Yes   LE Dressing Adaptive Equipment Reacher;Sock aide   Pants Level of Assistance Moderate assistance  (joan/doff pull over sweatshirt; vcs for wes dressing techniques provided)   Sock Level of Assistance Close supervision  (Pt doff/don LH reacher sock aid after set up to device)   Shoe Level of Assistance Maximum assistance  (Pt partial don RLE assist remainder)   Orthotics Level of Assistance Dependent  (L AFO)   Adult Briefs Level of Assistance Dependent   LE Dressing Where Assessed Wheelchair;Other (Comment)  (shower chair)   LE Dressing Comments instructed wes techniques and AE use balance strategies 3 point balance   Toileting   Toileting Level of Assistance Moderate assistance   Where Assessed Bedside commode  (atop toilet)   Toileting Comments Pt adjust clothing prior/hygiene assist after   Bed Mobility   Bed Mobility Yes   Bed Mobility 1   Bed Mobility 1 Supine to sitting   Level of Assistance 1 Minimum assistance   Bed Mobility Comments 1 bed to neutral effortful Pt advance BLE vc technique and body mechanics use side transfer bar partial assist trunk up   Bed Mobility 2   Bed Mobility  2 Scooting   Level of Assistance 2 Moderate assistance   Bed Mobility Comments 2 seated to edge of bed draw sheet to advance   Transfers   Transfer Yes   Transfer 1   Transfer From 1 Bed to   Transfer to 1 Wheelchair   Technique 1 Sit to stand;Stand to sit   Transfer Device 1 Gait belt;Wes-walker   Transfer Level of Assistance 1 Moderate assistance   Trials/Comments 1 vc technique and body mechanics   Dynamic Sitting Balance   Dynamic Sitting-Balance Support Feet supported   Dynamic Sitting-Level of Assistance Contact guard   Dynamic Sitting-Balance Forward lean;Reaching for objects;Trunk control  activities   Dynamic Sitting-Comments vc visual locate midline during ADL skills   Toilet Transfers   Toilet Transfer From Wheelchair   Toilet Transfer Type To and from   Toilet Transfer to Standard bedside commode  (atop toilet)   Toilet Transfer Technique To right;To left;Stand pivot   Toilet Transfers Moderate assistance  (min A iin/out)   Toilet Transfers Comments vc hand placement , body mechanics to weight shift to advance LLE   Shower Transfers   Shower Transfer From Wheelchair   Shower Transfer Type To and from   Shower Transfer to Shower seat with back   Shower Transfer Technique To right;To left   Shower Transfers Moderate assistance  (min A in/out)   Shower Transfers Comments vc grab bar use and to off load   IP OT Assessment   OT Assessment Pt highly motivated progressing with established POC. Will continue to address remaining deficits with skilled OT intervention   Prognosis Good   Barriers to Discharge Decreased caregiver support   Evaluation/Treatment Tolerance Patient tolerated treatment well   Medical Staff Made Aware Yes   End of Session Communication Bedside nurse   End of Session Patient Position Up in chair;Alarm on  (call light in reach  Pt demonstrate use all needs met.  Pt eating breakfast meal)   OT Assessment   OT Assessment Results Decreased upper extremity range of motion;Decreased upper extremity strength;Decreased endurance;Decreased sensation;Decreased gross motor control   Strengths Ability to acquire knowledge;Attitude of self;Coping skills   Barriers to Participation Comorbidities   Inpatient/Swing Bed or Outpatient   Inpatient/Swing Bed or Outpatient Inpatient   Inpatient Plan   Treatment Interventions ADL retraining;Functional transfer training;Endurance training;Patient/family training;Equipment evaluation/education;Compensatory technique education   OT Frequency 5 times per week   Equipment Recommended upon Discharge Other (comment)  (TBD)   OT Recommended Transfer Status  Assist of 1   OT - OK to Discharge Yes

## 2024-11-22 NOTE — PROGRESS NOTES
IDT held this date to review patient progress and discharge plan. Patient continues to work toward goal of return home. ADOD is 12.10.24. LSW will follow for equipment recommendations and post discharge care and therapy needs and collaborate with patient on discharge plan.

## 2024-11-22 NOTE — NURSING NOTE
Assumed care of pt @ 0730, report received, pt here s/p CVA with left sided involvement, pt has severe weakness to LUE, pt is able to shrug her shoulder and some movement in her upper arm but unable to squeeze her left hand, pt speech is garbled but comprehensible, pt axox4, pleasant, denies any chest pain or SOB, denies any pain, pt LLE stronger and has more movement in left leg, pt has scattered bruising to abdomen and right arm from lovenox shots, pt has shearing noted to coccyx-barrier cream applied, call light within reach, continuing to monitor.

## 2024-11-22 NOTE — PROGRESS NOTES
Occupational Therapy       11/22/24 [id number]   OT Last Visit   OT Received On 11/22/24   General   Reason for Referral CVA   Referred By Dr. Dubose   Past Medical History Relevant to Rehab HTN, DM, CKD III, tobacco abuse   Patient Position Received Up in chair  (w/c)   General Comment Agreeable to treatment.   Precautions   Hearing/Visual Limitations No glasses. Smooth pursuits, saccades and convergence/ divergence slowed but intact. No visual field cuts identified.   Medical Precautions Fall precautions   Pain Assessment   Pain Assessment 0-10   0-10 (Numeric) Pain Score 0 - No pain   Therapeutic Exercise   Therapeutic Exercise Activity 1 scap protraction/retraction 3 sets x 10 reps, AAROM   Therapeutic Exercise Activity 2 WB to all LUE joints, 5 reps, 5 second holds   Therapeutic Exercise Activity 3 table top slides with pt using RUE to assist forward/backward, lateral, and circular. 15 reps each   Balance/Neuromuscular Re-Education   Balance/Neuromuscular Re-Education Activity Performed Yes   Balance/Neuromuscular Re-Education Activity 1 E stim to LUE for wrist extension x 8 minutes with pt achieving neutral position, assist to increase range.   IP OT Assessment   End of Session Patient Position Up in chair;Alarm on   Inpatient Plan   Treatment Interventions Neuromuscular reeducation;UE strengthening/ROM   OT Frequency 5 times per week   OT Discharge Recommendations High intensity level of continued care   OT Recommended Transfer Status Assist of 1   OT - OK to Discharge Yes

## 2024-11-22 NOTE — PROGRESS NOTES
Physical Therapy       11/22/24 [phone]   PT  Visit   PT Received On 11/22/24   Response to Previous Treatment Patient reporting fatigue but able to participate.   General   Reason for Referral CVA   Referred By Dr. Dubose   Past Medical History Relevant to Rehab HTN, DM, CKD III, tobacco abuse   Patient Position Received Up in chair   Preferred Learning Style verbal;visual   General Comment Agreeable to treatment.   Precautions   Medical Precautions Fall precautions   Precautions Comment left shoulder harness   Pain Assessment   Pain Assessment 0-10   0-10 (Numeric) Pain Score 0 - No pain   Therapeutic Activity   Therapeutic Activity 1 stacked orange cone  from 4-inch step.  Bob, mild L list.   Therapeutic Activity 2 step-ups with LLE onto 4-inch step, + 1 UE support on rail.  Bob   Ambulation/Gait Training 1   Surface 1 Level tile   Device 1   (wes cane)   Gait Support Devices Gait belt;Wheelchair follow;L AFO   Assistance 1 Minimum assistance   Quality of Gait 1 Forward flexed posture;Soft knee(s);Inconsistent stride length;Decreased step length;Listing;NBOS   Comments/Distance (ft) 1 75 ft, 85 ft, turns included.  Vc's frequently needed for upright posture/looking forward.   Transfer 1   Technique 1 Sit to stand;Stand to sit   Transfer Device 1 Gait belt;Wes-walker   Transfer Level of Assistance 1 Contact guard;Minimum assistance   Trials/Comments 1 vc's for hand placement   Wheelchair Activities   Propulsion Type 1 Manual   Level 1 Level tile   Method 1 Right upper extremity;Right lower extremity   Level of Assistance 1 Set up   Description/Details 1 ad barbara around facility   Activity Tolerance   Endurance Tolerates 30 min exercise with multiple rests   PT Assessment   PT Assessment Results Decreased strength;Decreased range of motion;Decreased endurance;Impaired balance;Decreased mobility;Decreased coordination;Impaired tone   Rehab Prognosis Good   Evaluation/Treatment Tolerance Patient tolerated  treatment well   End of Session Patient Position Up in chair;Alarm on   PT Plan   Inpatient/Swing Bed or Outpatient Inpatient   PT Plan   Treatment/Interventions Bed mobility;Transfer training;Gait training;Stair training;Neuromuscular re-education;Therapeutic exercise;Therapeutic activity   PT Plan Ongoing PT   PT Recommended Transfer Status Assist x1;Assistive device

## 2024-11-22 NOTE — PROGRESS NOTES
Physical Therapy       11/22/24 [id number]   PT  Visit   PT Received On 11/22/24   Response to Previous Treatment Patient reporting fatigue but able to participate.   General   Reason for Referral CVA   Referred By Dr. Dubose   Past Medical History Relevant to Rehab HTN, DM, CKD III, tobacco abuse   Patient Position Received Up in chair;Alarm on   Preferred Learning Style verbal;visual   General Comment Pt agreeable to tx   Precautions   Medical Precautions Fall precautions   Precautions Comment left shoulder harness   Pain Assessment   Pain Assessment 0-10   0-10 (Numeric) Pain Score 0 - No pain   Therapeutic Exercise   Therapeutic Exercise Activity 1 supine bridging, 2x15 reps.   Therapeutic Exercise Activity 2 supine SAQ, 2x15 reps, 3# RLE, 1.5# LLE   Therapeutic Exercise Activity 3 supine hip abduction, 2x15 reps, 1.5# LLE, 3# RLE   Therapeutic Exercise Activity 4 supine heel slides, 2x15 reps, 1.5# LLE, 3# RLE   Therapeutic Activity   Therapeutic Activity 1 omnicycle x 10 minutes, level 1 resistance, 98% activity, 2.2 km   Bed Mobility 1   Bed Mobility 1 Sitting to supine;Supine to sitting   Level of Assistance 1 Close supervision;Minimal verbal cues   Bed Mobility Comments 1 flat mat, no rail   Ambulation/Gait Training 1   Surface 1 Level tile   Device 1   (wes cane)   Gait Support Devices Gait belt;Wheelchair follow;L AFO   Assistance 1 Minimum assistance   Quality of Gait 1 Forward flexed posture;Soft knee(s);Inconsistent stride length;Decreased step length;Listing;NBOS   Comments/Distance (ft) 1 70 ft x 2, turns included.  Vc's and tactile cues for R weight shift as well as L foot placement more wide and for upright posture.  Good carryover .   Transfer 1   Technique 1 Sit to stand;Stand to sit   Transfer Device 1 Gait belt;Wes-walker   Transfer Level of Assistance 1 Minimum assistance   Trials/Comments 1 vc's for hand placement   Transfers 2   Transfer From 2 Mat to;Wheelchair to   Transfer to 2  Wheelchair;Mat   Technique 2 Stand pivot;To right   Transfer Device 2 Gait belt   Transfer Level of Assistance 2 Minimum assistance   Trials/Comments 2 no AD, vc's for foot placement   Stairs   Rails 1 Right   Device 1 Railing   Support Devices 1 Gait belt;Left Ankle-foot orthosis   Assistance 1 Minimum assistance;Moderate assistance   Comment/Number of Steps 1 up/down 6 4-inch steps, step-to pattern.  Vc's for foot placement for WBOS and for weight shifting toward R.   Wheelchair Activities   Propulsion Type 1 Manual   Level 1 Level tile   Method 1 Right upper extremity;Right lower extremity   Level of Assistance 1 Set up   Description/Details 1 ad barbara around facility   Activity Tolerance   Endurance Tolerates 30 min exercise with multiple rests   PT Assessment   PT Assessment Results Decreased strength;Decreased range of motion;Decreased endurance;Impaired balance;Decreased mobility;Decreased coordination;Impaired tone   Rehab Prognosis Good   Assessment Comment Overall amb distance increased this AM with less assist needed.    Bed mobility improving to close supervision with extra time needed to complete.  Pt still exhibiting L lean with cuing needed frequently to correct.  Stair negotiation began this AM with good results.   End of Session Patient Position Up in chair;Alarm on   PT Plan   Inpatient/Swing Bed or Outpatient Inpatient   PT Plan   Treatment/Interventions Bed mobility;Transfer training;Gait training;Stair training;Neuromuscular re-education;Therapeutic exercise;Therapeutic activity   PT Plan Ongoing PT   PT Recommended Transfer Status Assist x1

## 2024-11-22 NOTE — PROGRESS NOTES
"[facility]  Physician Progress Note    Subjective   Cynthia Muse is a 79 y.o. female admitted to inpatient rehabilitation unit.    The patient continues to participate well with all therapy disciplines involved.  No new issues are identified by the nursing staff.  The patient denies chest pain and dyspnea.      Objective   /74 (BP Location: Right arm, Patient Position: Lying)   Pulse 77   Temp 37.1 °C (98.8 °F) (Tympanic)   Resp 16   Ht 1.6 m (5' 2.99\")   Wt 72 kg (158 lb 11.7 oz)   SpO2 97%   BMI 28.13 kg/m²      Physical Exam  Constitutional:       General: She is not in acute distress.     Appearance: She is not toxic-appearing.   HENT:      Head: Normocephalic and atraumatic.      Mouth/Throat:      Mouth: Mucous membranes are moist.   Eyes:      Pupils: Pupils are equal, round, and reactive to light.   Cardiovascular:      Rate and Rhythm: Normal rate and regular rhythm.      Heart sounds: No murmur heard.  Pulmonary:      Breath sounds: Normal breath sounds. No wheezing, rhonchi or rales.   Abdominal:      General: There is no distension.      Palpations: Abdomen is soft.   Musculoskeletal:      Right lower leg: No edema.      Left lower leg: No edema.   Neurological:      Mental Status: She is alert and oriented to person, place, and time.      Left hemiparesis.  Getting some movement at the elbow in terms of extension.    Labs  BMP:  Results from last 7 days   Lab Units 11/19/24  0609   CREATININE mg/dL 1.77*   BUN mg/dL 41*   SODIUM mmol/L 140   POTASSIUM mmol/L 3.9   CHLORIDE mmol/L 106   CO2 mmol/L 23     CBC:  Results from last 7 days   Lab Units 11/19/24  0609   WBC AUTO x10*3/uL 13.7*   HEMOGLOBIN g/dL 12.2   HEMATOCRIT % 37.7   MCV fL 91   PLATELETS AUTO x10*3/uL 352     Coagulation:           Assesment and Plan    Ischemic Stroke (Multi)   Left Hemiparesis (Multi)   Essential Hypertension   Hyperlipidemia   Chronic Kidney Disease (Ckd) " Stage G3a/A2, Moderately Decreased Glomerular Filtration Rate (Gfr) Between 45-59 Ml/Min/1.73 Square Meter and Albuminuria Creatinine Ratio Between  Mg/G (C* (Multi)      Ischemic stroke - Continue with medical management for secondary prevention: DAPT x 21 days, statin  DVT prophylaxis has been ordered: LMWH       BINH/CKD - fairly stable; monitor    DM2 - cont URENA, monitor glucose     HTN - amlodipine, coreg     Tobacco use disorder - nicotine patch ordered     HPL - stat     Left hemiparesis     Dysphagia - Cont ST efforts.    Yunior Dubose MD

## 2024-11-23 LAB
GLUCOSE BLD MANUAL STRIP-MCNC: 105 MG/DL (ref 74–99)
GLUCOSE BLD MANUAL STRIP-MCNC: 78 MG/DL (ref 74–99)

## 2024-11-23 PROCEDURE — 97530 THERAPEUTIC ACTIVITIES: CPT | Mod: GO

## 2024-11-23 PROCEDURE — 1180000001 HC REHAB PRIVATE ROOM DAILY

## 2024-11-23 PROCEDURE — 97535 SELF CARE MNGMENT TRAINING: CPT | Mod: GO

## 2024-11-23 PROCEDURE — 2500000001 HC RX 250 WO HCPCS SELF ADMINISTERED DRUGS (ALT 637 FOR MEDICARE OP): Performed by: INTERNAL MEDICINE

## 2024-11-23 PROCEDURE — 97110 THERAPEUTIC EXERCISES: CPT | Mod: GP,CQ

## 2024-11-23 PROCEDURE — 97112 NEUROMUSCULAR REEDUCATION: CPT | Mod: GO

## 2024-11-23 PROCEDURE — 97110 THERAPEUTIC EXERCISES: CPT | Mod: GO

## 2024-11-23 PROCEDURE — 97530 THERAPEUTIC ACTIVITIES: CPT | Mod: GP,CQ

## 2024-11-23 PROCEDURE — 2500000002 HC RX 250 W HCPCS SELF ADMINISTERED DRUGS (ALT 637 FOR MEDICARE OP, ALT 636 FOR OP/ED): Performed by: INTERNAL MEDICINE

## 2024-11-23 PROCEDURE — 82947 ASSAY GLUCOSE BLOOD QUANT: CPT

## 2024-11-23 PROCEDURE — 2500000004 HC RX 250 GENERAL PHARMACY W/ HCPCS (ALT 636 FOR OP/ED): Performed by: INTERNAL MEDICINE

## 2024-11-23 PROCEDURE — 97116 GAIT TRAINING THERAPY: CPT | Mod: GP,CQ

## 2024-11-23 ASSESSMENT — PAIN - FUNCTIONAL ASSESSMENT
PAIN_FUNCTIONAL_ASSESSMENT: 0-10

## 2024-11-23 ASSESSMENT — PAIN SCALES - WONG BAKER
WONGBAKER_NUMERICALRESPONSE: NO HURT
WONGBAKER_NUMERICALRESPONSE: NO HURT

## 2024-11-23 ASSESSMENT — PAIN SCALES - GENERAL
PAINLEVEL_OUTOF10: 0 - NO PAIN

## 2024-11-23 ASSESSMENT — ACTIVITIES OF DAILY LIVING (ADL)
BATHING_EQUIPMENT_NEEDED: LONG-HANDLED SPONGE;REMOVEABLE SHOWER HEAD
BATHING_LEVEL_OF_ASSISTANCE: MINIMUM ASSISTANCE
BATHING_WHERE_ASSESSED: SHOWER
HOME_MANAGEMENT_TIME_ENTRY: 30

## 2024-11-23 NOTE — PROGRESS NOTES
Occupational Therapy       11/23/24 4908-7778   OT Last Visit   OT Received On 11/23/24   General   Reason for Referral CVA   Referred By Dr. Dubose   Past Medical History Relevant to Rehab HTN, DM, CKD III, tobacco abuse   Missed Visit No   Family/Caregiver Present No   Prior to Session Communication Bedside nurse   Patient Position Received Up in chair   Preferred Learning Style verbal;visual   General Comment Agreeable to treatment.   Precautions   Hearing/Visual Limitations No glasses. Smooth pursuits, saccades and convergence/ divergence slowed but intact. No visual field cuts identified.   Medical Precautions Fall precautions   Precautions Comment left shoulder harness   Pain Assessment   Pain Assessment 0-10   0-10 (Numeric) Pain Score 0 - No pain   S[place] FACES Pain Rating 0   Cognition   Overall Cognitive Status Impaired   Orientation Level Oriented X4   Cognition Comments able to follow commands   Attention WFL   Coordination   Movements are Fluid and Coordinated No   Upper Body Coordination LUE improving   Lower Body Coordination slowed rate of movement left LE   RUE    RUE  WFL   Therapeutic Exercise   Therapeutic Exercise Performed Yes   Therapeutic Exercise Activity 1 LUE shoulder protraction/retraction with active assist   Therapeutic Exercise Activity 2 LUE shoulder elevation/depression   Therapeutic Exercise Activity 3 LUE elbow AAROM flexion/extension   Therapeutic Activity   Therapeutic Activity Performed Yes   Therapeutic Activity 1 BITS cognitive activity with 75% accuracy   Therapeutic Activity 2 BITs peripheral vision activity   Therapeutic Activity 3 Pinch pins with RUE cross midline towards the L side of body to promote visual scanning to the affected side   IP OT Assessment   Evaluation/Treatment Tolerance Patient tolerated treatment well   Medical Staff Made Aware Yes   End of Session Communication Bedside nurse   End of Session Patient Position Up in chair;Alarm on   OT Assessment    OT Assessment Results Decreased upper extremity strength;Decreased cognition;Decreased endurance;Decreased sensation;Decreased gross motor control;Decreased fine motor control   Strengths Ability to acquire knowledge   Barriers to Participation Comorbidities   Education   Individual(s) Educated Patient   Education Provided Fall precautons   Home Program AAROM;Strengthening;Modalities   Risk and Benefits Discussed with Patient/Caregiver/Other yes   Patient/Caregiver Demonstrated Understanding yes   Plan of Care Discussed and Agreed Upon yes   Patient Response to Education Patient/Caregiver Asked Appropriate Questions;Patient/Caregiver Verbalized Understanding of Information   Education Comment pt receptive to education   Inpatient/Swing Bed or Outpatient   Inpatient/Swing Bed or Outpatient Inpatient   Inpatient Plan   Treatment Interventions UE strengthening/ROM;Endurance training;Patient/family training;Neuromuscular reeducation;Fine motor coordination activities;Compensatory technique education   OT Frequency 5 times per week   OT Discharge Recommendations High intensity level of continued care   OT Recommended Transfer Status Assist of 1;Moderate assist   OT - OK to Discharge Yes

## 2024-11-23 NOTE — PROGRESS NOTES
11/23/24 [id number]   OT Last Visit   OT Received On 11/23/24   General   Reason for Referral CVA   Referred By Dr. Dubose   Past Medical History Relevant to Rehab HTN, DM, CKD III, tobacco abuse   Missed Visit No   Family/Caregiver Present No   Prior to Session Communication Bedside nurse   Patient Position Received Up in chair   Preferred Learning Style verbal;visual   General Comment Agreeable to treatment.   Precautions   Hearing/Visual Limitations No glasses. Smooth pursuits, saccades and convergence/ divergence slowed but intact. No visual field cuts identified.   Medical Precautions Fall precautions   Precautions Comment left shoulder harness   Pain Assessment   Pain Assessment 0-10   0-10 (Numeric) Pain Score 0 - No pain   S[place] FACES Pain Rating 0   Cognition   Orientation Level Oriented X4   Cognition Comments able to follow commands   Attention WFL   Coordination   Movements are Fluid and Coordinated No   Upper Body Coordination LUE improving  (pt presents with shoulder elevation, shoulder protraction/retraction (gravity eliminated), elbow flexion/extension (gravity eliminated))   Lower Body Coordination slowed rate of movement left LE   RUE    RUE  WFL   LUE    LUE X   LUE Strength   LUE Overall Strength Deficits   Grooming   Grooming Level of Assistance Close supervision   Grooming Where Assessed Sitting sinkside   Grooming Comments comb hair   UE Bathing   UE Bathing Adaptive Equipment Removeable shower head   UE Bathing Level of Assistance Close supervision   UE Bathing Where Assessed Shower   UE Bathing Comments seated on shower chair throughout   LE Bathing   LE Bathing Adaptive Equipment Long-handled sponge;Removeable shower head   LE Bathing Level of Assistance Minimum assistance   LE Bathing Where Assessed Shower   LE Bathing Comments vcs for safe technique   UE Dressing   UE Dressing Level of Assistance Minimum assistance   UE Dressing Where Assessed Wheelchair   UE Dressing Comments  joan/doff tight sweatshirt; pt ed on robb dressing techniques   LE Dressing   LE Dressing Yes   LE Dressing Adaptive Equipment Reacher;Sock aide   Pants Level of Assistance Moderate assistance   Sock Level of Assistance Close supervision  (after set up)   Adult Briefs Level of Assistance Dependent   LE Dressing Where Assessed Wheelchair   LE Dressing Comments Pt ed on robb dressing techniques and 3 point stance   Functional Standing Tolerance   Time 1 minute   Activity self care   Functional Standing Tolerance Comments RUE support of grab bar   Modalities   Modalities Used Yes   Modality 1 Timed BEBETO - Electric Stimulation Attended  (NMES (27 MHz) to wrist extensors to elicit motor response for ADLs. Pt demos good tolerance to treatment.)   Shower Transfers   Shower Transfer From Wheelchair   Shower Transfer Type To and from   Shower Transfer to Shower seat with back   Shower Transfer Technique To right;To left   Shower Transfers Moderate assistance   Shower Transfers Comments RUE support of grab bar   IP OT Assessment   OT Assessment Pt progressing with ADL transfers and no longer requires the Xena Stedy.  Pts BUE shoulder/elbow ROM improving (anti gravity).  Continue OT per POC to promote greater independence and safety with ADLs.   Prognosis Good   Barriers to Discharge Decreased caregiver support   Evaluation/Treatment Tolerance Patient tolerated treatment well   Medical Staff Made Aware Yes   End of Session Communication Bedside nurse   End of Session Patient Position Up in chair;Alarm on   OT Assessment   OT Assessment Results Decreased ADL status;Decreased upper extremity range of motion;Decreased upper extremity strength;Decreased endurance;Decreased sensation;Decreased functional mobility;Decreased gross motor control;Decreased IADLs   Strengths Ability to acquire knowledge   Barriers to Participation Comorbidities   Education   Individual(s) Educated Patient   Education Provided Fall precautons   Risk and  Benefits Discussed with Patient/Caregiver/Other yes   Patient/Caregiver Demonstrated Understanding yes   Plan of Care Discussed and Agreed Upon yes   Patient Response to Education Patient/Caregiver Asked Appropriate Questions;Patient/Caregiver Verbalized Understanding of Information   Education Comment pt receptive to education   Inpatient/Swing Bed or Outpatient   Inpatient/Swing Bed or Outpatient Inpatient   Inpatient Plan   Treatment Interventions ADL retraining;Functional transfer training;UE strengthening/ROM;Endurance training;Patient/family training;Equipment evaluation/education;Neuromuscular reeducation;Compensatory technique education   OT Frequency 5 times per week   OT Discharge Recommendations High intensity level of continued care   Equipment Recommended upon Discharge   (TBD)   OT Recommended Transfer Status Assist of 1;Moderate assist   OT - OK to Discharge Yes

## 2024-11-23 NOTE — PROGRESS NOTES
Physical Therapy       11/23/24 [id number]   PT  Visit   PT Received On 11/23/24   Response to Previous Treatment Patient reporting fatigue but able to participate.   General   Reason for Referral CVA   Referred By Dr. Dubose   Past Medical History Relevant to Rehab HTN, DM, CKD III, tobacco abuse   Patient Position Received Up in chair   Preferred Learning Style verbal;visual   General Comment Agreeable to treatment.   Precautions   Medical Precautions Fall precautions   Precautions Comment left shoulder harness   Pain Assessment   Pain Assessment 0-10   0-10 (Numeric) Pain Score 0 - No pain   Therapeutic Exercise   Therapeutic Exercise Activity 1 standing heel raises, 2x15 reps, BLE   Therapeutic Exercise Activity 2 standing hip flexion, 2x15 reps, 0# LLE only   Therapeutic Exercise Activity 3 standing hip abduction, 2x15 reps, 0# LLE only   Therapeutic Exercise Activity 4 standing knee flexion, 2x15 reps, 0# LLE only   Therapeutic Exercise Activity 5 standing hip extention, 2x15 reps, 0# LLE only   Therapeutic Exercise Activity 6 standing mini squats, 2x15 reps, vc's for correct technique   Ambulation/Gait Training 1   Surface 1 Level tile   Device 1   (wes cane)   Gait Support Devices L AFO;Gait belt   Assistance 1 Minimum assistance;Moderate assistance   Quality of Gait 1 Forward flexed posture;Soft knee(s);Inconsistent stride length;Decreased step length;Listing;NBOS   Comments/Distance (ft) 1 80 ft x 2, turns included.  NO w/c follow.  L foot catch with resulting LOB periodically with fatigue.  Min/modA to recover, otherwise Bob and good weight shifting to R noted.   Transfer 1   Technique 1 Sit to stand;Stand to sit   Transfer Device 1 Gait belt;Wes-walker   Transfer Level of Assistance 1 Contact guard;Minimum assistance   Trials/Comments 1 vc's for hand placement and lining self up with seat prior to sitting.  Multiple trials performed.   Transfers 2   Transfer From 2 Wheelchair to;Commode-standard to    Transfer to 2 Commode-standard;Wheelchair   Technique 2 Stand pivot;To right;To left   Transfer Device 2 Gait belt   Transfer Level of Assistance 2 Minimum assistance   Trials/Comments 2 use of grab bar   Stairs   Rails 1 Right   Device 1 Railing   Support Devices 1 Gait belt;Left Ankle-foot orthosis   Assistance 1 Minimum assistance;Moderate assistance   Comment/Number of Steps 1 up/down 8 6-inch steps, step-to pattern.  Vc's for correct sequencing/WBOS/weigfht shifting toward right.   Wheelchair Activities   Propulsion Type 1 Manual   Level 1 Level tile   Method 1 Right upper extremity;Right lower extremity   Level of Assistance 1 Set up   Description/Details 1 ad barbara around facility   Other Activity   Other Activity 1 extra time spent using restroom.  Pt dependent with pants up/down and yanique care.   Activity Tolerance   Endurance Tolerates 30 min exercise with multiple rests   PT Assessment   PT Assessment Results Decreased strength;Decreased range of motion;Decreased endurance;Impaired balance;Decreased mobility;Decreased coordination;Impaired tone   Rehab Prognosis Good   Assessment Comment Pt reported feeling a little fatigued this AM, but continues to put forth good effort in tx.  6-inch steps negotiated for 1st time this AM with good inityial results.   End of Session Patient Position Up in chair;Alarm on   PT Plan   Inpatient/Swing Bed or Outpatient Inpatient   PT Plan   Treatment/Interventions Bed mobility;Transfer training;Gait training;Stair training;Neuromuscular re-education;Therapeutic exercise;Therapeutic activity   PT Plan Ongoing PT   PT Recommended Transfer Status Assist x1;Assistive device

## 2024-11-23 NOTE — NURSING NOTE
Assumed care of patient. 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 A&O X 3. On RA. AC/HS. Small bite sized foods with thin liquids.  Denies any pain or needs at this time. Call light is within reach. Will continue  to monitor.

## 2024-11-23 NOTE — PROGRESS NOTES
Physical Therapy       11/23/24 [id number]   PT  Visit   PT Received On 11/23/24   Response to Previous Treatment Patient reporting fatigue but able to participate.   General   Reason for Referral CVA   Referred By Dr. Dubose   Past Medical History Relevant to Rehab HTN, DM, CKD III, tobacco abuse   Patient Position Received Up in chair   Preferred Learning Style verbal;visual   General Comment Agreeable to treatment.   Precautions   Medical Precautions Fall precautions   Precautions Comment left shoulder harness   Pain Assessment   Pain Assessment 0-10   0-10 (Numeric) Pain Score 0 - No pain   Therapeutic Exercise   Therapeutic Exercise Activity 1 seated hip abduction, green theraband, 2x15 reps   Therapeutic Exercise Activity 2 seated isometric hip adduction/ball squeezes, 2x15 reps   Therapeutic Activity   Therapeutic Activity 1 sit/stands 1x10 reps, Bob   Ambulation/Gait Training 1   Surface 1 Level tile;Carpet   Device 1   (wes cane)   Gait Support Devices L AFO;Gait belt   Assistance 1 Minimum assistance;Moderate assistance   Quality of Gait 1 Forward flexed posture;Soft knee(s);Inconsistent stride length;Decreased step length;Listing;NBOS   Comments/Distance (ft) 1 60 ft x 2, turns included,  Bob mostly but min/modA at times due to L list and with fatigue.   Transfer 1   Technique 1 Sit to stand;Stand to sit   Transfer Device 1 Gait belt;Wes-walker   Transfer Level of Assistance 1 Contact guard;Minimum assistance   Trials/Comments 1 vc's for weight shift forward   Wheelchair Activities   Propulsion Type 1 Manual   Level 1 Level tile   Method 1 Right upper extremity;Right lower extremity   Level of Assistance 1 Set up   Description/Details 1 ad barbara around facility   Activity Tolerance   Endurance Tolerates 30 min exercise with multiple rests   PT Assessment   PT Assessment Results Decreased strength;Decreased range of motion;Decreased endurance;Impaired balance;Decreased mobility;Decreased  coordination;Impaired tone   Rehab Prognosis Good   Evaluation/Treatment Tolerance Patient tolerated treatment well   End of Session Patient Position Up in chair;Alarm on   PT Plan   Inpatient/Swing Bed or Outpatient Inpatient   PT Plan   Treatment/Interventions Bed mobility;Transfer training;Gait training;Stair training;Neuromuscular re-education;Therapeutic exercise;Therapeutic activity   PT Plan Ongoing PT   PT Recommended Transfer Status Assist x1;Assistive device

## 2024-11-24 VITALS
TEMPERATURE: 98.2 F | RESPIRATION RATE: 18 BRPM | WEIGHT: 158.73 LBS | SYSTOLIC BLOOD PRESSURE: 138 MMHG | HEIGHT: 63 IN | BODY MASS INDEX: 28.12 KG/M2 | HEART RATE: 68 BPM | OXYGEN SATURATION: 96 % | DIASTOLIC BLOOD PRESSURE: 65 MMHG

## 2024-11-24 LAB
GLUCOSE BLD MANUAL STRIP-MCNC: 77 MG/DL (ref 74–99)
GLUCOSE BLD MANUAL STRIP-MCNC: 96 MG/DL (ref 74–99)

## 2024-11-24 PROCEDURE — 2500000004 HC RX 250 GENERAL PHARMACY W/ HCPCS (ALT 636 FOR OP/ED): Performed by: INTERNAL MEDICINE

## 2024-11-24 PROCEDURE — 1180000001 HC REHAB PRIVATE ROOM DAILY

## 2024-11-24 PROCEDURE — 82947 ASSAY GLUCOSE BLOOD QUANT: CPT

## 2024-11-24 PROCEDURE — 2500000002 HC RX 250 W HCPCS SELF ADMINISTERED DRUGS (ALT 637 FOR MEDICARE OP, ALT 636 FOR OP/ED): Performed by: INTERNAL MEDICINE

## 2024-11-24 PROCEDURE — 2500000001 HC RX 250 WO HCPCS SELF ADMINISTERED DRUGS (ALT 637 FOR MEDICARE OP): Performed by: INTERNAL MEDICINE

## 2024-11-24 ASSESSMENT — PAIN SCALES - WONG BAKER: WONGBAKER_NUMERICALRESPONSE: NO HURT

## 2024-11-24 ASSESSMENT — PAIN SCALES - GENERAL
PAINLEVEL_OUTOF10: 0 - NO PAIN

## 2024-11-24 ASSESSMENT — PAIN - FUNCTIONAL ASSESSMENT
PAIN_FUNCTIONAL_ASSESSMENT: 0-10

## 2024-11-24 NOTE — CARE PLAN
The patient's goals for the shift include improve mobility    The clinical goals for the shift include maintain safety    Over the shift, the patient did not make progress toward the following goals. Barriers to progression include noted weakness. Recommendations to address these barriers include continue current treatment plan.

## 2024-11-25 LAB
APPEARANCE UR: CLEAR
BACTERIA #/AREA URNS AUTO: ABNORMAL /HPF
BILIRUB UR STRIP.AUTO-MCNC: NEGATIVE MG/DL
COLOR UR: ABNORMAL
GLUCOSE BLD MANUAL STRIP-MCNC: 69 MG/DL (ref 74–99)
GLUCOSE BLD MANUAL STRIP-MCNC: 94 MG/DL (ref 74–99)
GLUCOSE UR STRIP.AUTO-MCNC: NORMAL MG/DL
KETONES UR STRIP.AUTO-MCNC: NEGATIVE MG/DL
LEUKOCYTE ESTERASE UR QL STRIP.AUTO: NEGATIVE
MUCOUS THREADS #/AREA URNS AUTO: ABNORMAL /LPF
NITRITE UR QL STRIP.AUTO: NEGATIVE
PH UR STRIP.AUTO: 5.5 [PH]
PROT UR STRIP.AUTO-MCNC: ABNORMAL MG/DL
RBC # UR STRIP.AUTO: NEGATIVE /UL
RBC #/AREA URNS AUTO: ABNORMAL /HPF
SP GR UR STRIP.AUTO: 1.02
SQUAMOUS #/AREA URNS AUTO: ABNORMAL /HPF
UROBILINOGEN UR STRIP.AUTO-MCNC: NORMAL MG/DL
WBC #/AREA URNS AUTO: ABNORMAL /HPF

## 2024-11-25 PROCEDURE — 81001 URINALYSIS AUTO W/SCOPE: CPT | Performed by: INTERNAL MEDICINE

## 2024-11-25 PROCEDURE — 97116 GAIT TRAINING THERAPY: CPT | Mod: GP,CQ

## 2024-11-25 PROCEDURE — 97110 THERAPEUTIC EXERCISES: CPT | Mod: GP,CQ

## 2024-11-25 PROCEDURE — 97530 THERAPEUTIC ACTIVITIES: CPT | Mod: GP,CQ

## 2024-11-25 PROCEDURE — 1180000001 HC REHAB PRIVATE ROOM DAILY

## 2024-11-25 PROCEDURE — 92526 ORAL FUNCTION THERAPY: CPT | Mod: GN

## 2024-11-25 PROCEDURE — 92507 TX SP LANG VOICE COMM INDIV: CPT | Mod: GN

## 2024-11-25 PROCEDURE — 97535 SELF CARE MNGMENT TRAINING: CPT | Mod: GO,CO

## 2024-11-25 PROCEDURE — 2500000004 HC RX 250 GENERAL PHARMACY W/ HCPCS (ALT 636 FOR OP/ED): Performed by: INTERNAL MEDICINE

## 2024-11-25 PROCEDURE — 2500000001 HC RX 250 WO HCPCS SELF ADMINISTERED DRUGS (ALT 637 FOR MEDICARE OP): Performed by: INTERNAL MEDICINE

## 2024-11-25 PROCEDURE — 2500000005 HC RX 250 GENERAL PHARMACY W/O HCPCS: Performed by: INTERNAL MEDICINE

## 2024-11-25 PROCEDURE — 97110 THERAPEUTIC EXERCISES: CPT | Mod: GO,CO

## 2024-11-25 PROCEDURE — 2500000002 HC RX 250 W HCPCS SELF ADMINISTERED DRUGS (ALT 637 FOR MEDICARE OP, ALT 636 FOR OP/ED): Performed by: INTERNAL MEDICINE

## 2024-11-25 PROCEDURE — 97112 NEUROMUSCULAR REEDUCATION: CPT | Mod: GP,CQ

## 2024-11-25 PROCEDURE — 99232 SBSQ HOSP IP/OBS MODERATE 35: CPT | Performed by: INTERNAL MEDICINE

## 2024-11-25 PROCEDURE — 82947 ASSAY GLUCOSE BLOOD QUANT: CPT

## 2024-11-25 PROCEDURE — 97530 THERAPEUTIC ACTIVITIES: CPT | Mod: GO,CO

## 2024-11-25 ASSESSMENT — ACTIVITIES OF DAILY LIVING (ADL)
HOME_MANAGEMENT_TIME_ENTRY: 30
BATHING_WHERE_ASSESSED: SHOWER
BATHING_LEVEL_OF_ASSISTANCE: MINIMUM ASSISTANCE
BATHING_EQUIPMENT_NEEDED: LONG-HANDLED SPONGE;REMOVEABLE SHOWER HEAD

## 2024-11-25 ASSESSMENT — PAIN SCALES - GENERAL
PAINLEVEL_OUTOF10: 0 - NO PAIN

## 2024-11-25 ASSESSMENT — PAIN - FUNCTIONAL ASSESSMENT
PAIN_FUNCTIONAL_ASSESSMENT: 0-10

## 2024-11-25 NOTE — NURSING NOTE
Assumed care of patient at report. Patient in bed sleeping with no signs of pain at this time. Reported that daughter brought in [place] paperwork for herself to care for MOM at discharge.  Will monitor nor any changes through shift

## 2024-11-25 NOTE — PROGRESS NOTES
"Physical Therapy       11/25/24 [id number]   PT  Visit   PT Received On 11/25/24   Response to Previous Treatment Patient reporting fatigue but able to participate.   General   Reason for Referral CVA   Referred By Dr. Dubose   Past Medical History Relevant to Rehab HTN, DM, CKD III, tobacco abuse   Patient Position Received Up in chair;Alarm on   Preferred Learning Style verbal;visual   General Comment Agreeable to treatment.   Precautions   Medical Precautions Fall precautions   Precautions Comment left shoulder harness   Pain Assessment   Pain Assessment 0-10   0-10 (Numeric) Pain Score 0 - No pain   Balance/Neuromuscular Re-Education   Balance/Neuromuscular Re-Education Activity 1 side stepping in // bars, CGA, + UE support on bar   Balance/Neuromuscular Re-Education Activity 2 // bars: squat walking with 1 UE support on bar, forward/retro amb, 2x12 ft each direction.  CG/Bob   Ambulation/Gait Training 1   Surface 1 Level tile   Device 1 Rolling walker  (R hand )   Gait Support Devices L AFO;Gait belt   Assistance 1 Minimum assistance;Moderate assistance   Quality of Gait 1 Forward flexed posture;Soft knee(s);Inconsistent stride length;Decreased step length;Listing;NBOS   Comments/Distance (ft) 1 80 ft x 2, turns included.  Vc's for weight shift to R and WBOS with LLE.  Good inital trial with RW, though pt tends to step\"hard\" with R foot due to not feeling LLE is stable.   Transfer 1   Technique 1 Sit to stand;Stand to sit   Transfer Device 1 Gait belt   Transfer Level of Assistance 1 Contact guard   Wheelchair Activities   Propulsion Type 1 Manual   Level 1 Level tile   Method 1 Right upper extremity;Right lower extremity   Level of Assistance 1 Modified independent   Description/Details 1 ad barbara around facility   Activity Tolerance   Endurance Tolerates 30 min exercise with multiple rests   PT Assessment   PT Assessment Results Decreased strength;Decreased range of motion;Decreased endurance;Impaired " balance;Decreased mobility;Decreased coordination;Impaired tone   Rehab Prognosis Good   Evaluation/Treatment Tolerance Patient tolerated treatment well   End of Session Patient Position Up in chair;Alarm on   PT Plan   Inpatient/Swing Bed or Outpatient Inpatient   PT Plan   Treatment/Interventions Bed mobility;Transfer training;Gait training;Stair training;Neuromuscular re-education;Therapeutic exercise;Therapeutic activity   PT Plan Ongoing PT   PT Recommended Transfer Status Assist x1;Assistive device

## 2024-11-25 NOTE — PROGRESS NOTES
Physical Therapy       11/25/24 [id number]   PT  Visit   PT Received On 11/25/24   Response to Previous Treatment Patient with no complaints from previous session.   General   Reason for Referral CVA   Referred By Dr. Dubose   Past Medical History Relevant to Rehab HTN, DM, CKD III, tobacco abuse   Patient Position Received Up in chair;Alarm on   Preferred Learning Style verbal;visual   General Comment Agreeable to treatment.   Precautions   Medical Precautions Fall precautions   Precautions Comment left shoulder harness   Pain Assessment   Pain Assessment 0-10   0-10 (Numeric) Pain Score 0 - No pain   Therapeutic Exercise   Therapeutic Exercise Activity 1 supine bridging, 2x15 reps,   Therapeutic Exercise Activity 2 supine heel slides, 2x15 reps, 3# LLE, 4# LLE   Therapeutic Exercise Activity 3 supine hip abduction, 2x15 reps, 3# LLE, 4#RLE   Therapeutic Activity   Therapeutic Activity 1 omnicycle x 10 minutes, level 2 resistance, 2.2 km, 98% activity   Balance/Neuromuscular Re-Education   Balance/Neuromuscular Re-Education Activity 1 55.93 sec with robb cane   Balance/Neuromuscular Re-Education Activity 2 FIST:  55/56   Bed Mobility 1   Bed Mobility 1 Supine to sitting;Sitting to supine   Level of Assistance 1 Close supervision   Bed Mobility Comments 1 flat mat, no rail   Bed Mobility 2   Bed Mobility  2 Scooting;Rolling right;Rolling left   Level of Assistance 2 Close supervision   Bed Mobility 3   Bed Mobility 3 Rolling right;Rolling left   Level of Assistance 3 Close supervision   Bed Mobility Comments 3 flat mat, no rail, extra time to roll to R   Ambulation/Gait Training 1   Surface 1 Level tile   Device 1   (robb cane)   Gait Support Devices L AFO;Gait belt   Assistance 1 Minimum assistance;Moderate assistance   Quality of Gait 1 Forward flexed posture;Soft knee(s);Inconsistent stride length;Decreased step length;Listing;NBOS   Comments/Distance (ft) 1 80 ft x 2, turns included.  Mild LOB x 1 to left when  turning L, with min/modA to recover.  Vc's for WBOS and posture.  WIth straight open spaces, Bob for amb.   Transfer 1   Technique 1 Sit to stand;Stand to sit   Transfer Device 1 Gait belt   Transfer Level of Assistance 1 Contact guard   Trials/Comments 1 vc's for safety   Transfers 2   Transfer From 2 Mat to;Wheelchair to   Transfer to 2 Wheelchair;Mat   Technique 2 Stand pivot   Transfer Device 2 Gait belt   Transfer Level of Assistance 2 Contact guard;Minimum assistance   Trials/Comments 2 lead to right   Stairs   Rails 1 Right   Device 1 Railing   Support Devices 1 Gait belt;Left Ankle-foot orthosis   Assistance 1 Contact guard;Minimum assistance   Comment/Number of Steps 1 up/down 1 flight, CGA up/Bob down.  Vc's for    Object From Floor   Devices Reacher   Assist Level Contact guard;Minimum assist   Comments small cone from floor with reacher   Wheelchair Activities   Propulsion Type 1 Manual   Level 1 Level tile   Method 1 Right upper extremity;Right lower extremity   Level of Assistance 1 Modified independent   Description/Details 1 ad barbara around facility   Activity Tolerance   Endurance Tolerates 30 min exercise with multiple rests   PT Assessment   PT Assessment Results Decreased strength;Decreased range of motion;Decreased endurance;Impaired balance;Decreased mobility;Decreased coordination;Impaired tone   Rehab Prognosis Good   Assessment Comment Pt has met all STG at this time.  FIST score improved from 25/56 to 55/56.  TUG score was 55 seconds with robb cane.  Pt continues with LLE instability and soft knee with cuing needed during gait training for posture and WBOS, sy with turnsing to L.  Pt puts forth good effort in each tx.   End of Session Patient Position Up in chair;Alarm on   PT Plan   Inpatient/Swing Bed or Outpatient Inpatient   PT Plan   Treatment/Interventions Bed mobility;Transfer training;Gait training;Stair training;Neuromuscular re-education;Therapeutic exercise;Therapeutic  activity   PT Plan Ongoing PT   PT Recommended Transfer Status Assist x1;Assistive device

## 2024-11-25 NOTE — PROGRESS NOTES
Occupational Therapy       11/25/24 9646-9610   OT Last Visit   OT Received On 11/25/24   General   Reason for Referral CVA   Referred By Dr. Dubose   Past Medical History Relevant to Rehab HTN, DM, CKD III, tobacco abuse   Patient Position Received Bed, 3 rail up;Alarm off, not on at start of session   Preferred Learning Style verbal;visual   General Comment Agreeable to treatment.   Precautions   Hearing/Visual Limitations No glasses. Smooth pursuits, saccades and convergence/ divergence slowed but intact. No visual field cuts identified.   Medical Precautions Fall precautions   Precautions Comment left shoulder harness   Pain Assessment   Pain Assessment 0-10   0-10 (Numeric) Pain Score 0 - No pain   Cognition   Overall Cognitive Status Impaired   Orientation Level Oriented X4   Attention WFL   Memory X   Short-Term Memory Impaired   Problem Solving X   Simple Functional Tasks Impaired   Simple Calculations Impaired   Coordination   Coordination Comment LUE weakness.   ELISEO GOMES  WFL   Feeding   Feeding Level of Assistance Setup   Feeding Where Assessed Bed level   Feeding Comments small bite sized/ thin liquids.   Grooming   Grooming Level of Assistance Setup   Grooming Where Assessed Sitting sinkside   Grooming Comments to comb hair and oral care.   UE Bathing   UE Bathing Adaptive Equipment Removeable shower head   UE Bathing Level of Assistance Close supervision   UE Bathing Where Assessed Shower   UE Bathing Comments Seated on shower chair   LE Bathing   LE Bathing Adaptive Equipment Long-handled sponge;Removeable shower head   LE Bathing Level of Assistance Minimum assistance   LE Bathing Where Assessed Shower   LE Bathing Comments In stance to wash buttocks.   UE Dressing   UE Dressing Level of Assistance Minimum assistance   UE Dressing Where Assessed Wheelchair   UE Dressing Comments Wes dressing techniques. VC's for LUE threading.   LE Dressing   LE Dressing Adaptive Equipment Reacher;Sock aide    Pants Level of Assistance Moderate assistance   Sock Level of Assistance Setup   Shoe Level of Assistance Maximum assistance   Orthotics Level of Assistance Dependent   Adult Briefs Level of Assistance Dependent   LE Dressing Where Assessed Wheelchair   LE Dressing Comments Reviewed robb dressing techniques. CS and VC's for pant threading with reacher. Mod assist for standing hiking. Set-up on sockaide to don socks.   Toileting   Toileting Level of Assistance Moderate assistance   Where Assessed Bedside commode  (Over toilet.)   Toileting Comments For pericare and clothing management.   Bed Mobility 1   Bed Mobility 1 Supine to sitting   Level of Assistance 1 Contact guard   Bed Mobility Comments 1 Min dizziness. HOB elevated with use of bed rail.   Transfer 1   Transfer From 1 Bed to   Transfer to 1 Wheelchair   Technique 1 Stand pivot   Transfer Device 1 Gait belt   Transfer Level of Assistance 1 Contact guard   Trials/Comments 1 VC's for safety.   Transfers 2   Transfer From 2 Sit to   Transfer to 2 Stand   Technique 2 Sit to stand;Stand to sit   Transfer Device 2 Gait belt   Transfer Level of Assistance 2 Contact guard   Trials/Comments 2 With use of grab bar.   Toilet Transfers   Toilet Transfer From Wheelchair   Toilet Transfer Type To and from   Toilet Transfer to Standard bedside commode   Toilet Transfer Technique To right;To left   Toilet Transfers Minimal assistance   Toilet Transfers Comments VC's on hand placement and robb techniques.  (Performed transfer x2.)   Shower Transfers   Shower Transfer From Wheelchair   Shower Transfer Type To and from   Shower Transfer to Shower seat with back   Shower Transfer Technique To right;To left   Shower Transfers Minimal assistance   Shower Transfers Comments VC's for technique and use of LUE   Therapeutic Exercise   Therapeutic Exercise Activity 1 Seated B/L UE shoulder shrugs x10. Movement noted in L shoulder shrug.   IP OT Assessment   OT Assessment Pt is  progressing with goals in POC. Regaining strength. Still below baseline. Will continue to works towards goals for safe D/C home.   Prognosis Good   Barriers to Discharge Decreased caregiver support   Evaluation/Treatment Tolerance Patient tolerated treatment well   Medical Staff Made Aware Yes   End of Session Communication Bedside nurse   End of Session Patient Position Up in chair;Alarm on   OT Assessment   OT Assessment Results Decreased upper extremity strength;Decreased cognition;Decreased endurance;Decreased sensation;Decreased gross motor control;Decreased fine motor control   Strengths Ability to acquire knowledge   Barriers to Participation Comorbidities   Education   Individual(s) Educated Patient   Education Provided Fall precautons   Home Program AAROM;Strengthening;Modalities   Diagnosis and Precautions fall precautions   Patient Response to Education Patient/Caregiver Asked Appropriate Questions;Patient/Caregiver Verbalized Understanding of Information   Education Comment pt receptive to education   Inpatient/Swing Bed or Outpatient   Inpatient/Swing Bed or Outpatient Inpatient   Inpatient Plan   Treatment Interventions ADL retraining;Functional transfer training;UE strengthening/ROM;Endurance training;Neuromuscular reeducation   OT Frequency 5 times per week   OT Discharge Recommendations High intensity level of continued care   OT Recommended Transfer Status Moderate assist   OT - OK to Discharge Yes

## 2024-11-25 NOTE — PROGRESS NOTES
"[facility]  Physician Progress Note    Subjective   Cynthia Muse is a 79 y.o. female admitted to inpatient rehabilitation unit.    The patient brings forth no new complaints and is doing well overall.  Denies chest pain and shortness of breath.  The bowels are moving.  Pain is adequately controlled.  I discussed the patient's condition with the nursing staff.      Objective   /67 (BP Location: Right arm, Patient Position: Lying)   Pulse 68   Temp 36.8 °C (98.2 °F) (Temporal)   Resp 16   Ht 1.6 m (5' 2.99\")   Wt 72 kg (158 lb 11.7 oz)   SpO2 95%   BMI 28.13 kg/m²      Physical Exam  Constitutional:       General: She is not in acute distress.     Appearance: She is not toxic-appearing.   HENT:      Head: Normocephalic and atraumatic.      Mouth/Throat:      Mouth: Mucous membranes are moist.   Eyes:      Pupils: Pupils are equal, round, and reactive to light.   Cardiovascular:      Rate and Rhythm: Normal rate and regular rhythm.      Heart sounds: No murmur heard.  Pulmonary:      Breath sounds: Normal breath sounds. No wheezing, rhonchi or rales.   Abdominal:      General: There is no distension.      Palpations: Abdomen is soft.   Musculoskeletal:      Right lower leg: No edema.      Left lower leg: No edema.   Neurological:      Mental Status: She is alert and oriented to person, place, and time.      Left hemiparesis.  Getting some movement at the elbow in terms of extension.    Labs  BMP:  Results from last 7 days   Lab Units 11/19/24  0609   CREATININE mg/dL 1.77*   BUN mg/dL 41*   SODIUM mmol/L 140   POTASSIUM mmol/L 3.9   CHLORIDE mmol/L 106   CO2 mmol/L 23     CBC:  Results from last 7 days   Lab Units 11/19/24  0609   WBC AUTO x10*3/uL 13.7*   HEMOGLOBIN g/dL 12.2   HEMATOCRIT % 37.7   MCV fL 91   PLATELETS AUTO x10*3/uL 352     Coagulation:           Assesment and Plan    Ischemic Stroke (Multi)   Left Hemiparesis (Multi)   Essential " Hypertension   Hyperlipidemia   Chronic Kidney Disease (Ckd) Stage G3a/A2, Moderately Decreased Glomerular Filtration Rate (Gfr) Between 45-59 Ml/Min/1.73 Square Meter and Albuminuria Creatinine Ratio Between  Mg/G (C* (Multi)      Ischemic stroke - Continue with medical management for secondary prevention: DAPT x 21 days, statin  DVT prophylaxis has been ordered: LMWH       BINH/CKD - fairly stable; monitor    DM2 - cont URENA, monitor glucose     HTN - amlodipine, coreg     Tobacco use disorder - nicotine patch ordered     HPL - stat     Left hemiparesis     Dysphagia - Cont ST efforts.    Yunior Dubose MD

## 2024-11-25 NOTE — PROGRESS NOTES
Occupational Therapy       11/25/24 3769-4037   OT Last Visit   OT Received On 11/25/24   General   Reason for Referral CVA   Referred By Dr. Dubose   Past Medical History Relevant to Rehab HTN, DM, CKD III, tobacco abuse   Patient Position Received Up in chair;Alarm on   Preferred Learning Style verbal;visual   General Comment Agreeable to treatment.   Precautions   Medical Precautions Fall precautions   Precautions Comment left shoulder harness   Pain Assessment   Pain Assessment 0-10   0-10 (Numeric) Pain Score 0 - No pain   Cognition   Overall Cognitive Status Impaired   Orientation Level Oriented X4   Attention WFL   Coordination   Upper Body Coordination LUE improving   Coordination Comment LUE weakness.   LUE AROM (degrees)   L Shoulder Flexion  0-170 20 Degrees   L Elbow Flexion/Extension 0-135-150 90 °   LUE Strength   L Shoulder Flexion 2/5   Transfer 1   Transfer From 1 Sit to   Transfer to 1 Stand   Technique 1 Sit to stand;Stand to sit   Transfer Device 1 Gait belt   Transfer Level of Assistance 1 Contact guard   Trials/Comments 1 In stance with wes walker.   Car Transfers   Car Transfer From Other (Comment)  (Wes walker)   Car Transfer Technique Ambulating   Car Transfers Minimal assistance   Car Transfers Comments Min assist and min VC's on technique. performed with wes walker. Will continue to progress.   Therapeutic Exercise   Therapeutic Exercise Activity 1 Shoulder flexion x8 LUE- seated.   Therapeutic Exercise Activity 2 Table top table glides x10 shoulder flexion/ extension.   Therapeutic Exercise Activity 3 x5 L wrist supination/ pronation- trace supination.   Therapeutic Exercise Activity 4 x5 B/L UE scapular retraction.   Therapeutic Exercise Activity 5 Table top glides elbow flexion- lateral x8   IP OT Assessment   OT Assessment Pt is progressing with goals in POC. Regaining strength. Still below baseline. Will continue to works towards goals for safe D/C home.   Prognosis Good   Barriers  to Discharge Decreased caregiver support   Evaluation/Treatment Tolerance Patient tolerated treatment well   Medical Staff Made Aware Yes   End of Session Communication Bedside nurse   End of Session Patient Position Up in chair;Alarm on   OT Assessment   OT Assessment Results Decreased upper extremity strength;Decreased cognition;Decreased endurance;Decreased sensation;Decreased gross motor control;Decreased fine motor control   Strengths Ability to acquire knowledge   Barriers to Participation Comorbidities   Education   Individual(s) Educated Patient   Education Provided Fall precautons   Home Program AAROM;Strengthening;Modalities   Diagnosis and Precautions fall precautions   Patient Response to Education Patient/Caregiver Asked Appropriate Questions;Patient/Caregiver Verbalized Understanding of Information   Education Comment pt receptive to education   Inpatient/Swing Bed or Outpatient   Inpatient/Swing Bed or Outpatient Inpatient   Inpatient Plan   Treatment Interventions UE strengthening/ROM;Neuromuscular reeducation   OT Frequency 5 times per week   OT Discharge Recommendations High intensity level of continued care   OT Recommended Transfer Status Moderate assist   OT - OK to Discharge Yes

## 2024-11-25 NOTE — CONSULTS
Inpatient consult to Podiatry  Consult performed by: Jorgito Aguiar DPM  Consult ordered by: Yunior Dubose MD        Reason For Consult  Routine foot check/ toe pain    History Of Present Illness  Cynthia Muse is a 79 y.o. female Currently admitted to [facility] secondary to stroke.  MRI did show acute ischemic infarct of the right corona radiata and small vessel ischemic changes.  Neurology placed her on dual antiplatelet therapy for 21 days as well as a statin.  She has been making some progress with therapy.Podiatry was consulted secondary to significant toenail pain.  Patient is unable to trim her own nail plate secondary to her current predicament and circumstance. She does have left hemiparesis essentially flaccid through the hand and wrist as well as significant weakness with foot drop.       Past Medical History  She has a past medical history of Cerebrovascular accident (CVA), unspecified mechanism (Multi) (11/14/2024), Chronic kidney disease (CKD) stage G3a/A2, moderately decreased glomerular filtration rate (GFR) between 45-59 mL/min/1.73 square meter and albuminuria creatinine ratio between  mg/g (C* (Multi), HTN (hypertension), Hyperlipidemia, Ischemic stroke (Multi) (11/18/2024), Osteoporosis, Type 2 diabetes mellitus, and Vitamin D deficiency.    Surgical History  She has no past surgical history on file.     Social History  She reports that she has been smoking cigarettes. She has never used smokeless tobacco. She reports that she does not drink alcohol and does not use drugs.    Family History  Family History   Problem Relation Name Age of Onset    Brain Aneurysm Mother 39 yo     Coronary artery disease Father 51 yo     Heart attack Father 51 yo     Lung cancer Sister          Allergies  Ezetimibe, Metformin, and Pravastatin sodium    Review of Systems    REVIEW OF SYSTEMS  GENERAL:  Negative for malaise, significant weight loss, fever  HEENT:  No changes in  "hearing or vision, no nose bleeds or other nasal problems and Negative for frequent or significant headaches  NECK:  Negative for lumps, goiter, pain and significant neck swelling  RESPIRATORY:  Negative for cough, wheezing and shortness of breath  CARDIOVASCULAR:  Negative for chest pain, leg swelling and palpitations  GI:  Negative for abdominal discomfort, blood in stools or black stools and change in bowel habits  :  Negative for dysuria, frequency and incontinence  MUSCULOSKELETAL:  Weakness left lower extremity, left upper extremity  SKIN:  Negative for lesions, rash, and itching  PSYCH:  Negative for sleep disturbance, mood disorder and recent psychosocial stressors  HEMATOLOGY/LYMPHOLOGY:  Negative for prolonged bleeding, bruising easily, and swollen nodes.  ENDOCRINE:  Negative for cold or heat intolerance, polyuria, polydipsia and goiter  NEURO: Weakness left lower extremity, left upper extremity    Objective:   Vasc: DP and PT pulses are Faintlypalpable bilateral.  CFT is less than 3 seconds bilateral.  Skin temperature is warm to cool proximal to distal bilateral.      Neuro:  Left-sided hemiparesis with some weakness with left foot plantarflexion compared to the right.  She does have evidence of foot drop to the left foot     Derm: Nails 1, 2, 3, 4, 5 with significant elongation, thickness, yellow substance crumbly debris noted consistent with mycotic changes.  There is pain with palpation to the nail plates.  No signs of ingrown or bacterial infection.  No active drainage..    Ortho: Pain present with palpation of the nail plates with focus on bilateral hallux.  She does have left-sided foot drop.     Physical Exam     Last Recorded Vitals  Blood pressure 164/65, pulse 71, temperature 36.8 °C (98.2 °F), temperature source Temporal, resp. rate 18, height 1.6 m (5' 2.99\"), weight 72 kg (158 lb 11.7 oz), SpO2 97%.    Assessment/Plan   # Pain in toe left foot  # pain in toe right foot  # onychomycosis  # " left foot drop    Overall patient is doing well today.  I did debride nail plates 1, 2, 3, 4, 5 on bilateral feet with sterile nail nipper secondary to pain and mycotic changes.  She is unable to perform this herself secondary to left hemiparesis from recent stroke.  I did perform a full pedal exam today.  She does have foot drop to the left foot and has the appropriate dorsiflexor assisted AFO brace.  This appears to work well for her.  We did discuss outpatient she may establish care with me.  I believe discharge instructions for the patient.  We did discuss the importance of smoking cessation especially when she is discharged.  She is agreeable and understanding.  Please reconsult as needed, podiatry to sign off.  Thank you for the consult.    I spent 50 minutes in the professional and overall care of this patient.

## 2024-11-25 NOTE — PROGRESS NOTES
Followed up with patient to review status and discharge plan. Patient remains aware of tentative date of 12.10.24. Patient;s daughter, Dorina, is also aware. Patient continues to prefer return home and declines alternate plan to be discussed at this time. Will continue to follow.

## 2024-11-25 NOTE — PROGRESS NOTES
Speech-Language Pathology    SLP Adult IRF CCR Dysphagia and Speech-Language Pathology Treatments     Patient Name: Cynthia Muse  MRN: [id number]  Today's Date: 11/25/2024  Time Calculation  Start Time: 1001  Stop Time: 1051  Time Calculation (min): 50 min     5/5sw +sp    Current Problem:   Ischemic stroke with left body involvement (right brain)      SLP Assessment:  SLP TX Intervention Outcome: Making Progress Towards Goals  SLP Assessment Results: Cognitive Deficits, Other (Comment), Motor Speech Deficits (dysphagia)  Prognosis: Good  Treatment Tolerance: Patient tolerated treatment well  Medical Staff Made Aware: Yes  Strengths: Motivation  Education Provided: Yes     Plan:  Inpatient/Swing Bed or Outpatient: Inpatient  Treatment/Interventions: Articulation, Cognitive communication functioning, Communication functioning, Other (Comment) (dysphagia)  SLP TX Plan: Continue Plan of Care  SLP Plan: Skilled SLP  SLP Frequency: 5x per week  Duration: 3 weeks  SLP Discharge Recommendations: Other (Comment) (TBD pending progress)  Next Treatment Priority: monitor solids upgrade, OMEs, dysarthria, attn, visual scanning  Discussed POC: Patient, Nursing  Discussed Risks/Benefits: Yes, Patient, Nursing  Patient/Caregiver Agreeable: Yes      Subjective   Pt wheeled self to scheduled ST appointment time.     General Visit Information:   Reason for Referral: Follow-up dysphagia and speech-language treatments  Referred By: Dr. Dubose  Past Medical History Relevant to Rehab: HTN, DM, CKD III, tobacco abuse  Prior to Session Communication: Bedside nurse    Pain Assessment:  Pain Assessment  0-10 (Numeric) Pain Score: 0 - No pain      Objective   Dysphagia goals (Established 11/19/24, projected discontinuation 12/10/24):  STG 1: Pt will tolerate current diet recommendation without overt s/s of aspiration with 90% accuracy during observed PO trials.   PROGRESS: No reported s/s aspiration. Pt has been receiving soft/bite-size textures.  Discussed with kitchen staff who updated meal ticket. No overt s/s aspiration with ginger-alvarez and Fig Ricks during tx session. Mild L sided ant bolus loss.               STATUS: continue, monitor upgrade to regular solids with thin liquids. See below.  STG 2: Pt will participate in PO trials with upgraded diet textures without overt s/s of aspiration with 80% accuracy.  PROGRESS: As above, meal ticket was updated by kitchen staff.  STATUS: Advance diet texture to regular with thin liquids. 11/25: continue to monitor advancing diet textures.  STG 3: Pt to participate in oral ROM and strengthening exercises to 85% accuracy with cues as needed to improve ability to clear oral residues with least restrictive diet.  PROGRESS:  Provided direct model, mirror for feedback, and cues for pacing, full ROM, jaw stability. Able to complete x10 repetitions of each of the following - labial protrusion/retraction/alternating protrusion and retraction/press/vowels/attempted cheek puff and suction, lingual protrusion/retraction/lateralization/pops. Provided handout and instruction for facial  circulation techniques. Utilized direct model.              STATUS: continue, progressing with cues  STG 4: Pt to demonstrate functional use of compensatory swallowing strategies to 90% to reduce risk of aspiration and s/s dysphagia.  PROGRESS: Cues to utilize slow rate, and small sips, and tighten lips on cup to prevent anterior oral spillage. 11/25: same as previous session.              STATUS: continue, progressing with cues    LTG: Pt will tolerate least restrictive diet without overt s/s of aspiration in order to meet nutrition and hydration needs.    Therapeutic Swallow:  Therapeutic Swallow Intervention : Compensatory Strategies, PO Trials, Oral Strengthening Techniques  Solid Diet Recommendations: Regular (IDDSI Level 7)  Liquid Diet Recommendations: Thin (IDDSI Level 0)    Speech and Language Goals: (established 11/19/24, projected  discontinuation 12/10/24)  Pt will complete oral-motor exercises with 80% acc given model and cues as needed in order to improve motor speech/articulation skills.              PROGRESS: see above              STATUS: continue, progressing with cues  Pt will  utilize speech intelligibility strategies with 75% acc to improve communication skills.  PROGRESS: Pt recalled 2/3 strategies previously discussed. Focused on slow rate and overarticulation. 11/25: same as previous tx session.  STATUS: continue  3.    Pt will  complete articulatory precision tasks with 75% accuracy to improve intelligibility.   PROGRESS: Not addressed during this tx session.              STATUS: continue, progressing with tasks of increased complexity.    LTG: Pt will increase motor speech function to the highest possible speech level for improved functional communication within daily living tasks.       4. Pt will increase STM/recall to 80% accuracy with cues to improve recall of functional daily information.  PROGRESS: Pt was able to recall 3/3 breakfast items, daily tx routine/schedule. She was able to describe 1 task completed during each OT/PT session.  STATUS: continue  5. Pt will improve visual scanning to 75% accuracy with cues to increase visual awareness of environment.  PROGRESS: Able to sequence numbers 1-25, alternate letters/numbers, and only 1 miss on Bell Cancellation. All completed via BITS  STATUS: continue   6. Ongoing assessment if indicated for further goal development or to determine progress, as needed.  PROGRESS: Not clinically indicated at this time  STATUS: continue as needed.   LTG 1: Pt will optimize cognitive-linguistic skills necessary for return to least restrictive living environment and to reduce caregiver dependence.   Motor Speech:   Motor Speech Intervention : Compensatory Speech Strategies, Oral/Facial Agility Techniques, Oral Lingual Strengthening Techniques    Cognitive Linguistics:  Cognitive Linguistics  Interventions: Short term memory, Other (Comment) (visual scanning)    Inpatient Education:  Adult Inpatient Education  Individual(s) Educated: Patient  Verbal Education : regular/thin textures  Risk and Benefits Discussed with Patient/Caregiver/Other: yes  Patient/Caregiver Demonstrated Understanding: yes  Plan of Care Discussed and Agreed Upon: yes  Patient Response to Education: Patient/Caregiver Verbalized Understanding of Information

## 2024-11-25 NOTE — CARE PLAN
The patient's goals for the shift include improve mobility    The clinical goals for the shift include maintain safety    Over the shift, the patient did not make progress toward the following goals. Barriers to progression include . Recommendations to address these barriers include .

## 2024-11-26 LAB
GLUCOSE BLD MANUAL STRIP-MCNC: 73 MG/DL (ref 74–99)
GLUCOSE BLD MANUAL STRIP-MCNC: 93 MG/DL (ref 74–99)

## 2024-11-26 PROCEDURE — 92507 TX SP LANG VOICE COMM INDIV: CPT | Mod: GN

## 2024-11-26 PROCEDURE — 97535 SELF CARE MNGMENT TRAINING: CPT | Mod: GO,CO

## 2024-11-26 PROCEDURE — 2500000002 HC RX 250 W HCPCS SELF ADMINISTERED DRUGS (ALT 637 FOR MEDICARE OP, ALT 636 FOR OP/ED): Performed by: INTERNAL MEDICINE

## 2024-11-26 PROCEDURE — 92526 ORAL FUNCTION THERAPY: CPT | Mod: GN

## 2024-11-26 PROCEDURE — 1180000001 HC REHAB PRIVATE ROOM DAILY

## 2024-11-26 PROCEDURE — 97530 THERAPEUTIC ACTIVITIES: CPT | Mod: GP,CQ

## 2024-11-26 PROCEDURE — 82947 ASSAY GLUCOSE BLOOD QUANT: CPT

## 2024-11-26 PROCEDURE — 2500000004 HC RX 250 GENERAL PHARMACY W/ HCPCS (ALT 636 FOR OP/ED): Performed by: INTERNAL MEDICINE

## 2024-11-26 PROCEDURE — 97116 GAIT TRAINING THERAPY: CPT | Mod: GP,CQ

## 2024-11-26 PROCEDURE — 2500000001 HC RX 250 WO HCPCS SELF ADMINISTERED DRUGS (ALT 637 FOR MEDICARE OP): Performed by: INTERNAL MEDICINE

## 2024-11-26 PROCEDURE — 99232 SBSQ HOSP IP/OBS MODERATE 35: CPT | Performed by: INTERNAL MEDICINE

## 2024-11-26 PROCEDURE — 97112 NEUROMUSCULAR REEDUCATION: CPT | Mod: GO,CO

## 2024-11-26 PROCEDURE — 97110 THERAPEUTIC EXERCISES: CPT | Mod: GP,CQ

## 2024-11-26 PROCEDURE — 2500000005 HC RX 250 GENERAL PHARMACY W/O HCPCS: Performed by: INTERNAL MEDICINE

## 2024-11-26 ASSESSMENT — PAIN - FUNCTIONAL ASSESSMENT
PAIN_FUNCTIONAL_ASSESSMENT: 0-10

## 2024-11-26 ASSESSMENT — COGNITIVE AND FUNCTIONAL STATUS - GENERAL
DAILY ACTIVITIY SCORE: 18
DRESSING REGULAR UPPER BODY CLOTHING: A LITTLE
DRESSING REGULAR LOWER BODY CLOTHING: A LOT
HELP NEEDED FOR BATHING: A LITTLE
TOILETING: A LOT

## 2024-11-26 ASSESSMENT — ACTIVITIES OF DAILY LIVING (ADL)
HOME_MANAGEMENT_TIME_ENTRY: 10
BATHING_LEVEL_OF_ASSISTANCE: CONTACT GUARD
BATHING_WHERE_ASSESSED: SHOWER
BATHING_EQUIPMENT_NEEDED: LONG-HANDLED SPONGE;REMOVEABLE SHOWER HEAD
HOME_MANAGEMENT_TIME_ENTRY: 60

## 2024-11-26 ASSESSMENT — PAIN SCALES - GENERAL
PAINLEVEL_OUTOF10: 0 - NO PAIN

## 2024-11-26 NOTE — CARE PLAN
Problem: IRF PT STG Problem  Goal: Patient will roll right and left with minimal assist to facilitate mobility.  Outcome: Met  Goal: Patient will transfer supine to sit and sit to supine with minimal assist to facilitate mobility.  Outcome: Met  Goal: Patient will transfer sit to stand and stand to sit with minimal assist to facilitate mobility.  Outcome: Met  Goal: Patient will transfer bed to chair and chair to bed with moderate assist to facilitate mobility.  Outcome: Met  Goal: Patient will amb 30 feet least restrictive device including no turns on even surface with moderate assist to facilitate safe mobility.    Outcome: Met  Goal: Patient will propel wheelchair 100 feet with two turns on even surface with supervision assist to facilitate safe mobility.   Outcome: Met  Goal: Patient will improve FIST score to  35/56  to promote mobility and safety with seated activities.   Outcome: Met     Problem: IRF PT LTG Problem  Goal: Patient will roll right and left with independent assist to facilitate mobility.  Outcome: Met  Goal: Patient will transfer supine to sit and sit to supine with independent assist to facilitate mobility.  Outcome: Progressing  Goal: Patient will transfer sit to stand and stand to sit with independent assist to facilitate mobility.  Outcome: Progressing  Goal: Patient will transfer bed to chair and chair to bed with independent assist to facilitate mobility.  Outcome: Progressing  Goal: Patient will amb 150 feet least restrictive device including two turns on even surface with supervision assist to facilitate safe mobility.    Outcome: Progressing  Goal: Patient will negotiate 12 stairs with two rail(s) and supervision} assist with no device for in home and community.  Outcome: Progressing  Goal: Patient will propel wheelchair 150 feet with two turns on even surface with independent assist to facilitate safe mobility.   Outcome: Progressing  Goal: Patient will perform TUG with least  restrictive assistive device in 30 seconds or less to promote mobility and reduce fall risk with dynamic standing tasks.   Outcome: Progressing  Goal: Patient will improve FIST score to  50/56  to promote mobility and safety with seated activities.   Outcome: Met  Goal: Patient will increase left LE strength to 4-/5 to improve functional mobility.   Outcome: Progressing  Goal: Patient will increase ROM by full range throughout left lower extremity to improve functional mobility.  Outcome: Met

## 2024-11-26 NOTE — PROGRESS NOTES
Occupational Therapy    WEEKLY PROGRESS NOTE  Evaluation date: 11/19/2024    Treatment Rx provided: See daily flowsheet        PRECAUTIONS  Precautions  Hearing/Visual Limitations: No glasses. Smooth pursuits, saccades and convergence/ divergence slowed but intact. No visual field cuts identified.  Medical Precautions: Fall precautions  Precautions Comment: left shoulder harness    ADL/ Functional Status:  Eating Setup   Grooming Setup   Toilet Hygiene Moderate assistance   Shower/Bathe Upper:  Setup  Lower:  Contact guard   Upper Body Dress Close supervision   Lower Body Dress Moderate assistance (able to thread right leg in pants partial thread of left with reach partial pull up required assist with left side. Patient instructed to utilize 3 piont stance)   On/Off Footwear Socks:  Setup (sockaide)  Shoes:  Maximum assistance (able to joan left assist to fasten. Assist to joan right and fasten)   Toilet Transfer Minimal assistance   Car Transfer Minimal assistance   Meal Preparation  N/A   Kitchen Mobility  N/A     Standardized Tests:  Box of blocks Box and Block: RUE 98 blks/min LUE unable   9 hole peg test 9 Hole Peg Test: RUE 21 secs LUE unable    strength  Bits Cruz cancellation  Clayton making A/B Other Outcome Measures: RUE 52lbs improved 9 lbs LUE unable Cruz cancellation right hand stylus 3 mins 51 secs 2 misses 0 distractors. Improved from missing 5 to missing 2       ROM - LEFT Upper Extremity  Shoulder:   Flex L Shoulder Flexion  0-170: 35 Degrees  Ext L Shoulder Extension  0-60: 35 Degrees  Abd L Shoulder ABduction 0-160/180: 60 Degrees Elbow:     Flex/ext L Elbow Flexion/Extension 0-135-150: 60 °     Forearm:   Supination  0  Pronation  0 Wrist:   Flex L Wrist Flexion 0-80:  (trace flexion)  Ext:     Wrist Deviation:   Radial    Ulnar       [facility] Daily Activity  Putting on and taking off regular lower body clothing: A lot  Bathing (including washing, rinsing, drying): A little  Putting on and taking  off regular upper body clothing: A little  Toileting, which includes using toilet, bedpan or urinal: A lot  Taking care of personal grooming such as brushing teeth: None  Eating Meals: None  Daily Activity - Total Score: 18    Plan Of Care: Pt is progressing towards established goals.  Pt presents with improvement with L shoulder and elbow ROM.  Continue OT per POC to address remaining goals and to promote greater safety and independence with ADLs.       Problem: ADLs STM Goals  Goal: Patient with complete lower body dressing with contact guard assist level of assistance donning and doffing all LE clothes  with PRN adaptive equipment while supported sitting.  Outcome: Progressing  Goal: Patient will complete toileting including  clothing management/hygiene with contact guard assist level of assistance and grab bars and bedside commode.  Outcome: Progressing     Problem: TRANSFERS STM Goals  Goal: Patient will complete functional transfer to toilet with least restrictive device with contact guard assist level of assistance.  Outcome: Progressing  Goal: Pt will complete functional transfer to car with least restrictive device with contact guard assist level.  Outcome: Progressing     Problem: ADLs LTM Goals  Goal: Patient will perform UB and LB bathing seated with modified independent level of assistance and grab bars and shower chair.  Outcome: Progressing  Goal: Patient with complete upper body dressing with modified independent level of assistance donning and doffing all UE clothes with PRN adaptive equipment while supported sitting.  Outcome: Progressing  Goal: Patient with complete lower body dressing with modified independent level of assistance donning and doffing all LE clothes  with PRN adaptive equipment while supported sitting.  Outcome: Progressing  Goal: Patient will complete toileting including clothing management/hygiene with modified independent level of assistance and grab bars and bedside  commode.  Outcome: Progressing  Goal: Pt will perform light home management and meal prep activity with modified independence with use of least restrictive device and good use of EC/WS techniques and safety.  Outcome: Progressing     Problem: TRANSFERS LTM Goals  Goal: Patient will complete functional transfer to toilet with least restrictive device with modified independent level of assistance.  Outcome: Progressing  Goal: Patient will complete functional car transfer with least restrictive device with modified independence.  Outcome: Progressing

## 2024-11-26 NOTE — CARE PLAN
Problem: ADLs STM Goals  Goal: Patient with complete lower body dressing with contact guard assist level of assistance donning and doffing all LE clothes  with PRN adaptive equipment while supported sitting.  Outcome: Progressing  Goal: Patient will complete toileting including  clothing management/hygiene with contact guard assist level of assistance and grab bars and bedside commode.  Outcome: Progressing     Problem: TRANSFERS STM Goals  Goal: Patient will complete functional transfer to toilet with least restrictive device with contact guard assist level of assistance.  Outcome: Progressing  Goal: Pt will complete functional transfer to car with least restrictive device with contact guard assist level.  Outcome: Progressing     Problem: ADLs LTM Goals  Goal: Patient will perform UB and LB bathing seated with modified independent level of assistance and grab bars and shower chair.  Outcome: Progressing  Goal: Patient with complete upper body dressing with modified independent level of assistance donning and doffing all UE clothes with PRN adaptive equipment while supported sitting.  Outcome: Progressing  Goal: Patient with complete lower body dressing with modified independent level of assistance donning and doffing all LE clothes  with PRN adaptive equipment while supported sitting.  Outcome: Progressing  Goal: Patient will complete toileting including clothing management/hygiene with modified independent level of assistance and grab bars and bedside commode.  Outcome: Progressing  Goal: Pt will perform light home management and meal prep activity with modified independence with use of least restrictive device and good use of EC/WS techniques and safety.  Outcome: Progressing     Problem: TRANSFERS LTM Goals  Goal: Patient will complete functional transfer to toilet with least restrictive device with modified independent level of assistance.  Outcome: Progressing  Goal: Patient will complete functional car  transfer with least restrictive device with modified independence.  Outcome: Progressing

## 2024-11-26 NOTE — PROGRESS NOTES
"[facility]  Physician Progress Note    Subjective   Cynthia Muse is a 79 y.o. female admitted to inpatient rehabilitation unit.    The patient is doing well overall and expresses no particular concerns.  Participating well with therapy.  Eating well and the bowels are moving.  No chest pain or shortness of breath is reported.    Stand pivot - CGA to min.    Ambulates w/ hemicane 110 feet min assist.    An interdisciplinary team meeting was held today.  I was in attendance and discussed this patient's functional progress with the physical and occupational therapist, as well as the speech therapist if they are involved in the patient's care.  Also present were representatives from the nursing staff and case management.  PLEASE REFER TO THE INTERDISCIPLINARY TEAM DOCUMENTATION FOR ADDITIONAL COMMENTS ON THE PATIENT'S CLINICAL AND FUNCTIONAL CONDITION.  I agree with the decisions made by the team.  The patient's medical and functional condition continue to require frequent physician visits, 24 hour rehabilitation nursing care and an interdisciplinary approach, such that this level of care could not be provided outside of an inpatient rehabilitation facility.  The patient's anticipated discharge plan and date was reviewed and approved by me.        Objective   BP (!) 177/91 (BP Location: Right arm, Patient Position: Sitting)   Pulse 71   Temp 36.9 °C (98.4 °F) (Temporal)   Resp 16   Ht 1.6 m (5' 2.99\")   Wt 72 kg (158 lb 11.7 oz)   SpO2 97%   BMI 28.13 kg/m²      Physical Exam  Constitutional:       General: She is not in acute distress.     Appearance: She is not toxic-appearing.   HENT:      Head: Normocephalic and atraumatic.      Mouth/Throat:      Mouth: Mucous membranes are moist.   Eyes:      Pupils: Pupils are equal, round, and reactive to light.   Cardiovascular:      Rate and Rhythm: Normal rate and regular rhythm.      Heart sounds: No murmur " heard.  Pulmonary:      Breath sounds: Normal breath sounds. No wheezing, rhonchi or rales.   Abdominal:      General: There is no distension.      Palpations: Abdomen is soft.   Musculoskeletal:      Right lower leg: No edema.      Left lower leg: No edema.   Neurological:      Mental Status: She is alert and oriented to person, place, and time.      Left hemiparesis.  Getting some movement at the elbow in terms of extension.    Labs  BMP:        CBC:        Coagulation:           Assesment and Plan    Ischemic Stroke (Multi)   Left Hemiparesis (Multi)   Essential Hypertension   Hyperlipidemia   Chronic Kidney Disease (Ckd) Stage G3a/A2, Moderately Decreased Glomerular Filtration Rate (Gfr) Between 45-59 Ml/Min/1.73 Square Meter and Albuminuria Creatinine Ratio Between  Mg/G (C* (Multi)      Ischemic stroke - Continue with medical management for secondary prevention: DAPT x 21 days, statin  DVT prophylaxis has been ordered: LMWH       BINH/CKD - fairly stable; monitor    DM2 - cont URENA, monitor glucose     HTN - amlodipine, coreg     Tobacco use disorder - nicotine patch ordered     HPL - stat     Left hemiparesis     Dysphagia - Cont ST efforts.    Yunior Dubose MD

## 2024-11-26 NOTE — CARE PLAN
The patient's goals for the shift include improve mobility    The clinical goals for the shift include maintain safety    Over the shift, the patient did not make progress toward the following goals. Barriers to progression include left upper extremity weakness. Recommendations to address these barriers include continue current treatment plan.

## 2024-11-26 NOTE — PROGRESS NOTES
11/26/24 1300 to 1330   General   Reason for Referral CVA   Referred By Dr. Dubose   Past Medical History Relevant to Rehab HTN, DM, CKD III, tobacco abuse   Prior to Session Communication Bedside nurse   Patient Position Received Up in chair;Alarm on   Preferred Learning Style verbal;visual   General Comment Agreeable to treatment.   Precautions   Hearing/Visual Limitations No glasses. Smooth pursuits, saccades and convergence/ divergence slowed but intact. No visual field cuts identified.   Medical Precautions Fall precautions   Precautions Comment left shoulder harness                                      Pain Assessment   Pain Assessment 0-10   0-10 (Numeric) Pain Score 0 - No pain   Tub Transfers   Tub Transfer From   (robb cane)   Tub Transfer Type To and from   Tub Transfer to Transfer tub bench   Tub Transfer Technique Ambulating;To left;To right   Tub Transfers Contact guard   Tub Transfers Comments educated on transfer able to complete with cues and contact guard dry transfer   IP OT Assessment   End of Session Communication Bedside nurse   End of Session Patient Position Up in chair;Alarm on   Education   Individual(s) Educated Patient   Equipment   (Issued handout for ttb, grab bars and bed side commode provided with a community resource sheet.)   Risk and Benefits Discussed with Patient/Caregiver/Other yes   Patient/Caregiver Demonstrated Understanding yes   Plan of Care Discussed and Agreed Upon yes   Patient Response to Education Patient/Caregiver Asked Appropriate Questions;Patient/Caregiver Verbalized Understanding of Information   Inpatient/Swing Bed or Outpatient   Inpatient/Swing Bed or Outpatient Inpatient   Inpatient Plan   Treatment Interventions Neuromuscular reeducation;Fine motor coordination activities   OT - OK to Discharge Yes      11/26/24 1300   OT Adult Other Outcome Measures   9 Hole Peg Test RUE 21 secs LUE unable   Box and Block RUE 98 blks/min LUE unable   Other Outcome  Measures RUE 52lbs improved 9 lbs LUE unable Cruz cancellation right hand stylus 3 mins 51 secs 2 misses 0 distractors. Improved from missing 5 to missing 2

## 2024-11-26 NOTE — PROGRESS NOTES
Speech-Language Pathology    SLP Adult IRF [facility] Dysphagia and Speech-Language Pathology Treatments     Patient Name: Cynthia Muse  MRN: [id number]  Today's Date: 11/26/2024  Time Calculation  Start Time: 0910  Stop Time: 0950  Time Calculation (min): 40 min     1/4sw +sp    Current Problem:   Ischemic stroke with left body involvement (right brain)     SLP Assessment:  SLP TX Intervention Outcome: Making Progress Towards Goals  SLP Assessment Results: Cognitive Deficits, Motor Speech Deficits  Prognosis: Good  Treatment Tolerance: Patient tolerated treatment well  Medical Staff Made Aware: Yes  Strengths: Motivation  Education Provided: Yes     Plan:  SLP TX Plan: Continue Plan of Care, Other (Comment) (change frequency to 4x/wk, d/c dysphagia POC, continue s-l POC)  SLP Frequency: 4x per week  Duration: 3 weeks  SLP Discharge Recommendations: Other (Comment) (TBD pending progress)  Next Treatment Priority: OMEs, dysarthria, CLQT reassess  Discussed POC: Patient, Nursing  Discussed Risks/Benefits: Yes, Patient, Nursing  Patient/Caregiver Agreeable: Yes    Subjective   Pt seen upright in wheelchair.      General Visit Information:   Reason for Referral: Follow-up dysphagia and speech-language treatments  Referred By: Dr. Dubose  Past Medical History Relevant to Rehab: HTN, DM, CKD III, tobacco abuse  Prior to Session Communication: Bedside nurse    Pain Assessment:  Pain Assessment  0-10 (Numeric) Pain Score: 0 - No pain      Objective   Dysphagia goals (Established 11/19/24, projected discontinuation 12/10/24):  STG 1: Pt will tolerate current diet recommendation without overt s/s of aspiration with 90% accuracy during observed PO trials.   PROGRESS: No reported s/s aspiration. Pt reports that she had ham for dinner and sausage for breakfast, no difficulties. Pt was seen with Cheetos. No difficulties noted. No overt s/s aspiration with continuous straw drink via small diameter straw. ACHIEVED- 11/26  STATUS: Pt is  tolerating diet upgrade to regular solids with thin liquids. GOAL MET - 11/26  STG 2: Pt will participate in PO trials with upgraded diet textures without overt s/s of aspiration with 80% accuracy.  PROGRESS: ACHIEVED - 11/26  STATUS: GOAL MET - 11/26  STG 3: Pt to participate in oral ROM and strengthening exercises to 85% accuracy with cues as needed to improve ability to clear oral residues with least restrictive diet.  PROGRESS:  Provided direct model, mirror for feedback, and cues for pacing, full ROM, jaw stability. Able to complete x10 repetitions of each of the following - labial protrusion/retraction/alternating protrusion and retraction/press/vowels/attempted cheek puff and suction, lingual protrusion/retraction/lateralization/pops. Provided handout and instruction for facial  circulation techniques. Utilized direct model. 11/26: OMEs can be addressed on s-l POC.              STATUS: continue, address via s-l POC.  STG 4: Pt to demonstrate functional use of compensatory swallowing strategies to 90% to reduce risk of aspiration and s/s dysphagia.  PROGRESS: Pt has independently been asking staff to cut food into small, bite size pieces. Adequate seal on small diameter straw. ACHIEVED - 11/26              STATUS: GOAL MET - 11/26     LTG: Pt will tolerate least restrictive diet without overt s/s of aspiration in order to meet nutrition and hydration needs.    Therapeutic Swallow:  Solid Diet Recommendations: Regular (IDDSI Level 7)  Liquid Diet Recommendations: Thin (IDDSI Level 0)    Speech and Language Goals: (established 11/19/24, projected discontinuation 12/10/24)  Pt will complete oral-motor exercises with 80% acc given model and cues as needed in order to improve motor speech/articulation skills.  PROGRESS: see above. Not addressed during this tx session as focus was on diet tolerance.              STATUS: continue, progressing with cues  Pt will  utilize speech intelligibility strategies with 75% acc  to improve communication skills.  PROGRESS: Cues to utilize slow rate, overarticulation, and pauses.  STATUS: continue, progressing with cues  3.    Pt will  complete articulatory precision tasks with 75% accuracy to improve intelligibility.   PROGRESS: 70% with cues for alliterative sentences (bilabials). Noted air escapage (L).              STATUS: continue, progressing with cues   LTG: Pt will increase motor speech function to the highest possible speech level for improved functional communication within daily living tasks.       4. Pt will increase STM/recall to 80% accuracy with cues to improve recall of functional daily information.  PROGRESS: 80% for understanding a voicemail. Cues to repeat/replay message prior to response.  STATUS: continue, pt benefits from repetition cues  5. Pt will improve visual scanning to 75% accuracy with cues to increase visual awareness of environment.  PROGRESS: 90% for reading a clock. 90% for clock math. ACHIEVED - 11/26  STATUS: GOAL MET - 11/26  6. Ongoing assessment if indicated for further goal development or to determine progress, as needed.  PROGRESS: Not clinically indicated at this time  STATUS: continue as needed.     LTG 1: Pt will optimize cognitive-linguistic skills necessary for return to least restrictive living environment and to reduce caregiver dependence.     Motor Speech:   Motor Speech Intervention : Compensatory Speech Strategies    Cognitive Linguistics:  Cognitive Linguistics Interventions: Short term memory, Attention, Other (Comment) (visual scanning)    Inpatient Education:  Adult Inpatient Education  Individual(s) Educated: Patient  Verbal Education : discharge dysphagia POC, continue s-l POC, progress  Risk and Benefits Discussed with Patient/Caregiver/Other: yes  Patient/Caregiver Demonstrated Understanding: yes  Plan of Care Discussed and Agreed Upon: yes  Patient Response to Education: Patient/Caregiver Verbalized Understanding of  Information    Consultations/Referrals/Coordination of Services: Discussed with RN

## 2024-11-26 NOTE — NURSING NOTE
Assumed care of patient at report. Patient sitting up to chair eating breakfast, has been t the restrrom already and denies pain currently. Bp was elevated this AM, report to be given meds and will recheck before med pass. Will monitor for any changes through shift

## 2024-11-26 NOTE — PROGRESS NOTES
11/26/24 0800 to 0900   General   Reason for Referral CVA   Referred By Dr. Dubose   Past Medical History Relevant to Rehab HTN, DM, CKD III, tobacco abuse   Prior to Session Communication Bedside nurse   Patient Position Received Up in chair;Alarm on   Preferred Learning Style verbal;visual   General Comment Agreeable to treatment.   Precautions   Hearing/Visual Limitations No glasses. Smooth pursuits, saccades and convergence/ divergence slowed but intact. No visual field cuts identified.   Medical Precautions Fall precautions   Precautions Comment left shoulder harness   Pain Assessment   Pain Assessment 0-10   0-10 (Numeric) Pain Score 0 - No pain   LUE AROM (degrees)   L Shoulder Flexion  0-170 35 Degrees   L Shoulder Extension  0-60 35 Degrees   L Shoulder ABduction 0-160/180 60 Degrees   L Elbow Flexion/Extension 0-135-150 60 °   L Forearm Pronation  0-80-90   (trace)   L Wrist Flexion 0-80   (trace flexion)   Feeding   Feeding Level of Assistance Setup   Feeding Where Assessed Bed level   Feeding Comments bite size bites thin liquid   Grooming   Grooming Level of Assistance Setup   Grooming Where Assessed Sitting sinkside   UE Bathing   UE Bathing Adaptive Equipment Removeable shower head   UE Bathing Level of Assistance Setup   UE Bathing Where Assessed Shower   UE Bathing Comments seatedin shower utilized modified bathing techniques   LE Bathing   LE Bathing Adaptive Equipment Long-handled sponge;Removeable shower head   LE Bathing Level of Assistance Contact guard   LE Bathing Where Assessed Shower   LE Bathing Comments patient utilized 3 point stance and able to complete rear hygiene in standing with contact guard   UE Dressing   UE Dressing Level of Assistance Close supervision   UE Dressing Where Assessed Wheelchair   UE Dressing Comments cues for left side robb dressing   LE Dressing   LE Dressing Yes   LE Dressing Adaptive Equipment Reacher;Sock aide   Pants Level of Assistance Moderate  assistance  (able to thread right leg in pants partial thread of left with reach partial pull up required assist with left side. Patient instructed to utilize 3 piont stance)   Sock Level of Assistance Setup  (sockaide)   Shoe Level of Assistance Maximum assistance  (able to joan left assist to fasten. Assist to joan right and fasten)   Orthotics Level of Assistance Dependent   Adult Briefs Level of Assistance Dependent   LE Dressing Where Assessed Wheelchair   Toileting   Toileting Level of Assistance Moderate assistance   Where Assessed Bedside commode   Toileting Comments able to complete hygiene assist with pants up and down   Transfers   Transfer Yes   Transfer 1   Transfer From 1 Sit to   Transfer to 1 Stand   Technique 1 Sit to stand;Stand to sit   Transfer Device 1 Gait belt   Transfer Level of Assistance 1 Contact guard   Trials/Comments 1 cues for hand placement   Toilet Transfers   Toilet Transfer From Wheelchair   Toilet Transfer Type To and from   Toilet Transfer to Standard bedside commode  (atop toilet)   Toilet Transfer Technique To right;To left   Toilet Transfers Minimal assistance   Toilet Transfers Comments cues for handplacement  and locking brakes   Shower Transfers   Shower Transfer From Wheelchair   Shower Transfer Type To and from   Shower Transfer to Shower seat with back   Shower Transfer Technique To right;To left   Shower Transfers Contact guard   Shower Transfers Comments cues to properly align with chair prior to sitting   IP OT Assessment   OT Assessment Pt is progressing with goals in POC. Regaining strength. Still below baseline. Will continue to works towards goals for safe D/C home.   Prognosis Good   Barriers to Discharge None   Evaluation/Treatment Tolerance Patient tolerated treatment well   Medical Staff Made Aware Yes   End of Session Communication Bedside nurse   End of Session Patient Position Up in chair;Alarm on   OT Assessment   OT Assessment Results Decreased upper  extremity strength;Decreased cognition;Decreased endurance;Decreased sensation;Decreased gross motor control;Decreased fine motor control   Strengths Access to adaptive/assistive products   Barriers to Participation Comorbidities   Education   Individual(s) Educated Patient   Education Provided   (Patient educated on 3 point stance for pants pull up)   Risk and Benefits Discussed with Patient/Caregiver/Other yes   Patient/Caregiver Demonstrated Understanding yes   Plan of Care Discussed and Agreed Upon yes   Patient Response to Education Patient/Caregiver Asked Appropriate Questions;Patient/Caregiver Verbalized Understanding of Information   Inpatient/Swing Bed or Outpatient   Inpatient/Swing Bed or Outpatient Inpatient   Inpatient Plan   Treatment Interventions ADL retraining   OT - OK to Discharge Yes

## 2024-11-26 NOTE — PROGRESS NOTES
Physical Therapy       11/26/24 [id number]   PT  Visit   PT Received On 11/26/24   Response to Previous Treatment Patient with no complaints from previous session.   General   Reason for Referral CVA   Referred By Dr. Dubose   Past Medical History Relevant to Rehab HTN, DM, CKD III, tobacco abuse   Patient Position Received Up in chair;Alarm on   Preferred Learning Style verbal;visual   General Comment Agreeable to treatment.   Precautions   Medical Precautions Fall precautions   Precautions Comment left shoulder harness   Pain Assessment   Pain Assessment 0-10   0-10 (Numeric) Pain Score 0 - No pain   Therapeutic Exercise   Therapeutic Exercise Activity 1 seated hip flexion, 3# RLE, 2.5# LLE, 2x15 reps   Therapeutic Exercise Activity 2 seated LAQ, 2x15 reps, 2.5# LLE, 3# RLE   Therapeutic Activity   Therapeutic Activity 1 sit/stands, no UE assist, 1x15 reps, Bob to modA with fatigue.   Therapeutic Activity 2 chair push ups, 1x15 reps, assist for holding RUE in position.   Ambulation/Gait Training 1   Surface 1 Level tile   Device 1   (robb cane)   Gait Support Devices L AFO;Gait belt  (3/8 inch heel lift)   Assistance 1 Minimum assistance;Moderate assistance   Quality of Gait 1 Forward flexed posture;Soft knee(s);Inconsistent stride length;Decreased step length;Listing;NBOS   Comments/Distance (ft) 1 60 ft x 2, turns included.  Heel lift used to reduce genu recurvatum with fair results noted.  AFO changed BACK to Toe Off per pt report of being most comfortable.   Transfer 1   Technique 1 Sit to stand;Stand to sit   Transfer Device 1 Gait belt   Transfer Level of Assistance 1 Contact guard   Wheelchair Activities   Propulsion Type 1 Manual   Level 1 Level tile   Method 1 Right upper extremity;Right lower extremity   Level of Assistance 1 Modified independent   Description/Details 1 ad barbara around facility   Other Activity   Other Activity 1 extra time spent donning Toe Off AFO and 3/8 inch heel lift   Activity  Tolerance   Endurance Tolerates 30 min exercise with multiple rests   PT Assessment   PT Assessment Results Decreased strength;Decreased range of motion;Decreased endurance;Impaired balance;Decreased mobility;Decreased coordination;Impaired tone   Rehab Prognosis Good   Evaluation/Treatment Tolerance Patient tolerated treatment well   PT Plan   Inpatient/Swing Bed or Outpatient Inpatient   PT Plan   Treatment/Interventions Bed mobility;Transfer training;Gait training;Stair training;Neuromuscular re-education;Therapeutic exercise;Therapeutic activity   PT Plan Ongoing PT   PT Recommended Transfer Status Assist x1;Assistive device

## 2024-11-26 NOTE — PROGRESS NOTES
The patient is seen today for evaluation and face-to-face encounter with a diagnosis of stoke presenting with foot drop on the left.  The patient has difficulty advancing the affected lower extremity when ambulating and would benefit from an ankle-foot orthosis to minimize foot drop and promote safe ambulation and reduce the risk of falling.  I agree with the orthotist's assessment and would like my patient to be fit with an ankle-foot orthosis.

## 2024-11-26 NOTE — PROGRESS NOTES
Physical Therapy Weekly Progress Note       11/26/24 [id number]   PT  Visit   PT Received On 11/26/24   Response to Previous Treatment Patient with no complaints from previous session.   General   Reason for Referral CVA   Referred By Dr. Dubose   Past Medical History Relevant to Rehab HTN, DM, CKD III, tobacco abuse   Patient Position Received Up in chair;Alarm on   Preferred Learning Style verbal;visual   General Comment Agreeable to treatment.   Precautions   Medical Precautions Fall precautions   Precautions Comment left shoulder harness   Pain Assessment   Pain Assessment 0-10   0-10 (Numeric) Pain Score 0 - No pain   Strength RLE   R Hip Flexion 5/5   R Hip Extension 5/5   R Hip ABduction 5/5   R Hip ADduction 5/5   R Knee Flexion 5/5   R Knee Extension 5/5   R Ankle Dorsiflexion 5/5   Strength LLE   L Hip Flexion 3+/5   L Hip ABduction 3+/5   L Hip ADduction 4/5   L Knee Extension 4+/5   L Ankle Dorsiflexion 2+/5   L Hip Extension 3+/5   L Knee Flexion 4-/5   L Ankle Plantar Flexion 2+/5   Bed Mobility 1   Bed Mobility 1 Supine to sitting;Sitting to supine   Level of Assistance 1 Set up   Bed Mobility Comments 1 flat mat, no rail, extra time for LLE up/down   Bed Mobility 2   Bed Mobility  2 Scooting   Level of Assistance 2 Set up   Bed Mobility 3   Bed Mobility 3 Rolling right;Rolling left   Level of Assistance 3 Modified independent   Bed Mobility Comments 3 flat mat, no rail   Ambulation/Gait Training 1   Surface 1 Level tile   Device 1 Rolling walker  (L ahnd )   Gait Support Devices L AFO;Gait belt   Assistance 1 Minimum assistance;Moderate assistance   Quality of Gait 1 Forward flexed posture;Soft knee(s);Inconsistent stride length;Decreased step length;Listing;NBOS   Comments/Distance (ft) 1 60 ft x 4, turns included, trial different AFO's with best resutls using dynamic walk (small left).  Periods of min/modA needed but mostly Bob.  Vc's for lateral weight shift toward R, posture, and WBOS with  LLE.   Ambulation/Gait Training 2   Surface 2 Level tile;Carpet   Device 2 Wes Walker   Gait Support Devices Gait belt;Wheelchair follow;L AFO   Assistance 2 Minimum assistance   Quality of Gait 2 Forward flexed posture;Listing;NBOS;Inconsistent stride length;Decreased step length;Soft knee(s)   Comments/Distance (ft) 2 110 ft x 2, turns included.  Improved lateral weight shift to R noted with better L foot clearance.  Vc's needed for upright posture (pt tends to anteriorly weight shift) and smaller step with LLE.  Cuing also for slower pace to concentrate on L foot placement and for softer step wtih RLE   Transfer 1   Technique 1 Sit to stand;Stand to sit   Transfer Device 1 Gait belt   Transfer Level of Assistance 1 Contact guard   Trials/Comments 1 cues for hand placement   Transfers 2   Transfer From 2 Mat to;Wheelchair to   Transfer to 2 Wheelchair;Mat   Technique 2 Stand pivot   Transfer Device 2 Gait belt   Transfer Level of Assistance 2 Contact guard;Minimum assistance   Trials/Comments 2 lead to right   Stairs   Rails 1 Right   Device 1 Railing   Support Devices 1 Gait belt;Left Ankle-foot orthosis   Assistance 1 Contact guard;Minimum assistance   Comment/Number of Steps 1 up/down 1 flight, CG/Bob to Bob with fatigue.  Vc's and assist for weight shifting to R and for L foot placement for WBOS.  Step-to pattern.  LEss genurecurvatum noted with dynamic walk AFO vs Toe Off AFO.    Object From Floor   Devices Reacher   Assist Level Contact guard   Comments small cone from floor with reacher   Wheelchair Activities   Propulsion Type 1 Manual   Level 1 Level tile   Method 1 Right upper extremity;Right lower extremity   Level of Assistance 1 Modified independent   Description/Details 1 ad barbara around unit   Other Activity   Other Activity 1 extra time spent donning/doffing AFO/shoe to trial during gait training.   Activity Tolerance   Endurance Tolerates 30 min exercise with multiple rests   PT Assessment    PT Assessment Results Decreased strength;Decreased range of motion;Decreased endurance;Impaired balance;Decreased mobility;Decreased coordination;Impaired tone   Rehab Prognosis Good   Evaluation/Treatment Tolerance Patient tolerated treatment well   Assessment Comment Pt has met all STG at this time, as well as 3 LTG's.  FIST score improved from 25/56 to 55/56.  TUG score was 55 seconds with robb cane.  Pt continues with LLE instability and soft knee with cuing needed during gait training for posture and WBOS, sy with turnsing to L.  Pt puts forth good effort in each tx. PT NOTE: continue to assess for best AFO and DME. Recommend family training closer to discharge.    End of Session Patient Position Up in chair;Alarm on   PT Plan   Inpatient/Swing Bed or Outpatient Inpatient. Will continue with PT POC towards goals.   PT Plan   Treatment/Interventions Bed mobility;Transfer training;Gait training;Stair training;Neuromuscular re-education;Therapeutic exercise;Therapeutic activity   PT Plan Ongoing PT   PT Recommended Transfer Status Assist x1;Assistive device                     Problem: IRF PT STG Problem  Goal: Patient will roll right and left with minimal assist to facilitate mobility.  Outcome: Met  Goal: Patient will transfer supine to sit and sit to supine with minimal assist to facilitate mobility.  Outcome: Met  Goal: Patient will transfer sit to stand and stand to sit with minimal assist to facilitate mobility.  Outcome: Met  Goal: Patient will transfer bed to chair and chair to bed with moderate assist to facilitate mobility.  Outcome: Met  Goal: Patient will amb 30 feet least restrictive device including no turns on even surface with moderate assist to facilitate safe mobility.    Outcome: Met  Goal: Patient will propel wheelchair 100 feet with two turns on even surface with supervision assist to facilitate safe mobility.   Outcome: Met  Goal: Patient will improve FIST score to  35/56  to promote  mobility and safety with seated activities.   Outcome: Met     Problem: IRF PT LTG Problem  Goal: Patient will roll right and left with independent assist to facilitate mobility.  Outcome: Met  Goal: Patient will transfer supine to sit and sit to supine with independent assist to facilitate mobility.  Outcome: Progressing  Goal: Patient will transfer sit to stand and stand to sit with independent assist to facilitate mobility.  Outcome: Progressing  Goal: Patient will transfer bed to chair and chair to bed with independent assist to facilitate mobility.  Outcome: Progressing  Goal: Patient will amb 150 feet least restrictive device including two turns on even surface with supervision assist to facilitate safe mobility.    Outcome: Progressing  Goal: Patient will negotiate 12 stairs with two rail(s) and supervision} assist with no device for in home and community.  Outcome: Progressing  Goal: Patient will propel wheelchair 150 feet with two turns on even surface with independent assist to facilitate safe mobility.   Outcome: Progressing  Goal: Patient will perform TUG with least restrictive assistive device in 30 seconds or less to promote mobility and reduce fall risk with dynamic standing tasks.   Outcome: Progressing  Goal: Patient will improve FIST score to  50/56  to promote mobility and safety with seated activities.   Outcome: Met  Goal: Patient will increase left LE strength to 4-/5 to improve functional mobility.   Outcome: Progressing  Goal: Patient will increase ROM by full range throughout left lower extremity to improve functional mobility.  Outcome: Met

## 2024-11-27 LAB
GLUCOSE BLD MANUAL STRIP-MCNC: 77 MG/DL (ref 74–99)
GLUCOSE BLD MANUAL STRIP-MCNC: 95 MG/DL (ref 74–99)

## 2024-11-27 PROCEDURE — 92507 TX SP LANG VOICE COMM INDIV: CPT | Mod: GN

## 2024-11-27 PROCEDURE — 97530 THERAPEUTIC ACTIVITIES: CPT | Mod: GO

## 2024-11-27 PROCEDURE — 97112 NEUROMUSCULAR REEDUCATION: CPT | Mod: CO,GO

## 2024-11-27 PROCEDURE — 97535 SELF CARE MNGMENT TRAINING: CPT | Mod: GO

## 2024-11-27 PROCEDURE — 97110 THERAPEUTIC EXERCISES: CPT | Mod: GO

## 2024-11-27 PROCEDURE — 2500000005 HC RX 250 GENERAL PHARMACY W/O HCPCS: Performed by: INTERNAL MEDICINE

## 2024-11-27 PROCEDURE — 97116 GAIT TRAINING THERAPY: CPT | Mod: GP

## 2024-11-27 PROCEDURE — 97110 THERAPEUTIC EXERCISES: CPT | Mod: GP

## 2024-11-27 PROCEDURE — 2500000004 HC RX 250 GENERAL PHARMACY W/ HCPCS (ALT 636 FOR OP/ED): Performed by: INTERNAL MEDICINE

## 2024-11-27 PROCEDURE — 2500000002 HC RX 250 W HCPCS SELF ADMINISTERED DRUGS (ALT 637 FOR MEDICARE OP, ALT 636 FOR OP/ED): Performed by: INTERNAL MEDICINE

## 2024-11-27 PROCEDURE — 1180000001 HC REHAB PRIVATE ROOM DAILY

## 2024-11-27 PROCEDURE — 2500000001 HC RX 250 WO HCPCS SELF ADMINISTERED DRUGS (ALT 637 FOR MEDICARE OP): Performed by: INTERNAL MEDICINE

## 2024-11-27 PROCEDURE — 82947 ASSAY GLUCOSE BLOOD QUANT: CPT

## 2024-11-27 ASSESSMENT — PAIN - FUNCTIONAL ASSESSMENT
PAIN_FUNCTIONAL_ASSESSMENT: 0-10

## 2024-11-27 ASSESSMENT — ACTIVITIES OF DAILY LIVING (ADL)
BATHING_LEVEL_OF_ASSISTANCE: CONTACT GUARD
BATHING_WHERE_ASSESSED: SHOWER
HOME_MANAGEMENT_TIME_ENTRY: 35

## 2024-11-27 ASSESSMENT — PAIN SCALES - GENERAL
PAINLEVEL_OUTOF10: 0 - NO PAIN

## 2024-11-27 NOTE — CARE PLAN
Problem: Fall/Injury  Goal: Be free from injury by end of the shift  Outcome: Progressing  Goal: Use assistive devices by end of the shift  Outcome: Progressing     Problem: Pain  Goal: Turns in bed with improved pain control throughout the shift  Outcome: Progressing     Problem: Skin  Goal: Prevent/manage excess moisture  Outcome: Progressing  Flowsheets (Taken 11/27/2024 1045)  Prevent/manage excess moisture: Moisturize dry skin  Note: .  Goal: Prevent/minimize sheer/friction injuries  Outcome: Progressing  Flowsheets (Taken 11/27/2024 1045)  Prevent/minimize sheer/friction injuries:   Use pull sheet   Turn/reposition every 2 hours/use positioning/transfer devices  Note: .  Goal: Promote/optimize nutrition  Outcome: Progressing  Flowsheets (Taken 11/27/2024 1045)  Promote/optimize nutrition: Monitor/record intake including meals  Note: .     Problem: Skin  Goal: Prevent/minimize sheer/friction injuries  Outcome: Progressing  Flowsheets (Taken 11/27/2024 1045)  Prevent/minimize sheer/friction injuries:   Use pull sheet   Turn/reposition every 2 hours/use positioning/transfer devices  Note: .     Problem: Skin  Goal: Promote/optimize nutrition  Outcome: Progressing  Flowsheets (Taken 11/27/2024 1045)  Promote/optimize nutrition: Monitor/record intake including meals  Note: .   The patient's goals for the shift include improve mobility    The clinical goals for the shift include maintain safety

## 2024-11-27 NOTE — PROGRESS NOTES
Occupational Therapy  Treatment     11/27/24 [id number]   OT Last Visit   OT Received On 11/27/24   General   Reason for Referral CVA   Referred By Dr. Dubose   Past Medical History Relevant to Rehab HTN, DM, CKD III, tobacco abuse   Family/Caregiver Present No   Prior to Session Communication Bedside nurse   Patient Position Received Up in chair;Alarm on   Preferred Learning Style verbal;visual   General Comment Patient cleared for therapy. Patient finishing up PT and agreeable to OT   Precautions   Hearing/Visual Limitations No glasses. Smooth pursuits, saccades and convergence/ divergence slowed but intact. No visual field cuts identified.   Medical Precautions Fall precautions   Precautions Comment left shoulder harness, L LE AFO   Pain Assessment   Pain Assessment 0-10   0-10 (Numeric) Pain Score 0 - No pain   Cognition   Overall Cognitive Status Impaired   Orientation Level Oriented X4   Cognition Comments pleasant and cooperative   Safety/Judgement   (slight decrease)   Coordination   Movements are Fluid and Coordinated No   Upper Body Coordination LUE improving   Lower Body Coordination slowed rate of movement left LE   Coordination Comment LUE weakness.   RUE    RUE  WFL   LUE    LUE X   LUE Strength   LUE Overall Strength Deficits   Grooming   Grooming Level of Assistance Setup   Grooming Where Assessed Sitting sinkside   Grooming Comments seated in wheelchair, hair care and teeth care   UE Bathing   UE Bathing Level of Assistance Setup   UE Bathing Where Assessed Shower   UE Bathing Comments seated on shower chair   LE Bathing   LE Bathing Level of Assistance Contact guard   LE Bathing Where Assessed Shower   LE Bathing Comments 3 point stance in standing for hygiene   UE Dressing   UE Dressing Level of Assistance Close supervision   UE Dressing Where Assessed Wheelchair   UE Dressing Comments education on robb dressing to doff/don pullover shirt   LE Dressing   LE Dressing Yes   LE Dressing Adaptive  Equipment Reacher;Sock aide   Pants Level of Assistance Moderate assistance  (patient able to partially thread B LE into pants, requires assist for completion and assist to pull over her hips in standing)   Sock Level of Assistance Setup  (with sock aide, therapist dons sock on device, patient dons on her feet)   Shoe Level of Assistance Maximum assistance  (assist to tie shoes)   Orthotics Level of Assistance Dependent  (L LE AFO)   Adult Briefs Level of Assistance Dependent   LE Dressing Where Assessed Wheelchair   Transfer 1   Transfer From 1 Wheelchair to   Transfer to 1   (shower chair)   Technique 1 Sit to stand;Stand pivot   Transfer Device 1 Gait belt   Transfer Level of Assistance 1 Contact guard   Trials/Comments 1 with use of grab bars   Transfers 2   Transfer From 2   (shower chair)   Transfer to 2 Wheelchair   Technique 2 Sit to stand;Stand pivot   Transfer Device 2 Gait belt   Transfer Level of Assistance 2 Contact guard   Trials/Comments 2 with use of grab bar   Transfers 3   Transfer From 3 Wheelchair to   Transfer to 3 Stand   Technique 3 Sit to stand   Transfer Device 3   (grab bar)   Transfer Level of Assistance 3 Contact guard   Shower Transfers   Shower Transfer From Wheelchair   Shower Transfer Type To and from   Shower Transfer to Shower seat with back   Shower Transfer Technique To right;To left   Shower Transfers Contact guard   Shower Transfers Comments with use of grab bar   Therapeutic Exercise   Therapeutic Exercise Activity 1 patient is seated in her wheelchair and completes 2x10 towel slides shoulder flex/ext. she completes 2x10 AROM shoulder elevation/depression, shoulder reverse rolls, scapular protraction/retraction. SROM elbow flex/ext 2x10   Therapeutic Activity   Therapeutic Activity 1 patient completes dynamic standing activity at the grab bar where she is encouraged to use her R UE to reach across midline, outside YANI, and in various planes. she requires CGA/Min A with Fair  balance noted   IP OT Assessment   OT Assessment Patient will continue to benefit from skilled OT to maximize safety and independence with daily tasks.   Prognosis Good   Barriers to Discharge None   Evaluation/Treatment Tolerance Patient tolerated treatment well   End of Session Communication Bedside nurse   End of Session Patient Position Up in chair;Alarm on   OT Assessment   OT Assessment Results Decreased upper extremity strength;Decreased cognition;Decreased endurance;Decreased sensation;Decreased gross motor control;Decreased fine motor control   Education   Home Program AAROM;Modalities   Education Comment reviewed robb dressing techniques   Inpatient/Swing Bed or Outpatient   Inpatient/Swing Bed or Outpatient Inpatient   Inpatient Plan   Treatment Interventions ADL retraining;Functional transfer training;UE strengthening/ROM;Endurance training;Patient/family training;Equipment evaluation/education;Neuromuscular reeducation;Compensatory technique education   OT Frequency 5 times per week   OT Discharge Recommendations High intensity level of continued care   OT Recommended Transfer Status Assist of 1   OT - OK to Discharge Yes

## 2024-11-27 NOTE — PROGRESS NOTES
Physical Therapy       11/27/24 [id number]   PT  Visit   PT Received On 11/27/24   Response to Previous Treatment Patient with no complaints from previous session.   General   Reason for Referral CVA   Referred By Dr. Dubose   Past Medical History Relevant to Rehab HTN, DM, CKD III, tobacco abuse   General Comment With inspection Right shoe has sharp plastic digging into foot.  AFO not causing pain, shoes bare.  Discussed   Precautions   Precautions Comment left shoulder harness, L LE AFO   Pain Assessment   Pain Assessment 0-10   0-10 (Numeric) Pain Score 0 - No pain   Therapeutic Exercise   Therapeutic Exercise Activity 1 Seated MArching 2x20 alternating 2.5 BLE   Therapeutic Exercise Activity 2 Hamstring Curls 2x15 green tband   Therapeutic Exercise Activity 3 LAQ 2.5 BLE 2x15   Ambulation/Gait Training 1   Surface 1 Level tile   Gait Support Devices L AFO;Gait belt   Assistance 1 Minimum assistance;Moderate assistance   Quality of Gait 1 Forward flexed posture;Soft knee(s);Inconsistent stride length;Decreased step length;Listing;NBOS   Comments/Distance (ft) 1 75x2, 80 x1 Left shoulder dips forward with fatigue.  Cues to nretract Left shoulder while advancing RLE.  Improved pace and gait speed, posture.   Transfer 1   Transfer From 1 Wheelchair to   Technique 1 Sit to stand;Stand pivot   Transfer Device 1 Gait belt   Transfer Level of Assistance 1 Contact guard   Trials/Comments 1 safety reminders   Transfers 2   Technique 2 Stand pivot   Transfer Device 2 Gait belt   Trials/Comments 2 wc to mat performed x6 3x toward Right 3x toward Left.  Pt needed step by step cues for full turn, eccentric control, flex at hips and then lower.   Wheelchair Activities   Propulsion Type 1 Manual   Level 1 Level tile   Method 1 Right upper extremity;Right lower extremity   Level of Assistance 1 Modified independent   Description/Details 1 ad barbara   PT Assessment   Assessment Comment Left Shoe is so worn that plastic piece sticking  out and scraping lateral edge of foot.  Request for new shoes.  Loaner shoe with Floor Reaction AFO today.  Good clearance LLE with AFO.  Trials with postreior leaf, and Dynamic walk AFO.  Shoe lift.   End of Session Patient Position Up in chair;Alarm on   Outpatient Education   Education Comment ongoing on corrections with posture   PT Plan   PT Recommended Transfer Status Assist x1   PT - OK to Discharge Yes

## 2024-11-27 NOTE — PROGRESS NOTES
11/27/24 1300 to 1330   General   Reason for Referral CVA   Referred By Dr. Dubose   Prior to Session Communication Bedside nurse  (discussed poc with OPTR)   Patient Position Received Up in chair;Alarm on   Preferred Learning Style verbal;visual   General Comment patient cleared to see by nursing,   Precautions   Precautions Comment left shoulder harness, L LE AFO   Vital Signs   Heart Rate 65   Heart Rate Source Monitor   Resp 16   SpO2 99 %   /63   MAP (mmHg) 90   BP Location Right arm   BP Method Automatic   Pain Assessment   Pain Assessment 0-10   0-10 (Numeric) Pain Score 0 - No pain   Balance/Neuromuscular Re-Education   Balance/Neuromuscular Re-Education Activity Performed Yes   Balance/Neuromuscular Re-Education Activity 1 LUE gentle joint compression and tapping to facilitate movement. Resistance applied to joints to promote recruitment of sacromeres   Balance/Neuromuscular Re-Education Activity 2 estim for digit flexion with patient attempting  to flex in time with stim.  Once stim removed patient able to trace flex. with object in hand instructed to fast squeeze patient able to complete 7 times.   Balance/Neuromuscular Re-Education Activity 3 trace wrist flexion with gravity eliminated and stabilization of forearm increased effort completed 7 times. Attempted wrist extension none seen.   Balance/Neuromuscular Re-Education Activity 4 shoulder abduction 30 degrees, shoulder flexion, 40 degrees with cues to not compensate with shoulder .5 reps each.   IP OT Assessment   End of Session Communication Bedside nurse  (Discussed poc with ot/r)   End of Session Patient Position Up in chair;Alarm on   Inpatient/Swing Bed or Outpatient   Inpatient/Swing Bed or Outpatient Inpatient   Inpatient Plan   Treatment Interventions Neuromuscular reeducation   OT - OK to Discharge Yes

## 2024-11-27 NOTE — PROGRESS NOTES
IDT held this date to review patient progress and discharge plan. Patient continues to work toward goals with PT OT ST. ADOD remains 12.10.24. Patient's daughter, Dorina, who lives out of state indicated plan to come up December 13th. LSW will follow with patient's local daughter to schedule training and plan for discharge home. Patient aware and in agreement with plan. No questions or concerns noted.

## 2024-11-27 NOTE — PROGRESS NOTES
Physical Therapy       11/27/24 [id number]   PT  Visit   PT Received On 11/27/24   Response to Previous Treatment Patient with no complaints from previous session.   General   Reason for Referral CVA   Referred By Dr. Dubose   Past Medical History Relevant to Rehab HTN, DM, CKD III, tobacco abuse   General Comment Slept ok just woke up every 2 hours to go to [place]   Precautions   Medical Precautions Fall precautions   Precautions Comment left shoulder harness   Pain Assessment   Pain Assessment 0-10   0-10 (Numeric) Pain Score 0 - No pain   Cognition   Overall Cognitive Status Impaired   Orientation Level Oriented X4   Therapeutic Exercise   Therapeutic Exercise Activity 1 Seated LAQ 2x15   Therapeutic Exercise Activity 2 Seated Hip Flexion 2x15   Therapeutic Exercise Activity 3 Seated Hip Abd 2x15   Therapeutic Exercise Activity 4 seated Hip Add with ball 2x15   Therapeutic Exercise Activity 5 Seated Hamstring Curls with ther band 2x15   Balance/Neuromuscular Re-Education   Balance/Neuromuscular Re-Education Activity Performed Yes   Balance/Neuromuscular Re-Education Activity 1 Balloon Toss   Balance/Neuromuscular Re-Education Activity 2 Sidestepping llo bars AFO LLE min/mod with LOB   Balance/Neuromuscular Re-Education Activity 3 Balloon Toss LOB   Ambulation/Gait Training 1   Surface 1 Level tile   Gait Support Devices L AFO;Gait belt   Assistance 1 Minimum assistance;Moderate assistance   Quality of Gait 1 Forward flexed posture;Soft knee(s);Inconsistent stride length;Decreased step length;Listing;NBOS   Comments/Distance (ft) 1 75 x2 Frequent cues for head posture LOB forward x4 when mov   Transfer 1   Transfer From 1 Sit to   Transfer to 1 Stand   Technique 1 Sit to stand;Stand to sit   Transfer Device 1 Gait belt   Transfer Level of Assistance 1 Contact guard   Trials/Comments 1 cues for hand placement   Stairs   Rails 1 Right   Device 1 Railing   Support Devices 1 Gait belt;Left Ankle-foot orthosis   Assistance  1 Contact guard   Comment/Number of Steps 1 up/down 1 flight, CG/Bob to CGx1  with fatigue. Step-to pattern. Occasional reciprocal with difficulty, mild buckle LLE, then back to non reciprocal pattern.    Object From Floor   Devices No reacher   Assist Level Contact guard   Comments Wes CAne cones from floor   PT Assessment   End of Session Patient Position Up in chair;Alarm on   PT Plan   Treatment/Interventions Bed mobility;Transfer training;Gait training;Stair training;Therapeutic exercise;Wheelchair management   PT Recommended Transfer Status Assist x1   PT - OK to Discharge Yes

## 2024-11-27 NOTE — PROGRESS NOTES
Speech-Language Pathology    SLP Adult [facility] Speech-Language Pathology Treatment     Patient Name: Cynthia Muse  MRN: [id number]  Today's Date: 11/27/2024  Time Calculation  Start Time: 1004  Stop Time: 1054  Time Calculation (min): 50 min     2/4sp    Current Problem:   Ischemic stroke with left body involvement (right brain)     SLP Assessment:  SLP TX Intervention Outcome: Making Progress Towards Goals  SLP Assessment Results: Cognitive Deficits, Motor Speech Deficits  Prognosis: Good  Treatment Tolerance: Patient tolerated treatment well  Medical Staff Made Aware: Yes  Strengths: Motivation  Education Provided: Yes     Plan:  Inpatient/Swing Bed or Outpatient: Inpatient  Treatment/Interventions: Articulation, Cognitive communication functioning  SLP TX Plan: Continue Plan of Care  SLP Plan: Skilled SLP  SLP Frequency: 4x per week  Duration: 3 weeks  SLP Discharge Recommendations: Other (Comment) (TBD pending progress)  Next Treatment Priority: OMEs, dysarthria, CLQT reassess  Discussed POC: Patient, Nursing  Discussed Risks/Benefits: Yes, Patient, Nursing  Patient/Caregiver Agreeable: Yes      Subjective   Pt wheeled self to scheduled ST cristel't time.    General Visit Information:   Reason for Referral: Follow-up speech-language treatment  Referred By: Dr. Dubose  Past Medical History Relevant to Rehab: HTN, DM, CKD III, tobacco abuse  Prior to Session Communication: Bedside nurse    Pain Assessment:  Pain Assessment  0-10 (Numeric) Pain Score: 0 - No pain      Objective     Speech and Language Goals: (established 11/19/24, projected discontinuation 12/10/24)  Pt will complete oral-motor exercises with 80% acc given model and cues as needed in order to improve motor speech/articulation skills.  PROGRESS: Provided direct model to initiate, mirror for feedback, and cues for pacing, full ROM, jaw stability. Able to complete x15 repetitions of each of the following - labial protrusion/retraction/alternating protrusion  and retraction/press/vowels/cheek puff and suction, lingual protrusion/retraction/lateralization/pops. Significantly reduced cues for facial circulation techniques. Provided cold spoon (placed in ice) to utilize for L labial/facial stretch.              STATUS: continue, progressing with reduced cues.  Pt will  utilize speech intelligibility strategies with 75% acc to improve communication skills.  PROGRESS: Cues to utilize slow rate, overarticulation, and syllable segmentation  STATUS: continue, progressing with consistent cues  3.    Pt will  complete articulatory precision tasks with 75% accuracy to improve intelligibility.   PROGRESS: 75% with cues for alliterative sentences (alveolars). Decreased air escapage (L) with /s/ phonemes and consonant blends.              STATUS: continue, progressing with cues   LTG: Pt will increase motor speech function to the highest possible speech level for improved functional communication within daily living tasks.       4. Pt will increase STM/recall to 80% accuracy with cues to improve recall of functional daily information.  PROGRESS: 90% for inferring a  voicemail. Cues to repeat/replay message prior to response. Improved awareness for repetitions prior to responding.  STATUS: continue, pt benefits from repetition cues  5. Pt will improve visual scanning to 75% accuracy with cues to increase visual awareness of environment.  PROGRESS: 90% for reading a clock. 90% for clock math. ACHIEVED - 11/26  STATUS: GOAL MET - 11/26  6. Ongoing assessment if indicated for further goal development or to determine progress, as needed.  PROGRESS: Not clinically indicated at this time  STATUS: continue as needed.      LTG 1: Pt will optimize cognitive-linguistic skills necessary for return to least restrictive living environment and to reduce caregiver dependence.     Motor Speech:   Motor Speech Intervention : Compensatory Speech Strategies, Oral Lingual Strengthening Techniques,  Oral/Facial Agility Techniques    Cognitive Linguistics:  Cognitive Linguistics Interventions: Short term memory, Attention    Inpatient Education:  Adult Inpatient Education  Individual(s) Educated: Patient  Verbal Education : progress, goals  Risk and Benefits Discussed with Patient/Caregiver/Other: yes  Patient/Caregiver Demonstrated Understanding: yes  Plan of Care Discussed and Agreed Upon: yes  Patient Response to Education: Patient/Caregiver Verbalized Understanding of Information

## 2024-11-28 LAB
GLUCOSE BLD MANUAL STRIP-MCNC: 103 MG/DL (ref 74–99)
GLUCOSE BLD MANUAL STRIP-MCNC: 175 MG/DL (ref 74–99)

## 2024-11-28 PROCEDURE — 82947 ASSAY GLUCOSE BLOOD QUANT: CPT

## 2024-11-28 PROCEDURE — 97530 THERAPEUTIC ACTIVITIES: CPT | Mod: GP

## 2024-11-28 PROCEDURE — 97110 THERAPEUTIC EXERCISES: CPT | Mod: GP

## 2024-11-28 PROCEDURE — 97112 NEUROMUSCULAR REEDUCATION: CPT | Mod: GO,CO

## 2024-11-28 PROCEDURE — 97535 SELF CARE MNGMENT TRAINING: CPT | Mod: GO

## 2024-11-28 PROCEDURE — 97530 THERAPEUTIC ACTIVITIES: CPT | Mod: GO

## 2024-11-28 PROCEDURE — 2500000002 HC RX 250 W HCPCS SELF ADMINISTERED DRUGS (ALT 637 FOR MEDICARE OP, ALT 636 FOR OP/ED): Performed by: INTERNAL MEDICINE

## 2024-11-28 PROCEDURE — 2500000004 HC RX 250 GENERAL PHARMACY W/ HCPCS (ALT 636 FOR OP/ED): Performed by: INTERNAL MEDICINE

## 2024-11-28 PROCEDURE — 97116 GAIT TRAINING THERAPY: CPT | Mod: GP

## 2024-11-28 PROCEDURE — 1180000001 HC REHAB PRIVATE ROOM DAILY

## 2024-11-28 PROCEDURE — 2500000001 HC RX 250 WO HCPCS SELF ADMINISTERED DRUGS (ALT 637 FOR MEDICARE OP): Performed by: INTERNAL MEDICINE

## 2024-11-28 PROCEDURE — 97112 NEUROMUSCULAR REEDUCATION: CPT | Mod: GP

## 2024-11-28 ASSESSMENT — PAIN SCALES - GENERAL
PAINLEVEL_OUTOF10: 0 - NO PAIN

## 2024-11-28 ASSESSMENT — PAIN - FUNCTIONAL ASSESSMENT
PAIN_FUNCTIONAL_ASSESSMENT: 0-10

## 2024-11-28 ASSESSMENT — ACTIVITIES OF DAILY LIVING (ADL)
HOME_MANAGEMENT_TIME_ENTRY: 30
HOME_MANAGEMENT_TIME_ENTRY: 15

## 2024-11-28 ASSESSMENT — PAIN SCALES - WONG BAKER: WONGBAKER_NUMERICALRESPONSE: NO HURT

## 2024-11-28 NOTE — NURSING NOTE
Assumed care of patient at 1900. Pt. denied pain/discomfort. Call light within reach. Safety measures remain in place. Will cont. to monitor.

## 2024-11-28 NOTE — PROGRESS NOTES
Physical Therapy       11/28/24 [id number]   PT  Visit   PT Received On 11/28/24   Response to Previous Treatment Patient with no complaints from previous session.   General   Reason for Referral CVA   Referred By Dr. Dubose   Past Medical History Relevant to Rehab HTN, DM, CKD III, tobacco abuse   Patient Position Received Up in chair   Precautions   Medical Precautions Fall precautions   Pain Assessment   Pain Assessment 0-10   0-10 (Numeric) Pain Score 0 - No pain   Therapeutic Exercise   Therapeutic Exercise Activity 1 SAQ 2 x 15, 4# right, 2.5# left   Therapeutic Exercise Activity 2 bridging 2 x 15 without roll   Therapeutic Exercise Activity 3 supine hip abd/add 2 x 15, 4# right, 2.5# left   Therapeutic Exercise Activity 4 supine heel slides 2 x 15, 4#right, 2.5# left   Therapeutic Exercise Activity 5 STS 1 x 15 with left LE behind right LE for forcedd weight shift and WB to left. No UE support.   Therapeutic Exercise Activity 6 SLR 2 x 15, 2.5# right, 1.5# left with cues to correct for quad lag   Therapeutic Exercise Activity 7 calm shells sidelying, 2 x 15 left   Therapeutic Exercise Activity 8 sidelying hip ext 2 x 15 left   Balance/Neuromuscular Re-Education   Balance/Neuromuscular Re-Education Activity 1 side stepping ll bars, one UE support, CGA cues for upright posture. Left knee genu recurvatum with WB. Can slightly correct with cues   Balance/Neuromuscular Re-Education Activity 2 forward amb in ll bars with one UE support min assist. Emphasis on equalizing weight shift and step length, step through gait pattern with good carryover.   Balance/Neuromuscular Re-Education Activity 3 backward amb in ll bars with one UE support min assist. Emphasis on equalizing weight shift and step length, with fair carryover. Cues for upright posture.   Bed Mobility 1   Bed Mobility 1 Supine to sitting;Sitting to supine   Level of Assistance 1 Set up   Ambulation/Gait Training 1   Surface 1 Level tile   Gait Support  "Devices L AFO;Gait belt   Assistance 1 Minimum assistance   Quality of Gait 1 Forward flexed posture;Inconsistent stride length;Decreased step length;Listing;NBOS   Comments/Distance (ft) 1 80' x 2 with robb cane, Left shoulder dips forward with fatigue.  Cues to correct with good carryover . Improved pace and gait speed, posture. Genu recurvatum with toe off AFO. Second amb with single upright dynamic walk AFO, genurecurvatum still present. Patient reports toe off is more comfortable. Cues for weight shift left, step through on right, equalize step length. Includes turns.   Ambulation/Gait Training 2   Surface 2 Level tile   Device 2 Rolling walker  (with left hand )   Gait Support Devices Gait belt;L AFO   Assistance 2 Minimum assistance;Moderate assistance   Quality of Gait 2 Forward flexed posture;Listing;NBOS;Inconsistent stride length;Decreased step length;Soft knee(s)   Comments/Distance (ft) 2 100' min assist straight, mod assist on turns with fatigue. Trendelenburg gait noted.   Transfer 1   Technique 1 Sit to stand;Stand to sit   Transfer Device 1 Gait belt   Transfer Level of Assistance 1 Contact guard   Trials/Comments 1 cues for safe technique   Transfers 2   Transfer From 2 Wheelchair to;Mat to   Transfer to 2 Mat;Wheelchair   Technique 2 Stand pivot;To right   Transfer Device 2 Gait belt   Transfer Level of Assistance 2 Contact guard   Trials/Comments 2 cues for safe technique   Stairs   Rails 1 Right   Device 1 Railing   Support Devices 1 Gait belt;Left Ankle-foot orthosis   Assistance 1 Contact guard   Comment/Number of Steps 1 12, 6\" steps, non reciprocal pattern.   Wheelchair Activities   Propulsion Type 1 Manual   Level 1 Level tile  (carpet)   Method 1 Right upper extremity;Right lower extremity   Level of Assistance 1 Modified independent   Description/Details 1 150+ feet includes turns   Activity Tolerance   Endurance Tolerates 30+ min exercise without fatigue   PT Assessment   PT " Assessment Results Decreased strength;Decreased range of motion;Decreased endurance;Impaired balance;Decreased mobility;Decreased coordination;Impaired tone   Rehab Prognosis Good   Barriers to Discharge lives alone   Evaluation/Treatment Tolerance Patient tolerated treatment well   Assessment Comment Continue to assess for appropriate AFO.   End of Session Patient Position Up in chair;Alarm on   PT Plan   Inpatient/Swing Bed or Outpatient Inpatient   PT Plan   Treatment/Interventions Bed mobility;Transfer training;Gait training;Stair training;Balance training;Neuromuscular re-education;Strengthening;Therapeutic exercise;Therapeutic activity;Wheelchair management;Postural re-education   PT Plan Ongoing PT   PT Recommended Transfer Status Assist x1   PT - OK to Discharge Yes

## 2024-11-28 NOTE — NURSING NOTE
Assumed  care of patient at 0730. 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 On RA. A & 0 X 3. BID blood sugars. Denies any pain or needs at this time. Call light is within reach. Will continue to monitor.

## 2024-11-28 NOTE — PROGRESS NOTES
11/28/24 [id number]   OT Last Visit   OT Received On 11/28/24   General   Reason for Referral CVA   Referred By Dr. Dubose   Past Medical History Relevant to Rehab HTN, DM, CKD III, tobacco abuse   Family/Caregiver Present No   Prior to Session Communication Bedside nurse   Patient Position Received Up in chair   Preferred Learning Style verbal;visual   General Comment Patient agreeable to therapy   Precautions   Hearing/Visual Limitations No glasses. Smooth pursuits, saccades and convergence/ divergence slowed but intact. No visual field cuts identified.   Medical Precautions Fall precautions   Precautions Comment left shoulder harness, L LE AFO   Pain Assessment   Pain Assessment 0-10   0-10 (Numeric) Pain Score 0 - No pain   [place] FACES Pain Rating 0   Cognition   Overall Cognitive Status Impaired   Orientation Level Oriented X4   Cognition Comments pleasant and cooperative   Attention WFL   Memory X   Short-Term Memory Impaired   Problem Solving X   Coordination   Movements are Fluid and Coordinated No   Upper Body Coordination LUE improving   Lower Body Coordination slowed rate of movement left LE   Coordination Comment LUE weakness.   RUE    RUE  WFL   RUE Strength   RUE Overall Strength Deficits   LUE    LUE X   LUE Strength   LUE Overall Strength Deficits   Toileting   Toileting Level of Assistance Minimum assistance   Where Assessed Toilet   Toileting Comments to manage pants over hips with support of grab bar   Modalities   Modalities Used Yes   Modality 1 Timed BEBETO - Electric Stimulation Attended  (L wrist extensor at 15 MHz to elicit motor response with good tolerance)   Toilet Transfers   Toilet Transfer From Wheelchair   Toilet Transfer Type To and from   Toilet Transfer to Standard toilet   Toilet Transfer Technique To right;To left   Toilet Transfers Supervision   Toilet Transfers Comments with support of grab bar   Therapeutic Activity   Therapeutic Activity Performed Yes   Therapeutic  Activity 1 BITs cognitive memory   Therapeutic Activity 2 BITs peripheral vision activity   IP OT Assessment   Evaluation/Treatment Tolerance Patient tolerated treatment well   Medical Staff Made Aware Yes   End of Session Communication Bedside nurse   End of Session Patient Position Up in chair;Alarm on   OT Assessment   OT Assessment Results Decreased upper extremity strength;Decreased cognition;Decreased endurance;Decreased sensation;Decreased gross motor control;Decreased fine motor control   Strengths Ability to acquire knowledge   Barriers to Participation Comorbidities   Education   Individual(s) Educated Patient   Diagnosis and Precautions fall precautions   Risk and Benefits Discussed with Patient/Caregiver/Other yes   Patient/Caregiver Demonstrated Understanding yes   Plan of Care Discussed and Agreed Upon yes   Patient Response to Education Patient/Caregiver Asked Appropriate Questions;Patient/Caregiver Verbalized Understanding of Information   Inpatient/Swing Bed or Outpatient   Inpatient/Swing Bed or Outpatient Inpatient   Inpatient Plan   Treatment Interventions Functional transfer training;ADL retraining;UE strengthening/ROM;Endurance training;Patient/family training;Neuromuscular reeducation;Compensatory technique education   OT Frequency 5 times per week   OT Discharge Recommendations Moderate intensity level of continued care   Equipment Recommended upon Discharge   (TBD)   OT Recommended Transfer Status Assist of 1   OT - OK to Discharge Yes

## 2024-11-28 NOTE — CARE PLAN
The patient's goals for the shift include improve mobility    The clinical goals for the shift include Maintain safety/improve ADLs

## 2024-11-28 NOTE — PROGRESS NOTES
11/28/24 1030 to 1130   General   Reason for Referral CVA   Referred By Dr. Dubose   Past Medical History Relevant to Rehab HTN, DM, CKD III, tobacco abuse   Prior to Session Communication Bedside nurse  (Discussed poc with OTR)   Patient Position Received Up in chair   Preferred Learning Style verbal;visual   General Comment Patient agreeable to therapy   Precautions   Medical Precautions Fall precautions   Precautions Comment left shoulder harness, L LE AFO   Pain Assessment   Pain Assessment 0-10   0-10 (Numeric) Pain Score 0 - No pain   Grooming   Grooming Level of Assistance Modified independent   Grooming Where Assessed Sitting sinkside  (washed hand at sink)   UE Bathing   UE Bathing Comments declined bathed prior to OT   LE Dressing   LE Dressing Comments dressed prior to OT   Toileting   Toileting Level of Assistance Moderate assistance   Where Assessed Toilet   Toileting Comments Patient completed hygiene and lower pants instructed in 3 point stance assist with pull up.   Transfers   Transfer Yes   Transfer 1   Transfer From 1 Wheelchair to   Transfer to 1 Stand   Technique 1 Sit to stand;Stand to sit   Transfer Device 1 Gait belt   Transfer Level of Assistance 1 Contact guard   Trials/Comments 1 cues for safe technique   Toilet Transfers   Toilet Transfer From Wheelchair   Toilet Transfer Type To and from   Toilet Transfer to Standard toilet   Toilet Transfer Technique To right;To left   Toilet Transfers Supervision   Toilet Transfers Comments with use of wallmounted grab bar   Kitchen Mobility   Kitchen Mobility Level of Assistance Contact guard   Kitchen-Mobility Level Wheelchair   Kitchen Activity Retrieve items;Transport items  (Patient completed fridge retrieval and item retrieval at W/C supervised cues to lock brakes and not lean to far past lynette. Stood to put away a plate in upper cabinet with contact guard.)   Kitchen Mobility Comments Patient educated on one handed equipment for cooking.    Balance/Neuromuscular Re-Education   Balance/Neuromuscular Re-Education Activity Performed Yes   Balance/Neuromuscular Re-Education Activity 1 Throughout treament gentle joint compression and tapping to facilitate movement.   Balance/Neuromuscular Re-Education Activity 2 Standing at table utilizing LUE gentle weight bearing forward and backward reach, circular and horizontal with cues to slow down and steadying assist.   Balance/Neuromuscular Re-Education Activity 3 5 reps against gravity LUE shoulder flexion, extension, abduction, bicep flexion, tricep extension, wrist supination pronation with cues for body mechanics and to slow down. Limited ROM   Balance/Neuromuscular Re-Education Activity 4 functional reach using a cane and kitchen draw with assist to maintain  and contact guard to protect arm. Able to complete with cues to slow down and completely exstend elbow.   IP OT Assessment   OT Assessment patient educated on one handed cooking equipment Patient will continue to benefit from skilled OT to maximize safety and independence with daily tasks.   Prognosis Good   Barriers to Discharge None   Evaluation/Treatment Tolerance Patient tolerated treatment well   Medical Staff Made Aware Yes   End of Session Communication Bedside nurse  (Dicussed POC with OTR)   End of Session Patient Position Up in chair;Alarm on   OT Assessment   OT Assessment Results Decreased upper extremity strength;Decreased cognition;Decreased endurance;Decreased sensation;Decreased gross motor control;Decreased fine motor control   Strengths Ability to acquire knowledge   Barriers to Participation Comorbidities   Education   Individual(s) Educated Patient   Education Provided   (Educated on weight bearing and given sponge for room to practice )   Risk and Benefits Discussed with Patient/Caregiver/Other yes   Patient/Caregiver Demonstrated Understanding yes   Plan of Care Discussed and Agreed Upon yes   Patient Response to Education  Patient/Caregiver Asked Appropriate Questions;Patient/Caregiver Verbalized Understanding of Information   Education Comment Good teach back   Inpatient/Swing Bed or Outpatient   Inpatient/Swing Bed or Outpatient Inpatient   Inpatient Plan   OT - OK to Discharge Yes

## 2024-11-29 LAB
GLUCOSE BLD MANUAL STRIP-MCNC: 91 MG/DL (ref 74–99)
GLUCOSE BLD MANUAL STRIP-MCNC: 99 MG/DL (ref 74–99)

## 2024-11-29 PROCEDURE — 2500000001 HC RX 250 WO HCPCS SELF ADMINISTERED DRUGS (ALT 637 FOR MEDICARE OP): Performed by: INTERNAL MEDICINE

## 2024-11-29 PROCEDURE — 2500000002 HC RX 250 W HCPCS SELF ADMINISTERED DRUGS (ALT 637 FOR MEDICARE OP, ALT 636 FOR OP/ED): Performed by: INTERNAL MEDICINE

## 2024-11-29 PROCEDURE — 99232 SBSQ HOSP IP/OBS MODERATE 35: CPT | Performed by: PHYSICIAN ASSISTANT

## 2024-11-29 PROCEDURE — 97116 GAIT TRAINING THERAPY: CPT | Mod: GP

## 2024-11-29 PROCEDURE — 97110 THERAPEUTIC EXERCISES: CPT | Mod: GP

## 2024-11-29 PROCEDURE — 1180000001 HC REHAB PRIVATE ROOM DAILY

## 2024-11-29 PROCEDURE — 97535 SELF CARE MNGMENT TRAINING: CPT | Mod: GO,CO

## 2024-11-29 PROCEDURE — 2500000004 HC RX 250 GENERAL PHARMACY W/ HCPCS (ALT 636 FOR OP/ED): Performed by: INTERNAL MEDICINE

## 2024-11-29 PROCEDURE — 2500000005 HC RX 250 GENERAL PHARMACY W/O HCPCS: Performed by: INTERNAL MEDICINE

## 2024-11-29 PROCEDURE — 97530 THERAPEUTIC ACTIVITIES: CPT | Mod: GO,CO

## 2024-11-29 PROCEDURE — 97530 THERAPEUTIC ACTIVITIES: CPT | Mod: GP

## 2024-11-29 PROCEDURE — 82947 ASSAY GLUCOSE BLOOD QUANT: CPT

## 2024-11-29 PROCEDURE — 92507 TX SP LANG VOICE COMM INDIV: CPT | Mod: GN

## 2024-11-29 PROCEDURE — 97110 THERAPEUTIC EXERCISES: CPT | Mod: GO

## 2024-11-29 ASSESSMENT — ACTIVITIES OF DAILY LIVING (ADL)
HOME_MANAGEMENT_TIME_ENTRY: 30
BATHING_WHERE_ASSESSED: SHOWER
BATHING_EQUIPMENT_NEEDED: LONG-HANDLED SPONGE;REMOVEABLE SHOWER HEAD
BATHING_LEVEL_OF_ASSISTANCE: CONTACT GUARD

## 2024-11-29 ASSESSMENT — PAIN SCALES - GENERAL
PAINLEVEL_OUTOF10: 0 - NO PAIN

## 2024-11-29 ASSESSMENT — PAIN - FUNCTIONAL ASSESSMENT
PAIN_FUNCTIONAL_ASSESSMENT: 0-10

## 2024-11-29 ASSESSMENT — PAIN SCALES - WONG BAKER: WONGBAKER_NUMERICALRESPONSE: NO HURT

## 2024-11-29 NOTE — PROGRESS NOTES
Speech-Language Pathology    SLP Adult [facility] Speech-Language Pathology Treatment     Patient Name: Cynthia Muse  MRN: [id number]  Today's Date: 11/29/2024  Time Calculation  Start Time: 0800  Stop Time: 0840  Time Calculation (min): 40 min     3/4sp    Current Problem:   Ischemic stroke with left body involvement (right brain)     SLP Assessment:  SLP TX Intervention Outcome: Making Progress Towards Goals  SLP Assessment Results: Cognitive Deficits, Motor Speech Deficits  Prognosis: Good  Treatment Tolerance: Patient tolerated treatment well  Medical Staff Made Aware: Yes  Strengths: Motivation  Education Provided: Yes     Plan:  Inpatient/Swing Bed or Outpatient: Inpatient  Treatment/Interventions: Articulation, Cognitive communication functioning, Oral motor exercises  SLP TX Plan: Continue Plan of Care  SLP Plan: Skilled SLP  SLP Frequency: 4x per week  Duration: 3 weeks  SLP Discharge Recommendations: Other (Comment) (TBD pending progress)  Next Treatment Priority: OMEs, dysarthria  Discussed POC: Patient, Nursing  Discussed Risks/Benefits: Yes, Patient, Nursing  Patient/Caregiver Agreeable: Yes      Subjective   Pt seen upright in wheelchair. She appears preoccupied with getting ready for PT appointment at 9:00. RN aware.    General Visit Information:   Reason for Referral: Follow-up speech-language treatment  Referred By: Dr. Dubose  Past Medical History Relevant to Rehab: HTN, DM, CKD III, tobacco abuse  Prior to Session Communication: Bedside nurse    Pain Assessment:  Pain Assessment  0-10 (Numeric) Pain Score: 0 - No pain      Objective     Speech and Language Goals: (established 11/19/24, projected discontinuation 12/10/24)  Pt will complete oral-motor exercises with 80% acc given model and cues as needed in order to improve motor speech/articulation skills.  PROGRESS: Provided direct model to initiate, mirror for feedback, and cues for pacing, full ROM, jaw stability. Able to complete x15 repetitions of  each of the following - labial protrusion/retraction/alternating protrusion and retraction/press/vowels/cheek puff and suction, lingual protrusion/retraction/lateralization/pops. Significantly reduced cues for facial circulation techniques. Provided cold spoon (placed in ice) to utilize for L labial/facial stretch. 11/29: Not addressed as focus was on CLQT              STATUS: continue, progressing with reduced cues.  Pt will  utilize speech intelligibility strategies with 75% acc to improve communication skills.  PROGRESS: Cues to utilize slow rate, overarticulation, and syllable segmentation. 11/29: Pt sts improved communication with family. Reduced cues with conversation level tasks for slow rate.  STATUS: continue, progressing with consistent cues  3.    Pt will  complete articulatory precision tasks with 75% accuracy to improve intelligibility.   PROGRESS: 75% with cues for alliterative sentences (alveolars). Decreased air escapage (L) with /s/ phonemes and consonant blends. 11/29: Not addressed as focus was on CLQT reassessment              STATUS: continue, progressing with cues   LTG: Pt will increase motor speech function to the highest possible speech level for improved functional communication within daily living tasks.       4. Pt will increase STM/recall to 80% accuracy with cues to improve recall of functional daily information.  PROGRESS: 90% for inferring a  voicemail. Cues to repeat/replay message prior to response. Improved awareness for repetitions prior to responding. 11/29: Initiated attention subtests on CLQT, however, pt was preoccupied with getting ready for PT and had difficulty concentrating.  STATUS: continue, pt benefits from repetition cues  5. Pt will improve visual scanning to 75% accuracy with cues to increase visual awareness of environment.  PROGRESS: 90% for reading a clock. 90% for clock math. ACHIEVED - 11/26  STATUS: GOAL MET - 11/26  6. Ongoing assessment if indicated for  further goal development or to determine progress, as needed.  PROGRESS: Initiated attention subtests for reassessment on CLQT. However, pt was eager to get dressed and ready for PT session and unable to complete.  STATUS: continue as needed.      LTG 1: Pt will optimize cognitive-linguistic skills necessary for return to least restrictive living environment and to reduce caregiver dependence.      Motor Speech:   Motor Speech Intervention : Compensatory Speech Strategies    Cognitive Linguistics:  Cognitive Linguistics Interventions: Short term memory, Attention    Inpatient Education:  Adult Inpatient Education  Individual(s) Educated: Patient  Verbal Education : progress, goals  Risk and Benefits Discussed with Patient/Caregiver/Other: yes  Patient/Caregiver Demonstrated Understanding: yes  Plan of Care Discussed and Agreed Upon: yes  Patient Response to Education: Patient/Caregiver Verbalized Understanding of Information

## 2024-11-29 NOTE — PROGRESS NOTES
Physical Therapy       11/29/24 9458-7440   PT  Visit   PT Received On 11/29/24   Response to Previous Treatment Patient with no complaints from previous session.   General   Reason for Referral CVA   Referred By Dr. Dubose   Past Medical History Relevant to Rehab HTN, DM, CKD III, tobacco abuse   General Comment Patient agreeable to therapy   Precautions   Hearing/Visual Limitations No glasses. Smooth pursuits, saccades and convergence/ divergence slowed but intact. No visual field cuts identified.   Medical Precautions Fall precautions   Precautions Comment left shoulder harness, L LE AFO   Pain Assessment   Pain Assessment 0-10   0-10 (Numeric) Pain Score 0 - No pain   Cognition   Overall Cognitive Status Impaired   Orientation Level Oriented X4   Ambulation/Gait Training 1   Surface 1 Level tile   Device 1 LBQC   Gait Support Devices L AFO;Gait belt   Assistance 1 Contact guard;Minimum assistance   Quality of Gait 1 Forward flexed posture;Inconsistent stride length;Decreased step length;Listing;NBOS   Comments/Distance (ft) 1 85 feet x2 as fatigued unsteady with Left foot sweep/catch and LOB forward.  Poor eccentric control when sitting.   Ambulation/Gait Training 2   Surface 2 Level tile   Device 2 Platform walker left;Platform walker right   Gait Support Devices Gait belt;L AFO   Assistance 2 Contact guard;Minimum assistance   Quality of Gait 2 Forward flexed posture;Listing;NBOS;Inconsistent stride length;Decreased step length;Soft knee(s)   Comments/Distance (ft) 2 Improved gait speed.  Occasional left foot sweep but able to use Platform USTEP walker to control LOB.  100 feet x2 assist to turn and retro to line up with w/c   Transfers   Transfer Yes   Transfer 1   Transfer From 1 Sit to   Transfer to 1 Stand   Technique 1 Sit to stand;Stand to sit   Transfer Device 1 Gait belt   Transfer Level of Assistance 1 Close supervision   Trials/Comments 1 x6 cues for hands and eccentric control, full turn and back up  to chair.  Impulsivelt sits when fatigued   Transfers 2   Transfer From 2 Wheelchair to;Mat to   Transfer to 2 Mat;Wheelchair   Technique 2 Stand pivot;To right   Transfer Device 2 Gait belt   Transfer Level of Assistance 2 Contact guard   Trials/Comments 2 cues for full turn, reach   Other Activity   Other Activity 1 Omnicycle x 15 minutes level 1   PT Assessment   Assessment Comment TRIAL with platform USTEP walker.  Improved gait speed, control of LOB. Discussed advantages, disadvantages.  Will continue trialing for best device.  FAmily Training likely next week. Plan for home with family.   End of Session Patient Position Up in chair;Alarm on   Outpatient Education   Education Comment Discussed reason for trialing USTEP Platform walker.   PT Plan   PT Recommended Transfer Status Assist x1   PT - OK to Discharge Yes

## 2024-11-29 NOTE — PROGRESS NOTES
Occupational Therapy       11/29/24 [id number]   OT Last Visit   OT Received On 11/29/24   General   Reason for Referral CVA   Referred By Dr. Dubose   Past Medical History Relevant to Rehab HTN, DM, CKD III, tobacco abuse   Prior to Session Communication Bedside nurse   Patient Position Received Up in chair   Preferred Learning Style verbal;visual   General Comment Patient agreeable to therapy   Precautions   STEADI Fall Risk Score (The score of 4 or more indicates an increased risk of falling) `   Hearing/Visual Limitations No glasses. Smooth pursuits, saccades and convergence/ divergence slowed but intact. No visual field cuts identified.   Medical Precautions Fall precautions   Precautions Comment left shoulder harness, L LE AFO   Pain Assessment   Pain Assessment 0-10   0-10 (Numeric) Pain Score 0 - No pain   Cognition   Overall Cognitive Status Impaired   Orientation Level Oriented X4   Attention WFL   Coordination   Upper Body Coordination LUE improving   Lower Body Coordination slowed rate of movement left LE   Coordination Comment LUE weakness.   Feeding   Feeding Level of Assistance Setup   Feeding Where Assessed Wheelchair   Feeding Comments To open containers.   Grooming   Grooming Level of Assistance Modified independent   Grooming Where Assessed Sitting sinkside   Grooming Comments Combed hair. Demo'd robb technique for opening rosie.   UE Bathing   UE Bathing Adaptive Equipment Removeable shower head   UE Bathing Level of Assistance Setup   UE Bathing Where Assessed Shower   UE Bathing Comments Seated on shower chair.   LE Bathing   LE Bathing Adaptive Equipment Long-handled sponge;Removeable shower head   LE Bathing Level of Assistance Contact guard   LE Bathing Where Assessed Shower   LE Bathing Comments In stance for buttocks cleaning.   UE Dressing   UE Dressing Level of Assistance Close supervision   UE Dressing Where Assessed Wheelchair   UE Dressing Comments EDU on technique.   LE Dressing   LE  Dressing Adaptive Equipment Sock aide   Pants Level of Assistance Moderate assistance   Sock Level of Assistance Setup   Shoe Level of Assistance Maximum assistance   Orthotics Level of Assistance Maximum assistance   Adult Briefs Level of Assistance Maximum assistance   LE Dressing Where Assessed Wheelchair   Transfer 1   Transfer From 1 Sit to   Transfer to 1 Stand   Technique 1 Sit to stand;Stand to sit   Transfer Level of Assistance 1 Close supervision   Trials/Comments 1 In stance at grab bar.   Shower Transfers   Shower Transfer From Wheelchair   Shower Transfer Type To and from   Shower Transfer to Shower seat with back   Shower Transfer Technique To right;To left   Shower Transfers Supervision   Shower Transfers Comments With use of grab bar. VC's for technique.   Therapeutic Exercise   Therapeutic Exercise Activity 1 LUE- Table glides for shoulder flexion/ [id number]   Therapeutic Exercise Activity 2 LUE- Table glides for elbow flexion/ [id number]   Balance/Neuromuscular Re-Education   Balance/Neuromuscular Re-Education Activity 1 LUE fingers with pain D/T arthritis. Heat applied x8 mins.   IP OT Assessment   OT Assessment Pt is limited by LUE finger arthritis pain this date. Effortful for LUE ROM. Continues to work towards goals in POC and is very motivated.   Prognosis Good   Barriers to Discharge None   Evaluation/Treatment Tolerance Patient limited by pain   Medical Staff Made Aware Yes   End of Session Communication Bedside nurse   End of Session Patient Position Up in chair;Alarm on   OT Assessment   OT Assessment Results Decreased upper extremity strength;Decreased cognition;Decreased endurance;Decreased sensation;Decreased gross motor control;Decreased fine motor control   Strengths Ability to acquire knowledge;Attitude of self;Coping skills   Barriers to Participation Comorbidities   Education   Individual(s) Educated Patient   Education Provided Fall precautons   Home Program AAROM;Modalities    Diagnosis and Precautions fall precautions   Patient Response to Education Patient/Caregiver Asked Appropriate Questions;Patient/Caregiver Verbalized Understanding of Information   Education Comment Good teach back   Inpatient/Swing Bed or Outpatient   Inpatient/Swing Bed or Outpatient Inpatient   Inpatient Plan   Treatment Interventions ADL retraining;Functional transfer training;UE strengthening/ROM;Neuromuscular reeducation   OT Frequency 5 times per week   OT Discharge Recommendations Moderate intensity level of continued care   Equipment Recommended upon Discharge   (TTB, Grab bars in tub shower and by toilet.)   OT Recommended Transfer Status Assist of 1   OT - OK to Discharge Yes

## 2024-11-29 NOTE — PROGRESS NOTES
"Cynthia Muse is a 79 y.o. female on day 11 of admission presenting with Ischemic stroke (Multi).    Subjective   Pt with h/o htn, hyperlipidemia, CKD and diabetes treated for ischemic stroke with left sided hemiparesis and dysphagia.  Pt seen while sitting up in wheelchair.  She offers no new complaints or concerns.  She is participating in therapy.  She reports no pain issues.  She is eating and drinking well on a regular diet.          Objective     Physical Exam  Constitutional:       General: She is not in acute distress.  Eyes:      Conjunctiva/sclera: Conjunctivae normal.      Pupils: Pupils are equal, round, and reactive to light.   Cardiovascular:      Rate and Rhythm: Normal rate and regular rhythm.      Heart sounds: No murmur heard.  Pulmonary:      Effort: Pulmonary effort is normal.      Breath sounds: No wheezing, rhonchi or rales.   Abdominal:      General: Abdomen is flat. Bowel sounds are normal. There is no distension.      Palpations: Abdomen is soft. There is no mass.      Tenderness: There is no abdominal tenderness.   Musculoskeletal:         General: No swelling.      Comments: Left hemiparesis   Skin:     General: Skin is warm and dry.      Findings: No rash.   Neurological:      Mental Status: She is alert and oriented to person, place, and time.         Last Recorded Vitals  Blood pressure 118/82, pulse 90, temperature 36.5 °C (97.7 °F), temperature source Tympanic, resp. rate 18, height 1.6 m (5' 2.99\"), weight 72 kg (158 lb 11.7 oz), SpO2 98%.  Intake/Output last 3 Shifts:  I/O last 3 completed shifts:  In: - (0 mL/kg)   Out: 3 (0 mL/kg) [Urine:3 (0 mL/kg/hr)]  Weight: 72 kg     Relevant Results      Results for orders placed or performed during the hospital encounter of 11/18/24 (from the past 24 hours)   POCT GLUCOSE   Result Value Ref Range    POCT Glucose 175 (H) 74 - 99 mg/dL   POCT GLUCOSE   Result Value Ref Range    POCT Glucose 99 74 - 99 mg/dL                        "       Assessment/Plan   Assessment & Plan  Ischemic stroke (Multi)    Essential hypertension    Hyperlipidemia    Chronic kidney disease (CKD) stage G3a/A2, moderately decreased glomerular filtration rate (GFR) between 45-59 mL/min/1.73 square meter and albuminuria creatinine ratio between  mg/g (C* (Multi)    Left hemiparesis (Multi)       Ischemic stroke - Continue with medical management for secondary prevention: DAPT x 21 days, statin  DVT prophylaxis has been ordered: LMWH        BINH/CKD - fairly stable; monitor    DM2 - continue amaryl.  Blood sugars well controlled , monitor blood sugars and adjust meds as needed.      HTN -  continue amlodipine, coreg.  Monitor blood pressures and adjust meds as needed.  Stable      Tobacco use disorder - nicotine patch ordered     HPL - statin     Left hemiparesis     Dysphagia - Cont ST efforts.  On regular diet with thin liquids.              DAVEY KolbC

## 2024-11-29 NOTE — PROGRESS NOTES
Physical Therapy Treatment    11/29/24 9331-0357   PT  Visit   PT Received On 11/29/24   Response to Previous Treatment Patient with no complaints from previous session.   General   Reason for Referral CVA   Referred By Dr. Dubose   Past Medical History Relevant to Rehab HTN, DM, CKD III, tobacco abuse   Caregiver Feedback Daughter - Tayler - supportive   General Comment Patient agreeable to therapy   Precautions   Hearing/Visual Limitations No glasses. Smooth pursuits, saccades and convergence/ divergence slowed but intact. No visual field cuts identified.   Medical Precautions Fall precautions   Precautions Comment left shoulder harness, L LE AFO   Pain Assessment   Pain Assessment 0-10   0-10 (Numeric) Pain Score 0 - No pain   Cognition   Overall Cognitive Status Impaired   Orientation Level Oriented X4   Therapeutic Exercise   Therapeutic Exercise Activity 1 Hip add sets 2x15   Therapeutic Exercise Activity 2 Heel Raises 2x15   Therapeutic Exercise Activity 3 Standing Squats 2x15 1/2 with brief hold ll bars 2x15   Therapeutic Exercise Activity 4 LAQ 2 x 15 2.5 BLE alternating   Therapeutic Exercise Activity 5 Seated Marching 2.5 BLE 2x15   Balance/Neuromuscular Re-Education   Balance/Neuromuscular Re-Education Activity 1 Balloon Toss Ble therpads RUE only Giv Tico LUE fatigued, Min/Cgx1 LOB x2-3   Balance/Neuromuscular Re-Education Activity 2 Sidestepping ll bars Genu recurvatum LLE when No UE support. CGx1   Balance/Neuromuscular Re-Education Activity 3 Cone feaching Blue Therpads emphasis slight knee flexion for motor recruitment flexors 2x15 RUE   Bed Mobility 1   Bed Mobility 1 Supine to sitting;Sitting to supine   Level of Assistance 1 Set up;Independent   Bed Mobility 2   Bed Mobility  2 Scooting   Level of Assistance 2 Independent   Bed Mobility 3   Bed Mobility 3 Rolling right;Rolling left   Level of Assistance 3 Modified independent   Ambulation/Gait Training 1   Surface 1 Level tile   Gait Support  Devices L AFO;Gait belt   Assistance 1 Contact guard   Quality of Gait 1 Forward flexed posture;Inconsistent stride length;Decreased step length;Listing;NBOS   Comments/Distance (ft) 1 100 feet x4  1st with/and without heel lift.  Better without heeel lift.  Toe Afo with new shoes brought in better fit.  Mild Left Genu recurvatum but better with Floor Reaction AFO, no lift.  (Pt wearing new shoe today.)   Transfer 1   Transfer From 1 Wheelchair to   Transfer to 1 Stand   Technique 1 Sit to stand;Stand to sit   Transfer Device 1 Gait belt   Transfer Level of Assistance 1 Contact guard   Trials/Comments 1 x10   Stairs   Rails 1 Right   Device 1 Railing   Support Devices 1 Gait belt;Left Ankle-foot orthosis   Assistance 1 Contact guard   Wheelchair Activities   Level 1 Level tile   Method 1 Right upper extremity;Right lower extremity   Level of Assistance 1 Modified independent   Description/Details 1 150 feet   PT Assessment   Strengths Ability to acquire knowledge   Barriers to Participation Comorbidities   End of Session Patient Position Up in chair;Alarm on   PT Plan   PT Recommended Transfer Status Assist x1   PT - OK to Discharge Yes

## 2024-11-29 NOTE — CONSULTS
"Nutrition Assessement Note    Nutrition Assessment    Reason for Assessment: Length of stay    Reason for Hospital Admission:  Cynthia Muse is a 79 y.o. female who is admitted to rehab following acute CVA s/p TNK. ADOD 12/10/24.    Malnutrition Screening Tool (MST)  Have you recently lost weight without trying?: No  Weight Loss Score: 0  Have you been eating poorly because of a decreased appetite?: No  Malnutrition Score: 0  Nutrition Screen  Stage 3 or 4 Pressure Injury or Multiple Non-Healing Wounds: No  Home Tube Feeding or Total Parenteral Nutrition (TPN): No  Dietitian Consult Needed: No    Past Medical History:   Diagnosis Date    Cerebrovascular accident (CVA), unspecified mechanism (Multi) 11/14/2024    Chronic kidney disease (CKD) stage G3a/A2, moderately decreased glomerular filtration rate (GFR) between 45-59 mL/min/1.73 square meter and albuminuria creatinine ratio between  mg/g (C* (Multi)     [place]    HTN (hypertension)     Hyperlipidemia     Ischemic stroke (Multi) 11/18/2024    Osteoporosis     Type 2 diabetes mellitus     Vitamin D deficiency      Nutrition History:  Energy Intake: Good > 75 %    Anthropometrics:  Ht: 160 cm (5' 2.99\"), Wt: 72 kg (158 lb 11.7 oz), BMI: 28.13    Weight Change:  Daily Weight  11/18/24 : 72 kg (158 lb 11.7 oz)  11/18/24 : 72.5 kg (159 lb 13.3 oz)  09/05/24 : 69.3 kg (152 lb 12.8 oz)  08/13/24 : 70.9 kg (156 lb 4.9 oz)  02/29/24 : 71.7 kg (158 lb)  08/04/23 : 65.6 kg (144 lb 9.6 oz)  02/27/23 : 64.4 kg (142 lb)  08/30/22 : 66 kg (145 lb 6.4 oz)  03/07/22 : 67.1 kg (148 lb)  09/07/21 : 67.3 kg (148 lb 6.4 oz)    Nutrition Focused Physical Exam Findings: deferred - not available     Nutrition Significant Labs:  Lab Results   Component Value Date    WBC 13.7 (H) 11/19/2024    HGB 12.2 11/19/2024    HCT 37.7 11/19/2024     11/19/2024    CHOL 132 11/14/2024    TRIG 130 11/14/2024    HDL 46.6 11/14/2024    ALT 13 11/15/2024    AST 17 11/15/2024     " 11/19/2024    K 3.9 11/19/2024     11/19/2024    CREATININE 1.77 (H) 11/19/2024    BUN 41 (H) 11/19/2024    CO2 23 11/19/2024    INR 1.0 11/14/2024    HGBA1C 6.0 (H) 11/14/2024    ALBUR 301 (H) 02/27/2023     Nutrition Specific Medications:  alendronate, 10 mg, oral, Daily before breakfast  amLODIPine, 10 mg, oral, Daily  aspirin, 81 mg, oral, Daily  carvedilol, 6.25 mg, oral, BID  cephalexin, 500 mg, oral, q12h ROSE  clopidogrel, 75 mg, oral, Daily  enoxaparin, 30 mg, subcutaneous, Daily  glimepiride, 2 mg, oral, Daily with breakfast  lidocaine, 1 patch, transdermal, Daily  rosuvastatin, 10 mg, oral, Daily      Dietary Orders (From admission, onward)       Start     Ordered    11/22/24 1103  Adult diet Regular; Thin 0  Diet effective now        Comments: Small bites/sips, go slow   Question Answer Comment   Diet type Regular    Fluid consistency Thin 0        11/22/24 1103    11/18/24 1842  May Participate in Room Service  ( ROOM SERVICE MAY PARTICIPATE)  Once        Question:  .  Answer:  Yes    11/18/24 1841                  Estimated Needs:   Estimated Energy Needs  Total Energy Estimated Needs (kCal): 1795 kCal  Total Estimated Energy Need per Day (kCal/kg): 25 kCal/kg  Method for Estimating Needs: actual wt    Estimated Protein Needs  Total Protein Estimated Needs (g): 72 g  Total Protein Estimated Needs (g/kg): 1 g/kg  Method for Estimating Needs: actual wt    Estimated Fluid Needs  Method for Estimating Needs: 1 ml.kcal or per MD        Nutrition Diagnosis   Nutrition Diagnosis:  Malnutrition Diagnosis  Patient has Malnutrition Diagnosis: No    Nutrition Diagnosis  Patient has Nutrition Diagnosis: No       Nutrition Interventions/Recommendations   Nutrition Interventions and Recommendations:    Nutrition Prescription:  Individualized Nutrition Prescription Provided for : 1795 kcals and 72g protein to be provided via diet    Nutrition Interventions:   Food and/or Nutrient Delivery  Interventions  Interventions: Meals and snacks  Meals and Snacks: General healthful diet  Goal: provide diet texture per SLP recs    Education Documentation  No documentation found.           Nutrition Monitoring and Evaluation   Monitoring/Evaluation:   Food/Nutrient Related History Monitoring  Monitoring and Evaluation Plan: Energy intake  Energy Intake: Estimated energy intake  Criteria: pt to consume >/= 75% estimated needs       Time Spent/Follow-up:   Follow Up  Time Spent (min): 30 minutes  Last Date of Nutrition Visit: 11/29/24  Nutrition Follow-Up Needed?: 7-10 days  Follow up Comment: 12/9/24

## 2024-11-29 NOTE — PROGRESS NOTES
11/29/24 1330   OT Last Visit   OT Received On 11/29/24   General   Reason for Referral CVA   Referred By Dr. Dubose   Past Medical History Relevant to Rehab HTN, DM, CKD III, tobacco abuse   Prior to Session Communication   (physical therapist)   Patient Position Received Up in chair   Preferred Learning Style verbal;visual   General Comment Patient agreeable to therapy   Precautions   Hearing/Visual Limitations No glasses. Smooth pursuits, saccades and convergence/ divergence slowed but intact. No visual field cuts identified.   Medical Precautions Fall precautions   Precautions Comment left shoulder harness, L LE AFO   Pain Assessment   Pain Assessment 0-10   0-10 (Numeric) Pain Score 0 - No pain   [place] FACES Pain Rating 0   Clinical Progression Not changed   Patient's Stated Pain Goal No pain   Cognition   Overall Cognitive Status Impaired   Orientation Level Oriented X4   Cognition Comments pleasant and cooperative   Attention WFL   Memory X   Short-Term Memory Impaired   Problem Solving X   Simple Functional Tasks Impaired   Coordination   Movements are Fluid and Coordinated No   Upper Body Coordination LUE improving   Lower Body Coordination slowed rate of movement left LE   Coordination Comment LUE weakness.   RUE    RUE  WFL   RUE Strength   RUE Overall Strength Deficits   LUE    LUE X   LUE Strength   LUE Overall Strength Deficits   Transfers   Transfer Yes   Transfer 1   Transfer From 1 Sit to   Transfer to 1 Stand   Technique 1 Sit to stand;Stand to sit   Transfer Device 1 Gait belt   Transfer Level of Assistance 1 Close supervision   Trials/Comments 1 x6 cues for hands and eccentric control, full turn and back up to chair.  Impulsivelt sits when fatigued   Transfers 2   Transfer From 2 Wheelchair to;Mat to   Transfer to 2 Mat;Wheelchair   Technique 2 Stand pivot;To right   Transfer Device 2 Gait belt   Transfer Level of Assistance 2 Contact guard   Trials/Comments 2 cues for full turn, reach    Modalities   Modalities Used Yes   Modality 1 Timed BEBETO - Electric Stimulation Attended  (NMES applied to wrist extensors, biceps, and triceps to elicit motor response to carryover to functional tasks.)   Therapeutic Exercise   Therapeutic Exercise Performed Yes   Therapeutic Exercise Activity 1 protraction/retraction against resistance with LUE   Therapeutic Exercise Activity 2 L shoulder elevation/depression   Therapeutic Exercise Activity 3 L scapular squeezes   Other Activity   Other Activity 1 Omnicycle x 15 minutes level 1   IP OT Assessment   Evaluation/Treatment Tolerance Patient tolerated treatment well   Medical Staff Made Aware Yes   End of Session Patient Position Up in chair;Alarm on   OT Assessment   OT Assessment Results Decreased upper extremity strength;Decreased cognition;Decreased endurance;Decreased sensation;Decreased gross motor control;Decreased fine motor control   Strengths Ability to acquire knowledge   Barriers to Participation Comorbidities   Education   Individual(s) Educated Patient   Home Program AROM;AAROM;Strengthening   Risk and Benefits Discussed with Patient/Caregiver/Other yes   Patient/Caregiver Demonstrated Understanding yes   Plan of Care Discussed and Agreed Upon yes   Patient Response to Education Patient/Caregiver Verbalized Understanding of Information   Inpatient/Swing Bed or Outpatient   Inpatient/Swing Bed or Outpatient Inpatient   Inpatient Plan   Treatment Interventions UE strengthening/ROM;Endurance training;Neuromuscular reeducation   OT Frequency 5 times per week   OT Discharge Recommendations Moderate intensity level of continued care   OT Recommended Transfer Status Assist of 1   OT - OK to Discharge Yes

## 2024-11-29 NOTE — NURSING NOTE
Assumed care of patient at report. Patient sitting in WC eating breakfast and denies pain at this time. Will monitor for changes through end of shift

## 2024-11-29 NOTE — CARE PLAN
The patient's goals for the shift include improve mobility    The clinical goals for the shift include maintain safety    Over the shift, the patient did not make progress toward the following goals. Barriers to progression include left sided weakness. Recommendations to address these barriers include continue current treatment plan.

## 2024-11-30 LAB
GLUCOSE BLD MANUAL STRIP-MCNC: 103 MG/DL (ref 74–99)
GLUCOSE BLD MANUAL STRIP-MCNC: 87 MG/DL (ref 74–99)

## 2024-11-30 PROCEDURE — 2500000004 HC RX 250 GENERAL PHARMACY W/ HCPCS (ALT 636 FOR OP/ED): Performed by: INTERNAL MEDICINE

## 2024-11-30 PROCEDURE — 2500000001 HC RX 250 WO HCPCS SELF ADMINISTERED DRUGS (ALT 637 FOR MEDICARE OP): Performed by: INTERNAL MEDICINE

## 2024-11-30 PROCEDURE — 2500000002 HC RX 250 W HCPCS SELF ADMINISTERED DRUGS (ALT 637 FOR MEDICARE OP, ALT 636 FOR OP/ED): Performed by: INTERNAL MEDICINE

## 2024-11-30 PROCEDURE — 97110 THERAPEUTIC EXERCISES: CPT | Mod: GP,CQ

## 2024-11-30 PROCEDURE — 97530 THERAPEUTIC ACTIVITIES: CPT | Mod: GP,CQ

## 2024-11-30 PROCEDURE — 97116 GAIT TRAINING THERAPY: CPT | Mod: GP,CQ

## 2024-11-30 PROCEDURE — 1180000001 HC REHAB PRIVATE ROOM DAILY

## 2024-11-30 PROCEDURE — 82947 ASSAY GLUCOSE BLOOD QUANT: CPT

## 2024-11-30 ASSESSMENT — PAIN SCALES - GENERAL
PAINLEVEL_OUTOF10: 0 - NO PAIN

## 2024-11-30 ASSESSMENT — PAIN - FUNCTIONAL ASSESSMENT
PAIN_FUNCTIONAL_ASSESSMENT: 0-10

## 2024-11-30 NOTE — PROGRESS NOTES
Patient's daughter, Tayler, provided FMLA paperwork to medical director for completion. Tayler will be made aware by this worker when paperwork is ready for pickup.    At IDT 11.26.24, Therapy recommended family training be scheduled for end of next week. Therapy will be notified when this has been scheduled.     Patient's goal remains for return home and she has declined any alternative plan. LSW following for equipment recommendations as well as any therapy needs post IRF discharge. LSW will make arrangements accordingly.

## 2024-11-30 NOTE — PROGRESS NOTES
"Physical Therapy       11/30/24 [id number]   PT  Visit   PT Received On 11/30/24   Response to Previous Treatment Patient with no complaints from previous session.   General   Reason for Referral CVA   Referred By Dr. Dubose   Past Medical History Relevant to Rehab HTN, DM, CKD III, tobacco abuse   Patient Position Received Up in chair   Preferred Learning Style verbal;visual   General Comment Patient agreeable to therapy   Precautions   Hearing/Visual Limitations No glasses. Smooth pursuits, saccades and convergence/ divergence slowed but intact. No visual field cuts identified.   Medical Precautions Fall precautions   Precautions Comment left shoulder harness, L LE AFO   Pain Assessment   Pain Assessment 0-10   0-10 (Numeric) Pain Score 0 - No pain   Therapeutic Exercise   Therapeutic Exercise Activity 1 seated hip abduction, 2x15 reps, blue theraband   Therapeutic Exercise Activity 2 seated isometric hip adduction/ball squeezes, 2x15 reps   Therapeutic Activity   Therapeutic Activity 1 step-ups laterally onto 4\" step, + UE support using RUE, 1x15 reps.  With LLE leading, vc's for  pushing though LLE to knee extension to lift RLE up,  Bob.  WIth RLE leading, vc's for slight L knee flexion when loading during RLE stepping down, CG/Bob   Ambulation/Gait Training 1   Surface 1 Level tile   Device 1 LBQC   Gait Support Devices L AFO;Gait belt   Assistance 1 Contact guard;Minimum assistance   Quality of Gait 1 Forward flexed posture;Inconsistent stride length;Decreased step length;Listing;NBOS   Comments/Distance (ft) 1 100 ft x 2, turns included.  Pt exhibited x 2 LOB anteriorly with min/modA to recover.  Vc's for upright posture and slower nelson.   Transfer 1   Technique 1 Sit to stand;Stand to sit   Transfer Device 1 Gait belt   Transfer Level of Assistance 1 Close supervision   Trials/Comments 1 improved chair approach noted   Stairs   Rails 1 Right   Device 1 Railing   Support Devices 1 Gait belt;Left " "Ankle-foot orthosis   Assistance 1 Contact guard   Comment/Number of Steps 1 up/down 12 6\" steps, step-to pattern, vc's for WBOS.   Wheelchair Activities   Propulsion Type 1 Manual   Level 1 Level tile   Method 1 Right upper extremity;Right lower extremity   Level of Assistance 1 Modified independent   Description/Details 1 ad barbara around unit   Activity Tolerance   Endurance Tolerates 30+ min exercise without fatigue   PT Assessment   PT Assessment Results Decreased strength;Decreased range of motion;Decreased endurance;Impaired balance;Decreased mobility;Decreased coordination;Impaired tone   Rehab Prognosis Good   Evaluation/Treatment Tolerance Patient tolerated treatment well   End of Session Patient Position Up in chair;Alarm on   PT Plan   Inpatient/Swing Bed or Outpatient Inpatient   PT Plan   Treatment/Interventions Bed mobility;Transfer training;Gait training;Stair training;Neuromuscular re-education;Therapeutic exercise;Therapeutic activity   PT Plan Ongoing PT   PT Recommended Transfer Status Assist x1     "

## 2024-12-01 VITALS
BODY MASS INDEX: 28.12 KG/M2 | OXYGEN SATURATION: 98 % | HEART RATE: 78 BPM | HEIGHT: 63 IN | TEMPERATURE: 98.6 F | DIASTOLIC BLOOD PRESSURE: 63 MMHG | WEIGHT: 158.73 LBS | RESPIRATION RATE: 18 BRPM | SYSTOLIC BLOOD PRESSURE: 143 MMHG

## 2024-12-01 LAB
GLUCOSE BLD MANUAL STRIP-MCNC: 66 MG/DL (ref 74–99)
GLUCOSE BLD MANUAL STRIP-MCNC: 93 MG/DL (ref 74–99)

## 2024-12-01 PROCEDURE — 2500000004 HC RX 250 GENERAL PHARMACY W/ HCPCS (ALT 636 FOR OP/ED): Performed by: INTERNAL MEDICINE

## 2024-12-01 PROCEDURE — 1180000001 HC REHAB PRIVATE ROOM DAILY

## 2024-12-01 PROCEDURE — 2500000001 HC RX 250 WO HCPCS SELF ADMINISTERED DRUGS (ALT 637 FOR MEDICARE OP): Performed by: INTERNAL MEDICINE

## 2024-12-01 PROCEDURE — 2500000002 HC RX 250 W HCPCS SELF ADMINISTERED DRUGS (ALT 637 FOR MEDICARE OP, ALT 636 FOR OP/ED): Performed by: INTERNAL MEDICINE

## 2024-12-01 PROCEDURE — 82947 ASSAY GLUCOSE BLOOD QUANT: CPT

## 2024-12-01 ASSESSMENT — PAIN - FUNCTIONAL ASSESSMENT
PAIN_FUNCTIONAL_ASSESSMENT: 0-10

## 2024-12-01 ASSESSMENT — PAIN SCALES - GENERAL
PAINLEVEL_OUTOF10: 0 - NO PAIN

## 2024-12-01 NOTE — CARE PLAN
The patient's goals for the shift include improve mobility    The clinical goals for the shift include No Fall

## 2024-12-01 NOTE — NURSING NOTE
Performed bladder scan d/t frequent urination, small output of urine. Bladder scan residual 113 ml measured after patient emptied the bladder. Pt. Is taking Keflex 500 mg BID for UTI.

## 2024-12-01 NOTE — NURSING NOTE
Assumed care of patient at 1900. Call light within reach. Pt. is resting in bed with eyes closed. No s/s of pain/discomfort. Safety measures remain in place. Will cont. to monitor.

## 2024-12-01 NOTE — NURSING NOTE
Pt is sitting up in wheelchair. Alarm on for pt safety. Call light in reach. No issues verbalized at this time.

## 2024-12-02 LAB — GLUCOSE BLD MANUAL STRIP-MCNC: 100 MG/DL (ref 74–99)

## 2024-12-02 PROCEDURE — 2500000002 HC RX 250 W HCPCS SELF ADMINISTERED DRUGS (ALT 637 FOR MEDICARE OP, ALT 636 FOR OP/ED): Performed by: INTERNAL MEDICINE

## 2024-12-02 PROCEDURE — 2500000001 HC RX 250 WO HCPCS SELF ADMINISTERED DRUGS (ALT 637 FOR MEDICARE OP): Performed by: INTERNAL MEDICINE

## 2024-12-02 PROCEDURE — 1180000001 HC REHAB PRIVATE ROOM DAILY

## 2024-12-02 PROCEDURE — 92507 TX SP LANG VOICE COMM INDIV: CPT | Mod: GN

## 2024-12-02 PROCEDURE — 97530 THERAPEUTIC ACTIVITIES: CPT | Mod: GP,CQ

## 2024-12-02 PROCEDURE — 2500000004 HC RX 250 GENERAL PHARMACY W/ HCPCS (ALT 636 FOR OP/ED): Performed by: INTERNAL MEDICINE

## 2024-12-02 PROCEDURE — 97535 SELF CARE MNGMENT TRAINING: CPT | Mod: CO,GO

## 2024-12-02 PROCEDURE — 97110 THERAPEUTIC EXERCISES: CPT | Mod: GP,CQ

## 2024-12-02 PROCEDURE — 97112 NEUROMUSCULAR REEDUCATION: CPT | Mod: GO,CO

## 2024-12-02 PROCEDURE — 99232 SBSQ HOSP IP/OBS MODERATE 35: CPT | Performed by: INTERNAL MEDICINE

## 2024-12-02 PROCEDURE — 97116 GAIT TRAINING THERAPY: CPT | Mod: GP,CQ

## 2024-12-02 PROCEDURE — 82947 ASSAY GLUCOSE BLOOD QUANT: CPT

## 2024-12-02 RX ORDER — OXYBUTYNIN CHLORIDE 5 MG/1
5 TABLET ORAL 2 TIMES DAILY
Status: DISCONTINUED | OUTPATIENT
Start: 2024-12-02 | End: 2024-12-07 | Stop reason: HOSPADM

## 2024-12-02 ASSESSMENT — ACTIVITIES OF DAILY LIVING (ADL)
BATHING_LEVEL_OF_ASSISTANCE: CONTACT GUARD
BATHING_WHERE_ASSESSED: SHOWER
HOME_MANAGEMENT_TIME_ENTRY: 60
BATHING_EQUIPMENT_NEEDED: LONG-HANDLED SPONGE;REMOVEABLE SHOWER HEAD

## 2024-12-02 ASSESSMENT — PAIN SCALES - GENERAL
PAINLEVEL_OUTOF10: 0 - NO PAIN

## 2024-12-02 ASSESSMENT — PAIN - FUNCTIONAL ASSESSMENT
PAIN_FUNCTIONAL_ASSESSMENT: 0-10

## 2024-12-02 NOTE — PROGRESS NOTES
12/02/24 0800 to 0900   General   Reason for Referral CVA   Referred By Dr. Dubose   Past Medical History Relevant to Rehab HTN, DM, CKD III, tobacco abuse   Patient Position Received Up in chair   Preferred Learning Style verbal;visual   General Comment Patient agreeable to therapy   Precautions   Hearing/Visual Limitations No glasses. Smooth pursuits, saccades and convergence/ divergence slowed but intact. No visual field cuts identified.   Medical Precautions Fall precautions   Precautions Comment left shoulder harness, L LE AFO   Pain Assessment   Pain Assessment 0-10   0-10 (Numeric) Pain Score 0 - No pain   Feeding   Feeding Level of Assistance Setup   Feeding Where Assessed Wheelchair   Feeding Comments open containers   Grooming   Grooming Level of Assistance Modified independent   Grooming Where Assessed Sitting sinkside   Grooming Comments hair and dentures   UE Bathing   UE Bathing Adaptive Equipment Removeable shower head   UE Bathing Level of Assistance Setup   UE Bathing Where Assessed Shower   LE Bathing   LE Bathing Adaptive Equipment Long-handled sponge;Removeable shower head   LE Bathing Level of Assistance Contact guard   LE Bathing Where Assessed Shower   LE Bathing Comments edeucated on 3 point stance for buttocks and to keep wide lynette   UE Dressing   UE Dressing Level of Assistance Setup   UE Dressing Where Assessed Wheelchair   LE Dressing   LE Dressing Yes   LE Dressing Adaptive Equipment Sock aide   Pants Level of Assistance Minimum assistance  (3 point stance instanding assist with left side to pull up pants. able to thread both legs)   Sock Level of Assistance Setup   Shoe Level of Assistance Maximum assistance   Orthotics Level of Assistance Dependent   Adult Briefs Level of Assistance Maximum assistance   LE Dressing Where Assessed Wheelchair   Toileting   Toileting Level of Assistance Minimum assistance   Where Assessed Toilet   Toileting Comments able to lower pants and complete  hygiene assist up   Toilet Transfers   Toilet Transfer From Wheelchair   Toilet Transfer Type To and from   Toilet Transfer to Standard toilet   Toilet Transfer Technique To right;To left   Toilet Transfers Supervision   Toilet Transfers Comments Patient utilzed wall mounted grab bar   Shower Transfers   Shower Transfer From Wheelchair   Shower Transfer Type To and from   Shower Transfer to Shower seat with back   Shower Transfer Technique To right;To left   Shower Transfers Supervision   Shower Transfers Comments with use of wall mounted grab bar   Balance/Neuromuscular Re-Education   Balance/Neuromuscular Re-Education Activity Performed Yes   Balance/Neuromuscular Re-Education Activity 1 20 degrees of wrist extension against gravity and trace flexion gravity elimated.   IP OT Assessment   OT Assessment Pt continuing to work on Left UE now has wrist extension against gravity 5 degrees and trace wrist flexion gravity eliminated,  strength improved 2 1/2 lbs.Patient is scheduled for family training on Wednesday. Lower dressing is improving sill requiring min assist.   Prognosis Good   Barriers to Discharge None   Evaluation/Treatment Tolerance Patient tolerated treatment well   Medical Staff Made Aware Yes   End of Session Communication Bedside nurse   End of Session Patient Position Up in chair;Alarm on   OT Assessment   OT Assessment Results Decreased upper extremity strength;Decreased cognition;Decreased endurance;Decreased sensation;Decreased gross motor control;Decreased fine motor control   Strengths Ability to acquire knowledge   Barriers to Participation Comorbidities   Education   Individual(s) Educated Patient   Education Provided   (continued education on 32 point stance)   Risk and Benefits Discussed with Patient/Caregiver/Other yes   Patient/Caregiver Demonstrated Understanding yes   Plan of Care Discussed and Agreed Upon yes   Patient Response to Education Patient/Caregiver Verbalized Understanding  of Information   Education Comment good teach back   Inpatient/Swing Bed or Outpatient   Inpatient/Swing Bed or Outpatient Inpatient   Inpatient Plan   Treatment Interventions ADL retraining   OT - OK to Discharge Yes

## 2024-12-02 NOTE — NURSING NOTE
Assumed care of patient at report, patient has been toileted twice this AM. Will speak with MD about ATB.  Patient has no pain and will be monitored through end of shift

## 2024-12-02 NOTE — PROGRESS NOTES
12/02/24 1300 to 1330   General   Reason for Referral CVA   Referred By Dr. Dubose   Past Medical History Relevant to Rehab HTN, DM, CKD III, tobacco abuse   Prior to Session Communication Bedside nurse   Patient Position Received Up in chair   Preferred Learning Style verbal;visual   General Comment Patient agreeable to therapy   Precautions   Medical Precautions Fall precautions   Precautions Comment left shoulder harness, L LE AFO   Pain Assessment   Pain Assessment 0-10   0-10 (Numeric) Pain Score 0 - No pain   LUE    LUE   ( strength 2 1/2 lbs)   LUE AROM (degrees)   L Wrist Flexion 0-80   (trace flexion)   L Wrist Extension 0-70   (20 degrees)   Toileting   Toileting Level of Assistance Minimum assistance   Where Assessed Toilet   Toileting Comments able to lower pants and assist up able to complete hygiene   Modalities   Modalities Used Yes   Modality 1 Timed BEBETO - Electric Stimulation Attended  (Used for visual feed back on wrist flexion, pronation and middle deltiod. Patient instructed in time with stim to attempt movement. Along with tapping and joint compression. Stim removed and same movement attempted)   Toilet Transfers   Toilet Transfer From Wheelchair   Toilet Transfer Type To and from   Toilet Transfer to Standard toilet   Toilet Transfer Technique To right;To left   Toilet Transfers Supervision   Toilet Transfers Comments Patient utilized grab bar   Balance/Neuromuscular Re-Education   Balance/Neuromuscular Re-Education Activity Performed Yes   Balance/Neuromuscular Re-Education Activity 1 Therapist applied resistance for shoulder abduction close chain with 5 reps.   Balance/Neuromuscular Re-Education Activity 2 Grasp digit flexion attempting grasping object and grasping therapist hand able to flex digits. 2 1/2 lbs of    IP OT Assessment   End of Session Communication Bedside nurse   End of Session Patient Position Up in chair;Alarm on   OT Assessment   OT Assessment Results Decreased  upper extremity strength;Decreased cognition;Decreased endurance;Decreased sensation;Decreased gross motor control;Decreased fine motor control   Strengths Ability to acquire knowledge   Barriers to Participation Comorbidities   Inpatient/Swing Bed or Outpatient   Inpatient/Swing Bed or Outpatient Inpatient   Inpatient Plan   Treatment Interventions ADL retraining;Neuromuscular reeducation   OT - OK to Discharge Yes

## 2024-12-02 NOTE — PROGRESS NOTES
Family training planned for 12.4.24 at 9am with daughter. Patient will discharge home on 12.10.24. LSW is following for discharge planning.

## 2024-12-02 NOTE — PROGRESS NOTES
Physical Therapy       12/02/24 [id number]   PT  Visit   PT Received On 12/02/24   Response to Previous Treatment Patient with no complaints from previous session.   General   Reason for Referral CVA   Referred By Dr. Dubose   Past Medical History Relevant to Rehab HTN, DM, CKD III, tobacco abuse   Patient Position Received Up in chair   Preferred Learning Style verbal;visual   General Comment Patient agreeable to therapy   Precautions   Medical Precautions Fall precautions   Precautions Comment left shoulder harness, L LE AFO   Pain Assessment   Pain Assessment 0-10   0-10 (Numeric) Pain Score 0 - No pain   Therapeutic Exercise   Therapeutic Exercise Activity 1 standing heel raises, 2# LLE, 3# RLE, 2x15 reps.  Vc's for straight knees   Therapeutic Exercise Activity 2 standing hip flexion, 2# LLE, 3# RLE, 2x15 reps   Therapeutic Exercise Activity 3 standing hip abduction, 2x15 reps, 2# LLE, 3# RLE   Therapeutic Exercise Activity 4 standing hip extension, 1x15 reps, 2# LLE, 3# RLE   Therapeutic Exercise Activity 5 standing knee flexion, 1x15 reps, 2# LLE, 3# RLE   Therapeutic Exercise Activity 6 seated unilateral hip abduction, 2x15 reps each LE, green theraband   Therapeutic Exercise Activity 7 seated hamstring curls, 2x15 reps, green theraband   Therapeutic Activity   Therapeutic Activity 1 // bars (UE support):  coordination ladder x 2 trials.  Emphasis on keeping hips low/slight squat hold during task to reduce L knee hyperextension during loading.  CGA   Therapeutic Activity 2 omnicycle x 12 minutes, level 2 resistance, 2.1 km, 98% activity   Bed Mobility 1   Bed Mobility 1 Supine to sitting;Sitting to supine   Level of Assistance 1 Modified independent   Bed Mobility Comments 1 flat mat, no rail   Bed Mobility 2   Bed Mobility  2 Scooting   Level of Assistance 2 Independent   Bed Mobility 3   Bed Mobility 3 Rolling right;Rolling left   Level of Assistance 3 Modified independent   Bed Mobility Comments 3 flat mat  "  Ambulation/Gait Training 1   Surface 1 Level tile;Carpet   Device 1 LBQC   Gait Support Devices L AFO;Gait belt   Assistance 1 Contact guard;Minimum assistance   Quality of Gait 1 Forward flexed posture;Inconsistent stride length;Decreased step length;Listing;NBOS   Comments/Distance (ft) 1 120 ft x 2 with turns included.  Vc's for upright posture/looking forward/shorter advacne of WBQC.  Good carryover.   Ambulation/Gait Training 2   Surface 2 Level tile   Device 2 Platform walker left;Platform walker right   Gait Support Devices Gait belt;L AFO  (2.5# ankle weight on LLE)   Assistance 2 Contact guard   Quality of Gait 2 Forward flexed posture;Listing;NBOS;Inconsistent stride length;Decreased step length;Soft knee(s)   Comments/Distance (ft) 2 120 ft x 2, turns included.  MIld improvement noted with weight shifting toward R when advancing LLE.  Overall improved gait quality noted.   Transfer 1   Technique 1 Sit to stand;Stand to sit   Transfer Device 1 Gait belt   Transfer Level of Assistance 1 Close supervision   Transfers 2   Transfer From 2 Wheelchair to;Mat to   Transfer to 2 Mat;Wheelchair   Technique 2 Stand pivot;To right   Transfer Device 2 Gait belt   Transfer Level of Assistance 2 Contact guard   Trials/Comments 2 cues for full turn   Stairs   Rails 1 Right   Device 1 Railing   Support Devices 1 Gait belt;Left Ankle-foot orthosis   Assistance 1 Close supervision;Contact guard   Comment/Number of Steps 1 up/down 12 6\" steps, step-to pattern.  Vc's to promote L foot clearance of stair lip during descent.  CGA to close supervision needed during descent, close supervision during ascent.   Wheelchair Activities   Propulsion Type 1 Manual   Level 1 Level tile   Method 1 Right upper extremity;Right lower extremity   Level of Assistance 1 Modified independent   Description/Details 1 ad barbara around unit   Activity Tolerance   Endurance Tolerates 30+ min exercise without fatigue   PT Assessment   PT Assessment " Results Decreased strength;Decreased range of motion;Decreased endurance;Impaired balance;Decreased mobility;Decreased coordination;Impaired tone   Rehab Prognosis Good   Assessment Comment Pt is progressing well toward remaining goals.  Vc's and instruction given during tasks to reduce genurecurvatum during loading on LLE and for increased alteral weight shifting toward R for improved L foot clearance.  Pt utilizing Toe Off AFO on LLE as this is most comfortable.  Heel lift D/C'd as pt reported discomfort and no significant improvement in knee function noted.  At this time, pt will require 24/7 supervision/assist.   End of Session Patient Position Up in chair;Alarm on   PT Plan   Inpatient/Swing Bed or Outpatient Inpatient   PT Plan   Treatment/Interventions Bed mobility;Transfer training;Gait training;Stair training;Neuromuscular re-education;Therapeutic exercise;Therapeutic activity   PT Plan Ongoing PT   PT Recommended Transfer Status Assist x1;Assistive device

## 2024-12-02 NOTE — PROGRESS NOTES
Speech-Language Pathology    SLP Adult IRF [facility] Speech-Language Pathology Treatment     Patient Name: Cynthia Muse  MRN: [id number]  Today's Date: 12/2/2024  Time Calculation  Start Time: 0901  Stop Time: 0957  Time Calculation (min): 56 min     4/4sp    Current Problem:   Ischemic stroke with left body involvement (right brain)      SLP Assessment:  SLP TX Intervention Outcome: Making Progress Towards Goals  SLP Assessment Results: Cognitive Deficits, Motor Speech Deficits  Prognosis: Good  Treatment Tolerance: Patient tolerated treatment well  Medical Staff Made Aware: Yes  Strengths: Motivation  Education Provided: Yes     Plan:  Inpatient/Swing Bed or Outpatient: Inpatient  Treatment/Interventions: Articulation, Cognitive communication functioning, Oral motor exercises  SLP TX Plan: Continue Plan of Care  SLP Plan: Skilled SLP  SLP Frequency: 4x per week  Duration: 3 weeks  SLP Discharge Recommendations: Other (Comment) (TBD pending progress)  Next Treatment Priority: OMEs, dysarthria  Discussed POC: Patient, Nursing  Discussed Risks/Benefits: Yes, Patient, Nursing  Patient/Caregiver Agreeable: Yes      Subjective   Pt seen upright in wheelchair after OT session.    General Visit Information:   Reason for Referral: Follow-up speech-language treatment  Referred By: Dr. Dubose  Past Medical History Relevant to Rehab: HTN, DM, CKD III, tobacco abuse  Prior to Session Communication: Bedside nurse    Pain Assessment:  Pain Assessment  0-10 (Numeric) Pain Score: 0 - No pain    Objective   Speech and Language Goals: (established 11/19/24, projected discontinuation 12/10/24)  Pt will complete oral-motor exercises with 80% acc given model and cues as needed in order to improve motor speech/articulation skills.  PROGRESS: Provided direct model to initiate, mirror for feedback, and cues for pacing, full ROM, jaw stability. Able to complete x15 repetitions of each of the following - labial protrusion/retraction/alternating  protrusion and retraction/press/vowels/cheek puff and suction, lingual protrusion/retraction/lateralization/pops. Significantly reduced cues for facial circulation techniques. Provided cold spoon (placed in ice) to utilize for L labial/facial stretch. 12/2: Pt reports completing OMEs outside of tx session. She demonstrates good return for facial circulation techniques. She is able to utilize iced spoon for L labial/facial stretch.              STATUS: continue, monitor for carryover outside of tx session.  Pt will  utilize speech intelligibility strategies with 75% acc to improve communication skills.  PROGRESS: Reduced cues to utilize slow rate, overarticulation. Cues for syllable segmentation with consonant blends such as /sk/ and /st/.  STATUS: continue, progressing with consistent cues  3.    Pt will  complete articulatory precision tasks with 75% accuracy to improve intelligibility.   PROGRESS: 75% with cues for alliterative sentences (palatals). Attempted expressions/quotes, however, mild difficulty in reading/understanding longer words. Pt did better with single sentence stimuli.               STATUS: continue, progressing with reduced cues for single sentence stimuli   LTG: Pt will increase motor speech function to the highest possible speech level for improved functional communication within daily living tasks.       4. Pt will increase STM/recall to 80% accuracy with cues to improve recall of functional daily information.  PROGRESS: Improved carryover for repetitions via Remembering Information about a Person (CT cristel), 90% when initially cued to slow down, 70% without any cues.  STATUS: continue, pt benefits from repetition cues  5. Pt will improve visual scanning to 75% accuracy with cues to increase visual awareness of environment.  PROGRESS: 90% for reading a clock. 90% for clock math. ACHIEVED - 11/26  STATUS: GOAL MET - 11/26  6. Ongoing assessment if indicated for further goal development or to  determine progress, as needed.  PROGRESS: Consider CLQT reassessment as pt is able.  STATUS: continue, as able.      LTG 1: Pt will optimize cognitive-linguistic skills necessary for return to least restrictive living environment and to reduce caregiver dependence.     Motor Speech:   Motor Speech Intervention : Compensatory Speech Strategies, Oral/Facial Agility Techniques, Oral Lingual Strengthening Techniques    Cognitive Linguistics:  Cognitive Linguistics Interventions: Short term memory    Inpatient Education:  Adult Inpatient Education  Individual(s) Educated: Patient  Verbal Education : progress, goals  Risk and Benefits Discussed with Patient/Caregiver/Other: yes  Patient/Caregiver Demonstrated Understanding: yes  Plan of Care Discussed and Agreed Upon: yes  Patient Response to Education: Patient/Caregiver Verbalized Understanding of Information

## 2024-12-02 NOTE — NURSING NOTE
Assumed care of patient at 1900. Call light within reach. Pt. denied pain/discomfort. Call light within reach. Will cont. to monitor

## 2024-12-02 NOTE — PROGRESS NOTES
"[facility]  Physician Progress Note    Subjective   Cynthia Muse is a 79 y.o. female admitted to inpatient rehabilitation unit.    Gets urge to urinate frequently for small volumes.      Objective   /72 (BP Location: Right arm, Patient Position: Lying)   Pulse 74   Temp 36.7 °C (98.1 °F) (Temporal)   Resp 18   Ht 1.6 m (5' 2.99\")   Wt 72 kg (158 lb 11.7 oz)   SpO2 96%   BMI 28.13 kg/m²      Physical Exam  Constitutional:       General: She is not in acute distress.     Appearance: She is not toxic-appearing.   HENT:      Head: Normocephalic and atraumatic.      Mouth/Throat:      Mouth: Mucous membranes are moist.   Eyes:      Pupils: Pupils are equal, round, and reactive to light.   Cardiovascular:      Rate and Rhythm: Normal rate and regular rhythm.      Heart sounds: No murmur heard.  Pulmonary:      Breath sounds: Normal breath sounds. No wheezing, rhonchi or rales.   Abdominal:      General: There is no distension.      Palpations: Abdomen is soft.   Musculoskeletal:      Right lower leg: No edema.      Left lower leg: No edema.   Neurological:      Mental Status: She is alert and oriented to person, place, and time.      Left hemiparesis.  Getting some movement at the elbow in terms of extension.    Labs  BMP:        CBC:        Coagulation:           Assesment and Plan    Ischemic Stroke (Multi)   Left Hemiparesis (Multi)   Essential Hypertension   Hyperlipidemia   Chronic Kidney Disease (Ckd) Stage G3a/A2, Moderately Decreased Glomerular Filtration Rate (Gfr) Between 45-59 Ml/Min/1.73 Square Meter and Albuminuria Creatinine Ratio Between  Mg/G (C* (Multi)      Ischemic stroke - Continue with medical management for secondary prevention: DAPT x 21 days, statin  DVT prophylaxis has been ordered: LMWH      BINH/CKD - fairly stable; monitor    DM2 - cont URENA, monitor glucose     HTN - amlodipine, coreg     Tobacco use disorder - nicotine patch " ordered     HPL - stat     Left hemiparesis     Dysphagia - Cont ST efforts.    Urinary frequency.  No UTI.  Low PVR.  Trial of oxybutinin.    Yunior Dubose MD

## 2024-12-02 NOTE — PROGRESS NOTES
12/02/24 1300 to 1330   General   Reason for Referral CVA   Referred By Dr. Dubose   Past Medical History Relevant to Rehab HTN, DM, CKD III, tobacco abuse   Prior to Session Communication Bedside nurse   Patient Position Received Up in chair   Preferred Learning Style verbal;visual   General Comment Patient agreeable to therapy   Precautions   Medical Precautions Fall precautions   Precautions Comment left shoulder harness, L LE AFO   Pain Assessment   Pain Assessment 0-10   0-10 (Numeric) Pain Score 0 - No pain   LUE    LUE   ( strength 2 1/2 lbs)   LUE AROM (degrees)   L Wrist Flexion 0-80   (trace flexion)   L Wrist Extension 0-70   (20 degrees)   Modalities   Modalities Used Yes   Modality 1 Timed BEBETO - Electric Stimulation Attended  (Used for visual feed back on wrist flexion, pronation and middle deltiod. Patient instructed in time with stim to attempt movement. Along with tapping and joint compression. Stim removed and same movement attempted)   Balance/Neuromuscular Re-Education   Balance/Neuromuscular Re-Education Activity Performed Yes   Balance/Neuromuscular Re-Education Activity 1 Therapist applied resistance for shoulder abduction close chain with 5 reps.   Balance/Neuromuscular Re-Education Activity 2 Grasp digit flexion attempting grasping object and grasping therapist hand able to flex digits. 2 1/2 lbs of    IP OT Assessment   End of Session Communication Bedside nurse   End of Session Patient Position Up in chair;Alarm on   OT Assessment   OT Assessment Results Decreased upper extremity strength;Decreased cognition;Decreased endurance;Decreased sensation;Decreased gross motor control;Decreased fine motor control   Strengths Ability to acquire knowledge   Barriers to Participation Comorbidities   Inpatient/Swing Bed or Outpatient   Inpatient/Swing Bed or Outpatient Inpatient   Inpatient Plan   Treatment Interventions ADL retraining;Neuromuscular reeducation   OT - OK to Discharge Yes

## 2024-12-03 LAB
GLUCOSE BLD MANUAL STRIP-MCNC: 105 MG/DL (ref 74–99)
GLUCOSE BLD MANUAL STRIP-MCNC: 87 MG/DL (ref 74–99)

## 2024-12-03 PROCEDURE — 97535 SELF CARE MNGMENT TRAINING: CPT | Mod: GO,CO

## 2024-12-03 PROCEDURE — 97530 THERAPEUTIC ACTIVITIES: CPT | Mod: GO,CO

## 2024-12-03 PROCEDURE — 2500000004 HC RX 250 GENERAL PHARMACY W/ HCPCS (ALT 636 FOR OP/ED): Performed by: INTERNAL MEDICINE

## 2024-12-03 PROCEDURE — 2500000002 HC RX 250 W HCPCS SELF ADMINISTERED DRUGS (ALT 637 FOR MEDICARE OP, ALT 636 FOR OP/ED): Performed by: INTERNAL MEDICINE

## 2024-12-03 PROCEDURE — 82947 ASSAY GLUCOSE BLOOD QUANT: CPT

## 2024-12-03 PROCEDURE — 97110 THERAPEUTIC EXERCISES: CPT | Mod: GP,CQ

## 2024-12-03 PROCEDURE — 92507 TX SP LANG VOICE COMM INDIV: CPT | Mod: GN

## 2024-12-03 PROCEDURE — 99232 SBSQ HOSP IP/OBS MODERATE 35: CPT | Performed by: INTERNAL MEDICINE

## 2024-12-03 PROCEDURE — 1180000001 HC REHAB PRIVATE ROOM DAILY

## 2024-12-03 PROCEDURE — 2500000001 HC RX 250 WO HCPCS SELF ADMINISTERED DRUGS (ALT 637 FOR MEDICARE OP): Performed by: INTERNAL MEDICINE

## 2024-12-03 PROCEDURE — 97116 GAIT TRAINING THERAPY: CPT | Mod: GP,CQ

## 2024-12-03 PROCEDURE — 97530 THERAPEUTIC ACTIVITIES: CPT | Mod: GP,CQ

## 2024-12-03 ASSESSMENT — PAIN SCALES - GENERAL
PAINLEVEL_OUTOF10: 0 - NO PAIN
PAINLEVEL_OUTOF10: 2
PAINLEVEL_OUTOF10: 0 - NO PAIN
PAINLEVEL_OUTOF10: 4
PAINLEVEL_OUTOF10: 0 - NO PAIN
PAINLEVEL_OUTOF10: 0 - NO PAIN

## 2024-12-03 ASSESSMENT — COGNITIVE AND FUNCTIONAL STATUS - GENERAL
HELP NEEDED FOR BATHING: A LITTLE
DAILY ACTIVITIY SCORE: 21
DRESSING REGULAR LOWER BODY CLOTHING: A LITTLE
TOILETING: A LITTLE

## 2024-12-03 ASSESSMENT — PAIN - FUNCTIONAL ASSESSMENT
PAIN_FUNCTIONAL_ASSESSMENT: 0-10

## 2024-12-03 ASSESSMENT — ACTIVITIES OF DAILY LIVING (ADL)
HOME_MANAGEMENT_TIME_ENTRY: 60
BATHING_COMMENTS: DECLINED
EFFECT OF PAIN ON DAILY ACTIVITIES: COMFORT

## 2024-12-03 ASSESSMENT — PAIN DESCRIPTION - DESCRIPTORS: DESCRIPTORS: ACHING

## 2024-12-03 NOTE — CARE PLAN
Problem: ADLs STM Goals  Goal: Patient with complete lower body dressing with contact guard assist level of assistance donning and doffing all LE clothes  with PRN adaptive equipment while supported sitting.  Outcome: Progressing  Goal: Patient will complete toileting including  clothing management/hygiene with contact guard assist level of assistance and grab bars and bedside commode.  Outcome: Progressing     Problem: TRANSFERS STM Goals  Goal: Pt will complete functional transfer to car with least restrictive device with contact guard assist level.  Outcome: Progressing     Problem: ADLs LTM Goals  Goal: Patient will perform UB and LB bathing seated with modified independent level of assistance and grab bars and shower chair.  Outcome: Progressing  Goal: Patient with complete upper body dressing with modified independent level of assistance donning and doffing all UE clothes with PRN adaptive equipment while supported sitting.  Outcome: Progressing  Goal: Patient with complete lower body dressing with modified independent level of assistance donning and doffing all LE clothes  with PRN adaptive equipment while supported sitting.  Outcome: Progressing  Goal: Patient will complete toileting including clothing management/hygiene with modified independent level of assistance and grab bars and bedside commode.  Outcome: Progressing  Goal: Pt will perform light home management and meal prep activity with modified independence with use of least restrictive device and good use of EC/WS techniques and safety.  Outcome: Progressing     Problem: TRANSFERS LTM Goals  Goal: Patient will complete functional transfer to toilet with least restrictive device with modified independent level of assistance.  Outcome: Progressing  Goal: Patient will complete functional car transfer with least restrictive device with modified independence.  Outcome: Progressing

## 2024-12-03 NOTE — PROGRESS NOTES
12/03/24 1300 to 1330   General   Reason for Referral CVA   Referred By Dr. Dubose   Past Medical History Relevant to Rehab HTN, DM, CKD III, tobacco abuse   Patient Position Received Up in chair;Alarm on   Preferred Learning Style verbal;visual   General Comment Patient agreeable to therapy   Precautions   Hearing/Visual Limitations No glasses. Smooth pursuits, saccades and convergence/ divergence slowed but intact. No visual field cuts identified.   Medical Precautions Fall precautions   Precautions Comment left shoulder harness, L LE AFO   LUE AROM (degrees)   L Shoulder Horizontal ABduction 0-40 0 Degrees   L Shoulder Horizontal ADduction 0-130 0 Degrees   L Elbow Flexion/Extension 0-135-150 90 °   L Forearm Pronation  0-80-90 80 Degrees   L Forearm Supination  0-80-90 0 Degrees   L Wrist Flexion 0-80 3 Degrees  (gravity eliminated with forearm stabilized)   L Wrist Extension 0-70 4 Degrees  (gravity eliminated with forearm stabilized)   OT Assessment   OT Assessment Results Decreased upper extremity strength;Decreased cognition;Decreased endurance;Decreased sensation;Decreased gross motor control;Decreased fine motor control   Strengths Ability to acquire knowledge   Barriers to Participation Comorbidities   Inpatient/Swing Bed or Outpatient   Inpatient/Swing Bed or Outpatient Inpatient   Inpatient Plan   Treatment Interventions Fine motor coordination activities   OT - OK to Discharge Yes      12/03/24 1300   OT Adult Other Outcome Measures   9 Hole Peg Test RUE 19 sec improved 2 secs   LUE unable   Box and Block RUE 78 blks/min LUE unable   Other Outcome Measures  strength  RUE 50 lbs LUE 5lbs improved from unable. Cruz cancellation right hand with stylus 2 mins 30 secs 2 misses 1 distractor imrproved 30secs improved by 2 misses.

## 2024-12-03 NOTE — PROGRESS NOTES
Physical Therapy Weekly Progress Report       24 [id number]   PT  Visit   PT Received On 24   Response to Previous Treatment Patient with no complaints from previous session.   General   Reason for Referral CVA   Referred By Dr. Dubose   Past Medical History Relevant to Rehab HTN, DM, CKD III, tobacco abuse   Patient Position Received Up in chair   Preferred Learning Style verbal;visual   General Comment Patient agreeable to therapy   Precautions   Medical Precautions Fall precautions   Precautions Comment left shoulder harness, L LE AFO   Pain Assessment   Pain Assessment 0-10   0-10 (Numeric) Pain Score 0 - No pain   Strength RLE   R Hip Flexion 5/5   R Hip Extension 5/5   R Hip ABduction 5/5   R Hip ADduction 5/5   R Knee Flexion 5/5   R Knee Extension 5/5   R Ankle Dorsiflexion 5/5   Strength LLE   L Hip Flexion 3+/5   L Hip ABduction 4+/5   L Hip ADduction 5/5   L Knee Extension 5/5   L Ankle Dorsiflexion 3-/5   L Hip Extension 3+/5   L Knee Flexion 4/5   L Ankle Plantar Flexion 3-/5   Therapeutic Exercise   Therapeutic Exercise Activity 1 seated hip flexion, 2# LLE, 3# RLE, 2x15 reps   Therapeutic Exercise Activity 2 sidelying on R, LLE straight leg hip abductrion, 0#, 2x15 reps   Therapeutic Exercise Activity 3 sideltying on R, LLE hip extension, 2x15 reps   Therapeutic Exercise Activity 4 supine bridging, 2x15 reps, with bolster under knees   Therapeutic Exercise Activity 5 supine SAQ, 2x15 reps, 2# LLE, 3# RLE   Therapeutic Exercise Activity 6 supine hip abduction, 2x15 reps, 2# LLE, 3# RLE   Balance/Neuromuscular Re-Education   Balance/Neuromuscular Re-Education Activity 1 TU.82 sec, WBQC   Bed Mobility 1   Bed Mobility 1 Supine to sitting;Sitting to supine   Level of Assistance 1 Modified independent   Bed Mobility Comments 1 flat mat, rail to assist when supine to sit transferring   Bed Mobility 2   Bed Mobility  2 Scooting   Level of Assistance 2 Independent   Bed Mobility 3   Bed  Mobility 3 Rolling right;Rolling left   Level of Assistance 3 Modified independent   Bed Mobility Comments 3 flat mat   Ambulation/Gait Training 1   Surface 1 Level tile;Carpet   Device 1 LBQC   Gait Support Devices L AFO;Gait belt   Assistance 1 Contact guard;Minimum assistance   Quality of Gait 1 Forward flexed posture;Inconsistent stride length;Decreased step length;Listing;NBOS   Comments/Distance (ft) 1 120 ft x 2,  turns included.  Mostly CGA with periods of anterior instability requiring Bob to recover.   Transfer 1   Technique 1 Sit to stand;Stand to sit   Transfer Device 1 Gait belt   Transfer Level of Assistance 1 Close supervision   Trials/Comments 1 use of w/c armrest   Transfers 2   Technique 2 Stand pivot;To right   Transfer Device 2 Gait belt   Transfer Level of Assistance 2 Close supervision;Contact guard   Stairs   Rails 1 Right   Device 1 Railing   Support Devices 1 Gait belt;Left Ankle-foot orthosis   Assistance 1 Close supervision;Contact guard   Comment/Number of Steps 1 up/down 12 6-inch steps, step-to pattern.  Vc's throughout for weight shifting to R during descent and for L foot clearance on step during descent.    Object From Floor   Devices No reacher   Assist Level Contact guard   Comments x5 cones from floor   PT Assessment   PT Assessment Results Decreased strength;Decreased range of motion;Decreased endurance;Impaired balance;Decreased mobility;Decreased coordination;Impaired tone   Rehab Prognosis Good   Barriers to Discharge lives alone   Assessment Comment Pt is progressing well toward remaining goals and has met 2 so far.  TUG score improved from 55 sec to 38 sec. Vc's and instruction given during tasks to reduce genu recurvatum during loading on LLE and for increased lateral weight shifting toward R for improved L foot clearance.  Pt utilizing Toe Off AFO on LLE as this is most comfortable.  Heel lift D/C'd as pt reported discomfort and no significant improvement in knee  function noted.  At this time, pt will require 24/7 supervision/assist. PT NOTE: excellent effort during sessions. Progressed to amb with WBQC. Anticipate need for AFO for home. Recommend family training.   End of Session Patient Position Up in chair;Alarm on   PT Plan   Inpatient/Swing Bed or Outpatient Inpatient. PT NOTE: continue with PT POC towards remaining goals. Contact orthotist regarding AFO for home.   PT Plan   Treatment/Interventions Bed mobility;Transfer training;Gait training;Stair training;Neuromuscular re-education;Therapeutic exercise;Therapeutic activity   PT Plan Ongoing PT   PT Recommended Transfer Status Assist x1;Assistive device                 Problem: IRF PT LTG Problem  Goal: Patient will transfer supine to sit and sit to supine with independent assist to facilitate mobility.  12/3/2024 0946 by Cheyenne Mathis PTA  Outcome: Met  Goal: Patient will transfer sit to stand and stand to sit with independent assist to facilitate mobility.  12/3/2024 0946 by Cheyenne Mathis PTA  Outcome: Progressing  Goal: Patient will transfer bed to chair and chair to bed with independent assist to facilitate mobility.  12/3/2024 0946 by Cheyenne Mathis PTA  Outcome: Progressing  Goal: Patient will amb 150 feet least restrictive device including two turns on even surface with supervision assist to facilitate safe mobility.    12/3/2024 0946 by Cheyenne Mathis PTA  Outcome: Progressing     Goal: Patient will negotiate 12 stairs with two rail(s) and supervision} assist with no device for in home and community.  12/3/2024 0946 by Cheyenne Mathis PTA  Outcome: Progressing     Goal: Patient will propel wheelchair 150 feet with two turns on even surface with independent assist to facilitate safe mobility.   Outcome: Met  Goal: Patient will perform TUG with least restrictive assistive device in 30 seconds or less to promote mobility and reduce fall risk with dynamic standing tasks.   12/3/2024 0946  by Cheyenne Mathis, PTA  Outcome: Progressing  Goal: Patient will increase left LE strength to 4-/5 to improve functional mobility.   12/3/2024 0946 by Cheyenne Mathis, CHAVA  Outcome: Progressing               Cosigned by: Surekha Nichole, PT at 12/3/2024 11:40 AM

## 2024-12-03 NOTE — PROGRESS NOTES
12/03/24 0800 to  0900   General   Reason for Referral CVA   Referred By Dr. Dubose   Past Medical History Relevant to Rehab HTN, DM, CKD III, tobacco abuse   Prior to Session Communication   (dicussed poc with OTR)   Patient Position Received Up in chair   Preferred Learning Style verbal;visual   General Comment Patient agreeable to therapy   Precautions   Medical Precautions Fall precautions   Precautions Comment left shoulder harness, L LE AFO   Pain Assessment   Pain Assessment 0-10   0-10 (Numeric) Pain Score 0 - No pain   LUE AROM (degrees)   L Shoulder Flexion  0-170 44 Degrees   L Shoulder Extension  0-60 60 Degrees   L Shoulder ABduction 0-160/180 44 Degrees   L Shoulder Internal Rotation  0-70 70 Degrees   L Shoulder External Rotation  0-90 90 Degrees   Feeding   Feeding Level of Assistance Setup   Feeding Where Assessed Wheelchair   Feeding Comments open conatainers   Grooming   Grooming Level of Assistance Modified independent   Grooming Where Assessed Sitting sinkside   Grooming Comments hair dentures   UE Bathing   UE Bathing Level of Assistance Setup   UE Bathing Where Assessed Sitting sinkside   UE Bathing Comments items within reach   LE Bathing   LE Bathing Comments declined   UE Dressing   UE Dressing Level of Assistance Setup   UE Dressing Where Assessed Wheelchair   LE Dressing   LE Dressing Yes   LE Dressing Adaptive Equipment Sock aide   Pants Level of Assistance Minimum assistance  (3 point stance instanding assist with left side to pull up pants. able to thread both legs)   Sock Level of Assistance Setup   Shoe Level of Assistance Maximum assistance   Orthotics Level of Assistance Dependent   Adult Briefs Level of Assistance Maximum assistance   LE Dressing Where Assessed Wheelchair   Toileting   Toileting Level of Assistance Minimum assistance   Where Assessed Toilet   Toileting Comments 3 point stance with cues to position feet assist with left side of pants up able to complete hygiene    Bed Mobility   Bed Mobility Yes   Bed Mobility 1   Bed Mobility 1 Supine to sitting;Sitting to supine   Level of Assistance 1 Set up   Bed Mobility 2   Bed Mobility  2 Scooting   Level of Assistance 2 Independent   Functional Mobility   Functional Mobility Performed Yes   Functional Mobility 1   Surface 1 Level tile   Device 1   (robb cane)   Functional Mobility Support Devices Gait belt   Assistance 1 Minimum assistance  (min a on small turns)   Quality of Functional Mobility 1 Diminished heel strike   Transfers   Transfer Yes   Transfer 1   Transfer From 1 Sit to   Transfer to 1 Stand   Technique 1 Sit to stand;Stand to sit   Transfer Device 1 Gait belt   Transfer Level of Assistance 1 Close supervision   Trials/Comments 1 recommedned a bed rail   Transfers 2   Transfer From 2 Bed to   Transfer to 2 Stand   Technique 2 Sit to stand   Transfer Device 2 Gait belt   Transfer Level of Assistance 2 Contact guard   Toilet Transfers   Toilet Transfer From   (robb cane)   Toilet Transfer Type To and from   Toilet Transfer to Standard toilet   Toilet Transfer Technique Ambulating   Toilet Transfers Contact guard   Toilet Transfers Comments cues to algin prior to descent.   IP OT Assessment   OT Assessment Patient continuing to improve LUE rom has improved. Working towards safe turns to toilet with robb cane. Reviewed dme recommendation with patient, family training scheduled for tomorrow. Will continue with POC   Prognosis Good   Barriers to Discharge None   Evaluation/Treatment Tolerance Patient tolerated treatment well   Medical Staff Made Aware Yes   End of Session Communication Bedside nurse   End of Session Patient Position Up in chair;Alarm on   OT Assessment   OT Assessment Results Decreased upper extremity strength;Decreased cognition;Decreased endurance;Decreased sensation;Decreased gross motor control;Decreased fine motor control   Strengths Ability to acquire knowledge   Barriers to Participation Comorbidities    Education   Individual(s) Educated Patient   Education Provided   (DME recommedations)   Equipment   (toilet safety frame, grab bar for shower, ttb, bed rail, gait belt, reacher and sock aide. nPossible lap tray for w/c)   Risk and Benefits Discussed with Patient/Caregiver/Other yes   Patient/Caregiver Demonstrated Understanding yes   Plan of Care Discussed and Agreed Upon yes   Inpatient/Swing Bed or Outpatient   Inpatient/Swing Bed or Outpatient Inpatient   Inpatient Plan   Treatment Interventions ADL retraining   OT - OK to Discharge Yes

## 2024-12-03 NOTE — NURSING NOTE
Assumed care of patient at report. Patient sitting up in chair eating breakfast, has no complaint of pain noted. Keflex discontinued and oxybutin has started to show some improvement in urine frequency. Will monitor for further changes through end of shift

## 2024-12-03 NOTE — PROGRESS NOTES
Physical Therapy       12/03/24 [id number]   PT  Visit   PT Received On 12/03/24   Response to Previous Treatment Patient with no complaints from previous session.   General   Reason for Referral CVA   Referred By Dr. Dubose   Past Medical History Relevant to Rehab HTN, DM, CKD III, tobacco abuse   Patient Position Received Up in chair;Alarm on   Preferred Learning Style verbal;visual   General Comment Patient agreeable to therapy   Precautions   Medical Precautions Fall precautions   Precautions Comment left shoulder harness, L LE AFO   Pain Assessment   Pain Assessment 0-10   0-10 (Numeric) Pain Score 0 - No pain   Therapeutic Activity   Therapeutic Activity 1 step-ups onto 6-inch step up with LLE/down with RLE.  Emphasis on control in both phases.  CGA to CG/Bob. 2x15 reps.  RUE on R rail for support.   Therapeutic Activity 2 forward/retro amb with WBQC x 3 (5 ft each direction), with vc's for technique.  CG/Bob   Therapeutic Activity 3 side stepping with WBQC, 6 ft each direction, CG/Bob with vc's for technique   Therapeutic Activity 4 omnicycle x 10 minutes, level 3 resistance, 1.9 km, 98% activity   Ambulation/Gait Training 1   Surface 1 Level tile   Device 1 LBQC   Gait Support Devices L AFO;Gait belt   Assistance 1 Contact guard;Minimum assistance   Quality of Gait 1 Forward flexed posture;Inconsistent stride length;Decreased step length;Listing;NBOS   Comments/Distance (ft) 1 120 ft x 2, turns included.  Mostly CGA with periods of Bob due to instability forward.   Transfer 1   Technique 1 Sit to stand;Stand to sit   Transfer Device 1 Gait belt   Transfer Level of Assistance 1 Close supervision   Wheelchair Activities   Propulsion Type 1 Manual   Level 1 Level tile   Method 1 Right upper extremity;Right lower extremity   Level of Assistance 1 Modified independent   Description/Details 1 ad barbara around unit   Activity Tolerance   Endurance Tolerates 30+ min exercise without fatigue   PT Assessment   PT  Assessment Results Decreased strength;Decreased range of motion;Decreased endurance;Impaired balance;Decreased mobility;Decreased coordination;Impaired tone   Rehab Prognosis Good   Evaluation/Treatment Tolerance Patient tolerated treatment well   End of Session Patient Position Up in chair;Alarm on   PT Plan   Inpatient/Swing Bed or Outpatient Inpatient   PT Plan   Treatment/Interventions Bed mobility;Transfer training;Gait training;Stair training;Neuromuscular re-education;Therapeutic exercise;Therapeutic activity   PT Plan Ongoing PT   PT Recommended Transfer Status Assist x1;Assistive device

## 2024-12-03 NOTE — PROGRESS NOTES
Speech-Language Pathology    SLP Adult IRF [facility] Speech-Language Pathology Treatment     Patient Name: Cynthia Muse  MRN: [id number]  Today's Date: 12/3/2024  Time Calculation  Start Time: 1100  Stop Time: 1150  Time Calculation (min): 50 min     1/3sp    Current Problem:   Ischemic stroke with left body involvement (right brain)     SLP Assessment:  SLP TX Intervention Outcome: Making Progress Towards Goals  SLP Assessment Results: Motor Speech Deficits  Prognosis: Good  Treatment Tolerance: Patient tolerated treatment well  Medical Staff Made Aware: Yes  Strengths: Family/Caregiver Support, Motivation  Education Provided: Yes     Plan:  Inpatient/Swing Bed or Outpatient: Inpatient  Treatment/Interventions: Articulation, Oral motor exercises  SLP TX Plan: Continue Plan of Care, Goals Adjusted, Other (Comment) (change frequency to 3x/wk)  SLP Plan: Skilled SLP  SLP Frequency: 3x per week  Duration: 3 weeks  SLP Discharge Recommendations: Other (Comment) (TBD after family training)  Next Treatment Priority: OMEs, dysarthria  Discussed POC: Patient, Nursing  Discussed Risks/Benefits: Yes, Patient, Nursing  Patient/Caregiver Agreeable: Yes      Subjective   Pt wheeled self to scheduled ST appointment time.     General Visit Information:   Reason for Referral: Follow-up speech-language treatment  Referred By: Dr. Dubose  Past Medical History Relevant to Rehab: HTN, DM, CKD III, tobacco abuse    Pain Assessment:  Pain Assessment  0-10 (Numeric) Pain Score: 0 - No pain      Objective   Speech and Language Goals: (established 11/19/24, projected discontinuation 12/10/24)  Pt will complete oral-motor exercises with 80% acc given model and cues as needed in order to improve motor speech/articulation skills.  PROGRESS: Provided direct model to initiate, mirror for feedback, and cues for pacing, full ROM, jaw stability. Able to complete x15 repetitions of each of the following - labial protrusion/retraction/alternating protrusion  and retraction/press/vowels/cheek puff and suction, lingual protrusion/retraction/lateralization/pops. Significantly reduced cues for facial circulation techniques. Provided cold spoon (placed in ice) to utilize for L labial/facial stretch. 12/3: Pt reports that she was able to complete OMEs outside of therapy session yesterday. She is comfortable with completing on her own.  STATUS: continue, monitor for carryover outside of tx session.  Pt will  utilize speech intelligibility strategies with 75% acc to improve communication skills.  PROGRESS: Improved carryover of slow rate, and overarticulation with structured conversation tasks that are familiar/concrete topics.  STATUS: continue, progressing with consistent cues  3.    Pt will  complete articulatory precision tasks with 75% accuracy to improve intelligibility.   PROGRESS: 75% with cues for alliterative sentences (palatals). Attempted expressions/quotes, however, mild difficulty in reading/understanding longer words. Pt did better with single sentence stimuli. 12/3: not addressed this tx session              STATUS: continue, progressing with reduced cues for single sentence stimuli   LTG: Pt will increase motor speech function to the highest possible speech level for improved functional communication within daily living tasks.       4. Pt will increase STM/recall to 80% accuracy with cues to improve recall of functional daily information.  PROGRESS: Improved carryover for repetitions via Remembering Information about a Person (CT cristel), 90% when initially cued to slow down, 70% without any cues. 12/3: Pt reports that she is at a baseline mentation. ACHIEVED - 12/3  STATUS: GOAL MET - 12/3  5. Pt will improve visual scanning to 75% accuracy with cues to increase visual awareness of environment.  PROGRESS: 90% for reading a clock. 90% for clock math. ACHIEVED - 11/26  STATUS: GOAL MET - 11/26  6. Ongoing assessment if indicated for further goal development or to  determine progress, as needed.  PROGRESS: See below for CLQT reassessment. Pt reports baseline mentation. There was a significant improvement with attention domain. Score increased from previous 147 (mild) to 160 (WNL). Executive function domain also increased by 3 points.  STATUS: not clinically indicated     LTG 1: Pt will optimize cognitive-linguistic skills necessary for return to least restrictive living environment and to reduce caregiver dependence.     Motor Speech:   Motor Speech Intervention : Compensatory Speech Strategies    Cognitive Linguistics:  Cognitive Linguistics Interventions: Short term memory      SLP Outcome Measures:  FROM 11/20:   The Cognitive Linguistic Quick Test (CLQT) is a criterion-referenced measure to quickly assess strengths and weaknesses in five cognitive domains.  Attention Domain:                               147, mild (3) Vs 160, WNL (4)  Memory Domain:                                157, WNL (4) Vs 148, WNL (4)  Executive Function Domain:                14, mild (3) Vs 17, mild (3)  Language Domain:                              29, WNL (4) Vs 30, WNL (4)  Visuospatial Skills Domain:                  63, WNL (4) Vs 65, WNL (4)     Composite Severity Rating:                 3.6, WNL Vs 3.8 WNL (4)     Inpatient Education:  Adult Inpatient Education  Individual(s) Educated: Patient  Verbal Education : CLQT subtest results  Risk and Benefits Discussed with Patient/Caregiver/Other: yes  Patient/Caregiver Demonstrated Understanding: yes  Plan of Care Discussed and Agreed Upon: yes  Patient Response to Education: Patient/Caregiver Verbalized Understanding of Information    Consultations/Referrals/Coordination of Services: Family training scheduled for next therapy day.

## 2024-12-03 NOTE — CARE PLAN
Problem: IRF PT LTG Problem  Goal: Patient will transfer supine to sit and sit to supine with independent assist to facilitate mobility.  12/3/2024 0946 by Cheyenne Mathis PTA  Outcome: Met  Goal: Patient will transfer sit to stand and stand to sit with independent assist to facilitate mobility.  12/3/2024 0946 by Cheyenne Mathis PTA  Outcome: Progressing  Goal: Patient will transfer bed to chair and chair to bed with independent assist to facilitate mobility.  12/3/2024 0946 by Cheyenne Mathis PTA  Outcome: Progressing  Goal: Patient will amb 150 feet least restrictive device including two turns on even surface with supervision assist to facilitate safe mobility.    12/3/2024 0946 by Cheyenne Mathis PTA  Outcome: Progressing    Goal: Patient will negotiate 12 stairs with two rail(s) and supervision} assist with no device for in home and community.  12/3/2024 0946 by Cheyenne Mathis PTA  Outcome: Progressing    Goal: Patient will propel wheelchair 150 feet with two turns on even surface with independent assist to facilitate safe mobility.   Outcome: Met  Goal: Patient will perform TUG with least restrictive assistive device in 30 seconds or less to promote mobility and reduce fall risk with dynamic standing tasks.   12/3/2024 0946 by Cheyenne Mathis PTA  Outcome: Progressing  Goal: Patient will increase left LE strength to 4-/5 to improve functional mobility.   12/3/2024 0946 by Cheyenne Mathis PTA  Outcome: Progressing

## 2024-12-03 NOTE — PROGRESS NOTES
"[facility]  Physician Progress Note    Subjective   Cynthia Muse is a 79 y.o. female admitted to inpatient rehabilitation unit.    Oxybutinin somewhat effective she feels.      Objective   /65   Pulse 83   Temp 36.7 °C (98.1 °F) (Temporal)   Resp 18   Ht 1.6 m (5' 2.99\")   Wt 72 kg (158 lb 11.7 oz)   SpO2 98%   BMI 28.13 kg/m²      Physical Exam  Constitutional:       General: She is not in acute distress.     Appearance: She is not toxic-appearing.   HENT:      Head: Normocephalic and atraumatic.      Mouth/Throat:      Mouth: Mucous membranes are moist.   Eyes:      Pupils: Pupils are equal, round, and reactive to light.   Cardiovascular:      Rate and Rhythm: Normal rate and regular rhythm.      Heart sounds: No murmur heard.  Pulmonary:      Breath sounds: Normal breath sounds. No wheezing, rhonchi or rales.   Abdominal:      General: There is no distension.      Palpations: Abdomen is soft.   Musculoskeletal:      Right lower leg: No edema.      Left lower leg: No edema.   Neurological:      Mental Status: She is alert and oriented to person, place, and time.      Left hemiparesis.  Getting some movement at the elbow in terms of extension.    Labs  BMP:        CBC:        Coagulation:           Assesment and Plan    Ischemic Stroke (Multi)   Left Hemiparesis (Multi)   Essential Hypertension   Hyperlipidemia   Chronic Kidney Disease (Ckd) Stage G3a/A2, Moderately Decreased Glomerular Filtration Rate (Gfr) Between 45-59 Ml/Min/1.73 Square Meter and Albuminuria Creatinine Ratio Between  Mg/G (C* (Multi)      Ischemic stroke - Continue with medical management for secondary prevention: DAPT x 21 days, statin  DVT prophylaxis has been ordered: LMWH      BINH/CKD - fairly stable; monitor    DM2 - cont URENA, monitor glucose     HTN - amlodipine, coreg     Tobacco use disorder - nicotine patch ordered     HPL - stat     Left hemiparesis     Dysphagia - " Cont ST efforts.    Urinary frequency.  No UTI.  Low PVR.  Cont oxybutinin.    Yunior Dubose MD

## 2024-12-03 NOTE — PROGRESS NOTES
WEEKLY PROGRESS NOTE  Evaluation date: 11/19/24    Treatment Rx provided: See daily flowsheet        PRECAUTIONS  Precautions  STEADI Fall Risk Score (The score of 4 or more indicates an increased risk of falling): `  Hearing/Visual Limitations: No glasses. Smooth pursuits, saccades and convergence/ divergence slowed but intact. No visual field cuts identified.  Medical Precautions: Fall precautions  Precautions Comment: left shoulder harness, L LE AFO    ADL/ Functional Status:  Eating Setup   Grooming Modified independent   Toilet Hygiene Minimum assistance   Shower/Bathe Upper:  Setup  Lower:  Contact guard   Upper Body Dress Setup   Lower Body Dress Minimum assistance (3 point stance instanding assist with left side to pull up pants. able to thread both legs)   On/Off Footwear Socks:  Setup  Shoes:  Maximum assistance   Toilet Transfer Contact guard   Car Transfer Minimal assistance   Meal Preparation  N/A   Kitchen Mobility Contact guard     Standardized Tests:  Box of blocks Box and Block: RUE 78 blks/min LUE unable   9 hole peg test 9 Hole Peg Test: RUE 19 sec improved 2 secs   LUE unable    strength  Bits Cruz cancellation  [place] making A/B Other Outcome Measures:  strength  RUE 50 lbs LUE 5lbs improved from unable. Cruz cancellation right hand with stylus 2 mins 30 secs 2 misses 1 distractor imrproved 30secs improved by 2 misses.       ROM - LEFT Upper Extremity  Shoulder:   Flex L Shoulder Flexion  0-170: 44 Degrees  Ext L Shoulder Extension  0-60: 60 Degrees  Abd L Shoulder ABduction 0-160/180: 44 Degrees Elbow:     Flex/ext L Elbow Flexion/Extension 0-135-150: 90 °     Forearm:   Supination L Forearm Supination  0-80-90: 0 Degrees  Pronation L Forearm Supination  0-80-90: 0 Degrees Wrist:   Flex L Wrist Flexion 0-80: 3 Degrees (gravity eliminated with forearm stabilized)  Ext: L Wrist Extension 0-70: 4 Degrees (gravity eliminated with forearm stabilized)   Wrist Deviation:   Radial    Ulnar        [facility] Daily Activity  Putting on and taking off regular lower body clothing: A little  Bathing (including washing, rinsing, drying): A little  Putting on and taking off regular upper body clothing: None  Toileting, which includes using toilet, bedpan or urinal: A little  Taking care of personal grooming such as brushing teeth: None  Eating Meals: None  Daily Activity - Total Score: 21    Plan Of Care: Pt partially attained STM/LTM goals. Pts LUE ROM has improved carrying over to functional tasks.  Pt has been educated on AE/DME needs and one handed assistive devices with handouts issued.  Pt has a scheduled family training with daughter on 12/4/24.  Pt plans on returning to independent living and out of town daughter plans on coming to stay with her beginning 12/13/24.  Continue OT POC to address unmet goals and begin DC planning.     Problem: ADLs STM Goals  Goal: Patient with complete lower body dressing with contact guard assist level of assistance donning and doffing all LE clothes  with PRN adaptive equipment while supported sitting.  Outcome: Progressing  Goal: Patient will complete toileting including  clothing management/hygiene with contact guard assist level of assistance and grab bars and bedside commode.  Outcome: Progressing     Problem: TRANSFERS STM Goals  Goal: Pt will complete functional transfer to car with least restrictive device with contact guard assist level.  Outcome: Progressing     Problem: ADLs LTM Goals  Goal: Patient will perform UB and LB bathing seated with modified independent level of assistance and grab bars and shower chair.  Outcome: Progressing  Goal: Patient with complete upper body dressing with modified independent level of assistance donning and doffing all UE clothes with PRN adaptive equipment while supported sitting.  Outcome: Progressing  Goal: Patient with complete lower body dressing with modified independent level of assistance donning and doffing all LE clothes   with PRN adaptive equipment while supported sitting.  Outcome: Progressing  Goal: Patient will complete toileting including clothing management/hygiene with modified independent level of assistance and grab bars and bedside commode.  Outcome: Progressing  Goal: Pt will perform light home management and meal prep activity with modified independence with use of least restrictive device and good use of EC/WS techniques and safety.  Outcome: Progressing     Problem: TRANSFERS LTM Goals  Goal: Patient will complete functional transfer to toilet with least restrictive device with modified independent level of assistance.  Outcome: Progressing  Goal: Patient will complete functional car transfer with least restrictive device with modified independence.  Outcome: Progressing

## 2024-12-04 ENCOUNTER — PHARMACY VISIT (OUTPATIENT)
Dept: PHARMACY | Facility: CLINIC | Age: 79
End: 2024-12-04
Payer: MEDICARE

## 2024-12-04 PROBLEM — I63.9 ISCHEMIC STROKE (MULTI): Status: RESOLVED | Noted: 2024-11-18 | Resolved: 2024-12-04

## 2024-12-04 LAB
GLUCOSE BLD MANUAL STRIP-MCNC: 116 MG/DL (ref 74–99)
GLUCOSE BLD MANUAL STRIP-MCNC: 92 MG/DL (ref 74–99)

## 2024-12-04 PROCEDURE — 1180000001 HC REHAB PRIVATE ROOM DAILY

## 2024-12-04 PROCEDURE — 99232 SBSQ HOSP IP/OBS MODERATE 35: CPT | Performed by: INTERNAL MEDICINE

## 2024-12-04 PROCEDURE — 82947 ASSAY GLUCOSE BLOOD QUANT: CPT

## 2024-12-04 PROCEDURE — 2500000004 HC RX 250 GENERAL PHARMACY W/ HCPCS (ALT 636 FOR OP/ED): Performed by: INTERNAL MEDICINE

## 2024-12-04 PROCEDURE — RXMED WILLOW AMBULATORY MEDICATION CHARGE

## 2024-12-04 PROCEDURE — 97112 NEUROMUSCULAR REEDUCATION: CPT | Mod: GP

## 2024-12-04 PROCEDURE — 92507 TX SP LANG VOICE COMM INDIV: CPT | Mod: GN

## 2024-12-04 PROCEDURE — 97530 THERAPEUTIC ACTIVITIES: CPT | Mod: GP

## 2024-12-04 PROCEDURE — 97535 SELF CARE MNGMENT TRAINING: CPT | Mod: GO,CO

## 2024-12-04 PROCEDURE — 97116 GAIT TRAINING THERAPY: CPT | Mod: GP

## 2024-12-04 PROCEDURE — 2500000001 HC RX 250 WO HCPCS SELF ADMINISTERED DRUGS (ALT 637 FOR MEDICARE OP): Performed by: INTERNAL MEDICINE

## 2024-12-04 PROCEDURE — 2500000002 HC RX 250 W HCPCS SELF ADMINISTERED DRUGS (ALT 637 FOR MEDICARE OP, ALT 636 FOR OP/ED): Performed by: INTERNAL MEDICINE

## 2024-12-04 PROCEDURE — 97110 THERAPEUTIC EXERCISES: CPT | Mod: GP

## 2024-12-04 RX ORDER — OXYBUTYNIN CHLORIDE 5 MG/1
5 TABLET ORAL 2 TIMES DAILY
Qty: 60 TABLET | Refills: 0 | Status: SHIPPED | OUTPATIENT
Start: 2024-12-04 | End: 2025-01-03

## 2024-12-04 RX ORDER — AMLODIPINE BESYLATE 10 MG/1
10 TABLET ORAL DAILY
Qty: 30 TABLET | Refills: 0 | Status: SHIPPED | OUTPATIENT
Start: 2024-12-04 | End: 2025-01-03

## 2024-12-04 RX ORDER — CARVEDILOL 6.25 MG/1
6.25 TABLET ORAL 2 TIMES DAILY
Qty: 60 TABLET | Refills: 0 | Status: SHIPPED | OUTPATIENT
Start: 2024-12-04 | End: 2025-01-03

## 2024-12-04 ASSESSMENT — PAIN DESCRIPTION - DESCRIPTORS
DESCRIPTORS: ACHING

## 2024-12-04 ASSESSMENT — PAIN DESCRIPTION - LOCATION
LOCATION: SHOULDER
LOCATION: SHOULDER

## 2024-12-04 ASSESSMENT — PAIN - FUNCTIONAL ASSESSMENT
PAIN_FUNCTIONAL_ASSESSMENT: 0-10

## 2024-12-04 ASSESSMENT — PAIN SCALES - GENERAL
PAINLEVEL_OUTOF10: 3
PAINLEVEL_OUTOF10: 0 - NO PAIN
PAINLEVEL_OUTOF10: 0 - NO PAIN
PAINLEVEL_OUTOF10: 5 - MODERATE PAIN
PAINLEVEL_OUTOF10: 6
PAINLEVEL_OUTOF10: 5 - MODERATE PAIN
PAINLEVEL_OUTOF10: 6
PAINLEVEL_OUTOF10: 2
PAINLEVEL_OUTOF10: 6
PAINLEVEL_OUTOF10: 4

## 2024-12-04 ASSESSMENT — ACTIVITIES OF DAILY LIVING (ADL)
BATHING_LEVEL_OF_ASSISTANCE: CONTACT GUARD
EFFECT OF PAIN ON DAILY ACTIVITIES: COMFORT
BATHING_WHERE_ASSESSED: SHOWER
HOME_MANAGEMENT_TIME_ENTRY: 60
EFFECT OF PAIN ON DAILY ACTIVITIES: COMFORT
EFFECT OF PAIN ON DAILY ACTIVITIES: COMFORT
BATHING_EQUIPMENT_NEEDED: LONG-HANDLED SPONGE
EFFECT OF PAIN ON DAILY ACTIVITIES: COMFORT
EFFECT OF PAIN ON DAILY ACTIVITIES: COMFORT

## 2024-12-04 ASSESSMENT — PAIN SCALES - WONG BAKER
WONGBAKER_NUMERICALRESPONSE: HURTS LITTLE MORE
WONGBAKER_NUMERICALRESPONSE: HURTS LITTLE BIT
WONGBAKER_NUMERICALRESPONSE: NO HURT

## 2024-12-04 ASSESSMENT — PAIN DESCRIPTION - ORIENTATION
ORIENTATION: LEFT
ORIENTATION: LEFT

## 2024-12-04 ASSESSMENT — PAIN SCALES - PAIN ASSESSMENT IN ADVANCED DEMENTIA (PAINAD)
FACIALEXPRESSION: SMILING OR INEXPRESSIVE
BODYLANGUAGE: RELAXED
BREATHING: NORMAL
TOTALSCORE: MEDICATION (SEE MAR)
TOTALSCORE: 0
CONSOLABILITY: NO NEED TO CONSOLE

## 2024-12-04 NOTE — PROGRESS NOTES
Speech-Language Pathology    SLP Adult IR[facility] Speech-Language Pathology Treatment     Patient Name: Cynthia Muse  MRN: [id number]  Today's Date: 12/4/2024  Time Calculation  Start Time: 0800  Stop Time: 0850  Time Calculation (min): 50 min     2/3sp    Current Problem:   Ischemic stroke with left body involvement (right brain)     SLP Assessment:  SLP TX Intervention Outcome: Making Progress Towards Goals  SLP Assessment Results: Motor Speech Deficits  Prognosis: Good  Treatment Tolerance: Patient tolerated treatment well  Medical Staff Made Aware: Yes  Strengths: Family/Caregiver Support  Education Provided: Yes     Plan:  Inpatient/Swing Bed or Outpatient: Inpatient  Treatment/Interventions: Articulation, Oral motor exercises  SLP TX Plan: Continue Plan of Care  SLP Plan: Skilled SLP  SLP Frequency: 3x per week  Duration: 3 weeks  SLP Discharge Recommendations: Other (Comment) (TBD pending progress)  Next Treatment Priority: OMEs, dysarthria  Discussed POC: Patient, Nursing  Discussed Risks/Benefits: Yes, Patient, Nursing  Patient/Caregiver Agreeable: Yes    Subjective   Pt seen upright in wheelchair.      General Visit Information:   Reason for Referral: Follow-up speech-language treatment  Referred By: Dr. Dubose  Past Medical History Relevant to Rehab: HTN, DM, CKD III, tobacco abuse  Prior to Session Communication: Bedside nurse    Pain Assessment:  Pain Assessment  0-10 (Numeric) Pain Score: 5 - Moderate pain  Pain Location: Shoulder  Pain Orientation: Left      Objective   Speech and Language Goals: (established 11/19/24, projected discontinuation 12/10/24)  Pt will complete oral-motor exercises with 80% acc given model and cues as needed in order to improve motor speech/articulation skills.  PROGRESS: Provided direct model to initiate, mirror for feedback, and cues for pacing, full ROM, jaw stability. Able to complete x15 repetitions of each of the following - labial protrusion/retraction/alternating  protrusion and retraction/press/vowels/cheek puff and suction, lingual protrusion/retraction/lateralization/pops. Significantly reduced cues for facial circulation techniques. Provided cold spoon (placed in ice) to utilize for L labial/facial stretch. 12/4: Pt able to complete labial OMEs at supervision-independent level. Pt reports completing outside of tx session.  STATUS: continue, able to complete at supvervision-independent level. Cues only for head neutral.  Pt will  utilize speech intelligibility strategies with 75% acc to improve communication skills.  PROGRESS: Improved carryover of slow rate, and overarticulation with structured conversation tasks that are familiar/concrete topics. 12/4: same as previous tx day.  STATUS: continue, progressing with reduced cues  3.    Pt will  complete articulatory precision tasks with 75% accuracy to improve intelligibility.   PROGRESS: 75% with cues for slow rate with short jokes.               STATUS: continue, progressing with reduced cues.   LTG: Pt will increase motor speech function to the highest possible speech level for improved functional communication within daily living tasks.       4. Pt will increase STM/recall to 80% accuracy with cues to improve recall of functional daily information.  PROGRESS: Improved carryover for repetitions via Remembering Information about a Person (CT cristel), 90% when initially cued to slow down, 70% without any cues. 12/3: Pt reports that she is at a baseline mentation. ACHIEVED - 12/3  STATUS: GOAL MET - 12/3  5. Pt will improve visual scanning to 75% accuracy with cues to increase visual awareness of environment.  PROGRESS: 90% for reading a clock. 90% for clock math. ACHIEVED - 11/26  STATUS: GOAL MET - 11/26  6. Ongoing assessment if indicated for further goal development or to determine progress, as needed.  PROGRESS: Not clinically indicated  STATUS: continue as needed     LTG 1: Pt will optimize cognitive-linguistic skills  necessary for return to least restrictive living environment and to reduce caregiver dependence.     Motor Speech:   Motor Speech Intervention : Oral/Facial Agility Techniques, Oral Lingual Strengthening Techniques, Compensatory Speech Strategies    Cognitive Linguistics:  Cognitive Linguistics Interventions: Short term memory    Inpatient Education:  Adult Inpatient Education  Individual(s) Educated: Patient  Written Education : reviewed handouts for what is stroke, effects of stroke, and dysarthria  Verbal Education : progress, goals  Risk and Benefits Discussed with Patient/Caregiver/Other: yes  Patient/Caregiver Demonstrated Understanding: yes  Plan of Care Discussed and Agreed Upon: yes  Patient Response to Education: Patient/Caregiver Verbalized Understanding of Information

## 2024-12-04 NOTE — DISCHARGE SUMMARY
Discharge Diagnosis  Ischemic stroke (Multi)    Issues Requiring Follow-Up  To see neurology in follow up, continue PT/OT/ST    Test Results Pending At Discharge  Pending Labs       Order Current Status    Extra Urine Gray Tube Collected (11/24/24 1314)    Urinalysis with Reflex Culture and Microscopic In process            Hospital Course   This is a 79-year-old woman who presented to the hospital with left-sided weakness. She was given TNK for acute stroke symptoms. MRI did show acute ischemic infarct in the right corona radiata and chronic small vessel ischemic changes. She has had some improvement in her strength more so in the leg than in the arm. He was seen by neurology and placed on dual antiplatelet therapy for 21 days as well as statin.  The patient was admitted to the inpatient rehabilitation unit and received intensive physical therapy, occupational therapy and rehabilitation nursing care.  She developed urinary frequency, low postvoid residual, no sign of infection.  She improved on oxybutynin.  The patient made strides toward functional independence and at the time of discharge was at a supervised to modified independent level with mobility, transfers and ADLs.  Home health care was arranged.  The patient will be following up with the providers as outlined in the discharge instructions.  Discharge medications were reconciled in detail and the patient and family were educated on appropriate medication use.  Prescriptions were provided either in hardcopy or electronic format.  The clinical condition was stable at the time of discharge.    Patient requires a manual Wheelchair to complete mobility related ADL's within the home. She is unable to functionally ambulate any significant distance at this time. She has sufficient BUE strength to independently propel the wheelchair. She will not be able to ambulate safely with a lesser assistive device.    Pertinent Physical Exam At Time of Discharge    Physical  Exam  Constitutional:       General: She is not in acute distress.     Appearance: She is not toxic-appearing.   HENT:      Head: Normocephalic and atraumatic.      Mouth/Throat:      Mouth: Mucous membranes are moist.   Eyes:      Pupils: Pupils are equal, round, and reactive to light.   Cardiovascular:      Rate and Rhythm: Normal rate and regular rhythm.      Heart sounds: No murmur heard.  Pulmonary:      Breath sounds: Normal breath sounds. No wheezing, rhonchi or rales.   Abdominal:      General: There is no distension.      Palpations: Abdomen is soft.   Musculoskeletal:      Right lower leg: No edema.      Left lower leg: No edema.   Neurological:      Mental Status: She is alert and oriented to person, place, and time.      Left hemiparesis.  She is essentially flaccid through the hand and wrist.  She can shrug her left shoulder.  She is able to plantarflex, has significant weakness in dorsiflexion on the left.  She can lift her left leg up off the chair from a seated position.    Home Medications     Medication List      START taking these medications     oxybutynin 5 mg tablet; Commonly known as: Ditropan; Take 1 tablet (5   mg) by mouth 2 times a day.     CONTINUE taking these medications     alendronate 70 mg tablet; Commonly known as: Fosamax; TAKE ONE TABLET BY   MOUTH ONCE A WEEK   amLODIPine 10 mg tablet; Commonly known as: Norvasc; Take 1 tablet (10   mg) by mouth once daily.   aspirin 81 mg EC tablet; Take 1 tablet (81 mg) by mouth once daily.   carvedilol 6.25 mg tablet; Commonly known as: Coreg; Take 1 tablet (6.25   mg) by mouth 2 times a day.   glimepiride 2 mg tablet; Commonly known as: AmaryL; Take 1 tablet (2 mg)   by mouth once daily in the morning. Take before meals. Do not fill before   November 19, 2024.   rosuvastatin 10 mg tablet; Commonly known as: Crestor; TAKE ONE TABLET   BY MOUTH EVERY DAY     STOP taking these medications     clopidogrel 75 mg tablet; Commonly known as: Plavix    insulin lispro 100 unit/mL injection   lidocaine 4 % patch   oxygen gas therapy; Commonly known as: O2       Outpatient Follow-Up  No future appointments.    Yunior Dubose MD

## 2024-12-04 NOTE — NURSING NOTE
Nurse to nurse report provided.  The patient is resting in bed at this time with the call light within reach.   Breath sounds clear and equal bilaterally.

## 2024-12-04 NOTE — PROGRESS NOTES
Physical Therapy and FAMILY TRAINING        12/04/24 1218-1004   PT  Visit   PT Received On 12/04/24   Response to Previous Treatment Patient with no complaints from previous session.   General   Reason for Referral CVA   Referred By Dr. Dubose   Past Medical History Relevant to Rehab HTN, DM, CKD III, tobacco abuse   General Comment FAMILY  TRAINING   Cognition   Orientation Level Oriented X4   Coordination   Coordination Comment LUE weakness. Improving ROM at shoulder and elbow   Postural Control   Righting Reactions Slower to left when turning, LOB Left   Static Sitting Balance   Static Sitting-Balance Support Feet supported   Static Sitting-Level of Assistance Independent   Static Sitting-Comment/Number of Minutes x 10 min   Dynamic Sitting Balance   Dynamic Sitting-Balance Support Feet supported   Dynamic Sitting-Level of Assistance Modified independent   Dynamic Sitting-Balance Reaching for objects   Dynamic Sitting-Comments RUE   Static Standing Balance   Static Standing-Balance Support Right upper extremity supported  (WBQC and WBOS)   Static Standing-Level of Assistance Contact guard;Close supervision   Static Standing-Comment/Number of Minutes WBQC   Dynamic Standing Balance   Dynamic Standing-Balance Support Right upper extremity supported   Dynamic Standing-Level of Assistance Minimum assistance;Moderate assistance   Dynamic Standing-Comments LOB with turning lead left when fatigued   Therapeutic Exercise   Therapeutic Exercise Activity 1 seated hip flexion, 2# LLE, 3# RLE, 2x15 reps   Therapeutic Exercise Activity 2 Hamstring Curls 2x15 blue tband  (Issued HEP with blue tband)   Therapeutic Exercise Activity 3 Standing Heel RAises 2x15 v   Therapeutic Exercise Activity 4 Seated Marching 2# LLE 3# RLE   Therapeutic Exercise Activity 5 LAQ 2# LLE 3# RLE 2x15   Therapeutic Activity   Therapeutic Activity 1 Sidestepping CGx1   Bed Mobility 1   Bed Mobility 1 Supine to sitting;Sitting to supine;Rolling  left;Rolling right   Level of Assistance 1 Modified independent   Ambulation/Gait Training 1   Surface 1 Level tile   Device 1 LBQC   Gait Support Devices L AFO;Gait belt   Assistance 1 Contact guard;Minimum assistance   Quality of Gait 1 Forward flexed posture;Inconsistent stride length;Decreased step length;Listing;NBOS   Comments/Distance (ft) 1 135 feet x2   Ambulation/Gait Training 2   Surface 2 Level tile   Device 2 No device   Gait Support Devices Gait belt;L AFO   Assistance 2 Minimum assistance   Quality of Gait 2 Forward flexed posture;Listing;NBOS;Inconsistent stride length;Decreased step length;Soft knee(s)   Comments/Distance (ft) 2 10 feet x2 with increased enegry expenditure, unsteadiness.  FAmily observed   Transfer 1   Transfer From 1 Sit to   Transfer to 1 Stand   Technique 1 Stand to sit;Sit to stand   Transfer Device 1 Quad cane   Transfer Level of Assistance 1 Close supervision   Trials/Comments 1 cues for hands, safety   Transfers 2   Transfer From 2 Bed to   Transfer to 2 Stand   Technique 2 Stand pivot   Transfer Device 2 Gait belt   Transfer Level of Assistance 2 Contact guard   Trials/Comments 2 reminders for BLE touching before sitting   Stairs   Rails 1 Right   Device 1 Railing   Support Devices 1 Gait belt;Left Ankle-foot orthosis   Assistance 1 Close supervision;Contact guard   Comment/Number of Steps 1 8 steps plus 1 platform step x2  FAmily provided hands on care   Wheelchair Activities   Propulsion Type 1 Manual   Level 1 Level tile   Method 1 Right upper extremity;Right lower extremity   Level of Assistance 1 Modified independent   Description/Details 1 ad barbara 1/2 lap tray LUE LLE legrest   PT Assessment   Assessment Comment FAMILY training with dtr and grddtr.  HAnds on training for transfers, gait, stairs.  Reviewed HEP, and issued blue tband.  HAndout given for Joseph hammer.  Wheelchair 16x18 requested.  PLan is for HHPT.   End of Session Patient Position Up in chair;Alarm  on   PT Plan   PT Recommended Transfer Status Contact guard   PT - OK to Discharge Yes

## 2024-12-04 NOTE — PROGRESS NOTES
12/04/24 0900 to 1000   General   Reason for Referral CVA   Referred By Dr. Dubose   Past Medical History Relevant to Rehab HTN, DM, CKD III, tobacco abuse   Family/Caregiver Present Yes   Caregiver Feedback daughter and granddaugher present   Prior to Session Communication Bedside nurse  (Discussed poc with OTR)   Patient Position Received Up in chair;Alarm on   Preferred Learning Style verbal;visual   General Comment Patient agreeable to therapy   Precautions   Medical Precautions Fall precautions   Precautions Comment left shoulder harness, L LE AFO   Pain Assessment   Pain Assessment 0-10   0-10 (Numeric) Pain Score 0 - No pain   Patient's Stated Pain Goal No pain   Feeding   Feeding Level of Assistance Setup   Feeding Where Assessed Wheelchair   Feeding Comments openning containers   Grooming   Grooming Level of Assistance Modified independent   Grooming Where Assessed Sitting sinkside   Grooming Comments hair dentures   UE Bathing   UE Bathing Level of Assistance Setup   UE Bathing Where Assessed Shower   UE Bathing Comments items within reach seated throughout   LE Bathing   LE Bathing Adaptive Equipment Long-handled sponge   LE Bathing Level of Assistance Contact guard   LE Bathing Where Assessed Shower   UE Dressing   UE Dressing Level of Assistance Setup   UE Dressing Where Assessed Wheelchair   UE Dressing Comments cues prior to donning   LE Dressing   LE Dressing Yes   LE Dressing Adaptive Equipment Sock aide   Pants Level of Assistance Minimum assistance  (3 point stance for pants pull up assist required for left side. Able to thread both legs)   Sock Level of Assistance Setup  (set up with sock aide)   Shoe Level of Assistance Maximum assistance  (Patient able to joan right shoe assist to fasten assist with left and AFO)   Orthotics Level of Assistance Dependent   Adult Briefs Level of Assistance Maximum assistance   LE Dressing Where Assessed Wheelchair   Toileting   Toileting Level of Assistance  Minimum assistance   Where Assessed Toilet   Toileting Comments 3 points stance assist with pants on left side. able to complete hygiene and lower   Functional Mobility   Functional Mobility Performed Yes   Functional Mobility 1   Surface 1 Level tile   Device 1 Wes Walker   Functional Mobility Support Devices Gait belt;Other (Comment);KAFO   Assistance 1 Minimum assistance   Quality of Functional Mobility 1 Diminished heel strike   Transfers   Transfer Yes   Transfer 1   Transfer From 1 Sit to   Transfer to 1 Stand   Technique 1 Stand to sit;Sit to stand   Transfer Device 1 Wes-walker;Gait belt   Transfer Level of Assistance 1 Close supervision   Trials/Comments 1 cues for foot and  handplacement   Toilet Transfers   Toilet Transfer From Wheelchair   Toilet Transfer Type To and from   Toilet Transfer to Standard toilet   Toilet Transfer Technique Ambulating   Toilet Transfers Contact guard   Toilet Transfers Comments cues to align prior to descent . Patient utilized wall mounted grab bar   Tub Transfers   Tub Transfer From Wheelchair   Tub Transfer Type To and from   Tub Transfer to Transfer tub bench   Tub Transfer Technique Ambulating;To left;To right   Tub Transfers Contact guard   Tub Transfers Comments Dry transfer with family present to observe   Car Transfers   Car Transfer From Wheelchair   Car Transfer Technique Ambulating   Car Transfers Contact guard   Car Transfers Comments actual car transfer with family present   Therapeutic Exercise   Therapeutic Exercise Performed Yes   IP OT Assessment   OT Assessment Patient continuing to improve LUE rom has improved. Working towards safe turns to toilet with wes cane. Reviewed dme recommendations family trainning completed open to all recommendations Will continue with POC   Prognosis Good   Barriers to Discharge None   Evaluation/Treatment Tolerance Patient tolerated treatment well   Medical Staff Made Aware Yes   End of Session Communication Bedside nurse    End of Session Patient Position Up in chair;Alarm on   OT Assessment   OT Assessment Results Decreased upper extremity strength;Decreased cognition;Decreased endurance;Decreased sensation;Decreased gross motor control;Decreased fine motor control   Strengths Ability to acquire knowledge   Barriers to Participation Comorbidities   Education   Individual(s) Educated Patient;Child;Caregiver   Education Provided   (Daughter and Grandaughter educated on transfers, car, tub transfer bench, toilet, dressing, bathing and equipment. Recommendation 24 hr min a family agreeable and can provide.)   Diagnosis and Precautions Educated on sling and giv hussein and subluxation risks for LUE   Equipment   (tub tranfer bench, grab bar for shower, gait belt  giv hussein sling,bed rail, reacher, sockaide, w/c, lap tray and bsc. Handouts issued to purchase and educated on use.)   Community Resources issued a community resource sheet for grab installation   Risk and Benefits Discussed with Patient/Caregiver/Other yes   Patient/Caregiver Demonstrated Understanding yes   Plan of Care Discussed and Agreed Upon yes   Patient Response to Education Patient/Caregiver Verbalized Understanding of Information   Education Comment Family agreeable to all recommendations and were hands on for training   Inpatient/Swing Bed or Outpatient   Inpatient/Swing Bed or Outpatient Inpatient   Inpatient Plan   Treatment Interventions ADL retraining   OT - OK to Discharge Yes

## 2024-12-04 NOTE — PROGRESS NOTES
Physical Therapy       12/04/24 6516-1377   PT  Visit   PT Received On 12/04/24   Response to Previous Treatment Patient with no complaints from previous session.   General   Reason for Referral CVA   Referred By Dr. Dubose   Past Medical History Relevant to Rehab HTN, DM, CKD III, tobacco abuse   General Comment Dtr and grddtr stayed for further training.  Additional hands on practice with ambulation.  HEP issued with Blue tband and hand outs for Giv Tico. AFO ordered   Precautions   Medical Precautions Fall precautions   Precautions Comment Giv Tico Left Shoulder.  Dtr practiced donning and doffing.  Massage Left Upper Trap due to pain from OT yesterday.  Some relief after   Pain Assessment   Pain Assessment 0-10   0-10 (Numeric) Pain Score 6   Pain Location Shoulder   Pain Orientation Left   Balance/Neuromuscular Re-Education   Balance/Neuromuscular Re-Education Activity 1 Bl;ue Therpads Balloon Toss and Cone reaching Min/Mod with LOB   Balance/Neuromuscular Re-Education Activity 2 Coordination ladder needed RUE support, cues to slow down for accuracy.   Balance/Neuromuscular Re-Education Activity 3  Cones from Floor with WBQC Min assist.   Manual Therapy   Manual Therapy Performed Yes   Manual Therapy Activity 1 Massage Left Upper Trap   Ambulation/Gait Training 1   Surface 1 Level tile   Device 1 LBQC   Gait Support Devices L AFO;Gait belt   Assistance 1 Contact guard;Minimum assistance   Quality of Gait 1 Forward flexed posture;Inconsistent stride length;Decreased step length;Listing;NBOS   Comments/Distance (ft) 1 150 feet x1 slow Cues for head posture, slower pace to clear LLE.  Giv Tico for stability LUE with less restriction of ROM.   PT Assessment   Assessment Comment Family very receptive, and good carryover of hands on skills.  OPPT chosen after discharge.   Outpatient Education   Education Comment Ongoing on chosen treatments, neurolplasticity   PT Plan   PT Recommended Transfer Status Contact  guard   PT - OK to Discharge Yes

## 2024-12-04 NOTE — PROGRESS NOTES
12/04/24 7099-8435   OT Last Visit   OT Received On 12/04/24   General   Reason for Referral CVA   Referred By Dr. Dubose   Past Medical History Relevant to Rehab HTN, DM, CKD III, tobacco abuse   Family/Caregiver Present No   Prior to Session Communication Bedside nurse   Patient Position Received Up in chair;Alarm on   Preferred Learning Style verbal;visual   General Comment pt agreeable to therapy   Precautions   Hearing/Visual Limitations No glasses. Smooth pursuits, saccades and convergence/ divergence slowed but intact. No visual field cuts identified.   Medical Precautions Fall precautions   Precautions Comment Giv Tico Left Shoulder.  Dtr practiced donning and doffing.  Massage Left Upper Trap due to pain from OT yesterday.  Some relief after   Pain Assessment   Pain Assessment 0-10   0-10 (Numeric) Pain Score 6   Soria-Baker FACES Pain Rating 2   Pain Type Acute pain   Pain Location Shoulder   Pain Orientation Left   Pain Radiating Towards n/a   Pain Descriptors Aching   Pain Frequency Intermittent   Pain Onset Gradual   Clinical Progression Not changed   Effect of Pain on Daily Activities comfort   Patient's Stated Pain Goal No pain   Cognition   Overall Cognitive Status Impaired   Orientation Level Oriented X4   Cognition Comments pleasant and cooperative   Attention X   Memory X   Short-Term Memory Impaired   Problem Solving X   Simple Functional Tasks Impaired   Numeric Reasoning X   Simple Calculations Impaired   Safety/Judgement   (slight decrease)   Coordination   Movements are Fluid and Coordinated No   Upper Body Coordination LUE improving   Lower Body Coordination slowed rate of movement left LE   Coordination Comment LUE weakness. Improving ROM at shoulder and elbow   RUE    RUE  WFL   LUE    LUE X   LUE Strength   LUE Overall Strength Deficits   Modalities   Modalities Used Yes   Modality 1 Timed BEBETO - Electric Stimulation Attended  (NMES applied to LUE to elicit motor response to carry over  to functional movement with good tolerance.  Applied to wrist extensors, and biceps.)   Other Activity   Other Activity Performed Yes   Other Activity 1 initiated mirror therapy with LUE with good tolerance.   IP OT Assessment   Evaluation/Treatment Tolerance Patient tolerated treatment well   Medical Staff Made Aware Yes   End of Session Communication Bedside nurse   End of Session Patient Position Up in chair;Alarm on   OT Assessment   OT Assessment Results Decreased upper extremity strength;Decreased cognition;Decreased endurance;Decreased sensation;Decreased gross motor control;Decreased fine motor control   Strengths Ability to acquire knowledge   Barriers to Participation Comorbidities   Education   Individual(s) Educated Patient   Education Provided Other  (benefits of mirror therapy and NMES treatment)   Risk and Benefits Discussed with Patient/Caregiver/Other yes   Patient/Caregiver Demonstrated Understanding yes   Plan of Care Discussed and Agreed Upon yes   Patient Response to Education Patient/Caregiver Verbalized Understanding of Information;Patient/Caregiver Performed Return Demonstration of Exercises/Activities   Inpatient/Swing Bed or Outpatient   Inpatient/Swing Bed or Outpatient Inpatient   Inpatient Plan   Treatment Interventions UE strengthening/ROM;Endurance training;Patient/family training;Fine motor coordination activities   OT Frequency 5 times per week   OT Discharge Recommendations Moderate intensity level of continued care   Equipment Recommended upon Discharge   (TTB, Grab bars in tub shower and by toilet.)   OT Recommended Transfer Status Assist of 1   OT - OK to Discharge Yes

## 2024-12-04 NOTE — CARE PLAN
Problem: Fall/Injury  Goal: Verbalize understanding of personal risk factors for fall in the hospital  Outcome: Progressing     Problem: Pain  Goal: Participates in PT with improved pain control throughout the shift  Outcome: Progressing     Problem: Skin  Goal: Prevent/manage excess moisture  Outcome: Progressing  Goal: Prevent/minimize sheer/friction injuries  Outcome: Progressing  Goal: Promote/optimize nutrition  Outcome: Progressing     Problem: Nutrition  Goal: Oral intake greater 75%  Outcome: Progressing   The patient's goals for the shift include improve mobility    The clinical goals for the shift include maintain safety

## 2024-12-05 LAB
GLUCOSE BLD MANUAL STRIP-MCNC: 127 MG/DL (ref 74–99)
GLUCOSE BLD MANUAL STRIP-MCNC: 78 MG/DL (ref 74–99)

## 2024-12-05 PROCEDURE — 92507 TX SP LANG VOICE COMM INDIV: CPT | Mod: GN

## 2024-12-05 PROCEDURE — 2500000004 HC RX 250 GENERAL PHARMACY W/ HCPCS (ALT 636 FOR OP/ED): Performed by: INTERNAL MEDICINE

## 2024-12-05 PROCEDURE — 97116 GAIT TRAINING THERAPY: CPT | Mod: GP

## 2024-12-05 PROCEDURE — 2500000002 HC RX 250 W HCPCS SELF ADMINISTERED DRUGS (ALT 637 FOR MEDICARE OP, ALT 636 FOR OP/ED): Performed by: INTERNAL MEDICINE

## 2024-12-05 PROCEDURE — 1180000001 HC REHAB PRIVATE ROOM DAILY

## 2024-12-05 PROCEDURE — 97535 SELF CARE MNGMENT TRAINING: CPT | Mod: GO,CO

## 2024-12-05 PROCEDURE — 97110 THERAPEUTIC EXERCISES: CPT | Mod: GP

## 2024-12-05 PROCEDURE — 82947 ASSAY GLUCOSE BLOOD QUANT: CPT

## 2024-12-05 PROCEDURE — 97110 THERAPEUTIC EXERCISES: CPT | Mod: GO,CO

## 2024-12-05 PROCEDURE — 99232 SBSQ HOSP IP/OBS MODERATE 35: CPT | Performed by: INTERNAL MEDICINE

## 2024-12-05 PROCEDURE — 97112 NEUROMUSCULAR REEDUCATION: CPT | Mod: GP

## 2024-12-05 PROCEDURE — 2500000001 HC RX 250 WO HCPCS SELF ADMINISTERED DRUGS (ALT 637 FOR MEDICARE OP): Performed by: INTERNAL MEDICINE

## 2024-12-05 PROCEDURE — 97530 THERAPEUTIC ACTIVITIES: CPT | Mod: GP

## 2024-12-05 ASSESSMENT — PAIN - FUNCTIONAL ASSESSMENT
PAIN_FUNCTIONAL_ASSESSMENT: 0-10
PAIN_FUNCTIONAL_ASSESSMENT: FLACC (FACE, LEGS, ACTIVITY, CRY, CONSOLABILITY)

## 2024-12-05 ASSESSMENT — PAIN SCALES - GENERAL
PAINLEVEL_OUTOF10: 0 - NO PAIN
PAINLEVEL_OUTOF10: 1
PAINLEVEL_OUTOF10: 0 - NO PAIN
PAINLEVEL_OUTOF10: 4
PAINLEVEL_OUTOF10: 1
PAINLEVEL_OUTOF10: 0 - NO PAIN
PAINLEVEL_OUTOF10: 0 - NO PAIN

## 2024-12-05 ASSESSMENT — ACTIVITIES OF DAILY LIVING (ADL)
BATHING_WHERE_ASSESSED: STANDING SINKSIDE
HOME_MANAGEMENT_TIME_ENTRY: 30
BATHING_LEVEL_OF_ASSISTANCE: CONTACT GUARD
EFFECT OF PAIN ON DAILY ACTIVITIES: COMFORT

## 2024-12-05 ASSESSMENT — PAIN DESCRIPTION - DESCRIPTORS
DESCRIPTORS: ACHING
DESCRIPTORS: ACHING

## 2024-12-05 NOTE — PROGRESS NOTES
Occupational Therapy       12/05/24 [id number]   OT Last Visit   OT Received On 12/05/24   General   Reason for Referral CVA   Referred By Dr. Dubose   Past Medical History Relevant to Rehab HTN, DM, CKD III, tobacco abuse   Prior to Session Communication Bedside nurse   Patient Position Received Up in chair;Alarm on   Preferred Learning Style verbal;visual   General Comment Pt seated in chair with blanket donned- agreeable to OT session.   Precautions   Medical Precautions Fall precautions   Precautions Comment L shoulder harness with transfers.   Pain Assessment   Pain Assessment 0-10   0-10 (Numeric) Pain Score 4   Pain Location Shoulder   Pain Orientation Left   Pain Descriptors Aching   Pain Frequency Intermittent   Cognition   Orientation Level Oriented X4   Coordination   Upper Body Coordination LUE improving   Lower Body Coordination slowed rate of movement left LE   Therapeutic Exercise   Therapeutic Exercise Activity 1 Shoulder shrugs x10 B/L UE. VC's for technique and pace.   Therapeutic Exercise Activity 2 Shoulder flexion against gravity- x5 seated. Pain with flexion- did not go past AROM ability.   Therapeutic Exercise Activity 3 LUE reach to target- x3 attempts to target on table. Pt with LUE fatigue this PM.   Therapeutic Exercise Activity 4 Scapular retraction- with anterior shoulder pain. Performed x3 then terminated task   Therapeutic Exercise Activity 5 LUE elbow flexion x8 with min assist at table top.   Therapeutic Activity   Therapeutic Activity 1 Pt with new  ability. Attempted to use clothes pins with LUE. Unable to squeeze at this time. LUE fatigue.   IP OT Assessment   OT Assessment Pt continueing to make gains in strength and function. . Pt will continue to work towards goals in POC for safe D/C 12/7   Prognosis Good   Barriers to Discharge None   Evaluation/Treatment Tolerance Patient tolerated treatment well   Medical Staff Made Aware Yes   End of Session Communication Bedside nurse    End of Session Patient Position Up in chair;Alarm on   OT Assessment   OT Assessment Results Decreased upper extremity strength;Decreased cognition;Decreased endurance;Decreased sensation;Decreased gross motor control;Decreased fine motor control   Strengths Ability to acquire knowledge   Barriers to Participation Comorbidities   Education   Individual(s) Educated Patient   Education Provided POC discussed and agreed upon   Home Program AROM;AAROM;Strengthening   Patient Response to Education Patient/Caregiver Verbalized Understanding of Information;Patient/Caregiver Performed Return Demonstration of Exercises/Activities   Inpatient/Swing Bed or Outpatient   Inpatient/Swing Bed or Outpatient Inpatient   Inpatient Plan   Treatment Interventions Neuromuscular reeducation;Fine motor coordination activities;UE strengthening/ROM   OT Frequency 5 times per week   OT Discharge Recommendations Moderate intensity level of continued care   OT Recommended Transfer Status Assist of 1   OT - OK to Discharge Yes

## 2024-12-05 NOTE — PROGRESS NOTES
"[facility]  Physician Progress Note    Subjective   Cynthia Muse is a 79 y.o. female admitted to inpatient rehabilitation unit.    No new issues today.  The chart and orders are reviewed.  The patient is participating well with therapy efforts.  No cardiopulmonary symptoms are identified.        Objective   /59 (BP Location: Right arm, Patient Position: Lying)   Pulse 81   Temp 36.7 °C (98.1 °F) (Temporal)   Resp 16   Ht 1.6 m (5' 2.99\")   Wt 72 kg (158 lb 11.7 oz)   SpO2 100%   BMI 28.13 kg/m²      Physical Exam  Constitutional:       General: She is not in acute distress.     Appearance: She is not toxic-appearing.   HENT:      Head: Normocephalic and atraumatic.      Mouth/Throat:      Mouth: Mucous membranes are moist.   Eyes:      Pupils: Pupils are equal, round, and reactive to light.   Cardiovascular:      Rate and Rhythm: Normal rate and regular rhythm.      Heart sounds: No murmur heard.  Pulmonary:      Breath sounds: Normal breath sounds. No wheezing, rhonchi or rales.   Abdominal:      General: There is no distension.      Palpations: Abdomen is soft.   Musculoskeletal:      Right lower leg: No edema.      Left lower leg: No edema.   Neurological:      Mental Status: She is alert and oriented to person, place, and time.      Left hemiparesis.  Getting some movement at the elbow in terms of extension.    Labs  BMP:        CBC:        Coagulation:           Assesment and Plan    Ischemic Stroke (Multi)   Left Hemiparesis (Multi)   Essential Hypertension   Hyperlipidemia   Chronic Kidney Disease (Ckd) Stage G3a/A2, Moderately Decreased Glomerular Filtration Rate (Gfr) Between 45-59 Ml/Min/1.73 Square Meter and Albuminuria Creatinine Ratio Between  Mg/G (C* (Multi)      Ischemic stroke - Continue with medical management for secondary prevention: DAPT x 21 days, statin  DVT prophylaxis has been ordered: LMWH      BINH/CKD - fairly stable; " monitor    DM2 - cont URENA, monitor glucose     HTN - amlodipine, coreg     Tobacco use disorder - nicotine patch ordered     HPL - stat     Left hemiparesis     Dysphagia - Cont ST efforts.    Urinary frequency.  No UTI.  Low PVR.  Cont oxybutinin.    Discharge planning for this week.    Yunior Dubose MD

## 2024-12-05 NOTE — PROGRESS NOTES
"[facility]  Physician Progress Note    Subjective   Cynthia Muse is a 79 y.o. female admitted to inpatient rehabilitation unit.    The patient denies chest pain and shortness of breath.  The bowels are moving.  Nursing staff report no new issues.  Daily therapy efforts continue.        Objective   /62 (BP Location: Right arm, Patient Position: Lying)   Pulse 75   Temp 36.6 °C (97.9 °F) (Temporal)   Resp 16   Ht 1.6 m (5' 2.99\")   Wt 72 kg (158 lb 11.7 oz)   SpO2 95%   BMI 28.13 kg/m²      Physical Exam  Constitutional:       General: She is not in acute distress.     Appearance: She is not toxic-appearing.   HENT:      Head: Normocephalic and atraumatic.      Mouth/Throat:      Mouth: Mucous membranes are moist.   Eyes:      Pupils: Pupils are equal, round, and reactive to light.   Cardiovascular:      Rate and Rhythm: Normal rate and regular rhythm.      Heart sounds: No murmur heard.  Pulmonary:      Breath sounds: Normal breath sounds. No wheezing, rhonchi or rales.   Abdominal:      General: There is no distension.      Palpations: Abdomen is soft.   Musculoskeletal:      Right lower leg: No edema.      Left lower leg: No edema.   Neurological:      Mental Status: She is alert and oriented to person, place, and time.      Left hemiparesis.  Getting some movement at the elbow in terms of extension.    Labs  BMP:        CBC:        Coagulation:           Assesment and Plan    Ischemic Stroke (Multi)   Left Hemiparesis (Multi)   Essential Hypertension   Hyperlipidemia   Chronic Kidney Disease (Ckd) Stage G3a/A2, Moderately Decreased Glomerular Filtration Rate (Gfr) Between 45-59 Ml/Min/1.73 Square Meter and Albuminuria Creatinine Ratio Between  Mg/G (C* (Multi)      Ischemic stroke - Continue with medical management for secondary prevention: DAPT x 21 days, statin  DVT prophylaxis has been ordered: LMWH      BINH/CKD - fairly stable; " monitor    DM2 - cont URENA, monitor glucose     HTN - amlodipine, coreg     Tobacco use disorder - nicotine patch ordered     HPL - stat     Left hemiparesis     Dysphagia - Cont ST efforts.    Urinary frequency.  No UTI.  Low PVR.  Cont oxybutinin.    Discharge planning for this week.    Yunior Dubose MD

## 2024-12-05 NOTE — NURSING NOTE
Assumed care of patient at 1900, patient resting in bed with no concerns voiced. Call light in reach, safety precautions in place.

## 2024-12-05 NOTE — PROGRESS NOTES
Occupational Therapy       12/05/24 8865-0755   OT Last Visit   OT Received On 12/05/24   General   Reason for Referral CVA   Referred By Dr. Dubose   Past Medical History Relevant to Rehab HTN, DM, CKD III, tobacco abuse   Prior to Session Communication Bedside nurse   Patient Position Received Up in chair;Alarm on   Preferred Learning Style verbal;visual   General Comment pt agreeable to therapy  (Pt declined shower this date- Stated she would take one tomorrow. D/C 12/7)   Precautions   Hearing/Visual Limitations No glasses. Smooth pursuits, saccades and convergence/ divergence slowed but intact. No visual field cuts identified.   Medical Precautions Fall precautions   Precautions Comment L shoulder harness with transfers.   Pain Assessment   Pain Assessment 0-10   0-10 (Numeric) Pain Score 0 - No pain   Cognition   Overall Cognitive Status Impaired   Orientation Level Oriented X4   Coordination   Upper Body Coordination LUE improving   Lower Body Coordination slowed rate of movement left LE   Coordination Comment LUE weakness. Improving ROM at shoulder and elbow   LUE AROM (degrees)   L Shoulder Flexion  0-170 44 Degrees   L Elbow Flexion/Extension 0-135-150 135 °   L Forearm Supination  0-80-90 40 Degrees   L Wrist Flexion 0-80 3 Degrees   L Wrist Extension 0-70 15 Degrees   Feeding   Feeding Level of Assistance Setup   Feeding Where Assessed Wheelchair   Feeding Comments Pt required assist to open lunch containers. Self feeding/ food prep - Use of rocker knife to cut chicken with RUE.EDU- & demo with good return demo.   Grooming   Grooming Level of Assistance Modified independent   Grooming Where Assessed Sitting sinkside   Grooming Comments Washing face while seated.   UE Bathing   UE Bathing Comments Declined   LE Bathing   LE Bathing Level of Assistance Contact guard   LE Bathing Where Assessed Standing sinkside   LE Bathing Comments Pt required CGA in stance for stability during Anterior/ posterior  pericare.   UE Dressing   UE Dressing Comments Declined.   LE Dressing   Pants Level of Assistance Minimum assistance   Sock Level of Assistance Setup   Shoe Level of Assistance Maximum assistance   Orthotics Level of Assistance Dependent   LE Dressing Where Assessed Wheelchair   LE Dressing Comments Pt able to perform figure 4 technique to thread pants over LE's. Min assist required during standing hiking over hips.  Set-up for sock donning on sockaide. Able to pull ropes to thread.   Meal Prep   Meal Prep Level of Assistance Close supervision   Meal Prep Level Wheelchair   Meal Preparation Pt stated she wont be cooking when home. Pt will be getting drinks from fridge. Pt was able to perform retrival from fridge from WC. Able to open pop bottle with RUE in lap. (edu on robb technique's with dycem etc...)   Kitchen Mobility   Kitchen Mobility Level of Assistance Modified independent   Kitchen-Mobility Level Wheelchair   Kitchen Activity Retrieve items;Transport items   Kitchen Mobility Comments Pt able to gather pop from fridge, place on counter, then transfer to table in WC.   Therapeutic Exercise   Therapeutic Exercise Activity 1 Seated Shoulder flexion x10 ( LUE- table glides). VC's on technique/ compensation.   Therapeutic Exercise Activity 2 Seated LUE supination/ pronation.Pt able to perform AROM- performed x10 while seated in WC. New measurment noted.   Therapeutic Exercise Activity 3  strength with ball. Pt now with  AROM. Issued therappy ball and hand gripper. Pt with difficulty with hand gripper- EDU provided for future use.   Therapeutic Exercise Activity 4 Seated AROM elbow flexion x5   IP OT Assessment   OT Assessment Pt continueing to make gains in strength and function. New ROM measurements taken this date. Pt will continue to work towards goals in POC for safe D/C 12/7   Prognosis Good   Barriers to Discharge None   Evaluation/Treatment Tolerance Patient tolerated treatment well   Medical  Staff Made Aware Yes   End of Session Communication Bedside nurse   End of Session Patient Position Up in chair;Alarm on   OT Assessment   OT Assessment Results Decreased upper extremity strength;Decreased cognition;Decreased endurance;Decreased sensation;Decreased gross motor control;Decreased fine motor control   Strengths Ability to acquire knowledge;Attitude of self;Coping skills   Barriers to Participation Comorbidities   Education   Individual(s) Educated Patient   Education Provided Other  (Use of rocker knife,& dycem for food prep. Wes dressing techniques, safety, WC mobility.)   Home Program AROM;AAROM;Strengthening   Patient Response to Education Patient/Caregiver Verbalized Understanding of Information;Patient/Caregiver Performed Return Demonstration of Exercises/Activities   Education Comment Pt very receptive to education.   Inpatient/Swing Bed or Outpatient   Inpatient/Swing Bed or Outpatient Inpatient   Inpatient Plan   Treatment Interventions ADL retraining;Functional transfer training;UE strengthening/ROM;Endurance training;Neuromuscular reeducation   OT Frequency 5 times per week   OT Discharge Recommendations Moderate intensity level of continued care   OT Recommended Transfer Status Assist of 1   OT - OK to Discharge Yes

## 2024-12-05 NOTE — PROGRESS NOTES
Physical Therapy Treatment       12/05/24 9584-9160   PT  Visit   PT Received On 12/05/24   General   Reason for Referral CVA   Referred By Dr. Dubose   Past Medical History Relevant to Rehab HTN, DM, CKD III, tobacco abuse   Family/Caregiver Present No   Prior to Session Communication Bedside nurse   Patient Position Received Up in chair;Alarm on   Preferred Learning Style verbal;visual   General Comment Pt seated up in wc upon arrival. Agreeable to participate.   Precautions   Medical Precautions Fall precautions   Precautions Comment L shoulder harness ([employer]) with transfers.   Pain Assessment   Pain Assessment FLACC   0-10 (Numeric) Pain Score 1   Pain Location Shoulder   Pain Orientation Left   Pain Interventions Repositioned   Cognition   Orientation Level Oriented X4   Cognition Comments able to follow commands appropriately   Coordination   Coordination Comment slow movements, increased dyskinesia in LLE with increased fatigue   Postural Control   Posture Comment rounded shoulders, flexed posture   Therapeutic Exercise   Therapeutic Exercise Activity 1 Seated ball squeezes, x30   Therapeutic Exercise Activity 2 seated B hip abduction, blue theraband, x 30   Therapeutic Exercise Activity 3 Seated LAQs, x30, 3#RLE, 2# LLE   Therapeutic Exercise Activity 4 Seated marches, x30, 3#RLE, 2# LLE   Therapeutic Exercise Activity 5 supine abduction slides, x30, 2# wts   Therapeutic Exercise Activity 6 supine SAQs, x30,   Therapeutic Exercise Activity 7 sidelying clamshells x   Therapeutic Activity   Therapeutic Activity 1 x5 sit to stand without UE support to promote LE strengthening. CG/min assist   Balance/Neuromuscular Re-Education   Balance/Neuromuscular Re-Education Activity 1 small hurdles 4x4 trials fwd   Balance/Neuromuscular Re-Education Activity 2 small hurdles, 4x4, sidestepping. Min assist with occasional LOB.   Balance/Neuromuscular Re-Education Activity 3 cone taps, colored, alternating R/L based on  verbal instruction for foot and color.  Flat surface,  Blue therapad square.   Bed Mobility 1   Bed Mobility 1 Sitting to supine   Level of Assistance 1 Distant supervision   Bed Mobility Comments 1 wedges on mat table   Bed Mobility 2   Bed Mobility  2 Supine to sitting   Level of Assistance 2 Minimum assistance   Bed Mobility Comments 2 wedges on mat table. assist required to elevate trunk up   Ambulation/Gait Training 1   Surface 1 Level tile   Device 1 LBQC   Gait Support Devices L AFO;Gait belt   Assistance 1 Contact guard;Minimum assistance   Quality of Gait 1 Forward flexed posture;Inconsistent stride length;Decreased step length;Listing;NBOS   Comments/Distance (ft) 1 150 ft, 100 ft with seated rest break in between trials. Cueing to minimize trunk shifted fwd to minimize LOB fwd with increased fatigue. Cueing to increased L step height with increased fatigue.   Ambulation/Gait Training 2   Surface 2 Level tile   Device 2 LBQC   Gait Support Devices Gait belt;L AFO   Assistance 2 Contact guard   Quality of Gait 2 Forward flexed posture;Listing;NBOS;Inconsistent stride length;Decreased step length;Soft knee(s)   Comments/Distance (ft) 2 about 50 ft, repeated cueing and emphasis of looking fwd and minimizing trunk flex   Transfer 1   Technique 1 Sit to stand   Transfer Device 1 Quad cane   Transfer Level of Assistance 1 Close supervision   Trials/Comments 1 multiple trials during session   Transfers 2   Technique 2 Stand to sit   Transfer Device 2 Quad cane   Transfer Level of Assistance 2 Close supervision   Trials/Comments 2 cueing for proper positioning prior to sitting   Transfers 3   Transfer From 3 Mat to   Transfer to 3 Wheelchair   Technique 3 Sit to stand;Stand to sit;Stand pivot   Transfer Device 3 Quad cane   Transfer Level of Assistance 3 Close supervision   Stairs   Rails 1 Right   Device 1 Railing   Support Devices 1 Gait belt;Left Ankle-foot orthosis   Assistance 1 Contact guard   Comment/Number  of Steps 1 up/down 4, 6 inch, steps with single handrail, CGA. Towards end of session, increased pt fatigue.   Wheelchair Activities   Propulsion Type 1 Manual   Level 1 Level tile   Method 1 Right upper extremity;Right lower extremity   Level of Assistance 1 Modified independent   Description/Details 1 around facility   Other Activity   Other Activity 1 Omnicycle, level 5, 15 minutes   Activity Tolerance   Endurance Tolerates 30 min exercise with multiple rests   Activity Tolerance Comments rest breaks throughout   PT Assessment   PT Assessment Results Decreased strength;Decreased range of motion;Decreased endurance;Impaired balance;Decreased mobility;Decreased coordination;Impaired tone   Evaluation/Treatment Tolerance Patient limited by fatigue   Medical Staff Made Aware Yes   End of Session Communication Bedside nurse   Assessment Comment Continues to progress well. With increased fatigue, observed increased L foot drag and LOB during activities and ambulation.   End of Session Patient Position Up in chair;Alarm on   PT Plan   Treatment/Interventions Bed mobility;Transfer training;Gait training;Stair training;Balance training;Neuromuscular re-education;Endurance training;Strengthening;Therapeutic exercise;Therapeutic activity;Wheelchair management   PT Plan Ongoing PT   Equipment Recommended upon Discharge   (TBD)   PT Recommended Transfer Status Assist x1;Assistive device   PT - OK to Discharge Yes

## 2024-12-05 NOTE — CARE PLAN
The patient's goals for the shift include improve mobility    The clinical goals for the shift include Maintain safety/improve ADLs    Over the shift, the patient did not make progress toward the following goals. Barriers to progression include impaired mobility. Recommendations to address these barriers include improve mobility.

## 2024-12-05 NOTE — NURSING NOTE
Assumed care of pt @W 0730, report received, pt here s/p ischemic CVA with left sided involvement, pt has severe weakness noted to LUE, pt has some shoulder movement in left arm but no fine motor movement and unable to squeeze Rns hand, pt lung sounds CTA, apical regular, abdomen soft and round, bowl sounds active x 4 quadrants, pt denies any acute pain, call light within reach, continuing to monitor.

## 2024-12-06 LAB
GLUCOSE BLD MANUAL STRIP-MCNC: 76 MG/DL (ref 74–99)
GLUCOSE BLD MANUAL STRIP-MCNC: 78 MG/DL (ref 74–99)

## 2024-12-06 PROCEDURE — 97112 NEUROMUSCULAR REEDUCATION: CPT | Mod: GP,CQ

## 2024-12-06 PROCEDURE — 1180000001 HC REHAB PRIVATE ROOM DAILY

## 2024-12-06 PROCEDURE — 97116 GAIT TRAINING THERAPY: CPT | Mod: GP,CQ

## 2024-12-06 PROCEDURE — 2500000002 HC RX 250 W HCPCS SELF ADMINISTERED DRUGS (ALT 637 FOR MEDICARE OP, ALT 636 FOR OP/ED): Performed by: INTERNAL MEDICINE

## 2024-12-06 PROCEDURE — 97535 SELF CARE MNGMENT TRAINING: CPT | Mod: GO,CO

## 2024-12-06 PROCEDURE — 97530 THERAPEUTIC ACTIVITIES: CPT | Mod: GO

## 2024-12-06 PROCEDURE — 97530 THERAPEUTIC ACTIVITIES: CPT | Mod: GP,CQ

## 2024-12-06 PROCEDURE — 99232 SBSQ HOSP IP/OBS MODERATE 35: CPT | Performed by: INTERNAL MEDICINE

## 2024-12-06 PROCEDURE — 2500000004 HC RX 250 GENERAL PHARMACY W/ HCPCS (ALT 636 FOR OP/ED): Performed by: INTERNAL MEDICINE

## 2024-12-06 PROCEDURE — 2500000001 HC RX 250 WO HCPCS SELF ADMINISTERED DRUGS (ALT 637 FOR MEDICARE OP): Performed by: INTERNAL MEDICINE

## 2024-12-06 PROCEDURE — 97110 THERAPEUTIC EXERCISES: CPT | Mod: GO

## 2024-12-06 PROCEDURE — 82947 ASSAY GLUCOSE BLOOD QUANT: CPT

## 2024-12-06 ASSESSMENT — PAIN SCALES - GENERAL
PAINLEVEL_OUTOF10: 4
PAINLEVEL_OUTOF10: 6
PAINLEVEL_OUTOF10: 4
PAINLEVEL_OUTOF10: 5 - MODERATE PAIN
PAINLEVEL_OUTOF10: 0 - NO PAIN
PAINLEVEL_OUTOF10: 5 - MODERATE PAIN
PAINLEVEL_OUTOF10: 0 - NO PAIN
PAINLEVEL_OUTOF10: 0 - NO PAIN

## 2024-12-06 ASSESSMENT — PAIN - FUNCTIONAL ASSESSMENT
PAIN_FUNCTIONAL_ASSESSMENT: 0-10
PAIN_FUNCTIONAL_ASSESSMENT: FLACC (FACE, LEGS, ACTIVITY, CRY, CONSOLABILITY)
PAIN_FUNCTIONAL_ASSESSMENT: 0-10

## 2024-12-06 ASSESSMENT — PAIN DESCRIPTION - DESCRIPTORS
DESCRIPTORS: ACHING;DISCOMFORT
DESCRIPTORS: ACHING;DISCOMFORT

## 2024-12-06 ASSESSMENT — COGNITIVE AND FUNCTIONAL STATUS - GENERAL
TOILETING: A LITTLE
DRESSING REGULAR LOWER BODY CLOTHING: A LITTLE
DAILY ACTIVITIY SCORE: 22

## 2024-12-06 ASSESSMENT — PAIN DESCRIPTION - ORIENTATION: ORIENTATION: LEFT

## 2024-12-06 ASSESSMENT — ACTIVITIES OF DAILY LIVING (ADL)
BATHING_LEVEL_OF_ASSISTANCE: MODIFIED INDEPENDENT
BATHING_WHERE_ASSESSED: SHOWER
EFFECT OF PAIN ON DAILY ACTIVITIES: MOVEMENT
EFFECT OF PAIN ON DAILY ACTIVITIES: MOVEMENT
HOME_MANAGEMENT_TIME_ENTRY: 15

## 2024-12-06 ASSESSMENT — PAIN SCALES - WONG BAKER: WONGBAKER_NUMERICALRESPONSE: HURTS LITTLE MORE

## 2024-12-06 ASSESSMENT — PAIN DESCRIPTION - LOCATION: LOCATION: SHOULDER

## 2024-12-06 NOTE — NURSING NOTE
Assumed care of patient at 1900. Pt. denied pain/discomfort. Pt. stated that she feels much better since started Oxybutynin 5 mg. Noted that frequent urge to urinate decreased. Safety measures remain in place. Will cont. to monitor.

## 2024-12-06 NOTE — CARE PLAN
Problem: IRF PT LTG Problem  Goal: Patient will transfer sit to stand and stand to sit with independent assist to facilitate mobility.  Outcome: Met  Goal: Patient will transfer bed to chair and chair to bed with independent assist to facilitate mobility.  Outcome: Met  Goal: Patient will amb 150 feet least restrictive device including two turns on even surface with supervision assist to facilitate safe mobility.    12/6/2024 1200 by Cheyenne Mathis PTA  Outcome: Adequate for Discharge  12/6/2024 1136 by Cheyenne Mathis PTA  Outcome: Adequate for Discharge  Goal: Patient will negotiate 12 stairs with two rail(s) and supervision} assist with no device for in home and community.  12/6/2024 1200 by Cheyenne Mathis PTA  Outcome: Adequate for Discharge  12/6/2024 1136 by Cheyenne Mathis PTA  Outcome: Adequate for Discharge  Goal: Patient will perform TUG with least restrictive assistive device in 30 seconds or less to promote mobility and reduce fall risk with dynamic standing tasks.   12/6/2024 1200 by Cheyenne Mathis, PTA  Outcome: Adequate for Discharge  12/6/2024 1136 by Cheyenne Mathis PTA  Outcome: Adequate for Discharge  Goal: Patient will increase left LE strength to 4-/5 to improve functional mobility.   12/6/2024 1200 by Cheyenne Mathis PTA  Outcome: Adequate for Discharge  12/6/2024 1136 by Cheyenne Mathis PTA  Outcome: Adequate for Discharge

## 2024-12-06 NOTE — PROGRESS NOTES
Occupational Therapy  DISCHARGE STATUS  Discharge Date:  12/7  Reason for Discharge:  Pt has reached maximum potential for this rehabilitation setting.   Upper Extremity Status    Left Right   Dominance  strength- 3lbs X  strength 47lbs   Gross Motor Box of blocks- unable Box of blocks- 67 blks/ min   Fine Motor 9 hole peg test- unable 9 hole peg test- 21 seconds   Balance   Static Dynamic   Sitting good good   Standing good fair      Visual / Perceptual  Visual Acuity WFL   Visual Spatial WFL   Hearing WFL   Proprioception WFL   Praxis WFL   Memory WFL   Attention WFL   Rt/Lt Neglect WFL     Discharge Functional Status  Eating Setup   Grooming Modified independent   Toilet Hygiene Contact guard   Shower/Bathe Upper:  Modified independent  Lower:  Modified independent   Upper Body Dress Modified independent   Lower Body Dress Contact guard   On/Off Footwear Socks:  Setup  Shoes:  Maximum assistance   Toilet Transfer Modified independence   Car Transfer Set up   Meal Preparation Close supervision   Kitchen Mobility Modified independent        AMPAC Daily Activity  Putting on and taking off regular lower body clothing: A little  Bathing (including washing, rinsing, drying): None  Putting on and taking off regular upper body clothing: None  Toileting, which includes using toilet, bedpan or urinal: A little  Taking care of personal grooming such as brushing teeth: None  Eating Meals: None  Daily Activity - Total Score: 22      Treatment Summary:  ***  Goal Attainment: {algoals:07763} ***  Remaining Goals/Needs:  ***  Equipment Recommendations: Wheel chair, TTB, BSC, Grab bars, Gaitbelt, Lap tray, bed rail, & sockaide.  OT Plan: Pt will discharge home with family assist with outpatient therapy.

## 2024-12-06 NOTE — PROGRESS NOTES
12/06/24 4990-7497   OT Last Visit   OT Received On 12/06/24   General   Reason for Referral CVA   Referred By Dr. Dubose   Past Medical History Relevant to Rehab HTN, DM, CKD III, tobacco abuse   Family/Caregiver Present No   Prior to Session Communication Bedside nurse   Patient Position Received Up in chair;Alarm on   Preferred Learning Style verbal;visual   General Comment Pt seated up in wc upon arrival. Agreeable to participate.   Precautions   Hearing/Visual Limitations No glasses. Smooth pursuits, saccades and convergence/ divergence slowed but intact. No visual field cuts identified.   Medical Precautions Fall precautions   Precautions Comment L shoulder harness with transfers.   Pain Assessment   Pain Assessment 0-10   0-10 (Numeric) Pain Score 5 - Moderate pain   S[place]-Allen FACES Pain Rating 4   Pain Type Acute pain   Pain Location Hand   Pain Orientation Left   Pain Radiating Towards n/a   Pain Descriptors Aching;Discomfort  (painful to the touch and edematous; reported to nursing and issued a LUE edema glove and positioned L arm on a pillow on L 1/2 lap tray)   Pain Frequency Constant/continuous   Pain Onset Progressive   Clinical Progression Gradually worsening   Effect of Pain on Daily Activities movement   Patient's Stated Pain Goal No pain   Pain Interventions Repositioned;Other (Comment)  (issued a LUE edema glove)   Response to Interventions Relief;Provider notified   Cognition   Overall Cognitive Status Impaired   Orientation Level Oriented X4   Cognition Comments able to follow commands   Coordination   Movements are Fluid and Coordinated No   Upper Body Coordination LUE improving   Lower Body Coordination slowed rate of movement left LE   RUE    RUE  WFL   LUE    LUE X   LUE Strength   LUE Overall Strength Deficits   Toileting   Toileting Level of Assistance Contact guard   Where Assessed Toilet   Toileting Comments 3 point stance at grab bar to manage pants over hips   Functional Standing  Tolerance   Time 1 min   Activity self care   Functional Standing Tolerance Comments RUE support of grab bar   Therapeutic Exercise   Therapeutic Exercise Performed Yes   Therapeutic Exercise Activity 1 PREs with 1# hand weight with RUE for 15 reps x 1 set to improve muscle strength for ADLs with good tolerance.   Therapeutic Activity   Therapeutic Activity Performed Yes   Therapeutic Activity 1 see outcome measures for results of standardized assessments   IP OT Assessment   Evaluation/Treatment Tolerance Patient tolerated treatment well   Medical Staff Made Aware Yes   End of Session Communication Bedside nurse   End of Session Patient Position Up in chair;Alarm on   OT Assessment   OT Assessment Results Decreased upper extremity strength;Decreased cognition;Decreased endurance;Decreased sensation;Decreased gross motor control;Decreased fine motor control   Strengths Ability to acquire knowledge   Barriers to Participation Comorbidities   Education   Individual(s) Educated Patient   Education Provided Edema control   Home Program AROM;Strengthening   Diagnosis and Precautions educated on compression glove for LUE to reduce edema   Risk and Benefits Discussed with Patient/Caregiver/Other yes   Patient/Caregiver Demonstrated Understanding yes   Plan of Care Discussed and Agreed Upon yes   Patient Response to Education Patient/Caregiver Verbalized Understanding of Information;Patient/Caregiver Performed Return Demonstration of Exercises/Activities   Education Comment Pt very receptive to education.   Inpatient/Swing Bed or Outpatient   Inpatient/Swing Bed or Outpatient Inpatient   Inpatient Plan   Treatment Interventions Functional transfer training;UE strengthening/ROM;Endurance training;Patient/family training;Compensatory technique education   OT Frequency 5 times per week   OT Discharge Recommendations Moderate intensity level of continued care   OT Recommended Transfer Status Assist of 1   OT - OK to Discharge  Yes      12/06/24 1430   OT Adult Other Outcome Measures   Other Outcome Measures Trail making A: 58 sec., 1 error, 2 interruptions, Trailmaking B: 2:24 min, 19 errors, 14 interruptions

## 2024-12-06 NOTE — PROGRESS NOTES
Physical Therapy Discharge Summary       24 [id number]   PT  Visit   PT Received On 24   Response to Previous Treatment Patient with no complaints from previous session.   General   Reason for Referral CVA   Referred By Dr. Dubose   Past Medical History Relevant to Rehab HTN, DM, CKD III, tobacco abuse   Patient Position Received Up in chair;Alarm on   Preferred Learning Style verbal;visual   General Comment Pt seated up in wc upon arrival. Agreeable to participate.   Precautions   Medical Precautions Fall precautions   Precautions Comment L shoulder harness ([employer]) with transfers.   Pain Assessment   Pain Assessment 0-10   0-10 (Numeric) Pain Score 4   Pain Type Acute pain   Pain Location Shoulder   Pain Orientation Left   Strength RLE   R Hip Flexion 5/5   R Hip Extension 5/5   R Hip ABduction 5/5   R Hip ADduction 5/5   R Knee Flexion 5/5   R Knee Extension 5/5   R Ankle Dorsiflexion 5/5   R Ankle Plantar Flexion 5/5   Strength LLE   L Hip Flexion 4-/5   L Hip ABduction 5/5   L Hip ADduction 5/5   L Knee Extension 5/5   L Ankle Dorsiflexion 3-/5   L Hip Extension 4-/5   L Knee Flexion 5/5   L Ankle Plantar Flexion 3-/5   Therapeutic Exercise   Therapeutic Exercise Activity 1 seated hip abduction, 2x15 reps, blue theraband   Therapeutic Activity   Therapeutic Activity 1 sit/stands x 1 minute x 2 trials: 1st trial 22 reps, 2nd trial 24 sec   Therapeutic Activity 2 side stepping x 15 each direction, CGA to R, CG/Bob to L with vc's to weight shift to R prior to stepping with LLE.   Balance/Neuromuscular Re-Education   Balance/Neuromuscular Re-Education Activity 1 TU.59 sec with WBQC (from 38.82 sec)   Balance/Neuromuscular Re-Education Activity 2 side stepping in // bars (no UE support), x 2 trials both directions.  Vc's for slower nelson/posture/ weight shifting to R when stepping to L.  Bob   Bed Mobility 1   Bed Mobility 1 Supine to sitting;Sitting to supine   Level of Assistance 1 Modified  independent   Bed Mobility Comments 1 bed flat, no rail   Bed Mobility 2   Bed Mobility  2 Rolling right;Rolling left   Level of Assistance 2 Modified independent   Bed Mobility Comments 2 flat mat, no rail, extra time to roll to right   Ambulation/Gait Training 1   Surface 1 Level tile   Device 1 LBQC   Gait Support Devices L AFO;Gait belt   Assistance 1 Contact guard;Minimum assistance   Quality of Gait 1 Forward flexed posture;Inconsistent stride length;Decreased step length;Listing;NBOS   Comments/Distance (ft) 1 150 ft x 2, turns included.  periodic L foot catch with LOB forward - Bob to recover.   Transfer 1   Technique 1 Sit to stand;Stand to sit   Transfer Device 1 Quad cane   Transfer Level of Assistance 1 Modified independent   Trials/Comments 1 use of RUE   Transfers 2   Transfer From 2 Mat to;Wheelchair to   Transfer to 2 Wheelchair;Mat   Technique 2 Lateral   Transfer Device 2 Gait belt   Transfer Level of Assistance 2 Modified independent   Trials/Comments 2 level surfaces, leading to R   Stairs   Rails 1 Right   Device 1 Railing   Support Devices 1 Gait belt;Left Ankle-foot orthosis   Assistance 1 Contact guard   Comment/Number of Steps 1 up/down 12 6-inch steps, step-to pattern, good YANI noted.  Improved quality noted.    Object From Floor   Devices No reacher;No device   Assist Level Supervision   Comments cone from floor x 2   Wheelchair Activities   Propulsion Type 1 Manual   Level 1 Level tile   Method 1 Right upper extremity;Right lower extremity   Level of Assistance 1 Modified independent   Description/Details 1 ad barbara around unit   Activity Tolerance   Endurance Tolerates 30 min exercise with multiple rests   PT Assessment   PT Assessment Results Decreased strength;Decreased range of motion;Decreased endurance;Impaired balance;Decreased mobility;Decreased coordination;Impaired tone   Rehab Prognosis Good   Assessment Comment Pt has met 2 goals at this time.  TUG time improved from  38.82 sec to 35.59 sec.  Family training performed and plan to provide 24/7 supervision/assist.  Pt will be issued AFO and w/c and plan to buy WBQC.  Pt will be receiving OP PT.  PT Note Pt has made considerable progress in all areas.  PT continues to need assist for ambulation due to fall Risk.  OPPT will be next level of care.  Family aware of needs.     End of Session Patient Position Up in chair;Alarm on   PT Plan   Inpatient/Swing Bed or Outpatient Inpatient   PT Plan   Treatment/Interventions Bed mobility;Transfer training;Gait training;Stair training;Neuromuscular re-education;Therapeutic activity;Home exercise program   PT Plan Ongoing PT   PT Recommended Transfer Status Assist x1;Assistive device   PT - OK to Discharge Yes

## 2024-12-06 NOTE — PROGRESS NOTES
"[facility]  Physician Progress Note    Subjective   Cynthia Muse is a 79 y.o. female admitted to inpatient rehabilitation unit.    The patient continues to participate well with all therapy disciplines involved.  No new issues are identified by the nursing staff.  The patient denies chest pain and dyspnea.        Objective   /67 (BP Location: Right arm, Patient Position: Lying)   Pulse 71   Temp 36.8 °C (98.2 °F) (Temporal)   Resp 15   Ht 1.6 m (5' 2.99\")   Wt 72 kg (158 lb 11.7 oz)   SpO2 95%   BMI 28.13 kg/m²      Physical Exam  Constitutional:       General: She is not in acute distress.     Appearance: She is not toxic-appearing.   HENT:      Head: Normocephalic and atraumatic.      Mouth/Throat:      Mouth: Mucous membranes are moist.   Eyes:      Pupils: Pupils are equal, round, and reactive to light.   Cardiovascular:      Rate and Rhythm: Normal rate and regular rhythm.      Heart sounds: No murmur heard.  Pulmonary:      Breath sounds: Normal breath sounds. No wheezing, rhonchi or rales.   Abdominal:      General: There is no distension.      Palpations: Abdomen is soft.   Musculoskeletal:      Right lower leg: No edema.      Left lower leg: No edema.   Neurological:      Mental Status: She is alert and oriented to person, place, and time.      Left hemiparesis.  Getting some movement at the elbow in terms of extension.    Labs  BMP:        CBC:        Coagulation:           Assesment and Plan    Ischemic Stroke (Multi)   Left Hemiparesis (Multi)   Essential Hypertension   Hyperlipidemia   Chronic Kidney Disease (Ckd) Stage G3a/A2, Moderately Decreased Glomerular Filtration Rate (Gfr) Between 45-59 Ml/Min/1.73 Square Meter and Albuminuria Creatinine Ratio Between  Mg/G (C* (Multi)      Ischemic stroke - Continue with medical management for secondary prevention: DAPT x 21 days, statin  DVT prophylaxis has been ordered: LMWH      BINH/CKD " - fairly stable; monitor    DM2 - cont URENA, monitor glucose     HTN - amlodipine, coreg     Tobacco use disorder - nicotine patch ordered     HPL - stat     Left hemiparesis     Dysphagia - Cont ST efforts.    Urinary frequency.  No UTI.  Low PVR.  Cont oxybutinin - is helpful.    Discharge planning for this week.    Yunior Dubose MD

## 2024-12-06 NOTE — PROGRESS NOTES
Physical Therapy       12/06/24 [id number]   PT  Visit   PT Received On 12/06/24   Response to Previous Treatment Patient with no complaints from previous session.   General   Reason for Referral CVA   Referred By Dr. Dubose   Past Medical History Relevant to Rehab HTN, DM, CKD III, tobacco abuse   Patient Position Received Up in chair;Alarm on   Preferred Learning Style verbal;visual   General Comment Pt seated up in wc upon arrival. Agreeable to participate.   Precautions   Medical Precautions Fall precautions   Precautions Comment L shoulder harness with transfers.   Pain Assessment   Pain Assessment 0-10   0-10 (Numeric) Pain Score 0 - No pain   Therapeutic Exercise   Therapeutic Exercise Activity 1 seated isometric hip adduction/ball squeezes, 2x15 reps   Therapeutic Activity   Therapeutic Activity 1 omnicycle x 10 minutes, level 1 reistance,   Balance/Neuromuscular Re-Education   Balance/Neuromuscular Re-Education Activity 1 coordination ladder, no UE support, modA.  Vc's for weight shifting to R   Ambulation/Gait Training 1   Surface 1 Level tile;Carpet   Device 1 LBQC   Gait Support Devices L AFO;Gait belt   Assistance 1 Contact guard;Minimum assistance   Quality of Gait 1 Forward flexed posture;Inconsistent stride length;Decreased step length;Listing;NBOS   Comments/Distance (ft) 1 150 ft, turns included.  Improved posture maintained   Transfer 1   Technique 1 Sit to stand;Stand to sit   Transfer Device 1 Quad cane   Transfer Level of Assistance 1 Modified independent   Wheelchair Activities   Propulsion Type 1 Manual   Level 1 Level tile   Method 1 Right upper extremity;Right lower extremity   Level of Assistance 1 Modified independent   Description/Details 1 ad barbara around unit   Activity Tolerance   Endurance Tolerates 30 min exercise with multiple rests   PT Assessment   Evaluation/Treatment Tolerance Patient tolerated treatment well   End of Session Patient Position Up in chair;Alarm on   PT Plan   PT  Recommended Transfer Status Assist x1;Assistive device   PT - OK to Discharge Yes

## 2024-12-06 NOTE — PROGRESS NOTES
Occupational Therapy       12/06/24 [id number]   OT Last Visit   OT Received On 12/06/24   General   Reason for Referral CVA   Referred By Dr. Dubose   Past Medical History Relevant to Rehab HTN, DM, CKD III, tobacco abuse   Prior to Session Communication Bedside nurse   Patient Position Received Up in chair;Alarm on   Preferred Learning Style verbal;visual   General Comment Pt seated up in wc upon arrival. Agreeable to participate.   Precautions   Hearing/Visual Limitations No glasses. Smooth pursuits, saccades and convergence/ divergence slowed but intact. No visual field cuts identified.   Medical Precautions Fall precautions   Precautions Comment L shoulder harness with transfers.   Pain Assessment   Pain Assessment 0-10   0-10 (Numeric) Pain Score 4   Pain Type Acute pain   Pain Location Shoulder   Pain Orientation Left   Multiple Pain Sites Two   Pain 2   Pain Score 2 5 - Moderate pain   Pain Type 2 Chronic pain   Pain Location 2 Finger (Comment which one)  (2nd digit- arthritis)   Cognition   Orientation Level Oriented X4   Coordination   Upper Body Coordination LUE improving   UE Bathing   UE Bathing Level of Assistance Modified independent   UE Bathing Where Assessed Shower   UE Bathing Comments Dry simulation.   LE Bathing   LE Bathing Level of Assistance Modified independent   LE Bathing Where Assessed Shower   LE Bathing Comments Dry simulation- able to perform bathing seated and standing with good safety and ability to reach bittocks in stance with 3 point stance.   UE Dressing   UE Dressing Level of Assistance Modified independent   UE Dressing Where Assessed Wheelchair   UE Dressing Comments Able to gather clothes from closet at WC level and don/doff over head.   LE Dressing   LE Dressing Adaptive Equipment Sock aide   Pants Level of Assistance Contact guard   Sock Level of Assistance Setup   Shoe Level of Assistance Maximum assistance   Orthotics Level of Assistance Maximum assistance   LE Dressing  Comments Pt able to perform threading. Assist required for standing hiking over hips while standing at grab bar.   Toileting   Toileting Level of Assistance Contact guard   Where Assessed Toilet   Toileting Comments 3 point stance at grab bar. CGA for L sided pants over hips.   Transfer 1   Transfer From 1 Sit to   Transfer to 1 Stand   Technique 1 Sit to stand;Stand to sit   Transfer Device 1 Quad cane   Transfer Level of Assistance 1 Modified independent   Trials/Comments 1 Use of RUE.   Toilet Transfers   Toilet Transfer From Wheelchair   Toilet Transfer Type To and from   Toilet Transfer to Standard toilet   Toilet Transfer Technique Stand pivot   Toilet Transfers Modified independence   Toilet Transfers Comments With use of grab bar   Shower Transfers   Shower Transfer From Wheelchair   Shower Transfer Type To and from   Shower Transfer to Shower seat with back   Shower Transfer Technique To left;To right   Shower Transfers Modified independence   Shower Transfers Comments With use of grab bar.   Car Transfers   Car Transfer From Wheelchair   Car Transfer Technique Ambulating   Car Transfers Set up   Car Transfers Comments Simulated car transfer. Cueing prior to task for foot placement and safety.   Health Management   Health Management Level of Assistance Close supervision   Health Management Level Wheelchair   Health Management Performed pill box and pill bottle task. Pt was able to open pill bottle with assist of manuela with RUE. Good compensation. VC's on technique for ease of task.   Therapeutic Exercise   Therapeutic Exercise Activity 1 Seated at table top. LUE finger flexion x5 mins with theraputty. L hand and joint pain this date. Heat applied x5 min to decrease pain.   Therapeutic Exercise Activity 2 Elbow flexion x8 while seated. LUE. L finger pain.   Therapeutic Activity   Therapeutic Activity 1 Seated stated matching with RUE- w/ #1 wrist weight. Performed to increase seated function/ crossing  midline. Pt used reacher x3 to retrive dropped states on floor.   IP OT Assessment   OT Assessment Pt continues to work towards goal in POC for safe D/C home on 12/7   Prognosis Good   Barriers to Discharge None   Evaluation/Treatment Tolerance Patient tolerated treatment well   Medical Staff Made Aware Yes   End of Session Communication Bedside nurse   End of Session Patient Position Up in chair;Alarm on   OT Assessment   OT Assessment Results Decreased upper extremity strength;Decreased cognition;Decreased endurance;Decreased sensation;Decreased gross motor control;Decreased fine motor control   Strengths Ability to acquire knowledge   Barriers to Participation Comorbidities   Education   Individual(s) Educated Patient   Education Provided POC discussed and agreed upon   Home Program AROM;AAROM;Strengthening   Diagnosis and Precautions Educated on sling and giv hussein and subluxation risks for LUE   Patient Response to Education Patient/Caregiver Verbalized Understanding of Information;Patient/Caregiver Performed Return Demonstration of Exercises/Activities   Education Comment Pt very receptive to education.   Inpatient/Swing Bed or Outpatient   Inpatient/Swing Bed or Outpatient Inpatient   Inpatient Plan   Treatment Interventions ADL retraining;Functional transfer training;UE strengthening/ROM;Neuromuscular reeducation   OT Frequency 5 times per week   OT Discharge Recommendations Moderate intensity level of continued care   OT Recommended Transfer Status Assist of 1   OT - OK to Discharge Yes

## 2024-12-06 NOTE — PROGRESS NOTES
Physical Therapy Discharge Summary       [id number]   PT  Visit   PT Received On 24   Response to Previous Treatment Patient with no complaints from previous session.   General   Reason for Referral CVA   Referred By Dr. Dubose   Past Medical History Relevant to Rehab HTN, DM, CKD III, tobacco abuse   Patient Position Received Up in chair;Alarm on   Preferred Learning Style verbal;visual   General Comment Pt seated up in wc upon arrival. Agreeable to participate.   Precautions   Medical Precautions Fall precautions   Precautions Comment L shoulder harness (Give[employer]) with transfers.   Pain Assessment   Pain Assessment 0-10   0-10 (Numeric) Pain Score 4   Pain Type Acute pain   Pain Location Shoulder   Pain Orientation Left   Therapeutic Exercise   Therapeutic Exercise Activity 1 seated hip abduction, 2x15 reps, blue theraband   Therapeutic Activity   Therapeutic Activity 1 sit/stands x 1 minute x 2 trials: 1st trial 22 reps, 2nd trial 24 sec   Therapeutic Activity 2 side stepping x 15 each direction, CGA to R, CG/Bob to L with vc's to weight shift to R prior to stepping with LLE.   Balance/Neuromuscular Re-Education   Balance/Neuromuscular Re-Education Activity 1 TU.59 sec with WBQC (from 38.82 sec)   Balance/Neuromuscular Re-Education Activity 2 side stepping in // bars (no UE support), x 2 trials both directions.  Vc's for slower nelson/posture/ weight shifting to R when stepping to L.  Bob   Bed Mobility 1   Bed Mobility 1 Supine to sitting;Sitting to supine   Level of Assistance 1 Modified independent   Bed Mobility Comments 1 bed flat, no rail   Bed Mobility 2   Bed Mobility  2 Rolling right;Rolling left   Level of Assistance 2 Modified independent   Bed Mobility Comments 2 flat mat, no rail, extra time to roll to right   Ambulation/Gait Training 1   Surface 1 Level tile   Device 1 LBQC   Gait Support Devices L AFO;Gait belt   Assistance 1 Contact guard;Minimum assistance   Quality of Gait  1 Forward flexed posture;Inconsistent stride length;Decreased step length;Listing;NBOS   Comments/Distance (ft) 1 150 ft x 2, turns included.  periodic L foot catch with LOB forward - Bob to recover.   Transfer 1   Technique 1 Sit to stand;Stand to sit   Transfer Device 1 Quad cane   Transfer Level of Assistance 1 Modified independent   Trials/Comments 1 use of RUE   Transfers 2   Transfer From 2 Mat to;Wheelchair to   Transfer to 2 Wheelchair;Mat   Technique 2 Lateral   Transfer Device 2 Gait belt   Transfer Level of Assistance 2 Modified independent   Trials/Comments 2 level surfaces, leading to R   Stairs   Rails 1 Right   Device 1 Railing   Support Devices 1 Gait belt;Left Ankle-foot orthosis   Assistance 1 Contact guard   Comment/Number of Steps 1 up/down 12 6-inch steps, step-to pattern, good YANI noted.  Improved quality noted.    Object From Floor   Devices No reacher;No device   Assist Level Supervision   Comments cone from floor x 2   Wheelchair Activities   Propulsion Type 1 Manual   Level 1 Level tile   Method 1 Right upper extremity;Right lower extremity   Level of Assistance 1 Modified independent   Description/Details 1 ad barbara around unit   Activity Tolerance   Endurance Tolerates 30 min exercise with multiple rests   PT Assessment   PT Assessment Results Decreased strength;Decreased range of motion;Decreased endurance;Impaired balance;Decreased mobility;Decreased coordination;Impaired tone   Rehab Prognosis Good   Assessment Comment Pt has met 2 goals at this time.  TUG time improved from 38.82 sec to 35.59 sec.  Family training performed and plan to provide 24/7 supervision/assist.  Pt will be issued AFO and w/c and plan to buy WBQC.  Pt will be receiving OP PT.   End of Session Patient Position Up in chair;Alarm on   PT Plan   Inpatient/Swing Bed or Outpatient Inpatient   PT Plan   Treatment/Interventions Bed mobility;Transfer training;Gait training;Stair training;Neuromuscular  re-education;Therapeutic activity;Home exercise program   PT Plan Ongoing PT   PT Recommended Transfer Status Assist x1;Assistive device   PT - OK to Discharge Yes                    Problem: IRF PT LTG Problem  Goal: Patient will transfer sit to stand and stand to sit with independent assist to facilitate mobility.  Outcome: Met  Goal: Patient will transfer bed to chair and chair to bed with independent assist to facilitate mobility.  Outcome: Met  Goal: Patient will amb 150 feet least restrictive device including two turns on even surface with supervision assist to facilitate safe mobility.    12/6/2024 1200 by Cheyenne Mathis PTA  Outcome: Adequate for Discharge  12/6/2024 1136 by Cheyenne Mathis PTA  Outcome: Adequate for Discharge  Goal: Patient will negotiate 12 stairs with two rail(s) and supervision} assist with no device for in home and community.  12/6/2024 1200 by Cheyenne Mathis PTA  Outcome: Adequate for Discharge  12/6/2024 1136 by Cheyenne Mathis PTA  Outcome: Adequate for Discharge  Goal: Patient will perform TUG with least restrictive assistive device in 30 seconds or less to promote mobility and reduce fall risk with dynamic standing tasks.   12/6/2024 1200 by Cheyenne Mathis PTA  Outcome: Adequate for Discharge  12/6/2024 1136 by Cheyenne Mathis PTA  Outcome: Adequate for Discharge  Goal: Patient will increase left LE strength to 4-/5 to improve functional mobility.   12/6/2024 1200 by Cheyenne Mathis PTA  Outcome: Adequate for Discharge  12/6/2024 1136 by Cheyenne Mathis PTA  Outcome: Adequate for Discharge

## 2024-12-06 NOTE — CARE PLAN
The patient's goals for the shift include improve mobility    The clinical goals for the shift include saftey awareness

## 2024-12-06 NOTE — PROGRESS NOTES
Patient has reached maximum potential with therapy and is medically clear for discharge. Discharge options/recommendations have been reviewed with patient and family who prefer return home with home health. No appeal requested as per Medicare guidelines.      TRANSPORT DATE AND TIME:  11am via family on 12.7.24    EQUIPMENT NEEDS:  wheelchair has been delivered to bedside by [facility]    HOME GOING SERVICES:  [facility]in process of being scheduled at time of this note. Central scheduling will reach out to patient for finalized plan.     PRESCRIPTIONS: provided by  [employer]    FOLLOW UP APPOINTMENTS:  See AVS for details      IDT aware of discharge plan. Patient and family aware and in agreement with safe discharge plan for 12.7.24.

## 2024-12-07 VITALS
HEART RATE: 74 BPM | SYSTOLIC BLOOD PRESSURE: 136 MMHG | OXYGEN SATURATION: 95 % | DIASTOLIC BLOOD PRESSURE: 63 MMHG | BODY MASS INDEX: 28.12 KG/M2 | WEIGHT: 158.73 LBS | TEMPERATURE: 98.4 F | HEIGHT: 63 IN | RESPIRATION RATE: 15 BRPM

## 2024-12-07 LAB — GLUCOSE BLD MANUAL STRIP-MCNC: 86 MG/DL (ref 74–99)

## 2024-12-07 PROCEDURE — 2500000002 HC RX 250 W HCPCS SELF ADMINISTERED DRUGS (ALT 637 FOR MEDICARE OP, ALT 636 FOR OP/ED): Performed by: INTERNAL MEDICINE

## 2024-12-07 PROCEDURE — 2500000004 HC RX 250 GENERAL PHARMACY W/ HCPCS (ALT 636 FOR OP/ED): Performed by: INTERNAL MEDICINE

## 2024-12-07 PROCEDURE — 2500000001 HC RX 250 WO HCPCS SELF ADMINISTERED DRUGS (ALT 637 FOR MEDICARE OP): Performed by: INTERNAL MEDICINE

## 2024-12-07 PROCEDURE — 82947 ASSAY GLUCOSE BLOOD QUANT: CPT

## 2024-12-07 ASSESSMENT — BRIEF INTERVIEW FOR MENTAL STATUS (BIMS)
INITIAL REPETITION OF BED BLUE SOCK - FIRST ATTEMPT: 3
WHAT YEAR IS IT: CORRECT
ASKED TO RECALL BLUE: YES, NO CUE REQUIRED
WHAT DAY OF THE WEEK IS IT: CORRECT
WHAT MONTH IS IT: ACCURATE WITHIN 5 DAYS
ASKED TO RECALL BED: YES, NO CUE REQUIRED
ASKED TO RECALL SOCK: YES, NO CUE REQUIRED
COGNITIVE PATTERN ASSESSMENT USED: BIMS
BIMS SUMMARY SCORE: 15

## 2024-12-07 ASSESSMENT — PAIN - FUNCTIONAL ASSESSMENT: PAIN_FUNCTIONAL_ASSESSMENT: 0-10

## 2024-12-07 ASSESSMENT — PATIENT HEALTH QUESTIONNAIRE - PHQ9
SUM OF ALL RESPONSES TO PHQ9 QUESTIONS 1 & 2: 0
2. FEELING DOWN, DEPRESSED OR HOPELESS: NOT AT ALL
1. LITTLE INTEREST OR PLEASURE IN DOING THINGS: NOT AT ALL

## 2024-12-07 ASSESSMENT — PAIN SCALES - GENERAL: PAINLEVEL_OUTOF10: 0 - NO PAIN

## 2024-12-07 NOTE — CARE PLAN
The patient's goals for the shift include improve mobility    The clinical goals for the shift include maintain safety    Over the shift, the patient did not make progress toward the following goals. Barriers to progression include left upper extremity remains flaccid. Recommendations to address these barriers include continued therapy and mindful movements.

## 2024-12-07 NOTE — CARE PLAN
Problem: ADLs STM Goals  Goal: Patient with complete lower body dressing with contact guard assist level of assistance donning and doffing all LE clothes  with PRN adaptive equipment while supported sitting.  Outcome: Met  Goal: Patient will complete toileting including  clothing management/hygiene with contact guard assist level of assistance and grab bars and bedside commode.  Outcome: Met     Problem: TRANSFERS STM Goals  Goal: Pt will complete functional transfer to car with least restrictive device with contact guard assist level.  Outcome: Met     Problem: ADLs LTM Goals  Goal: Patient will perform UB and LB bathing seated with modified independent level of assistance and grab bars and shower chair.  Outcome: Met  Goal: Patient with complete upper body dressing with modified independent level of assistance donning and doffing all UE clothes with PRN adaptive equipment while supported sitting.  Outcome: Met  Goal: Patient with complete lower body dressing with modified independent level of assistance donning and doffing all LE clothes  with PRN adaptive equipment while supported sitting.  Outcome: Progressing  Goal: Patient will complete toileting including clothing management/hygiene with modified independent level of assistance and grab bars and bedside commode.  Outcome: Progressing  Goal: Pt will perform light home management and meal prep activity with modified independence with use of least restrictive device and good use of EC/WS techniques and safety.  Outcome: Progressing

## 2024-12-07 NOTE — NURSING NOTE
Assume care of patient at report. Patient sitting up in chair finishing breakfast and denies pain. Discussed with patient about possible retention of urine. She feels like when she gets up and goes into bathroom she in fact does empty her bladder. She has been advised to discuss with her primary if she notices she is not emptying. Patient to discharge home with her daughter today. Will monitor for changes until DC

## 2024-12-07 NOTE — NURSING NOTE
Patient daughter ask to have some home health services started for extra assistance. Referral being sent to [facility] and patient asked not to schedule with outpatient therapy until cleared from home health. Family educated on use of WC at discharge.

## 2024-12-07 NOTE — PROGRESS NOTES
Occupational Therapy  DISCHARGE STATUS  Discharge Date:  12/6/2024  Reason for Discharge:  Pt met max rehab potential at this level of care.  Upper Extremity Status    Left Right   Dominance  x   Gross Motor Impaired WFL   Fine Motor Impaired WFL   Balance   Static Dynamic   Sitting good good   Standing good fair (+)     Visual / Perceptual  Visual Acuity WFL   Visual Spatial WFL   Hearing WFL   Proprioception WFL   Praxis WFL   Memory WFL   Attention WFL   Rt/Lt Neglect Impaired (left)     Discharge Functional Status  Eating Setup   Grooming Modified independent   Toilet Hygiene Contact guard   Shower/Bathe Upper:  Modified independent  Lower:  Modified independent   Upper Body Dress Modified independent   Lower Body Dress Contact guard   On/Off Footwear Socks:  Setup  Shoes:  Maximum assistance   Toilet Transfer Modified independence   Car Transfer Set up   Meal Preparation Close supervision   Kitchen Mobility Modified independent      12/06/24 1000   [facility] Daily Activity   Putting on and taking off regular lower body clothing 3   Bathing (including washing, rinsing, drying) 4   Putting on and taking off regular upper body clothing 4   Toileting, which includes using toilet, bedpan or urinal 3   Taking care of personal grooming such as brushing teeth 4   Eating Meals 4   Daily Activity - Total Score 22   OT Adult Other Outcome Measures   9 Hole Peg Test RUE- 21 seconds- LUE unable   Box and Block RUE- 67 blks/min; LUE unable.   Other Outcome Measures  strength RUE- 47; LUE- 3lbs. BITS bell cancellation- 3:27- x1 miss     Problem: ADLs STM Goals  Goal: Patient with complete lower body dressing with contact guard assist level of assistance donning and doffing all LE clothes  with PRN adaptive equipment while supported sitting.  Outcome: Met  Goal: Patient will complete toileting including  clothing management/hygiene with contact guard assist level of assistance and grab bars and bedside commode.  Outcome: Met      Problem: TRANSFERS STM Goals  Goal: Pt will complete functional transfer to car with least restrictive device with contact guard assist level.  Outcome: Met     Problem: ADLs LTM Goals  Goal: Patient will perform UB and LB bathing seated with modified independent level of assistance and grab bars and shower chair.  Outcome: Met  Goal: Patient with complete upper body dressing with modified independent level of assistance donning and doffing all UE clothes with PRN adaptive equipment while supported sitting.  Outcome: Met  Goal: Patient with complete lower body dressing with modified independent level of assistance donning and doffing all LE clothes  with PRN adaptive equipment while supported sitting.  Outcome: Progressing  Goal: Patient will complete toileting including clothing management/hygiene with modified independent level of assistance and grab bars and bedside commode.  Outcome: Progressing  Goal: Pt will perform light home management and meal prep activity with modified independence with use of least restrictive device and good use of EC/WS techniques and safety.  Outcome: Progressing                  Treatment Summary:  Pt attained all STM goals and partially attained LTM goals. Family training completed and AE/DME recommended with handouts issued.  Pt ed on BUE HEP and handouts issued.  Pt plans to continue rehab at outpatient therapy.  Arrangements have been made with family who will provide 24/7 care upon DC home.  Goal Attainment: Partially met   Remaining Goals/Needs:  LB dressing, home management and toileting  Equipment Recommendations:  grab bars and tub transfer bench  OT Plan: Skilled OT, Outpatient therapy, home with family support (24/7)

## 2024-12-09 ENCOUNTER — HOME HEALTH ADMISSION (OUTPATIENT)
Dept: HOME HEALTH SERVICES | Facility: HOME HEALTH | Age: 79
End: 2024-12-09
Payer: COMMERCIAL

## 2024-12-09 ENCOUNTER — DOCUMENTATION (OUTPATIENT)
Dept: HOME HEALTH SERVICES | Facility: HOME HEALTH | Age: 79
End: 2024-12-09
Payer: COMMERCIAL

## 2024-12-09 DIAGNOSIS — I63.9 CEREBROVASCULAR ACCIDENT (CVA), UNSPECIFIED MECHANISM (MULTI): ICD-10-CM

## 2024-12-09 NOTE — HH CARE COORDINATION
Home Care received a Referral for Nursing, Physical Therapy, Occupational Therapy, and Home Health Aide. We have processed the referral for a Start of Care on 12/11/24-12/12/24.     If you have any questions or concerns, please feel free to contact us at [phone]. Follow the prompts, enter your five digit zip code, and you will be directed to your care team on EAST 1.

## 2024-12-10 ENCOUNTER — PATIENT OUTREACH (OUTPATIENT)
Dept: PRIMARY CARE | Facility: CLINIC | Age: 79
End: 2024-12-10
Payer: COMMERCIAL

## 2024-12-10 NOTE — PROGRESS NOTES
Discharge Facility: [facility]  Discharge Diagnosis: Ischemic stroke   Admission Date: 11/18/2024  Discharge Date: 12/7/2024    PCP Appointment Date: Message to office to schedule  Specialist Appointment Date: Referral to Neurology  Hospital Encounter and Summary Linked: Yes    Two attempts were made to reach patient within two business days after discharge. Voicemail left with contact information for patient to call back with any non-emergent questions or concerns.

## 2024-12-14 ENCOUNTER — HOME CARE VISIT (OUTPATIENT)
Dept: HOME HEALTH SERVICES | Facility: HOME HEALTH | Age: 79
End: 2024-12-14
Payer: COMMERCIAL

## 2024-12-14 VITALS
DIASTOLIC BLOOD PRESSURE: 80 MMHG | SYSTOLIC BLOOD PRESSURE: 145 MMHG | TEMPERATURE: 97.5 F | HEART RATE: 63 BPM | OXYGEN SATURATION: 96 % | RESPIRATION RATE: 18 BRPM

## 2024-12-14 PROCEDURE — G0299 HHS/HOSPICE OF RN EA 15 MIN: HCPCS

## 2024-12-14 PROCEDURE — 169592 NO-PAY CLAIM PROCEDURE

## 2024-12-14 ASSESSMENT — ENCOUNTER SYMPTOMS
APPETITE LEVEL: GOOD
DENIES PAIN: 1

## 2024-12-14 ASSESSMENT — ACTIVITIES OF DAILY LIVING (ADL)
ENTERING_EXITING_HOME: MAXIMUM ASSIST
AMBULATION ASSISTANCE: TWO PERSON
OASIS_M1830: 03
CURRENT_FUNCTION: TWO PERSON

## 2024-12-16 ENCOUNTER — HOME CARE VISIT (OUTPATIENT)
Dept: HOME HEALTH SERVICES | Facility: HOME HEALTH | Age: 79
End: 2024-12-16
Payer: COMMERCIAL

## 2024-12-16 VITALS — DIASTOLIC BLOOD PRESSURE: 80 MMHG | SYSTOLIC BLOOD PRESSURE: 150 MMHG | TEMPERATURE: 97.6 F | HEART RATE: 68 BPM

## 2024-12-16 PROCEDURE — G0155 HHCP-SVS OF CSW,EA 15 MIN: HCPCS

## 2024-12-16 PROCEDURE — G0151 HHCP-SERV OF PT,EA 15 MIN: HCPCS

## 2024-12-16 SDOH — ECONOMIC STABILITY: HOUSING INSECURITY: ENVIRONMENTAL RISKS: 1

## 2024-12-16 SDOH — HEALTH STABILITY: MENTAL HEALTH: STRESS FACTORS COMMENTS: RECENT CVA THIS PAST NOVEMBER

## 2024-12-16 SDOH — HEALTH STABILITY: PHYSICAL HEALTH: EXERCISE TYPE: LE THER EX

## 2024-12-16 ASSESSMENT — BALANCE ASSESSMENTS
ARISING SCORE: 0
STANDING BALANCE: 2 - NARROW STANCE WITHOUT SUPPORT
NUDGED SCORE: 1
SITTING BALANCE: 1 - STEADY, SAFE
IMMEDIATE STANDING BALANCE FIRST 5 SECONDS: 1 - STEADY BUT USES WALKER OR OTHER SUPPORT
ATTEMPTS TO ARISE: 0 - UNABLE WITHOUT HELP
EYES CLOSED AT MAXIMUM POSITION NUDGED: 0 - UNSTEADY
ARISES: 0 - UNABLE WITHOUT HELP
BALANCE SCORE: 6
TURNING 360 DEGREES STEPS: 0 - DISCONTINUOUS STEPS
NUDGED: 1 - STAGGERS, GRABS, CATCHES SELF
SITTING DOWN: 1 - USES ARMS OR NOT SMOOTH MOTION

## 2024-12-16 ASSESSMENT — ACTIVITIES OF DAILY LIVING (ADL)
PHYSICAL TRANSFERS ASSESSED: 1
AMBULATION ASSISTANCE: 1
TOILETING: MINIMUM ASSIST
SHOPPING_REQUIRES_ASSISTANCE: 1
CURRENT_FUNCTION: ONE PERSON
DRESSING_LB_CURRENT_FUNCTION: MODERATE ASSIST
AMBULATION_REQUIRES_ASSISTANCE: 1
AMBULATION_DISTANCE/DURATION_TOLERATED: 75 FT
AMBULATION ASSISTANCE: STAND BY ASSIST
DRESSING_UB_CURRENT_FUNCTION: MODERATE ASSIST
PHYSICAL_TRANSFER_REQUIRES_ASSISTANCE: 1
AMBULATION ASSISTANCE ON FLAT SURFACES: 1
AMBULATION ASSISTANCE: CONTACT GUARD ASSIST
BATHING_REQUIRES_ASSISTANCE: 1
TOILETING: 1

## 2024-12-16 ASSESSMENT — GAIT ASSESSMENTS
BALANCE AND GAIT SCORE: 14
INITIATION OF GAIT IMMEDIATELY AFTER GO: 1 - NO HESITANCY
STEP SYMMETRY: 1 - RIGHT AND LEFT STEP LENGTH APPEAR EQUAL
TRUNK: 0 - MARKED SWAY OR USES WALKING AID
PATH SCORE: 1
STEP CONTINUITY: 1 - STEPS APPEAR CONTINUOUS
PATH: 1 - MILD/MODERATE DEVIATION OR USES WALKING AID
TRUNK SCORE: 0
GAIT SCORE: 8
WALKING STANCE: 1 - HEELS ALMOST TOUCHING WHILE WALKING

## 2024-12-16 ASSESSMENT — ENCOUNTER SYMPTOMS
HIGHEST PAIN SEVERITY IN PAST 24 HOURS: 5/10
LIMITED RANGE OF MOTION: 1
MUSCLE WEAKNESS: 1
PAIN: 1
PAIN LOCATION - PAIN SEVERITY: 0/10
PAIN LOCATION: LEFT SHOULDER

## 2024-12-17 ENCOUNTER — HOME CARE VISIT (OUTPATIENT)
Dept: HOME HEALTH SERVICES | Facility: HOME HEALTH | Age: 79
End: 2024-12-17
Payer: COMMERCIAL

## 2024-12-17 PROCEDURE — G0152 HHCP-SERV OF OT,EA 15 MIN: HCPCS

## 2024-12-19 ENCOUNTER — HOME CARE VISIT (OUTPATIENT)
Dept: HOME HEALTH SERVICES | Facility: HOME HEALTH | Age: 79
End: 2024-12-19
Payer: COMMERCIAL

## 2024-12-19 VITALS — HEART RATE: 70 BPM | OXYGEN SATURATION: 96 % | SYSTOLIC BLOOD PRESSURE: 136 MMHG | DIASTOLIC BLOOD PRESSURE: 70 MMHG

## 2024-12-19 VITALS
DIASTOLIC BLOOD PRESSURE: 64 MMHG | OXYGEN SATURATION: 95 % | HEART RATE: 72 BPM | SYSTOLIC BLOOD PRESSURE: 146 MMHG | TEMPERATURE: 98.2 F

## 2024-12-19 VITALS
TEMPERATURE: 97.6 F | RESPIRATION RATE: 18 BRPM | DIASTOLIC BLOOD PRESSURE: 72 MMHG | HEART RATE: 72 BPM | SYSTOLIC BLOOD PRESSURE: 136 MMHG | OXYGEN SATURATION: 97 %

## 2024-12-19 PROCEDURE — G0157 HHC PT ASSISTANT EA 15: HCPCS | Mod: CQ

## 2024-12-19 PROCEDURE — G0300 HHS/HOSPICE OF LPN EA 15 MIN: HCPCS

## 2024-12-19 ASSESSMENT — ENCOUNTER SYMPTOMS
PAIN LOCATION: LEFT ARM
PERSON REPORTING PAIN: PATIENT
PAIN LOCATION - PAIN SEVERITY: 5/10
PAIN LOCATION - PAIN FREQUENCY: WITH ACTIVITY
PAIN LOCATION - RELIEVING FACTORS: POSITIONING, MEDS
PAIN LOCATION - PAIN FREQUENCY: WITH ACTIVITY
PAIN LOCATION - PAIN SEVERITY: 0/10
PAIN LOCATION - PAIN FREQUENCY: WITH ACTIVITY
PAIN LOCATION - RELIEVING FACTORS: POSITIONING, MEDS
PAIN: 1
PAIN SEVERITY GOAL: 0/10
PAIN LOCATION - PAIN QUALITY: ACHING
PAIN LOCATION - PAIN QUALITY: ACHING
PAIN LOCATION - PAIN SEVERITY: 3/10
LOWEST PAIN SEVERITY IN PAST 24 HOURS: 0/10
HIGHEST PAIN SEVERITY IN PAST 24 HOURS: 4/10
PAIN LOCATION - PAIN QUALITY: ACHING
HIGHEST PAIN SEVERITY IN PAST 24 HOURS: 5/10
PAIN LOCATION - RELIEVING FACTORS: POSITIONING, MEDS
PAIN LOCATION: LEFT SHOULDER
PERSON REPORTING PAIN: PATIENT
PAIN LOCATION: LEFT HAND
PAIN LOCATION - PAIN SEVERITY: 3/10
PAIN: 1
PAIN LOCATION: LEFT WRIST

## 2024-12-19 ASSESSMENT — ACTIVITIES OF DAILY LIVING (ADL)
GROOMING ASSESSED: 1
BATHING_CURRENT_FUNCTION: MAXIMUM ASSIST
AMBULATION ASSISTANCE: 1
TOILETING: 1
BATHING ASSESSED: 1
DRESSING_UB_CURRENT_FUNCTION: MAXIMUM ASSIST
FEEDING ASSESSED: 1
PREPARING MEALS: DEPENDENT
GROOMING_CURRENT_FUNCTION: MINIMUM ASSIST
DRESSING_LB_CURRENT_FUNCTION: MAXIMUM ASSIST
FEEDING: STAND BY ASSIST
AMBULATION ASSISTANCE: MINIMUM ASSIST
TOILETING: MAXIMUM ASSIST

## 2024-12-20 DIAGNOSIS — I10 ESSENTIAL HYPERTENSION: ICD-10-CM

## 2024-12-20 DIAGNOSIS — I63.9 CEREBROVASCULAR ACCIDENT (CVA), UNSPECIFIED MECHANISM (MULTI): ICD-10-CM

## 2024-12-20 DIAGNOSIS — I63.9 ISCHEMIC STROKE (MULTI): ICD-10-CM

## 2024-12-20 RX ORDER — AMLODIPINE BESYLATE 10 MG/1
10 TABLET ORAL DAILY
Qty: 90 TABLET | Refills: 0 | Status: SHIPPED | OUTPATIENT
Start: 2024-12-20 | End: 2025-03-20

## 2024-12-20 RX ORDER — OXYBUTYNIN CHLORIDE 5 MG/1
5 TABLET ORAL 2 TIMES DAILY
Qty: 180 TABLET | Refills: 0 | Status: SHIPPED | OUTPATIENT
Start: 2024-12-20 | End: 2025-03-20

## 2024-12-20 RX ORDER — CARVEDILOL 6.25 MG/1
6.25 TABLET ORAL 2 TIMES DAILY
Qty: 180 TABLET | Refills: 0 | Status: SHIPPED | OUTPATIENT
Start: 2024-12-20

## 2024-12-20 SDOH — ECONOMIC STABILITY: GENERAL

## 2024-12-20 ASSESSMENT — PAIN SCALES - PAIN ASSESSMENT IN ADVANCED DEMENTIA (PAINAD)
CONSOLABILITY: 0 - NO NEED TO CONSOLE.
FACIALEXPRESSION: 0
TOTALSCORE: 0
NEGVOCALIZATION: 0
BREATHING: 0
NEGVOCALIZATION: 0 - NONE.
FACIALEXPRESSION: 0 - SMILING OR INEXPRESSIVE.
BODYLANGUAGE: 0
CONSOLABILITY: 0
BODYLANGUAGE: 0 - RELAXED.

## 2024-12-20 ASSESSMENT — ACTIVITIES OF DAILY LIVING (ADL): MONEY MANAGEMENT (EXPENSES/BILLS): INDEPENDENT

## 2024-12-20 ASSESSMENT — ENCOUNTER SYMPTOMS
CHANGE IN APPETITE: UNCHANGED
PERSON REPORTING PAIN: PATIENT
LAST BOWEL MOVEMENT: 67193
DENIES PAIN: 1
STOOL FREQUENCY: DAILY

## 2024-12-20 NOTE — TELEPHONE ENCOUNTER
LOV:    9/5/24       NEXT OV: 1/9/25                          LAST FILL:                           LABS:       Pt had a stroke last month and has a follow up with you next month.  She has new prescriptions and will run out prior to her appt with you.  Can you please refill these for the pt?  Confirmed GE in [place].

## 2024-12-22 NOTE — PROGRESS NOTES
ddSubjective   Patient ID: Cynthia Muse is a 79 y.o. female who presents for Hospital Follow-up.    HPI   Pt comes in for hospital/SNF f/u for ischemic CVA.  Admitted to [facility] 11/14-11/18. Presented to ER with left sided weakness.  Received TNK. MRI showed acute infarct in right corona radiata and chronic small vessel disease.   She completed plavix for 21 days and is now on ASA.  Pt's blood pressure med lisinopril and hctz stopped due to BINH.  Bp stable on amlodipine and newly added carvedilol.       SNF from 11/18 to 12/7. In rehab she gained strength in left leg more than the left arm. She does require a manual wheelchair since unable to functionally ambulate any significant distance on discharge.  She is able to propel the wheelchair with both arms.  She was not able to ambulate safely with a lesser assistive device. PT has been coming out twice a week and now she can use a quad cane.  She came into office walking with the cane.      Past Medical History:   Diagnosis Date    Cerebrovascular accident (CVA), unspecified mechanism (Multi) 11/14/2024    Chronic kidney disease (CKD) stage G3a/A2, moderately decreased glomerular filtration rate (GFR) between 45-59 mL/min/1.73 square meter and albuminuria creatinine ratio between  mg/g (C* (Multi)     [place]    HTN (hypertension)     Hyperlipidemia     Ischemic stroke (Multi) 11/18/2024    Left hemiparesis (Multi) 11/19/2024    Osteoporosis     Type 2 diabetes mellitus     Vitamin D deficiency      Current Outpatient Medications   Medication Sig Dispense Refill    acetaminophen (Tylenol) 325 mg tablet Take 325 mg by mouth every 4 hours. Indications: pain      alendronate (Fosamax) 70 mg tablet TAKE ONE TABLET BY MOUTH ONCE A WEEK 4 tablet 0    amLODIPine (Norvasc) 10 mg tablet Take 1 tablet (10 mg) by mouth once daily. 90 tablet 0    aspirin 81 mg EC tablet Take 1 tablet (81 mg) by mouth once daily.      carvedilol (Coreg) 6.25 mg tablet Take 1 tablet (6.25  "mg) by mouth 2 times a day. 180 tablet 0    glimepiride (AmaryL) 2 mg tablet Take 1 tablet (2 mg) by mouth once daily in the morning. Take before meals. Do not fill before November 19, 2024.      oxybutynin (Ditropan) 5 mg tablet Take 1 tablet (5 mg) by mouth 2 times a day. 180 tablet 0    rosuvastatin (Crestor) 10 mg tablet TAKE ONE TABLET BY MOUTH EVERY DAY 90 tablet 1     No current facility-administered medications for this visit.         Review of Systems see hpi  Labs:  Lab Results   Component Value Date    GLUCOSE 106 (H) 11/19/2024    CALCIUM 9.3 11/19/2024     11/19/2024    K 3.9 11/19/2024    CO2 23 11/19/2024     11/19/2024    BUN 41 (H) 11/19/2024    CREATININE 1.77 (H) 11/19/2024     eGFR  >60 mL/min/1.73m*2 29     Lab Results   Component Value Date    HGBA1C 6.0 (H) 11/14/2024     Lab Results   Component Value Date    CHOL 132 11/14/2024    CHOL 141 02/29/2024    CHOL 130 (L) 08/04/2023     Lab Results   Component Value Date    HDL 46.6 11/14/2024    HDL 47.7 02/29/2024    HDL 53 08/04/2023     Lab Results   Component Value Date    LDLCALC 59 11/14/2024    LDLCALC 69 02/29/2024    LDLCALC 42 (L) 08/04/2023     Lab Results   Component Value Date    TRIG 130 11/14/2024    TRIG 123 02/29/2024    TRIG 175 (H) 08/04/2023     No components found for: \"CHOLHDL\"        Objective   /68   Pulse 53   Wt 70.9 kg (156 lb 6.4 oz)   SpO2 97%   BMI 28.61 kg/m²     Physical Exam  Vitals reviewed.   Constitutional:       Appearance: Normal appearance.   HENT:      Head: Normocephalic and atraumatic.   Cardiovascular:      Rate and Rhythm: Normal rate and regular rhythm.      Pulses: Normal pulses.      Heart sounds: Normal heart sounds.   Pulmonary:      Effort: Pulmonary effort is normal.      Breath sounds: Normal breath sounds.   Musculoskeletal:      Comments: Left arm has no strength;unable to lift it or  with hand.  Left leg is doing well: can lift it off chair and straighten it out. "   Neurological:      Mental Status: She is alert.      Deep Tendon Reflexes: Reflexes are normal and symmetric.         Assessment/Plan   Assessment & Plan  Hospital discharge follow-up         Ischemic cerebrovascular accident (CVA) (Multi)  Cont asa 81 mg       Left hemiparesis (Multi)  PT to cont to come to house twice weekly.       Type 2 diabetes mellitus without complication, without long-term current use of insulin (Multi)  Cont glimepiride       Essential hypertension  Cont amlodipine and carvedilol       Mixed hyperlipidemia  Cont rosuvastatin       Chronic kidney disease (CKD) stage G3a/A2, moderately decreased glomerular filtration rate (GFR) between 45-59 mL/min/1.73 square meter and albuminuria creatinine ratio between  mg/g (C* (Multi)  Follows with nephro prn; will monitor labs       Other chronic pain    Orders:    Disability Placard

## 2024-12-23 ENCOUNTER — HOME CARE VISIT (OUTPATIENT)
Dept: HOME HEALTH SERVICES | Facility: HOME HEALTH | Age: 79
End: 2024-12-23
Payer: COMMERCIAL

## 2024-12-23 PROCEDURE — G0153 HHCP-SVS OF S/L PATH,EA 15MN: HCPCS

## 2024-12-24 ENCOUNTER — HOME CARE VISIT (OUTPATIENT)
Dept: HOME HEALTH SERVICES | Facility: HOME HEALTH | Age: 79
End: 2024-12-24
Payer: COMMERCIAL

## 2024-12-24 PROCEDURE — G0152 HHCP-SERV OF OT,EA 15 MIN: HCPCS

## 2024-12-24 PROCEDURE — G0157 HHC PT ASSISTANT EA 15: HCPCS | Mod: CQ

## 2024-12-26 ENCOUNTER — HOME CARE VISIT (OUTPATIENT)
Dept: HOME HEALTH SERVICES | Facility: HOME HEALTH | Age: 79
End: 2024-12-26
Payer: COMMERCIAL

## 2024-12-26 VITALS
OXYGEN SATURATION: 93 % | DIASTOLIC BLOOD PRESSURE: 62 MMHG | HEART RATE: 64 BPM | TEMPERATURE: 98.3 F | SYSTOLIC BLOOD PRESSURE: 140 MMHG

## 2024-12-26 VITALS
TEMPERATURE: 98 F | HEART RATE: 59 BPM | OXYGEN SATURATION: 94 % | DIASTOLIC BLOOD PRESSURE: 66 MMHG | SYSTOLIC BLOOD PRESSURE: 148 MMHG

## 2024-12-26 VITALS — SYSTOLIC BLOOD PRESSURE: 155 MMHG | HEART RATE: 64 BPM | DIASTOLIC BLOOD PRESSURE: 75 MMHG | OXYGEN SATURATION: 97 %

## 2024-12-26 PROCEDURE — G0157 HHC PT ASSISTANT EA 15: HCPCS | Mod: CQ

## 2024-12-26 ASSESSMENT — ENCOUNTER SYMPTOMS
PAIN LOCATION: LEFT SHOULDER
PAIN LOCATION - PAIN SEVERITY: 5/10
PAIN LOCATION - PAIN QUALITY: ACHING
DENIES PAIN: 1
PERSON REPORTING PAIN: PATIENT
PAIN: 1
PERSON REPORTING PAIN: PATIENT
HIGHEST PAIN SEVERITY IN PAST 24 HOURS: 5/10
DENIES PAIN: 1
COUGH CHARACTERISTICS: WFL
PAIN LOCATION - RELIEVING FACTORS: TYLENOL
SUBJECTIVE PAIN PROGRESSION: WAXING AND WANING
PERSON REPORTING PAIN: PATIENT
LOWEST PAIN SEVERITY IN PAST 24 HOURS: 2/10

## 2024-12-27 ENCOUNTER — HOME CARE VISIT (OUTPATIENT)
Dept: HOME HEALTH SERVICES | Facility: HOME HEALTH | Age: 79
End: 2024-12-27
Payer: COMMERCIAL

## 2024-12-27 VITALS
DIASTOLIC BLOOD PRESSURE: 66 MMHG | DIASTOLIC BLOOD PRESSURE: 64 MMHG | SYSTOLIC BLOOD PRESSURE: 154 MMHG | SYSTOLIC BLOOD PRESSURE: 148 MMHG | OXYGEN SATURATION: 92 % | HEART RATE: 61 BPM | HEART RATE: 71 BPM | OXYGEN SATURATION: 95 %

## 2024-12-27 PROCEDURE — G0152 HHCP-SERV OF OT,EA 15 MIN: HCPCS

## 2024-12-28 ASSESSMENT — ENCOUNTER SYMPTOMS
PERSON REPORTING PAIN: PATIENT
PAIN LOCATION: LEFT WRIST
PERSON REPORTING PAIN: PATIENT
PAIN LOCATION - EXACERBATING FACTORS: MOVEMENT
PAIN LOCATION - PAIN QUALITY: ACHING
LOWEST PAIN SEVERITY IN PAST 24 HOURS: 0/10
PAIN LOCATION: LEFT WRIST
PAIN LOCATION - PAIN FREQUENCY: WITH ACTIVITY
PAIN LOCATION - RELIEVING FACTORS: REST
PAIN LOCATION - RELIEVING FACTORS: REST
PAIN SEVERITY GOAL: 0/10
PAIN LOCATION - PAIN FREQUENCY: WITH ACTIVITY
PAIN LOCATION - RELIEVING FACTORS: REST
PAIN LOCATION - PAIN FREQUENCY: WITH ACTIVITY
HIGHEST PAIN SEVERITY IN PAST 24 HOURS: 4/10
PAIN LOCATION: LEFT SHOULDER
PAIN LOCATION - PAIN SEVERITY: 3/10
PAIN LOCATION - PAIN SEVERITY: 3/10
PAIN LOCATION - PAIN QUALITY: ACHING
PAIN LOCATION - EXACERBATING FACTORS: MOVEMENT
PAIN LOCATION - EXACERBATING FACTORS: MOVEMENT
PAIN LOCATION - PAIN QUALITY: ACHING
PAIN LOCATION: LEFT HAND
PAIN LOCATION - RELIEVING FACTORS: REST
PAIN SEVERITY GOAL: 0/10
PAIN: 1
PAIN LOCATION - EXACERBATING FACTORS: MOVEMENT
PAIN LOCATION - PAIN FREQUENCY: WITH ACTIVITY
PAIN LOCATION - PAIN SEVERITY: 3/10
PAIN LOCATION - PAIN QUALITY: ACHING
PAIN LOCATION - PAIN FREQUENCY: WITH ACTIVITY
HIGHEST PAIN SEVERITY IN PAST 24 HOURS: 4/10
LOWEST PAIN SEVERITY IN PAST 24 HOURS: 0/10
PAIN LOCATION - PAIN FREQUENCY: WITH ACTIVITY
PAIN LOCATION - EXACERBATING FACTORS: MOVEMENT
PAIN LOCATION - RELIEVING FACTORS: REST
PAIN: 1
PAIN LOCATION: LEFT HAND
PAIN LOCATION - PAIN SEVERITY: 3/10
PAIN LOCATION: LEFT SHOULDER
PAIN LOCATION - PAIN QUALITY: ACHING
PAIN LOCATION - PAIN SEVERITY: 3/10
PAIN LOCATION - RELIEVING FACTORS: REST
PAIN LOCATION - PAIN SEVERITY: 3/10
PAIN LOCATION - EXACERBATING FACTORS: MOVEMENT
PAIN LOCATION - PAIN QUALITY: ACHING

## 2024-12-30 ENCOUNTER — HOME CARE VISIT (OUTPATIENT)
Dept: HOME HEALTH SERVICES | Facility: HOME HEALTH | Age: 79
End: 2024-12-30
Payer: COMMERCIAL

## 2024-12-30 VITALS — TEMPERATURE: 98 F | OXYGEN SATURATION: 96 % | HEART RATE: 64 BPM

## 2024-12-30 PROCEDURE — G0157 HHC PT ASSISTANT EA 15: HCPCS | Mod: CQ

## 2024-12-30 ASSESSMENT — ENCOUNTER SYMPTOMS
DENIES PAIN: 1
PERSON REPORTING PAIN: PATIENT

## 2024-12-31 ENCOUNTER — HOME CARE VISIT (OUTPATIENT)
Dept: HOME HEALTH SERVICES | Facility: HOME HEALTH | Age: 79
End: 2024-12-31
Payer: COMMERCIAL

## 2024-12-31 VITALS
WEIGHT: 145 LBS | OXYGEN SATURATION: 94 % | HEART RATE: 64 BPM | TEMPERATURE: 97.7 F | SYSTOLIC BLOOD PRESSURE: 142 MMHG | HEIGHT: 62 IN | DIASTOLIC BLOOD PRESSURE: 70 MMHG | RESPIRATION RATE: 16 BRPM | BODY MASS INDEX: 26.68 KG/M2

## 2024-12-31 PROCEDURE — G0299 HHS/HOSPICE OF RN EA 15 MIN: HCPCS

## 2024-12-31 PROCEDURE — G0152 HHCP-SERV OF OT,EA 15 MIN: HCPCS

## 2024-12-31 ASSESSMENT — ACTIVITIES OF DAILY LIVING (ADL)
PHYSICAL TRANSFERS ASSESSED: 1
AMBULATION ASSISTANCE: STAND BY ASSIST
SHOPPING: DEPENDENT
SHOPPING ASSESSED: 1
GROOMING ASSESSED: 1
HOUSEKEEPING ASSESSED: 1
BATHING_CURRENT_FUNCTION: MAXIMUM ASSIST
TOILETING: 1
USING THE TELPHONE: INDEPENDENT
DRESSING_UB_CURRENT_FUNCTION: MAXIMUM ASSIST
ORAL_CARE_ASSESSED: 1
LAUNDRY ASSESSED: 1
ORAL_CARE_CURRENT_FUNCTION: NEEDS ASSISTANCE
FEEDING ASSESSED: 1
TELEPHONE USE ASSESSED: 1
CURRENT_FUNCTION: ONE PERSON
TRANSPORTATION ASSESSED: 1
PREPARING MEALS: DEPENDENT
LIGHT HOUSEKEEPING: DEPENDENT
LAUNDRY: DEPENDENT
TRANSPORTATION: DEPENDENT
AMBULATION ASSISTANCE: 1
GROOMING_CURRENT_FUNCTION: MODERATE ASSIST
DRESSING_LB_CURRENT_FUNCTION: MAXIMUM ASSIST
TOILETING: MODERATE ASSIST
BATHING ASSESSED: 1
FEEDING: STAND BY ASSIST

## 2024-12-31 ASSESSMENT — ENCOUNTER SYMPTOMS
DENIES PAIN: 1
LOWER EXTREMITY EDEMA: 1
LIMITED RANGE OF MOTION: 1
DRY SKIN: 1
LAST BOWEL MOVEMENT: 67205
APPETITE LEVEL: GOOD
PERSON REPORTING PAIN: PATIENT
STOOL FREQUENCY: DAILY

## 2024-12-31 ASSESSMENT — PAIN SCALES - PAIN ASSESSMENT IN ADVANCED DEMENTIA (PAINAD)
CONSOLABILITY: 0 - NO NEED TO CONSOLE.
BODYLANGUAGE: 0 - RELAXED.
BODYLANGUAGE: 0
NEGVOCALIZATION: 0
CONSOLABILITY: 0
FACIALEXPRESSION: 0 - SMILING OR INEXPRESSIVE.
FACIALEXPRESSION: 0
TOTALSCORE: 0
NEGVOCALIZATION: 0 - NONE.
BREATHING: 0

## 2025-01-02 ENCOUNTER — HOME CARE VISIT (OUTPATIENT)
Dept: HOME HEALTH SERVICES | Facility: HOME HEALTH | Age: 80
End: 2025-01-02
Payer: COMMERCIAL

## 2025-01-02 ENCOUNTER — PATIENT OUTREACH (OUTPATIENT)
Dept: PRIMARY CARE | Facility: CLINIC | Age: 80
End: 2025-01-02
Payer: COMMERCIAL

## 2025-01-02 VITALS
SYSTOLIC BLOOD PRESSURE: 154 MMHG | TEMPERATURE: 97.7 F | DIASTOLIC BLOOD PRESSURE: 65 MMHG | OXYGEN SATURATION: 97 % | HEART RATE: 63 BPM

## 2025-01-02 PROCEDURE — G0152 HHCP-SERV OF OT,EA 15 MIN: HCPCS

## 2025-01-02 PROCEDURE — G0157 HHC PT ASSISTANT EA 15: HCPCS | Mod: CQ

## 2025-01-02 ASSESSMENT — ENCOUNTER SYMPTOMS
DENIES PAIN: 1
PERSON REPORTING PAIN: PATIENT

## 2025-01-02 NOTE — PROGRESS NOTES
Unable to reach patient for call back after recent hospitalization. LVM reminding patient of upcoming appt with Dr. Izquierdo 1/9/2025 and call back number for patient to call if needed.

## 2025-01-04 VITALS
OXYGEN SATURATION: 93 % | SYSTOLIC BLOOD PRESSURE: 142 MMHG | DIASTOLIC BLOOD PRESSURE: 70 MMHG | DIASTOLIC BLOOD PRESSURE: 70 MMHG | SYSTOLIC BLOOD PRESSURE: 150 MMHG

## 2025-01-04 ASSESSMENT — ENCOUNTER SYMPTOMS
PAIN LOCATION - PAIN QUALITY: ACHING
PAIN LOCATION - PAIN FREQUENCY: WITH ACTIVITY
PAIN LOCATION - PAIN SEVERITY: 2/10
PAIN LOCATION: LEFT HAND
HIGHEST PAIN SEVERITY IN PAST 24 HOURS: 3/10
LOWEST PAIN SEVERITY IN PAST 24 HOURS: 0/10
PAIN LOCATION - PAIN QUALITY: ACHING
PAIN LOCATION - RELIEVING FACTORS: REST
PERSON REPORTING PAIN: PATIENT
LOWEST PAIN SEVERITY IN PAST 24 HOURS: 0/10
PAIN LOCATION: LEFT HAND
PAIN LOCATION - RELIEVING FACTORS: REST
PAIN LOCATION - PAIN FREQUENCY: WITH ACTIVITY
PAIN: 1
PAIN LOCATION - EXACERBATING FACTORS: PROM
PAIN SEVERITY GOAL: 0/10
PAIN: 1
HIGHEST PAIN SEVERITY IN PAST 24 HOURS: 3/10
PERSON REPORTING PAIN: PATIENT
PAIN LOCATION - PAIN SEVERITY: 2/10
PAIN SEVERITY GOAL: 0/10
PAIN LOCATION - EXACERBATING FACTORS: PROM

## 2025-01-06 ENCOUNTER — HOME CARE VISIT (OUTPATIENT)
Dept: HOME HEALTH SERVICES | Facility: HOME HEALTH | Age: 80
End: 2025-01-06
Payer: COMMERCIAL

## 2025-01-06 VITALS
HEART RATE: 63 BPM | OXYGEN SATURATION: 96 % | DIASTOLIC BLOOD PRESSURE: 70 MMHG | TEMPERATURE: 96.9 F | SYSTOLIC BLOOD PRESSURE: 148 MMHG

## 2025-01-06 PROCEDURE — G0157 HHC PT ASSISTANT EA 15: HCPCS | Mod: CQ

## 2025-01-06 ASSESSMENT — ENCOUNTER SYMPTOMS
PERSON REPORTING PAIN: PATIENT
DENIES PAIN: 1

## 2025-01-07 ENCOUNTER — HOME CARE VISIT (OUTPATIENT)
Dept: HOME HEALTH SERVICES | Facility: HOME HEALTH | Age: 80
End: 2025-01-07
Payer: COMMERCIAL

## 2025-01-07 PROCEDURE — G0152 HHCP-SERV OF OT,EA 15 MIN: HCPCS

## 2025-01-08 ENCOUNTER — HOME CARE VISIT (OUTPATIENT)
Dept: HOME HEALTH SERVICES | Facility: HOME HEALTH | Age: 80
End: 2025-01-08
Payer: COMMERCIAL

## 2025-01-08 VITALS
HEART RATE: 62 BPM | DIASTOLIC BLOOD PRESSURE: 68 MMHG | SYSTOLIC BLOOD PRESSURE: 152 MMHG | TEMPERATURE: 98.2 F | OXYGEN SATURATION: 97 %

## 2025-01-08 PROCEDURE — G0157 HHC PT ASSISTANT EA 15: HCPCS | Mod: CQ

## 2025-01-08 ASSESSMENT — ENCOUNTER SYMPTOMS
DENIES PAIN: 1
PERSON REPORTING PAIN: PATIENT

## 2025-01-09 ENCOUNTER — OFFICE VISIT (OUTPATIENT)
Dept: PRIMARY CARE | Facility: CLINIC | Age: 80
End: 2025-01-09
Payer: MEDICARE

## 2025-01-09 VITALS
WEIGHT: 156.4 LBS | OXYGEN SATURATION: 97 % | HEART RATE: 53 BPM | SYSTOLIC BLOOD PRESSURE: 132 MMHG | BODY MASS INDEX: 28.61 KG/M2 | DIASTOLIC BLOOD PRESSURE: 68 MMHG

## 2025-01-09 DIAGNOSIS — I10 ESSENTIAL HYPERTENSION: ICD-10-CM

## 2025-01-09 DIAGNOSIS — G81.94 LEFT HEMIPARESIS (MULTI): ICD-10-CM

## 2025-01-09 DIAGNOSIS — N18.31 CHRONIC KIDNEY DISEASE (CKD) STAGE G3A/A2, MODERATELY DECREASED GLOMERULAR FILTRATION RATE (GFR) BETWEEN 45-59 ML/MIN/1.73 SQUARE METER AND ALBUMINURIA CREATININE RATIO BETWEEN 30-299 MG/G (C* (MULTI): ICD-10-CM

## 2025-01-09 DIAGNOSIS — Z09 HOSPITAL DISCHARGE FOLLOW-UP: Primary | ICD-10-CM

## 2025-01-09 DIAGNOSIS — E78.2 MIXED HYPERLIPIDEMIA: ICD-10-CM

## 2025-01-09 DIAGNOSIS — G89.29 OTHER CHRONIC PAIN: ICD-10-CM

## 2025-01-09 DIAGNOSIS — E11.9 TYPE 2 DIABETES MELLITUS WITHOUT COMPLICATION, WITHOUT LONG-TERM CURRENT USE OF INSULIN (MULTI): ICD-10-CM

## 2025-01-09 DIAGNOSIS — I63.9 ISCHEMIC CEREBROVASCULAR ACCIDENT (CVA) (MULTI): ICD-10-CM

## 2025-01-09 PROCEDURE — 1159F MED LIST DOCD IN RCRD: CPT | Performed by: FAMILY MEDICINE

## 2025-01-09 PROCEDURE — 99214 OFFICE O/P EST MOD 30 MIN: CPT | Performed by: FAMILY MEDICINE

## 2025-01-09 PROCEDURE — 1126F AMNT PAIN NOTED NONE PRSNT: CPT | Performed by: FAMILY MEDICINE

## 2025-01-09 PROCEDURE — 3078F DIAST BP <80 MM HG: CPT | Performed by: FAMILY MEDICINE

## 2025-01-09 PROCEDURE — 1160F RVW MEDS BY RX/DR IN RCRD: CPT | Performed by: FAMILY MEDICINE

## 2025-01-09 PROCEDURE — 3075F SYST BP GE 130 - 139MM HG: CPT | Performed by: FAMILY MEDICINE

## 2025-01-09 ASSESSMENT — PAIN SCALES - GENERAL: PAINLEVEL_OUTOF10: 0-NO PAIN

## 2025-01-10 ENCOUNTER — HOME CARE VISIT (OUTPATIENT)
Dept: HOME HEALTH SERVICES | Facility: HOME HEALTH | Age: 80
End: 2025-01-10
Payer: COMMERCIAL

## 2025-01-10 PROCEDURE — G0152 HHCP-SERV OF OT,EA 15 MIN: HCPCS

## 2025-01-11 VITALS
SYSTOLIC BLOOD PRESSURE: 146 MMHG | DIASTOLIC BLOOD PRESSURE: 66 MMHG | SYSTOLIC BLOOD PRESSURE: 140 MMHG | DIASTOLIC BLOOD PRESSURE: 66 MMHG

## 2025-01-11 ASSESSMENT — ENCOUNTER SYMPTOMS
PAIN LOCATION - PAIN SEVERITY: 2/10
PERSON REPORTING PAIN: PATIENT
PAIN LOCATION: LEFT HAND
PAIN: 1
PAIN SEVERITY GOAL: 0/10
PAIN LOCATION - PAIN SEVERITY: 2/10
PAIN LOCATION - PAIN QUALITY: ACHING
LOWEST PAIN SEVERITY IN PAST 24 HOURS: 0/10
PAIN LOCATION - PAIN QUALITY: ACHING
PERSON REPORTING PAIN: PATIENT
LOWEST PAIN SEVERITY IN PAST 24 HOURS: 0/10
HIGHEST PAIN SEVERITY IN PAST 24 HOURS: 3/10
PAIN LOCATION - PAIN FREQUENCY: WITH ACTIVITY
PAIN LOCATION - PAIN FREQUENCY: WITH ACTIVITY
PAIN LOCATION - RELIEVING FACTORS: REST
PAIN: 1
PAIN LOCATION - RELIEVING FACTORS: REST
PAIN LOCATION: LEFT HAND
HIGHEST PAIN SEVERITY IN PAST 24 HOURS: 3/10
PAIN SEVERITY GOAL: 0/10
PAIN LOCATION - EXACERBATING FACTORS: PROM
PAIN LOCATION - EXACERBATING FACTORS: PROM

## 2025-01-13 ENCOUNTER — HOME CARE VISIT (OUTPATIENT)
Dept: HOME HEALTH SERVICES | Facility: HOME HEALTH | Age: 80
End: 2025-01-13
Payer: COMMERCIAL

## 2025-01-13 VITALS — HEART RATE: 62 BPM | TEMPERATURE: 97.3 F | SYSTOLIC BLOOD PRESSURE: 134 MMHG | DIASTOLIC BLOOD PRESSURE: 76 MMHG

## 2025-01-13 PROCEDURE — G0151 HHCP-SERV OF PT,EA 15 MIN: HCPCS

## 2025-01-13 SDOH — HEALTH STABILITY: PHYSICAL HEALTH: EXERCISE TYPE: LE THER EX

## 2025-01-13 ASSESSMENT — ACTIVITIES OF DAILY LIVING (ADL)
AMBULATION ASSISTANCE ON FLAT SURFACES: 1
PHYSICAL TRANSFERS ASSESSED: 1
AMBULATION_DISTANCE/DURATION_TOLERATED: 100 FT
AMBULATION ASSISTANCE: 1
CURRENT_FUNCTION: CONTACT GUARD ASSIST
CURRENT_FUNCTION: STAND BY ASSIST
AMBULATION ASSISTANCE: STAND BY ASSIST

## 2025-01-13 ASSESSMENT — GAIT ASSESSMENTS
TRUNK: 0 - MARKED SWAY OR USES WALKING AID
INITIATION OF GAIT IMMEDIATELY AFTER GO: 1 - NO HESITANCY
BALANCE AND GAIT SCORE: 18
PATH SCORE: 1
STEP CONTINUITY: 1 - STEPS APPEAR CONTINUOUS
TRUNK SCORE: 0
PATH: 1 - MILD/MODERATE DEVIATION OR USES WALKING AID
WALKING STANCE: 1 - HEELS ALMOST TOUCHING WHILE WALKING
GAIT SCORE: 8
STEP SYMMETRY: 1 - RIGHT AND LEFT STEP LENGTH APPEAR EQUAL

## 2025-01-13 ASSESSMENT — BALANCE ASSESSMENTS
TURNING 360 DEGREES STEPS: 0 - DISCONTINUOUS STEPS
NUDGED SCORE: 2
BALANCE SCORE: 10
SITTING BALANCE: 1 - STEADY, SAFE
ARISING SCORE: 0
STANDING BALANCE: 2 - NARROW STANCE WITHOUT SUPPORT
EYES CLOSED AT MAXIMUM POSITION NUDGED: 1 - STEADY
ARISES: 0 - UNABLE WITHOUT HELP
IMMEDIATE STANDING BALANCE FIRST 5 SECONDS: 1 - STEADY BUT USES WALKER OR OTHER SUPPORT
ATTEMPTS TO ARISE: 1 - ABLE, REQUIRES MORE THAN ONE ATTEMPT
SITTING DOWN: 1 - USES ARMS OR NOT SMOOTH MOTION
NUDGED: 2 - STEADY

## 2025-01-13 ASSESSMENT — ENCOUNTER SYMPTOMS
DENIES PAIN: 1
MUSCLE WEAKNESS: 1

## 2025-01-14 ENCOUNTER — HOME CARE VISIT (OUTPATIENT)
Dept: HOME HEALTH SERVICES | Facility: HOME HEALTH | Age: 80
End: 2025-01-14
Payer: COMMERCIAL

## 2025-01-14 PROCEDURE — G0152 HHCP-SERV OF OT,EA 15 MIN: HCPCS

## 2025-01-16 ENCOUNTER — HOME CARE VISIT (OUTPATIENT)
Dept: HOME HEALTH SERVICES | Facility: HOME HEALTH | Age: 80
End: 2025-01-16
Payer: COMMERCIAL

## 2025-01-16 VITALS — SYSTOLIC BLOOD PRESSURE: 152 MMHG | HEART RATE: 70 BPM | TEMPERATURE: 98 F | DIASTOLIC BLOOD PRESSURE: 80 MMHG

## 2025-01-16 VITALS — SYSTOLIC BLOOD PRESSURE: 138 MMHG | DIASTOLIC BLOOD PRESSURE: 65 MMHG

## 2025-01-16 PROCEDURE — G0151 HHCP-SERV OF PT,EA 15 MIN: HCPCS

## 2025-01-16 SDOH — HEALTH STABILITY: PHYSICAL HEALTH: EXERCISE TYPE: LE THERE X

## 2025-01-16 ASSESSMENT — ENCOUNTER SYMPTOMS
PAIN LOCATION - PAIN FREQUENCY: WITH ACTIVITY
PAIN LOCATION - PAIN QUALITY: ACHING
PAIN LOCATION: LEFT WRIST
DENIES PAIN: 1
PAIN LOCATION - PAIN SEVERITY: 2/10
PAIN LOCATION - PAIN SEVERITY: 2/10
PAIN LOCATION: LEFT HAND
PAIN LOCATION - EXACERBATING FACTORS: PROM
PAIN: 1
PAIN LOCATION - RELIEVING FACTORS: POSITIONING
PAIN LOCATION - PAIN QUALITY: ACHING
PAIN LOCATION - RELIEVING FACTORS: POSITIONING
LOWEST PAIN SEVERITY IN PAST 24 HOURS: 0/10
PERSON REPORTING PAIN: PATIENT
PAIN LOCATION - EXACERBATING FACTORS: PROM
MUSCLE WEAKNESS: 1
PAIN LOCATION - PAIN FREQUENCY: WITH ACTIVITY
HIGHEST PAIN SEVERITY IN PAST 24 HOURS: 3/10
PAIN SEVERITY GOAL: 0/10

## 2025-01-16 ASSESSMENT — ACTIVITIES OF DAILY LIVING (ADL)
GROOMING ASSESSED: 1
AMBULATION ASSISTANCE ON FLAT SURFACES: 1
TOILETING: 1
BATHING_CURRENT_FUNCTION: MODERATE ASSIST
PREPARING MEALS: DEPENDENT
BATHING ASSESSED: 1
AMBULATION ASSISTANCE: 1
AMBULATION_DISTANCE/DURATION_TOLERATED: 100 FT
AMBULATION ASSISTANCE: 1
TOILETING: MINIMUM ASSIST
FEEDING: SUPERVISION
CURRENT_FUNCTION: STAND BY ASSIST
FEEDING ASSESSED: 1
DRESSING_LB_CURRENT_FUNCTION: MODERATE ASSIST
AMBULATION ASSISTANCE: STAND BY ASSIST
GROOMING_CURRENT_FUNCTION: STAND BY ASSIST
AMBULATION ASSISTANCE: CONTACT GUARD ASSIST
PHYSICAL TRANSFERS ASSESSED: 1
DRESSING_UB_CURRENT_FUNCTION: MODERATE ASSIST

## 2025-01-16 NOTE — PROGRESS NOTES
HPI:       Diabetes Mellitus:          The patient is complaining of nothing         Blood sugar monitoring not done by pt          Hypoglycemic episodes are none         The results of the last HbgA1c were within normal limits at 6.1; today it is: 5.8         The microalbumin is due. She does have ckd and was seeing Dr. Camarillo but he says she could return prn based on stable labs and to amanda them yearly.         The feet/last exam done  9/5/24         Last eye exam 8/4/23 by retinavue; advised pt she is due         Side effects of the medications include none         Compliance with the medical regimen has been Excellent       Hypertension:          The patient has no issues but ran out of 2 out of her 3 meds for several days.  BP has been elevated; + dizzy.         The patients cardiovascular risk factors include:         Cardiac Risk Factors  Age > 45-male, > 55-female:  YES  +1   Smoking:   YES  +1   Sig. family hx of CHD*:  YES  +1   Hypertension:   YES  +1   Diabetes:   YES  +1   HDL < 35:   NO   HDL > 59:   NO   Total: 5     *- Sig. family h/o CHD per NCEP = MI or sudden death at <56yo in   father or other 1st-degree male relative, or <66yo in mother or   other 1st-degree female relative           The patients adherence to the treatment regimen is Good         Responses to the medications has been Good    Hyperlipidemia:   The patient does not use medications that may worsen dyslipidemias (corticosteroids, progestins, anabolic steroids, diuretics, beta-blockers, amiodarone, cyclosporine, olanzapine). The patient exercises intermittently.  The patient is not known to have coexisting coronary artery disease.      MEDICAL HISTORY:   Past Medical History:   Diagnosis Date    Cerebrovascular accident (CVA), unspecified mechanism (Multi) 11/14/2024    Chronic kidney disease (CKD) stage G3a/A2, moderately decreased glomerular filtration rate (GFR) between 45-59 mL/min/1.73 square meter and albuminuria creatinine  ratio between  mg/g (C* (Multi)     [place]    HTN (hypertension)     Hyperlipidemia     Ischemic stroke (Multi) 11/18/2024    Left hemiparesis (Multi) 11/19/2024    Osteoporosis     Type 2 diabetes mellitus     Vitamin D deficiency      MEDICATIONS:   Current Outpatient Medications   Medication Sig Dispense Refill    acetaminophen (Tylenol) 325 mg tablet Take 1 tablet (325 mg) by mouth every 4 hours.      aspirin 81 mg EC tablet Take 1 tablet (81 mg) by mouth once daily.      cholecalciferol (Vitamin D-3) 50 MCG (2000 UT) tablet Take 1 tablet (2,000 Units) by mouth once daily.      amLODIPine (Norvasc) 10 mg tablet Take 1 tablet (10 mg) by mouth once daily. 90 tablet 1    carvedilol (Coreg) 6.25 mg tablet Take 1 tablet (6.25 mg) by mouth 2 times a day. 180 tablet 1    glimepiride (AmaryL) 2 mg tablet Take 0.5 tablets (1 mg) by mouth once daily in the morning. Take before meals. 1 tablet 0    oxybutynin (Ditropan) 5 mg tablet Take 1 tablet (5 mg) by mouth 2 times a day. 180 tablet 3    rosuvastatin (Crestor) 10 mg tablet Take 1 tablet (10 mg) by mouth once daily. 90 tablet 1     No current facility-administered medications for this visit.     ALLERGIES:   Allergies   Allergen Reactions    Ezetimibe GI Upset    Metformin GI Upset    Pravastatin Sodium Myalgia     FAMILY HISTORY:   Family History   Problem Relation Name Age of Onset    Brain Aneurysm Mother 37 yo     Coronary artery disease Father 51 yo     Heart attack Father 51 yo     Lung cancer Sister       SOCIAL HISTORY:   Social History     Tobacco Use    Smoking status: Former     Types: Cigarettes    Smokeless tobacco: Never   Vaping Use    Vaping status: Never Used   Substance Use Topics    Alcohol use: Never    Drug use: Never         ROS:      CONSTITUTION:  alert and oriented     OPHTHALMOLOGY:          no Change in vision.         CARDIOLOGY:          no Chest pain.  no Dyspnea on exertion.  no Shortness of breath.         ENDOCRINOLOGY:           no Excessive thirst.  no Excessive urination.  no Fatigue.  no Skin changes.  no Weight gain.  no Weight loss.         SKIN:          no Healing problems.         NEUROLOGY:          no Loss of sensation in specific body area.  no Burning pain in feet.  no Peripheral neuropathy.  no Tingling/numbness.    LABS:   Lab Results   Component Value Date    GLUCOSE 106 (H) 11/19/2024    CALCIUM 9.3 11/19/2024     11/19/2024    K 3.9 11/19/2024    CO2 23 11/19/2024     11/19/2024    BUN 41 (H) 11/19/2024    CREATININE 1.77 (H) 11/19/2024     Lab Results   Component Value Date    CHOL 132 11/14/2024    CHOL 141 02/29/2024    CHOL 130 (L) 08/04/2023     Lab Results   Component Value Date    HDL 46.6 11/14/2024    HDL 47.7 02/29/2024    HDL 53 08/04/2023     Lab Results   Component Value Date    LDLCALC 59 11/14/2024    LDLCALC 69 02/29/2024    LDLCALC 42 (L) 08/04/2023     Lab Results   Component Value Date    TRIG 130 11/14/2024    TRIG 123 02/29/2024    TRIG 175 (H) 08/04/2023        VITAL SIGNS:  /78   Pulse 74   Wt 70.4 kg (155 lb 3.2 oz)   SpO2 93%   BMI 28.39 kg/m²     General Appearance: awake, alert, oriented, in no acute distress  Lungs: Normal expansion.  Clear to auscultation.  No rales, rhonchi, or wheezing.  Heart: Heart sounds are normal.  Regular rate and rhythm without murmur, gallop or rub.  Extremities: Extremities warm to touch, pink, with no edema.  Neurologic: Alert and oriented x 3, gait normal., reflexes normal and symmetric, strength and  sensation grossly normal        Cynthia was seen today for diabetes.  Diagnoses and all orders for this visit:  Type 2 diabetes mellitus with stage 3a chronic kidney disease, without long-term current use of insulin (Multi) (Primary)  Comments:  cont amaryl 1 mg; pt does not want to completely stop the med  Orders:  -     Albumin-Creatinine Ratio, Urine Random; Future  -     Urinalysis with Reflex Microscopic; Future  -     POCT glycosylated  hemoglobin (Hb A1C) manually resulted  -     glimepiride (AmaryL) 2 mg tablet; Take 0.5 tablets (1 mg) by mouth once daily in the morning. Take before meals.  -     Follow Up In Primary Care - Established; Future  Essential hypertension  -     carvedilol (Coreg) 6.25 mg tablet; Take 1 tablet (6.25 mg) by mouth 2 times a day.  -     amLODIPine (Norvasc) 10 mg tablet; Take 1 tablet (10 mg) by mouth once daily.  Mixed hyperlipidemia  Comments:  cont rosuvastatin  History of CVA (cerebrovascular accident)  Comments:  cont asa  Orders:  -     rosuvastatin (Crestor) 10 mg tablet; Take 1 tablet (10 mg) by mouth once daily.  Left hemiparesis (Multi)  Comments:  start outpatient OT/PT this week  Urgency incontinence  -     oxybutynin (Ditropan) 5 mg tablet; Take 1 tablet (5 mg) by mouth 2 times a day.

## 2025-01-17 ENCOUNTER — HOME CARE VISIT (OUTPATIENT)
Dept: HOME HEALTH SERVICES | Facility: HOME HEALTH | Age: 80
End: 2025-01-17
Payer: COMMERCIAL

## 2025-01-17 PROCEDURE — G0152 HHCP-SERV OF OT,EA 15 MIN: HCPCS

## 2025-01-18 ASSESSMENT — ENCOUNTER SYMPTOMS
PAIN LOCATION - PAIN QUALITY: ACHING
PAIN LOCATION - PAIN SEVERITY: 2/10
PAIN SEVERITY GOAL: 0/10
PAIN LOCATION - PAIN FREQUENCY: WITH ACTIVITY
HIGHEST PAIN SEVERITY IN PAST 24 HOURS: 3/10
PAIN LOCATION - RELIEVING FACTORS: POSITIONING
PAIN LOCATION: LEFT HAND
LOWEST PAIN SEVERITY IN PAST 24 HOURS: 0/10
PAIN: 1
PERSON REPORTING PAIN: PATIENT

## 2025-01-20 ENCOUNTER — HOME CARE VISIT (OUTPATIENT)
Dept: HOME HEALTH SERVICES | Facility: HOME HEALTH | Age: 80
End: 2025-01-20
Payer: COMMERCIAL

## 2025-01-20 VITALS — HEART RATE: 65 BPM | TEMPERATURE: 97.3 F | DIASTOLIC BLOOD PRESSURE: 76 MMHG | SYSTOLIC BLOOD PRESSURE: 156 MMHG

## 2025-01-20 PROCEDURE — G0151 HHCP-SERV OF PT,EA 15 MIN: HCPCS

## 2025-01-20 SDOH — HEALTH STABILITY: PHYSICAL HEALTH: EXERCISE TYPE: LE THER EX

## 2025-01-20 ASSESSMENT — ACTIVITIES OF DAILY LIVING (ADL)
AMBULATION ASSISTANCE ON FLAT SURFACES: 1
AMBULATION ASSISTANCE: STAND BY ASSIST
CURRENT_FUNCTION: STAND BY ASSIST
AMBULATION_DISTANCE/DURATION_TOLERATED: 50 FT
PHYSICAL TRANSFERS ASSESSED: 1
AMBULATION ASSISTANCE: 1

## 2025-01-20 ASSESSMENT — ENCOUNTER SYMPTOMS
MUSCLE WEAKNESS: 1
DENIES PAIN: 1
PERSON REPORTING PAIN: PATIENT

## 2025-01-21 ENCOUNTER — HOME CARE VISIT (OUTPATIENT)
Dept: HOME HEALTH SERVICES | Facility: HOME HEALTH | Age: 80
End: 2025-01-21
Payer: COMMERCIAL

## 2025-01-21 PROCEDURE — G0152 HHCP-SERV OF OT,EA 15 MIN: HCPCS

## 2025-01-22 ENCOUNTER — HOME CARE VISIT (OUTPATIENT)
Dept: HOME HEALTH SERVICES | Facility: HOME HEALTH | Age: 80
End: 2025-01-22
Payer: COMMERCIAL

## 2025-01-22 VITALS
TEMPERATURE: 97.6 F | SYSTOLIC BLOOD PRESSURE: 148 MMHG | OXYGEN SATURATION: 96 % | HEART RATE: 61 BPM | DIASTOLIC BLOOD PRESSURE: 68 MMHG

## 2025-01-22 PROCEDURE — G0157 HHC PT ASSISTANT EA 15: HCPCS | Mod: CQ

## 2025-01-22 ASSESSMENT — ENCOUNTER SYMPTOMS
PERSON REPORTING PAIN: PATIENT
DENIES PAIN: 1

## 2025-01-23 ENCOUNTER — HOME CARE VISIT (OUTPATIENT)
Dept: HOME HEALTH SERVICES | Facility: HOME HEALTH | Age: 80
End: 2025-01-23
Payer: COMMERCIAL

## 2025-01-23 PROCEDURE — G0152 HHCP-SERV OF OT,EA 15 MIN: HCPCS

## 2025-01-24 ENCOUNTER — TELEPHONE (OUTPATIENT)
Dept: PRIMARY CARE | Facility: CLINIC | Age: 80
End: 2025-01-24
Payer: COMMERCIAL

## 2025-01-24 NOTE — TELEPHONE ENCOUNTER
Daughter is asking if you will fill out [place] papers for her to come home and take care of her mother 2 separate weeks.  She said her sister is here and she needs help.  Dorina lives in [place].    She would need it for the dates of 2/24/25 through 2/27/25  And   4/7/25 through 4/10/25

## 2025-01-25 VITALS
SYSTOLIC BLOOD PRESSURE: 149 MMHG | DIASTOLIC BLOOD PRESSURE: 68 MMHG | DIASTOLIC BLOOD PRESSURE: 64 MMHG | SYSTOLIC BLOOD PRESSURE: 142 MMHG

## 2025-01-25 ASSESSMENT — ENCOUNTER SYMPTOMS
PAIN SEVERITY GOAL: 0/10
PAIN LOCATION - PAIN QUALITY: ACHING
LOWEST PAIN SEVERITY IN PAST 24 HOURS: 0/10
PAIN LOCATION - RELIEVING FACTORS: POSITIONING
PAIN LOCATION - PAIN FREQUENCY: WITH ACTIVITY
PAIN LOCATION: LEFT HAND
PERSON REPORTING PAIN: PATIENT
PAIN LOCATION: LEFT HAND
PAIN LOCATION - PAIN SEVERITY: 3/10
PAIN: 1
HIGHEST PAIN SEVERITY IN PAST 24 HOURS: 4/10
PERSON REPORTING PAIN: PATIENT
PAIN LOCATION - PAIN FREQUENCY: WITH ACTIVITY
PAIN: 1
HIGHEST PAIN SEVERITY IN PAST 24 HOURS: 4/10
LOWEST PAIN SEVERITY IN PAST 24 HOURS: 0/10
PAIN LOCATION - PAIN QUALITY: ACHING
PAIN SEVERITY GOAL: 0/10
PAIN LOCATION - PAIN SEVERITY: 4/10
PAIN LOCATION - RELIEVING FACTORS: POSITIONING

## 2025-01-27 ENCOUNTER — HOME CARE VISIT (OUTPATIENT)
Dept: HOME HEALTH SERVICES | Facility: HOME HEALTH | Age: 80
End: 2025-01-27
Payer: COMMERCIAL

## 2025-01-27 VITALS
DIASTOLIC BLOOD PRESSURE: 66 MMHG | TEMPERATURE: 98.3 F | HEART RATE: 71 BPM | OXYGEN SATURATION: 97 % | SYSTOLIC BLOOD PRESSURE: 144 MMHG

## 2025-01-27 PROCEDURE — G0157 HHC PT ASSISTANT EA 15: HCPCS | Mod: CQ

## 2025-01-27 ASSESSMENT — ENCOUNTER SYMPTOMS
PAIN LOCATION - PAIN FREQUENCY: INTERMITTENT
PAIN LOCATION - EXACERBATING FACTORS: MORNING TIME
PAIN LOCATION: LEFT HAND
PAIN LOCATION - PAIN QUALITY: STABBING
LOWEST PAIN SEVERITY IN PAST 24 HOURS: 0/10
SUBJECTIVE PAIN PROGRESSION: WAXING AND WANING
HIGHEST PAIN SEVERITY IN PAST 24 HOURS: 3/10
PERSON REPORTING PAIN: PATIENT
PAIN: 1
PAIN LOCATION - PAIN SEVERITY: 3/10

## 2025-01-28 ENCOUNTER — HOME CARE VISIT (OUTPATIENT)
Dept: HOME HEALTH SERVICES | Facility: HOME HEALTH | Age: 80
End: 2025-01-28
Payer: COMMERCIAL

## 2025-01-28 PROCEDURE — G0152 HHCP-SERV OF OT,EA 15 MIN: HCPCS

## 2025-01-29 ENCOUNTER — HOME CARE VISIT (OUTPATIENT)
Dept: HOME HEALTH SERVICES | Facility: HOME HEALTH | Age: 80
End: 2025-01-29
Payer: COMMERCIAL

## 2025-01-29 VITALS
SYSTOLIC BLOOD PRESSURE: 130 MMHG | DIASTOLIC BLOOD PRESSURE: 62 MMHG | HEART RATE: 58 BPM | TEMPERATURE: 98.4 F | OXYGEN SATURATION: 97 %

## 2025-01-29 PROCEDURE — G0157 HHC PT ASSISTANT EA 15: HCPCS | Mod: CQ

## 2025-01-29 ASSESSMENT — ENCOUNTER SYMPTOMS
DENIES PAIN: 1
PERSON REPORTING PAIN: PATIENT

## 2025-01-31 ENCOUNTER — TELEPHONE (OUTPATIENT)
Dept: PRIMARY CARE | Facility: CLINIC | Age: 80
End: 2025-01-31
Payer: COMMERCIAL

## 2025-01-31 ENCOUNTER — HOME CARE VISIT (OUTPATIENT)
Dept: HOME HEALTH SERVICES | Facility: HOME HEALTH | Age: 80
End: 2025-01-31
Payer: COMMERCIAL

## 2025-01-31 PROCEDURE — G0152 HHCP-SERV OF OT,EA 15 MIN: HCPCS

## 2025-01-31 NOTE — TELEPHONE ENCOUNTER
Patients daughter contacted us regarding her [facility] paperwork. She informed me that her job is needs proof of dates to be on [facility] on page 4. She needs these dates down on the paper so she can see and take care of her mom, February 24-27th and April 7-10th. They have denied it and wont accept it until it has the correct dates. [facility] paperwork needs to be fixed and refaxed before February 11th.

## 2025-02-01 VITALS
DIASTOLIC BLOOD PRESSURE: 70 MMHG | SYSTOLIC BLOOD PRESSURE: 150 MMHG | SYSTOLIC BLOOD PRESSURE: 142 MMHG | DIASTOLIC BLOOD PRESSURE: 60 MMHG

## 2025-02-01 ASSESSMENT — ENCOUNTER SYMPTOMS
PAIN LOCATION - PAIN SEVERITY: 4/10
PERSON REPORTING PAIN: PATIENT
PAIN LOCATION - EXACERBATING FACTORS: PROM
HIGHEST PAIN SEVERITY IN PAST 24 HOURS: 5/10
HIGHEST PAIN SEVERITY IN PAST 24 HOURS: 5/10
PAIN LOCATION: LEFT HAND
PAIN LOCATION - EXACERBATING FACTORS: PROM
LOWEST PAIN SEVERITY IN PAST 24 HOURS: 0/10
PAIN LOCATION: LEFT HAND
PAIN SEVERITY GOAL: 0/10
PAIN LOCATION - PAIN QUALITY: ACHING
PAIN LOCATION - RELIEVING FACTORS: POSITIONING
PERSON REPORTING PAIN: PATIENT
PAIN LOCATION - PAIN FREQUENCY: WITH ACTIVITY
PAIN LOCATION - PAIN FREQUENCY: WITH ACTIVITY
PAIN: 1
PAIN LOCATION - PAIN SEVERITY: 4/10
PAIN: 1
LOWEST PAIN SEVERITY IN PAST 24 HOURS: 0/10
PAIN SEVERITY GOAL: 0/10
PAIN LOCATION - PAIN QUALITY: ACHING
PAIN LOCATION - RELIEVING FACTORS: POSITIONING

## 2025-02-03 ENCOUNTER — HOME CARE VISIT (OUTPATIENT)
Dept: HOME HEALTH SERVICES | Facility: HOME HEALTH | Age: 80
End: 2025-02-03
Payer: COMMERCIAL

## 2025-02-03 VITALS — DIASTOLIC BLOOD PRESSURE: 66 MMHG | SYSTOLIC BLOOD PRESSURE: 134 MMHG | HEART RATE: 66 BPM | TEMPERATURE: 98 F

## 2025-02-03 PROCEDURE — G0152 HHCP-SERV OF OT,EA 15 MIN: HCPCS

## 2025-02-03 PROCEDURE — G0151 HHCP-SERV OF PT,EA 15 MIN: HCPCS

## 2025-02-03 SDOH — HEALTH STABILITY: PHYSICAL HEALTH: EXERCISE TYPE: LE THER EX

## 2025-02-03 ASSESSMENT — GAIT ASSESSMENTS
BALANCE AND GAIT SCORE: 23
WALKING STANCE: 1 - HEELS ALMOST TOUCHING WHILE WALKING
PATH: 1 - MILD/MODERATE DEVIATION OR USES WALKING AID
PATH SCORE: 1
STEP CONTINUITY: 1 - STEPS APPEAR CONTINUOUS
TRUNK SCORE: 0
GAIT SCORE: 9
STEP SYMMETRY: 1 - RIGHT AND LEFT STEP LENGTH APPEAR EQUAL
TRUNK: 0 - MARKED SWAY OR USES WALKING AID
INITIATION OF GAIT IMMEDIATELY AFTER GO: 1 - NO HESITANCY

## 2025-02-03 ASSESSMENT — BALANCE ASSESSMENTS
ARISES: 2 - ABLE WITHOUT USING ARMS
ATTEMPTS TO ARISE: 2 - ABLE TO RISE, ONE ATTEMPT
TURNING 360 DEGREES STEPS: 1 - CONTINUOUS STEPS
NUDGED: 2 - STEADY
EYES CLOSED AT MAXIMUM POSITION NUDGED: 1 - STEADY
SITTING DOWN: 1 - USES ARMS OR NOT SMOOTH MOTION
ARISING SCORE: 2
STANDING BALANCE: 2 - NARROW STANCE WITHOUT SUPPORT
NUDGED SCORE: 2
SITTING BALANCE: 1 - STEADY, SAFE
IMMEDIATE STANDING BALANCE FIRST 5 SECONDS: 1 - STEADY BUT USES WALKER OR OTHER SUPPORT
BALANCE SCORE: 14

## 2025-02-03 ASSESSMENT — ENCOUNTER SYMPTOMS
DENIES PAIN: 1
MUSCLE WEAKNESS: 1

## 2025-02-03 ASSESSMENT — ACTIVITIES OF DAILY LIVING (ADL)
AMBULATION_DISTANCE/DURATION_TOLERATED: 100 FT
AMBULATION ASSISTANCE: INDEPENDENT
CURRENT_FUNCTION: INDEPENDENT
AMBULATION ASSISTANCE ON FLAT SURFACES: 1
PHYSICAL TRANSFERS ASSESSED: 1
AMBULATION ASSISTANCE: 1

## 2025-02-04 ENCOUNTER — TELEPHONE (OUTPATIENT)
Dept: PRIMARY CARE | Facility: CLINIC | Age: 80
End: 2025-02-04
Payer: COMMERCIAL

## 2025-02-04 DIAGNOSIS — G81.94 LEFT HEMIPARESIS (MULTI): ICD-10-CM

## 2025-02-04 DIAGNOSIS — I63.9 ISCHEMIC CEREBROVASCULAR ACCIDENT (CVA) (MULTI): Primary | ICD-10-CM

## 2025-02-04 NOTE — TELEPHONE ENCOUNTER
Berna, patients granddaughter, would like orders for outpatient PT and OT- she was discharged from Home care last week. Please put in referral to [facility]

## 2025-02-07 VITALS — HEART RATE: 70 BPM | SYSTOLIC BLOOD PRESSURE: 144 MMHG | DIASTOLIC BLOOD PRESSURE: 64 MMHG | OXYGEN SATURATION: 97 %

## 2025-02-07 ASSESSMENT — ACTIVITIES OF DAILY LIVING (ADL)
OASIS_M1830: 03
HOME_HEALTH_OASIS: 01

## 2025-02-11 ASSESSMENT — ACTIVITIES OF DAILY LIVING (ADL): POOR_BALANCE: 1

## 2025-02-11 NOTE — PROGRESS NOTES
Physical Therapy Evaluation and Treatment     Patient Name: Cynthia Muse  MRN: [id number]  Encounter date: 2/17/2025       Visit # Visit count could not be calculated. Make sure you are using a visit which is associated with an episode. of ***  Visits/Dates Authorized: ***    Current Problem:   Problem List Items Addressed This Visit    None    Precautions:          Subjective Evaluation    History of Present Illness  Mechanism of injury: Pt presents to OP PT for skilled interventions to address post CVA affects including left sided weakness secondary to the following diagnoses; Ischemic cerebrovascular accident  and left hemiparesis. Onset of initial symptoms were 11/14/2024 and was admitted to the hospital.   Following hospital stay pt was dischared to to inpatient rehabilitation unit ([facility]) and was discharged 12/07/2024 and then received home health services twice a week.  Presents in waiting room with *** (assistive device?)   Functional limitations: Pt reports that she struggles with ***  Caregiver / assistance provided by ***  Home set-up ***  [facility] home ***    Diagnosis from MD: Ischemic cerebrovascular accident (CVA) (Multi) [I63.9], Left hemiparesis (Multi) [G81.94]    Med hx:   CKD 3, hypertension, hyperlipidemia and type 2 diabetes            Objective    Vitals:       Cervical/Thoracic Spine    Lumbar Spine         Balance:  {Balance Assessments:95321}    Other Outcome Measures:       Treatments:     Therapeutic Exercise 20080:     Neuromuscular Re-Ed 03021:     Therapeutic Activity 96960:     Gait Training 17329:     Manual Therapy 18008:     Modalities:   {Modalities Used:73304}      HEP / Access Codes:       Assessment & Plan     Assessment  Impairments: abnormal coordination, abnormal gait, abnormal muscle firing, abnormal muscle tone, activity intolerance, impaired balance, impaired physical strength, lacks appropriate home exercise program and safety issue  Assessment details: *** year old *** presents to  outpatient PT s/p CVA on ***. Pt presents with the following neurological deficits impacting both neuromuscular and musculoskeletal systems. Pt presents with weakness of the  *** and also has impaired ***. Pt would benefit from skill physical therapy interventions to improve safety, strength of the  ***, balance, gait, transfer training and functional independence.    Plan  Planned modality interventions: electrical stimulation/Russian stimulation  Planned therapy interventions: manual therapy, motor coordination training, neuromuscular re-education, postural training, orthotic fitting/training, strengthening, stretching, therapeutic activities, transfer training, abdominal trunk stabilization, ADL retraining, balance/weight-bearing training, bed mobility training, fine motor coordination training, gait training, home exercise program and IADL retraining     {Complexities:70966}     Post-Treatment Symptoms:       Goals:

## 2025-02-17 ENCOUNTER — APPOINTMENT (OUTPATIENT)
Dept: PHYSICAL THERAPY | Facility: CLINIC | Age: 80
End: 2025-02-17
Payer: COMMERCIAL

## 2025-02-17 ENCOUNTER — APPOINTMENT (OUTPATIENT)
Dept: OCCUPATIONAL THERAPY | Facility: CLINIC | Age: 80
End: 2025-02-17
Payer: COMMERCIAL

## 2025-02-17 ASSESSMENT — ACTIVITIES OF DAILY LIVING (ADL): POOR_BALANCE: 1

## 2025-02-17 NOTE — PROGRESS NOTES
Physical Therapy Evaluation and Treatment     Patient Name: Cynthia Muse  MRN: [id number]  Encounter date: 2/19/2025       Visit # 1 of ***  Visits/Dates Authorized: ***    Current Problem:   Problem List Items Addressed This Visit    None    Precautions:          Subjective Evaluation    History of Present Illness  Date of onset: 11/14/2024  Mechanism of injury: 80 yo female presents with c/o difficulty walking and weakness after suffering an ischemic stroke on 11/14/24. Pt is having L sided weakness with her UE/LE. MRI showed acute infarct in right corona radiata and chronic small vessel disease. Pt had rehab at a SNF for three weeks after her stroke and home PT/OT for the past two months. Pt currently ambulating with a quad cane.             Objective    Vitals:       Cervical/Thoracic Spine    Lumbar Spine         Balance:  {Balance Assessments:21575}    Other Outcome Measures:       Treatments:     Therapeutic Exercise 77123:     Neuromuscular Re-Ed 94487:     Therapeutic Activity 57329:     Gait Training 60459:     Manual Therapy 53676:     Modalities:   {Modalities Used:47247}      HEP / Access Codes:       Assessment & Plan     Assessment  Impairments: abnormal coordination, abnormal gait, activity intolerance, impaired balance, impaired physical strength, lacks appropriate home exercise program, pain with function and safety issue  Assessment details: Patient presents with difficulty walking and L sided weakness after suffering a stroke. Key impairments include: decreased ROM and strength, L UE/LE tone/spasticity, fair balance and compensatory gait patterning, and high fall risk. Patient would benefit from skilled physical therapy services to address these impairments and restore normal ROM and strength to reduce pain and improving activity participation.    Prognosis: good    Plan  Therapy options: will be seen for skilled physical therapy services  Planned therapy interventions: stretching, strengthening,  motor coordination training, balance/weight-bearing training, neuromuscular re-education, flexibility, functional ROM exercises, gait training, home exercise program, transfer training, therapeutic activities and fine motor coordination training  Frequency: 2x week  Duration in visits: 10  Duration in weeks: 12  Treatment plan discussed with: patient     {Complexities:25371}     Post-Treatment Symptoms:       Goals:

## 2025-02-19 ENCOUNTER — APPOINTMENT (OUTPATIENT)
Dept: PHYSICAL THERAPY | Facility: CLINIC | Age: 80
End: 2025-02-19
Payer: MEDICARE

## 2025-02-24 ENCOUNTER — OFFICE VISIT (OUTPATIENT)
Dept: PRIMARY CARE | Facility: CLINIC | Age: 80
End: 2025-02-24
Payer: COMMERCIAL

## 2025-02-24 VITALS
WEIGHT: 155.2 LBS | OXYGEN SATURATION: 93 % | SYSTOLIC BLOOD PRESSURE: 136 MMHG | BODY MASS INDEX: 28.39 KG/M2 | DIASTOLIC BLOOD PRESSURE: 78 MMHG | HEART RATE: 74 BPM

## 2025-02-24 DIAGNOSIS — E11.22 TYPE 2 DIABETES MELLITUS WITH STAGE 3A CHRONIC KIDNEY DISEASE, WITHOUT LONG-TERM CURRENT USE OF INSULIN (MULTI): Primary | ICD-10-CM

## 2025-02-24 DIAGNOSIS — Z86.73 HISTORY OF CVA (CEREBROVASCULAR ACCIDENT): ICD-10-CM

## 2025-02-24 DIAGNOSIS — I10 ESSENTIAL HYPERTENSION: ICD-10-CM

## 2025-02-24 DIAGNOSIS — G81.94 LEFT HEMIPARESIS (MULTI): ICD-10-CM

## 2025-02-24 DIAGNOSIS — N39.41 URGENCY INCONTINENCE: ICD-10-CM

## 2025-02-24 DIAGNOSIS — E78.2 MIXED HYPERLIPIDEMIA: ICD-10-CM

## 2025-02-24 DIAGNOSIS — N18.31 TYPE 2 DIABETES MELLITUS WITH STAGE 3A CHRONIC KIDNEY DISEASE, WITHOUT LONG-TERM CURRENT USE OF INSULIN (MULTI): Primary | ICD-10-CM

## 2025-02-24 LAB — POC HEMOGLOBIN A1C: 5.8 % (ref 4.2–6.5)

## 2025-02-24 PROCEDURE — 83036 HEMOGLOBIN GLYCOSYLATED A1C: CPT | Performed by: FAMILY MEDICINE

## 2025-02-24 PROCEDURE — 99214 OFFICE O/P EST MOD 30 MIN: CPT | Performed by: FAMILY MEDICINE

## 2025-02-24 PROCEDURE — 1126F AMNT PAIN NOTED NONE PRSNT: CPT | Performed by: FAMILY MEDICINE

## 2025-02-24 PROCEDURE — 1160F RVW MEDS BY RX/DR IN RCRD: CPT | Performed by: FAMILY MEDICINE

## 2025-02-24 PROCEDURE — 3078F DIAST BP <80 MM HG: CPT | Performed by: FAMILY MEDICINE

## 2025-02-24 PROCEDURE — 3075F SYST BP GE 130 - 139MM HG: CPT | Performed by: FAMILY MEDICINE

## 2025-02-24 PROCEDURE — 1124F ACP DISCUSS-NO DSCNMKR DOCD: CPT | Performed by: FAMILY MEDICINE

## 2025-02-24 PROCEDURE — 1159F MED LIST DOCD IN RCRD: CPT | Performed by: FAMILY MEDICINE

## 2025-02-24 RX ORDER — AMLODIPINE BESYLATE 10 MG/1
10 TABLET ORAL DAILY
Qty: 90 TABLET | Refills: 1 | Status: SHIPPED | OUTPATIENT
Start: 2025-02-24 | End: 2025-08-23

## 2025-02-24 RX ORDER — ROSUVASTATIN CALCIUM 10 MG/1
10 TABLET, COATED ORAL DAILY
Qty: 90 TABLET | Refills: 1 | Status: SHIPPED | OUTPATIENT
Start: 2025-02-24

## 2025-02-24 RX ORDER — OXYBUTYNIN CHLORIDE 5 MG/1
5 TABLET ORAL 2 TIMES DAILY
Qty: 180 TABLET | Refills: 3 | Status: SHIPPED | OUTPATIENT
Start: 2025-02-24 | End: 2026-02-19

## 2025-02-24 RX ORDER — CARVEDILOL 6.25 MG/1
6.25 TABLET ORAL 2 TIMES DAILY
Qty: 180 TABLET | Refills: 1 | Status: SHIPPED | OUTPATIENT
Start: 2025-02-24

## 2025-02-24 RX ORDER — CHOLECALCIFEROL (VITAMIN D3) 50 MCG
1 TABLET ORAL DAILY
COMMUNITY

## 2025-02-24 RX ORDER — GLIMEPIRIDE 2 MG/1
1 TABLET ORAL
Qty: 1 TABLET | Refills: 0 | Status: SHIPPED | OUTPATIENT
Start: 2025-02-24

## 2025-02-24 ASSESSMENT — PATIENT HEALTH QUESTIONNAIRE - PHQ9
SUM OF ALL RESPONSES TO PHQ9 QUESTIONS 1 AND 2: 0
1. LITTLE INTEREST OR PLEASURE IN DOING THINGS: NOT AT ALL
2. FEELING DOWN, DEPRESSED OR HOPELESS: NOT AT ALL

## 2025-02-24 ASSESSMENT — PAIN SCALES - GENERAL: PAINLEVEL_OUTOF10: 0-NO PAIN

## 2025-02-25 NOTE — PROGRESS NOTES
Physical Therapy Evaluation and Treatment     Patient Name: Cynthia Muse  MRN: [id number]  Encounter date: 2/28/2025  Time Calculation  Start Time: 0817  Stop Time: 0850  Time Calculation (min): 33 min  PT Evaluation Time Entry  PT Evaluation (Moderate) Time Entry: 33    Visit # 1 of 20  Visits/Dates Authorized: D - PT - 1) [employer] Local [employer]80 - NO AUTH / $350 DED MET / 80% coins / $10,000 OOP not met / 40V MAX pt alone - 1 used / ds 2/14/25.   CPT 05782 not covered.  2) MEDICARE A/B - NO AUTH / $257 DEDUCT not met / $0 used PT/ST / [place] visits / Per RTE.       Current Problem:   Problem List Items Addressed This Visit             ICD-10-CM    Ischemic cerebrovascular accident (CVA) (Multi) - Primary I63.9    Relevant Orders    Follow Up In Physical Therapy    Left hemiparesis (Multi) G81.94    Relevant Orders    Follow Up In Physical Therapy     Precautions: Falls risk.  Precautions  STEADI Fall Risk Score (The score of 4 or more indicates an increased risk of falling): 3    Subjective Evaluation    History of Present Illness  Date of onset: 11/14/2024  Mechanism of injury: General:  Reason for Referral: Ischemic cerebrovascular accident (CVA) (Multi) [I63.9], Left hemiparesis (Multi) [G81.94]  Referred By: Dr Fiorella Izquierdo  Past Medical History Relevant to Rehab: Rehab at [facility] following discharge from [facility].    Family/Caregiver Present: used to live by self but now has daughter and granddaughter checking in daily.     Precautions:  STEADI Fall Risk Score unsteady with walking/transfers despite STEADI score of 3  Medical Precautions: diabetes(controlled and sensation intact)  HTN (on medication for management)     Medical History Form: Reviewed (scanned into chart)  Stroke, diabetes, HTN    Subjective:   Pt is a 79 female who presents to outpatient physical therapy with will present (daughter, Deana is from [place] and per Deana her sister and her niece will be overseeing yCnthia).for Ischemic cerebrovascular  accident (CVA) (Multi) [I63.9], Left hemiparesis (Multi) [G81.94] referred to PT services on 2025 with onset of issue beginning on 25. Pts granddaughter noticed facial droop and called 911. Pt admitted to [facility] then went to [facility] for skilled therapy. Has been doing her exercises twice a day.    Assistive device: quad  cane  Caregiver / family support system: Daughter and grand daughter        Pain  Current pain ratin  At best pain ratin  At worst pain ratin        Objective     Static Posture     Head  Forward.    Shoulders  Rounded.    Neurological Testing     Sensation     Knee   Left Knee   Intact: Light touch, pin prick, sharp/dull discrimination and proprioception    Reflexes   Left   Patellar (L4): normal (2+)  Achilles (S1): normal (2+)    Right   Patellar (L4): normal (2+)  Achilles (S1): normal (2+)    Strength/Myotome Testing     Left Hip   Planes of Motion   Flexion: 4  Abduction: 3+  Adduction: 4    Right Hip   Planes of Motion   Flexion: 5  Abduction: 4-  Adduction: 4+    Left Knee   Flexion: 4  Extension: 4    Right Knee   Flexion: 5  Extension: 5    Left Ankle/Foot   Dorsiflexion: 4-  Plantar flexion: 4-  Great toe extension: 4    Right Ankle/Foot   Dorsiflexion: 4+  Plantar flexion: 4+    Ambulation   Weight-Bearing Status   Assistive device used: quad cane    Ambulation: Level Surfaces   Ambulation with assistive device: independent  Ambulation without assistive device: contact guard assist    Ambulation: Stairs     Additional Stairs Ambulation Details  DNP secondary to time    Observational Gait   Decreased walking speed, stride length, left step length and right step length.   Right foot contact pattern: heel to toe  Left foot contact pattern comments: reduced heel toe on L    Functional Assessment     Comments  Bed mobility on firm mat table at sup/sba assist levels without need to pull from rail/chair.    General Comments     Shoulder Comments   Able to raise  LUE against gravity but only to 90 degrees. Per pt reports unable to use hand or bend elbow when asked to perform.       Vitals:       Objective     Static Posture     Head  Forward.    Shoulders  Rounded.    Neurological Testing     Sensation     Knee   Left Knee   Intact: Light touch, pin prick, sharp/dull discrimination and proprioception    Reflexes   Left   Patellar (L4): normal (2+)  Achilles (S1): normal (2+)    Right   Patellar (L4): normal (2+)  Achilles (S1): normal (2+)    Strength/Myotome Testing     Left Hip   Planes of Motion   Flexion: 4  Abduction: 3+  Adduction: 4    Right Hip   Planes of Motion   Flexion: 5  Abduction: 4-  Adduction: 4+    Left Knee   Flexion: 4  Extension: 4    Right Knee   Flexion: 5  Extension: 5    Left Ankle/Foot   Dorsiflexion: 4-  Plantar flexion: 4-  Great toe extension: 4    Right Ankle/Foot   Dorsiflexion: 4+  Plantar flexion: 4+    Ambulation   Weight-Bearing Status   Assistive device used: quad cane    Ambulation: Level Surfaces   Ambulation with assistive device: independent  Ambulation without assistive device: contact guard assist    Ambulation: Stairs     Additional Stairs Ambulation Details  DNP secondary to time    Observational Gait   Decreased walking speed, stride length, left step length and right step length.   Right foot contact pattern: heel to toe  Left foot contact pattern comments: reduced heel toe on L    Functional Assessment     Comments  Bed mobility on firm mat table at sup/sba assist levels without need to pull from rail/chair.    General Comments     Shoulder Comments   Able to raise LUE against gravity but only to 90 degrees. Per pt reports unable to use hand or bend elbow when asked to perform.       Objective     Static Posture     Head  Forward.    Shoulders  Rounded.    Neurological Testing     Sensation     Knee   Left Knee   Intact: Light touch, pin prick, sharp/dull discrimination and proprioception    Reflexes   Left   Patellar (L4):  normal (2+)  Achilles (S1): normal (2+)    Right   Patellar (L4): normal (2+)  Achilles (S1): normal (2+)    Strength/Myotome Testing     Left Hip   Planes of Motion   Flexion: 4  Abduction: 3+  Adduction: 4    Right Hip   Planes of Motion   Flexion: 5  Abduction: 4-  Adduction: 4+    Left Knee   Flexion: 4  Extension: 4    Right Knee   Flexion: 5  Extension: 5    Left Ankle/Foot   Dorsiflexion: 4-  Plantar flexion: 4-  Great toe extension: 4    Right Ankle/Foot   Dorsiflexion: 4+  Plantar flexion: 4+    Ambulation   Weight-Bearing Status   Assistive device used: quad cane    Ambulation: Level Surfaces   Ambulation with assistive device: independent  Ambulation without assistive device: contact guard assist    Ambulation: Stairs     Additional Stairs Ambulation Details  DNP secondary to time    Observational Gait   Decreased walking speed, stride length, left step length and right step length.   Right foot contact pattern: heel to toe  Left foot contact pattern comments: reduced heel toe on L    Functional Assessment     Comments  Bed mobility on firm mat table at sup/sba assist levels without need to pull from rail/chair.    General Comments     Shoulder Comments   Able to raise LUE against gravity but only to 90 degrees. Per pt reports unable to use hand or bend elbow when asked to perform.            Balance:  Romberg: Firm Eyes Open 30 sec, Eyes Closed 15 sec   Tandem: Firm Eyes Open 10 sec, Eyes Closed 5 sec   *Modified tandem*    Other Outcome Measures:  Other Measures  30 Second Sit to Stand: 1130 sec STS 11 reps     HEP  Access Code: [email or web address]  URL: [email or web address]  Date: 02/28/2025  Prepared by: María Bustillo    Exercises  - Narrow Stance with Counter Support  - 2 x daily - 7 x weekly - 3 reps - 20 hold    Assessment/Plan   Moderate complexity due to patient's clinical presentation being evolving with changing characteristics, with comorbidities/complexities to include age, impacts of CVA and left  hemiparesis, all of which may negatively impact rehab tolerance and progression.         Goals:   Active       PT Problem       PT Goal 1       Start:  02/28/25    Expected End:  03/28/25       Pt to improve balance with improved modified tandem stance to 30 sec with eyes closed to facilitate zero incidents of falling since starting therapy.         PT Goal 2       Start:  02/28/25    Expected End:  05/09/25       Pt to improve LE strength of left side to 4+/5 or greater.         PT Goal 3       Start:  02/28/25    Expected End:  05/09/25            PT Goal 4       Start:  02/28/25    Expected End:  05/09/25       Pt able to ascend and descend x 12 steps with reciprocal pattern and railing for assistance to facilitate return to community outgoing.         Patient Stated Goal 1       Start:  02/28/25    Expected End:  05/09/25       Pt to be able to walk without quad cane or assistive device.         Patient Stated Goal 2       Start:  02/28/25    Expected End:  05/09/25       Pt would like to get in and out of bed (soft mattress) without having to use grab bar.

## 2025-02-28 ENCOUNTER — EVALUATION (OUTPATIENT)
Dept: OCCUPATIONAL THERAPY | Facility: CLINIC | Age: 80
End: 2025-02-28
Payer: COMMERCIAL

## 2025-02-28 ENCOUNTER — EVALUATION (OUTPATIENT)
Dept: PHYSICAL THERAPY | Facility: CLINIC | Age: 80
End: 2025-02-28
Payer: COMMERCIAL

## 2025-02-28 DIAGNOSIS — G81.94 LEFT HEMIPARESIS (MULTI): ICD-10-CM

## 2025-02-28 DIAGNOSIS — I63.9 ISCHEMIC CEREBROVASCULAR ACCIDENT (CVA) (MULTI): ICD-10-CM

## 2025-02-28 DIAGNOSIS — I63.9 ISCHEMIC CEREBROVASCULAR ACCIDENT (CVA) (MULTI): Primary | ICD-10-CM

## 2025-02-28 PROCEDURE — 97112 NEUROMUSCULAR REEDUCATION: CPT | Mod: GO | Performed by: OCCUPATIONAL THERAPIST

## 2025-02-28 PROCEDURE — 97166 OT EVAL MOD COMPLEX 45 MIN: CPT | Mod: GO | Performed by: OCCUPATIONAL THERAPIST

## 2025-02-28 PROCEDURE — 97162 PT EVAL MOD COMPLEX 30 MIN: CPT | Mod: GP

## 2025-02-28 ASSESSMENT — ACTIVITIES OF DAILY LIVING (ADL)
AMBULATION_WITHOUT_ASSISTIVE_DEVICE: CONTACT GUARD ASSIST
AMBULATION_WITH_ASSISTIVE_DEVICE: INDEPENDENT

## 2025-02-28 ASSESSMENT — PAIN - FUNCTIONAL ASSESSMENT: PAIN_FUNCTIONAL_ASSESSMENT: 0-10

## 2025-02-28 ASSESSMENT — ENCOUNTER SYMPTOMS
LOSS OF SENSATION IN FEET: 0
PAIN SCALE: 0
PAIN SCALE AT HIGHEST: 0
OCCASIONAL FEELINGS OF UNSTEADINESS: 0
PAIN SCALE AT LOWEST: 0
DEPRESSION: 0

## 2025-02-28 ASSESSMENT — PAIN SCALES - GENERAL: PAINLEVEL_OUTOF10: 0 - NO PAIN

## 2025-02-28 NOTE — PROGRESS NOTES
"Evaluation/Treatment    Patient Name: Cynthia Muse  MRN: [id number]  : 1945  Today's Date: 25    Time Calculation  Start Time: 845  Stop Time: 930  Time Calculation (min): 45 min  OT Evaluation Time Entry  OT Evaluation (Moderate) Time Entry: 15  OT Therapeutic Procedures Time Entry  Neuromuscular Re-Education Time Entry: 30      Subjective   Current Problem/Diagnosis:  1. Ischemic cerebrovascular accident (CVA) (Multi)  Referral to Occupational Therapy    Follow Up In Occupational Therapy      2. Left hemiparesis (Multi)  Referral to Occupational Therapy    Follow Up In Occupational Therapy        Subjective Evaluation    History of Present Illness  Date of onset: 2024  Mechanism of injury: Patient is a 79-year-old female who presented to the ED on 24 with c/o L-sided weakness and L-sided facial droop. Patient dx'd with +CVA s/p TNK. Following hospitalization patient participated in acute rehab at [facility] and then home health OT/PT.     Quality of life: good    Pain  Current pain ratin    Social Support  Lives with: Prior to CVA living alone; currently dtr and graddtr residing with patient.    Diagnostic Tests  MRI studies: abnormal (Acute ischemic infarct in the right corona radiata on a background of mild chronic small vessel ischemic changes.)    Treatments  Treatments tried: See above.  Discharged from (in last 30 days): home health care  Patient Goals  Patient goals for therapy: decreased edema, improved balance, increased motion, increased strength, independence with ADLs/IADLs, return to sport/leisure activities and return to work  Patient goal: \"To be able to use my left hand and arm and take care of myself.\"       Precautions: +L Hemiparesis; +Fall Risk       Objective     Prior Functional Status: Fully Independent; patient currently requires assistance for ADL's/self-care PRN and      Work History:     Occupation and Activities:  Work status: off work  Job title/type of work:  " Patient works at [employer] in [facility]    Current functional limitations: Grooming, Bathing, Dressing, Lifting, Holding, Crafts/hobbies, Tool handling, and Driving       Objective     Sensation: Denies abnormal     Appearance: +Fluctuating tone throughout LUE; no noted scapular wining or subluxation at this time; +Arthritic joint changes to B hands; ambulating to evaluation and throughout clinic with quad cane; accompanied to clinic by dtr (Deana) present throughout evaluation--to return to [place] tomorrow.    Edema: +Mild throughout L hand and distal FA    Coordination: +Impaired LUE    Range of Motion/Strength:     Upper Extremity ROM    AROM MMT MMT     Right Left Right Left   SHOULDER Flexion WFL ~90 WFL 3-/5    Extension WFL  Neutral  WFL  3/5    Abduction WFL  ~60 WFL  3-/5    Internal Rotation WFL  To  stomach WFL  3/5    External Rotation WFL  Netural  WFL 3-/5     ELBOW Extension WFL  WFL  WFL  3+/5    Flexion  WFL WFL WFL  3+/5     FOREARM Pronation WFL  WFL  WFL  3/5    Supination WFL  ~70 WFL  3-/5     WRIST Flexion WFL  WFL  WFL  3/5    Extension WFL  Neutral  WFL  3-/5    Radial Deviation WFL  NT      Ulnar Deviation WFL NT       Hand Range of Motion   +Partial fist L hand      Hand Strength   (lbs) Right Left   1     2 35# 10#   3     4     5       Pinch Right Left   2-pt NT NT   3-pt NT NT   Lateral NT NT       Outcome Measure: UEFI= 14/80; 83% impaired     Splinting: Resting Hand splint per  OT note dated 1/23/25.    Modalities: Educated patient on purpose of/benefits of functional electrical stimulation for neuromuscular re-education and improved functional use of L hand/UE.    Therapy/Activity: Therapist fitted patient for Bioness, including A plates for flexors and extensors  this date. Established tx setting to tolerance including 40 Joshua for extensors, 25 Joshua for flexors, and 15 Joshua for thenar. With LUE positioned on foam wedge, implemented neuromodulation programming x5 minutes and  "grasp/release programming x5 minutes to facilitate lymphatic drainage and promote muscle contraction.    EDUCATION: See above.       Assessment & Plan     Assessment  Impairments: abnormal coordination, abnormal muscle firing, abnormal muscle tone, abnormal or restricted ROM, activity intolerance, impaired balance, impaired physical strength, lacks appropriate home exercise program, safety issue and weight-bearing intolerance  Assessment details: Patient is a 79-year-old female who presents s/p CVA with subsequent L-sided involvement including weakness, impaired ROM, impaired proprioception, impaired tone, impaired gross and fine motor coordination, and impaired functional use of LUE. Skilled OT intervention indicated to address deficits and facilitate improved functional use of LUE and improved quality of life for patient.    Moderate complexity evaluation selected due to patient's evolving clinical presentation with changing characteristics, existing and impacting co-morbidities including Stage III CKD, DM II, HTN, and HLD, all of which may negatively impact patient's rehab tolerance, progression, and potential.   Prognosis: good    Goals  \"To be able to use my left hand and arm and take care of myself.\"    Plan  Planned modality interventions: electrical stimulation/Russian stimulation, TENS, thermotherapy (hydrocollator packs), fluidotherapy, cryotherapy and ultrasound (Dry needling)  Planned therapy interventions: ADL retraining, body mechanics training, compression, fine motor coordination training, flexibility, functional ROM exercises, home exercise program, IADL retraining, joint mobilization, manual therapy, motor coordination training, neuromuscular re-education, orthotic fitting/training, soft tissue mobilization, strengthening, stretching and therapeutic activities  Frequency: 2x/week.  Duration in weeks: 12  Treatment plan discussed with: patient  Plan details: [place]            Goals:  Active  "      OT Goals       1. Patient will increase LUE shoulder AROM to 130 degrees flexion and abduction for improved functional use of LUE.       Start:  03/03/25    Expected End:  04/11/25            2. Patient will increase LUE strength at all planes to at least 4/5 for improved functional use of LUE.       Start:  03/03/25    Expected End:  04/11/25            3. Patient will achieve full grasp/release of L hand for improved ability to grasp/carry ADL objects with L hand.       Start:  03/03/25    Expected End:  04/11/25            4. Patient will increase L hand  strength by at least 15# for improved gripping with L hand during ADL's.       Start:  03/03/25    Expected End:  04/11/25            5. Patient will increase increase coordination in L hand AEB ability to move at least 10 blocks with B&B standardized testing.       Start:  03/03/25    Expected End:  04/11/25            6. Patient will complete UB dressing and hygiene/grooming tasks with Mod I.       Start:  03/03/25    Expected End:  05/23/25            7. Patient will complete LB dressing tasks with Min A.       Start:  03/03/25    Expected End:  05/23/25                8. Patient will increase overall ease and independence AEB UEFI score of at least 50/80 for improved quality of life for patient.       Start:  03/03/25    Expected End:  05/23/25

## 2025-03-03 ENCOUNTER — TREATMENT (OUTPATIENT)
Dept: PHYSICAL THERAPY | Facility: CLINIC | Age: 80
End: 2025-03-03
Payer: COMMERCIAL

## 2025-03-03 DIAGNOSIS — I63.9 ISCHEMIC CEREBROVASCULAR ACCIDENT (CVA) (MULTI): ICD-10-CM

## 2025-03-03 DIAGNOSIS — G81.94 LEFT HEMIPARESIS (MULTI): ICD-10-CM

## 2025-03-03 PROCEDURE — 97112 NEUROMUSCULAR REEDUCATION: CPT | Mod: GP,CQ

## 2025-03-03 PROCEDURE — 97110 THERAPEUTIC EXERCISES: CPT | Mod: GP,CQ

## 2025-03-03 SDOH — ECONOMIC STABILITY: GENERAL: QUALITY OF LIFE: GOOD

## 2025-03-03 ASSESSMENT — PAIN SCALES - GENERAL: PAINLEVEL_OUTOF10: 0 - NO PAIN

## 2025-03-03 ASSESSMENT — PAIN - FUNCTIONAL ASSESSMENT: PAIN_FUNCTIONAL_ASSESSMENT: 0-10

## 2025-03-03 ASSESSMENT — ACTIVITIES OF DAILY LIVING (ADL): POOR_BALANCE: 1

## 2025-03-03 ASSESSMENT — ENCOUNTER SYMPTOMS: PAIN SCALE: 0

## 2025-03-03 NOTE — PROGRESS NOTES
"Physical Therapy Treatment    Patient Name: Cynthia Muse  MRN: [id number]  Encounter date: 3/3/2025    Time Calculation  Start Time: 1415  Stop Time: 1458  Time Calculation (min): 43 min  PT Therapeutic Procedures Time Entry  Neuromuscular Re-Education Time Entry: 15  Therapeutic Exercise Time Entry: 28    Visit # 2 of 16  Visits/Dates Authorized: D - PT - 1) MMO Local 880 - NO AUTH / $350 DED MET / 80% coins / $10,000 OOP not met / 40V MAX pt alone - 1 used / ds 2/14/25.   CPT 18444 not covered.  2) MEDICARE A/B - NO AUTH / $257 DEDUCT not met / $0 used PT/ST / [place] visits / Per RTE.     Current Problem:   Problem List Items Addressed This Visit             ICD-10-CM    Ischemic cerebrovascular accident (CVA) (Multi) I63.9    Left hemiparesis (Multi) G81.94       Precautions:    falls       Subjective   General:    Patient states she is feeling good today, no pain or soreness. Patient reports HEP is going well, doing them everyday.     Pre-Treatment Symptoms:   Pain Assessment: 0-10  0-10 (Numeric) Pain Score: 0 - No pain    Objective   Findings:   Ambulates with AFO on L LE , and with quad cane             Treatments:      Therapeutic Exercise 64949:   Nustep L4 5 min LE only   STS 1x10 no UE support   LAQ 4# 2x10 R/L   HSC DL 45# 2x10 , SL L only 30-35# 2x10  Tloop side stepping pink 2 laps  Tloop lean on counter hip extension pink 1x10   HEP updated and reviewed    Neuromuscular Re-Ed 88069:   NBOS firm/foam EC 30\" x 1   Staggered stance EC   30\"x1 R/L   Towel hurdles- step to and reciprocal   6\" hurdles- step to , reciprocal, and side stepping     HEP / Access Codes:   URL: [email or web address]  Access Code: [email or web address]   Date: 02/28/2025  - Narrow Stance with Counter Support  - 2 x daily - 7 x weekly - 3 reps - 20 hold    Access Code: [email or web address]  Date: 03/03/2025  - Side Stepping with Resistance at Ankles and Counter Support  - 1-2 x daily - 15-20 ft x 4  distance  - Standing Hip Extension with Resistance at " Ankles and Counter Support  - 1-2 x daily - 2 sets - 10 reps  - Sit to Stand Without Arm Support  - 1-2 x daily - 2 sets - 10 reps    Assessment:    Patient tolerated all exercises today without complaint. Patient was more challenged with side steps and LAQ with L>R LE. Patient did well with balance activities, no LOB with HEP exercises. Patient challenged with side stepping over hurdles, moderate use of handrails for LOB. Issued additional HEP at end of session along with tbands to progress strength.     Post-Treatment Symptoms:   Response to Interventions: Other (Comment) (fatigued)    Plan:    Continue to progress strength and balance    Goals:   Active       PT Problem       PT Goal 1       Start:  02/28/25    Expected End:  03/28/25       Pt to improve balance with improved modified tandem stance to 30 sec with eyes closed to facilitate zero incidents of falling since starting therapy.         PT Goal 2       Start:  02/28/25    Expected End:  05/09/25       Pt to improve LE strength of left side to 4+/5 or greater.         PT Goal 3       Start:  02/28/25    Expected End:  05/09/25            PT Goal 4       Start:  02/28/25    Expected End:  05/09/25       Pt able to ascend and descend x 12 steps with reciprocal pattern and railing for assistance to facilitate return to community outgoing.         Patient Stated Goal 1       Start:  02/28/25    Expected End:  05/09/25       Pt to be able to walk without quad cane or assistive device.         Patient Stated Goal 2       Start:  02/28/25    Expected End:  05/09/25       Pt would like to get in and out of bed (soft mattress) without having to use grab bar.

## 2025-03-05 ENCOUNTER — TREATMENT (OUTPATIENT)
Dept: OCCUPATIONAL THERAPY | Facility: CLINIC | Age: 80
End: 2025-03-05
Payer: COMMERCIAL

## 2025-03-05 DIAGNOSIS — G81.94 LEFT HEMIPARESIS (MULTI): ICD-10-CM

## 2025-03-05 DIAGNOSIS — I63.9 ISCHEMIC CEREBROVASCULAR ACCIDENT (CVA) (MULTI): ICD-10-CM

## 2025-03-05 PROCEDURE — 97112 NEUROMUSCULAR REEDUCATION: CPT | Mod: GO,CO

## 2025-03-05 PROCEDURE — 97110 THERAPEUTIC EXERCISES: CPT | Mod: GO,CO

## 2025-03-05 ASSESSMENT — PAIN SCALES - GENERAL: PAINLEVEL_OUTOF10: 2

## 2025-03-05 ASSESSMENT — PAIN DESCRIPTION - DESCRIPTORS: DESCRIPTORS: ACHING;DULL;THROBBING

## 2025-03-05 ASSESSMENT — PAIN - FUNCTIONAL ASSESSMENT: PAIN_FUNCTIONAL_ASSESSMENT: 0-10

## 2025-03-05 NOTE — PROGRESS NOTES
"Occupational Therapy Treatment  Patient Name: Cynthia Muse  MRN: [id number]  OT Received on: 3/5/2025 OT Received On: 03/05/25      Time Calculation  Start Time: 0858  Stop Time: 0945  Time Calculation (min): 47 min  OT Therapeutic Procedures Time Entry  Neuromuscular Re-Education Time Entry: 20  Therapeutic Exercise Time Entry: 27  Insurance:  Visit Number: 2 of up to 24    2/14/25 - MMO SUPERMED PLUS [employer] LOCAL 880 / NO AUTH / 40 V LIMIT YEAR - 2 USED 2/14/25 / DED MET - OOP 03730 NOT MET / MEDICARE A/B SECONDARY TO MMO / LOCAL 880 CALL REF # [id number] & [employer] # [phone]  JS    Subjective   Problem List Items Addressed This Visit             ICD-10-CM       Neuro    Ischemic cerebrovascular accident (CVA) (Multi) I63.9    Left hemiparesis (Multi) G81.94     Referred by: Dr. Izquierdo, no follow up scheduled.     Patient reports: \" I want to get this arm working for me again\". Pt  taking tylenol every am for pain for L shoulder pain, no modalities. Wearing resting hand splint at night. Goals and precautions reviewed with patient, she verbalized understanding.     Precautions: + left hemiparesis and + fall risk    Performing HEP?: Yes    Pain: 2/10 in front of joint capsule.   Pain Assessment  Pain Assessment: 0-10  0-10 (Numeric) Pain Score: 2  Pain Type: Acute pain  Pain Location: Shoulder (front of joint capsule)  Pain Orientation: Left  Pain Descriptors: Aching, Dull, Throbbing  Pain Frequency: Intermittent    Objective   Outcome Measure: UEFI= 14/80; 83% impaired   Physical Observation: +Fluctuating tone throughout LUE; no noted scapular wining or subluxation at this time; +Arthritic joint changes to B hands; ambulating to evaluation and throughout clinic with quad cane;   Edema: min edema  continues in L arm and hand    Sensory: intact  Numbness/Tingling: none noted today  Treatment:  Educated pt in importance of elevating L arm along with retrograde massage, fist pumps to reduce edema, pt able to return demo " from writer.     Modalities:   Provided patient with written information for purchase of home TENS/ e stim unit for UB function and shoulder pain relief.     Bioness completed with A plates for flexors and extensors this date. Established tx setting to tolerance including 45 Joshua for extensors, 35 Joshua for flexors, and 15 Joshua for thenar. With LUE positioned on foam wedge, implemented neuromodulation programming x 10 minutes and grasp/release programming x10 minutes to facilitate lymphatic drainage and promote muscle contraction.    Therapeutic exercise  Reviewed resting hand splint , patient wearing at night without issue  Educated pt in/performed  table top Finger press outs to encourage finger ext.   Educated pt in /performed Supine dowel jose HEP with 1.5# resistance, 2 sets of 10 reps for tricep punches and shoulder flexion to 90 degrees. Added to HEP  Supine bicep curls with 1# resistance, CGA from writer with L arm, 2 x 10 reps, added to HEP.   Post-tx pain:0/10, moderate fatigue in L UE    Assessment/Plan Pt is highly motivated to return to function. Limited by L UE weakness and edema. Pt to consider purchase of home TENS. To follow through with supine HEP along with edema control tech. Writer encouraged bilateral activities as tolerated.     OP EDUCATION:  Education  Individual(s) Educated: Patient  Education Provided: POC discussed and agreed upon, Risk and benefits of OT discussed with patient or other, Edema control, Orthotics wearing schedule and precautions  Home Program: PROM, AROM, Fine motor tasks, Modalities, Edema control, Orthotic wearing schedule, care and precautions, Handout issued  Equipment: Other (home TENS unit)  Risk and Benefits Discussed with Patient/Caregiver/Other: yes  Patient/Caregiver Demonstrated Understanding: yes  Plan of Care Discussed and Agreed Upon: yes  Patient Response to Education: Patient/Caregiver Verbalized Understanding of Information, Patient/Caregiver Performed  Return Demonstration of Exercises/Activities, Patient/Caregiver Asked Appropriate Questions    Goals:  Active       OT Goals       1. Patient will increase LUE shoulder AROM to 130 degrees flexion and abduction for improved functional use of LUE. (Progressing)       Start:  03/03/25    Expected End:  04/11/25            2. Patient will increase LUE strength at all planes to at least 4/5 for improved functional use of LUE. (Progressing)       Start:  03/03/25    Expected End:  04/11/25            3. Patient will achieve full grasp/release of L hand for improved ability to grasp/carry ADL objects with L hand. (Progressing)       Start:  03/03/25    Expected End:  04/11/25            4. Patient will increase L hand  strength by at least 15# for improved gripping with L hand during ADL's. (Progressing)       Start:  03/03/25    Expected End:  04/11/25            5. Patient will increase increase coordination in L hand AEB ability to move at least 10 blocks with B&B standardized testing. (Progressing)       Start:  03/03/25    Expected End:  04/11/25            6. Patient will complete UB dressing and hygiene/grooming tasks with Mod I. (Progressing)       Start:  03/03/25    Expected End:  05/23/25            7. Patient will complete LB dressing tasks with Min A. (Progressing)       Start:  03/03/25    Expected End:  05/23/25                8. Patient will increase overall ease and independence AEB UEFI score of at least 50/80 for improved quality of life for patient. (Progressing)       Start:  03/03/25    Expected End:  05/23/25

## 2025-03-06 ENCOUNTER — TREATMENT (OUTPATIENT)
Dept: PHYSICAL THERAPY | Facility: CLINIC | Age: 80
End: 2025-03-06
Payer: COMMERCIAL

## 2025-03-06 DIAGNOSIS — I63.9 ISCHEMIC CEREBROVASCULAR ACCIDENT (CVA) (MULTI): ICD-10-CM

## 2025-03-06 DIAGNOSIS — G81.94 LEFT HEMIPARESIS (MULTI): ICD-10-CM

## 2025-03-06 PROCEDURE — 97116 GAIT TRAINING THERAPY: CPT | Mod: GP

## 2025-03-06 PROCEDURE — 97110 THERAPEUTIC EXERCISES: CPT | Mod: GP

## 2025-03-06 PROCEDURE — 97112 NEUROMUSCULAR REEDUCATION: CPT | Mod: GP

## 2025-03-06 ASSESSMENT — PAIN - FUNCTIONAL ASSESSMENT: PAIN_FUNCTIONAL_ASSESSMENT: 0-10

## 2025-03-06 ASSESSMENT — PAIN SCALES - GENERAL: PAINLEVEL_OUTOF10: 1

## 2025-03-06 NOTE — PROGRESS NOTES
"Physical Therapy Treatment    Patient Name: Cynthia Muse  MRN: [id number]  Encounter date: 3/6/2025 PT Received On: 03/06/25  Time Calculation  Start Time: 1404  Stop Time: 1444  Time Calculation (min): 40 min  PT Therapeutic Procedures Time Entry  Neuromuscular Re-Education Time Entry: 15  Therapeutic Exercise Time Entry: 10  Gait Training Time Entry: 15    Visit # 3 of 16  Visits/Dates Authorized: D - PT - 1) [employer] Local 880 - NO AUTH / $350 DED MET / 80% coins / $10,000 OOP not met / 40V MAX pt alone - 1 used / ds 2/14/25.   CPT 43342 not covered.  2) MEDICARE A/B - NO AUTH / $257 DEDUCT not met / $0 used PT/ST / [place] visits / Per RTE.     Current Problem:   Problem List Items Addressed This Visit             ICD-10-CM    Ischemic cerebrovascular accident (CVA) (Multi) I63.9    Left hemiparesis (Multi) G81.94       Precautions:    Falls risk and decreased step height on LLE resulting in intermittent LOB       Subjective   General:    Patient states she is feeling good today, but did have soreness after last session.     Pain Assessment: 0-10  0-10 (Numeric) Pain Score: 1  Pain Location: Shoulder  Pain Orientation: Left    Objective Remains motivated to continue but tendencies to ambulate to fast without use of quad cane.    Findings:   Ambulates with AFO on L LE , and with quad cane     Treatments:      Therapeutic Exercise 39150:  10 min  Nustep L4 5 min LE only   STS 2 sets  to fatigue no UE support   LAQ 4# 2x10 R/L   Marches in place    Gait training 21858: 15 min  Gait [place] in [place] [facility] [place]  feet with and without use of quad cane. Pt required cues to slow down gait when not using quad cane as her gait pattern was less controlled resulting in x 1 LOB    High intensity gait training in par bars for safety with frequent cuing to improve step height, length, while continuing a safe gait speed.    Neuromuscular Re-Ed 66432: 15 min  // bar walking  Fwds  Backwards  Repeat cued \"bigger steps\"  Side " "stepping   With 2.5# ankle weight    Stepping fwd   Stepping bkwd   Stepping sideways    6\" hurdles   Back and forth stepping   Sideways stepping    DNP on 03/06/2025  \NBOS firm/foam EC 30\" x 1   Staggered stance EC   30\"x1 R/L   Towel hurdles- step to and reciprocal   HSC DL 45# 2x10 , SL L only 30-35# 2x10  Tloop side stepping pink 2 laps  Tloop lean on counter hip extension pink 1x10   HEP updated and reviewed    HEP / Access Codes:   URL: [email or web address]  Access Code: [email or web address]   Date: 02/28/2025  - Narrow Stance with Counter Support  - 2 x daily - 7 x weekly - 3 reps - 20 hold    Access Code: [email or web address]  Date: 03/03/2025  - Side Stepping with Resistance at Ankles and Counter Support  - 1-2 x daily - 15-20 ft x 4  distance  - Standing Hip Extension with Resistance at Ankles and Counter Support  - 1-2 x daily - 2 sets - 10 reps  - Sit to Stand Without Arm Support  - 1-2 x daily - 2 sets - 10 reps    Assessment:   Pt has tendencies to ambulate at speed excessive to her control with forefoot of LLE catching on the floor when advancing LE resulting in x 1 LOB. Overall, pt had good performance in session with increased hip flexion and step height when cued as with external feedback of ankle weight. Fatigue noted at end of session with less foot clearance with use of mann. Rest breaks offered during session as needed.    Post-Treatment Symptoms:        Plan:   Cont with high intensity gait training and follow up on response to session with emphasis on increasing step height.     Goals:   Active       PT Problem       PT Goal 1       Start:  02/28/25    Expected End:  03/28/25       Pt to improve balance with improved modified tandem stance to 30 sec with eyes closed to facilitate zero incidents of falling since starting therapy.         PT Goal 2       Start:  02/28/25    Expected End:  05/09/25       Pt to improve LE strength of left side to 4+/5 or greater.         PT Goal 3       Start:  02/28/25    " Expected End:  05/09/25            PT Goal 4       Start:  02/28/25    Expected End:  05/09/25       Pt able to ascend and descend x 12 steps with reciprocal pattern and railing for assistance to facilitate return to community outgoing.         Patient Stated Goal 1       Start:  02/28/25    Expected End:  05/09/25       Pt to be able to walk without quad cane or assistive device.         Patient Stated Goal 2       Start:  02/28/25    Expected End:  05/09/25       Pt would like to get in and out of bed (soft mattress) without having to use grab bar.

## 2025-03-10 ENCOUNTER — DOCUMENTATION (OUTPATIENT)
Dept: OCCUPATIONAL THERAPY | Facility: CLINIC | Age: 80
End: 2025-03-10
Payer: COMMERCIAL

## 2025-03-10 ENCOUNTER — APPOINTMENT (OUTPATIENT)
Dept: OCCUPATIONAL THERAPY | Facility: CLINIC | Age: 80
End: 2025-03-10
Payer: COMMERCIAL

## 2025-03-10 NOTE — PROGRESS NOTES
Occupational Therapy                 Therapy Communication Note    Patient Name: Cynthia Muse  MRN: [id number]  Department:   Room: Room/bed info not found  Today's Date: 3/10/2025     Discipline: Occupational Therapy     Missed Visit Reason:  Patient cancelled visit, no reason provided.     Missed Time: Cancel

## 2025-03-12 ENCOUNTER — TREATMENT (OUTPATIENT)
Dept: PHYSICAL THERAPY | Facility: CLINIC | Age: 80
End: 2025-03-12
Payer: COMMERCIAL

## 2025-03-12 DIAGNOSIS — G81.94 LEFT HEMIPARESIS (MULTI): ICD-10-CM

## 2025-03-12 DIAGNOSIS — I63.9 ISCHEMIC CEREBROVASCULAR ACCIDENT (CVA) (MULTI): ICD-10-CM

## 2025-03-12 PROCEDURE — 97112 NEUROMUSCULAR REEDUCATION: CPT | Mod: GP,CQ

## 2025-03-12 PROCEDURE — 97110 THERAPEUTIC EXERCISES: CPT | Mod: GP,CQ

## 2025-03-12 ASSESSMENT — PAIN SCALES - GENERAL: PAINLEVEL_OUTOF10: 3

## 2025-03-12 ASSESSMENT — PAIN - FUNCTIONAL ASSESSMENT: PAIN_FUNCTIONAL_ASSESSMENT: 0-10

## 2025-03-12 NOTE — PROGRESS NOTES
"Physical Therapy Treatment    Patient Name: Cynthia Muse  MRN: [id number]  Encounter date: 3/12/2025    Time Calculation  Start Time: 0935  Stop Time: 1020  Time Calculation (min): 45 min  PT Therapeutic Procedures Time Entry  Neuromuscular Re-Education Time Entry: 15  Therapeutic Exercise Time Entry: 24  Gait Training Time Entry: 6    Visit # 4 of 16  Visits/Dates Authorized: D - PT - 1) [employer] Local 880 - NO AUTH / $350 DED MET / 80% coins / $10,000 OOP not met / 40V MAX pt alone - 1 used / ds 2/14/25.   CPT 82028 not covered.  2) MEDICARE A/B - NO AUTH / $257 DEDUCT not met / $0 used PT/ST / [place] visits / Per RTE.     Current Problem:   Problem List Items Addressed This Visit             ICD-10-CM    Ischemic cerebrovascular accident (CVA) (Multi) I63.9    Left hemiparesis (Multi) G81.94         Precautions:    falls       Subjective   General:    Patient states she is doing HEP, inversion/eversion with ankle is toughest one, but doing her best. Patient reports she has no soreness today, just some pain in L bicep. Patient states she is able to move digits of L hand a little better, OT is helping with that.     Pre-Treatment Symptoms:   Pain Assessment: 0-10  0-10 (Numeric) Pain Score: 3 (L bicep)    Objective   Findings:   Ambulates with AFO on L LE , and with quad cane             Treatments:      Therapeutic Exercise 97469:   Nustep L4 6 min LE only , seat 6  HSC DL 45-50# 2x12 , SL L only 35-30# 2x10  LAQ 5# 2x15 R/L   Shuttle leg press DL 50-# 3x12 , SL 37.5# 1x12 R/L  Marches in place with UE support 2.5# ankle weight 2x10 R/L     Deferred:  STS 2x10 no UE support   Tloop side stepping pink 2 laps  Tloop lean on counter hip extension pink 1x10     Neuromuscular Re-Ed 71883:   // bars:  6\" mann single step over 2.5# ankle weight 1x10 R/L   Forward walk 2.5# ankle weight  4 laps (cued \"bigger steps\")  Backward walk 2.5# ankle weight 4 laps (cued \"bigger steps\")  Side stepping 2.5# ankle weight 4 laps (cued to " "not drag feet)   Staggered stance on blue travon pads EO/EC 30\"x1 ea R/L     Deferred:  NBOS firm/foam EC 30\" x 1   Towel hurdles- step to and reciprocal   6\" hurdles- step to , reciprocal, and side stepping     Gait training 51588:  Ladder mat reciprocal stepping , with cane 3 laps , without cane 3 laps (min assist for LOB)    Deferred:  High intensity gait training in par bars for safety with frequent cuing to improve step height, length, while continuing a safe gait speed.    HEP / Access Codes:   URL: [email or web address]  Access Code: [email or web address]   Date: 02/28/2025  - Narrow Stance with Counter Support  - 2 x daily - 7 x weekly - 3 reps - 20 hold    Access Code: [email or web address]  Date: 03/03/2025  - Side Stepping with Resistance at Ankles and Counter Support  - 1-2 x daily - 15-20 ft x 4  distance  - Standing Hip Extension with Resistance at Ankles and Counter Support  - 1-2 x daily - 2 sets - 10 reps  - Sit to Stand Without Arm Support  - 1-2 x daily - 2 sets - 10 reps    Assessment:    Treatment focused on continued LE strengthening, balance training, and gait training. Patient had no complaints of pain during session. Patient did well with ambulation without quad cane, only requiring minimal assist from therapist for LOB. Patient was fatigued at end of session.     Post-Treatment Symptoms:   Response to Interventions: Other (Comment) (fatigued)    Plan:    Continue to progress strength and balance    Goals:   Active       PT Problem       PT Goal 1       Start:  02/28/25    Expected End:  03/28/25       Pt to improve balance with improved modified tandem stance to 30 sec with eyes closed to facilitate zero incidents of falling since starting therapy.         PT Goal 2       Start:  02/28/25    Expected End:  05/09/25       Pt to improve LE strength of left side to 4+/5 or greater.         PT Goal 3       Start:  02/28/25    Expected End:  05/09/25            PT Goal 4       Start:  02/28/25    Expected End:  " 05/09/25       Pt able to ascend and descend x 12 steps with reciprocal pattern and railing for assistance to facilitate return to community outgoing.         Patient Stated Goal 1       Start:  02/28/25    Expected End:  05/09/25       Pt to be able to walk without quad cane or assistive device.         Patient Stated Goal 2       Start:  02/28/25    Expected End:  05/09/25       Pt would like to get in and out of bed (soft mattress) without having to use grab bar.

## 2025-03-13 ENCOUNTER — TREATMENT (OUTPATIENT)
Dept: OCCUPATIONAL THERAPY | Facility: CLINIC | Age: 80
End: 2025-03-13
Payer: COMMERCIAL

## 2025-03-13 DIAGNOSIS — G81.94 LEFT HEMIPARESIS (MULTI): ICD-10-CM

## 2025-03-13 DIAGNOSIS — I63.9 ISCHEMIC CEREBROVASCULAR ACCIDENT (CVA) (MULTI): ICD-10-CM

## 2025-03-13 PROCEDURE — 97110 THERAPEUTIC EXERCISES: CPT | Mod: GO,CO

## 2025-03-13 PROCEDURE — 97014 ELECTRIC STIMULATION THERAPY: CPT | Mod: GO,CO

## 2025-03-13 ASSESSMENT — PAIN DESCRIPTION - DESCRIPTORS: DESCRIPTORS: ACHING;DULL

## 2025-03-13 ASSESSMENT — PAIN SCALES - GENERAL: PAINLEVEL_OUTOF10: 3

## 2025-03-13 ASSESSMENT — PAIN - FUNCTIONAL ASSESSMENT: PAIN_FUNCTIONAL_ASSESSMENT: 0-10

## 2025-03-13 NOTE — PROGRESS NOTES
"Occupational Therapy Treatment  Patient Name: Cynthia Muse  MRN: [id number]  OT Received on: 3/13/2025 OT Received On: 03/13/25      Time Calculation  Start Time: 1030  Stop Time: 1120  Time Calculation (min): 50 min  OT Modalities Time Entry  E-Stim (Unattended) Time Entry: 20  OT Therapeutic Procedures Time Entry  Therapeutic Exercise Time Entry: 30  Insurance:  Visit Number:  3 of up to 24    2/14/25 - [employer]MO [employer] LOCAL 880 / NO AUTH / 40 V LIMIT YEAR - 2 USED 2/14/25 / DED MET - OOP 93843 NOT MET / MEDICARE A/B SECONDARY TO MMO / LOCAL 880 CALL REF # [id number] & [facility] # [id number]  JS    Subjective   Problem List Items Addressed This Visit             ICD-10-CM       Neuro    Ischemic cerebrovascular accident (CVA) (Multi) I63.9    Left hemiparesis (Multi) G81.94     Referred by: Dr. Izquierdo, no follow up scheduled.     Patient reports: \" Look at my hand, it is moving more! My kids were impressed too!\". Pt  taking tylenol every am for pain for L shoulder pain, no modalities. Wearing resting hand splint at night. Goals and precautions reviewed with patient, she verbalized understanding.     Precautions: + left hemiparesis and + fall risk    Performing HEP?: Yes    Pain: 0/10 ( was 2/10 last session) in front of joint capsule.   Pain Assessment  Pain Assessment: 0-10  0-10 (Numeric) Pain Score: 3  Pain Type: Acute pain  Pain Location: Shoulder  Pain Orientation: Left  Pain Radiating Towards: bicep  Pain Descriptors: Aching, Dull  Pain Frequency: Intermittent    Objective   Outcome Measure: UEFI= 14/80; 83% impaired   Physical Observation: +Fluctuating tone throughout LUE; no noted scapular wining or subluxation at this time; +Arthritic joint changes to B hands; ambulating to evaluation and throughout clinic with quad cane;   Edema: min/trace( was min) in L arm and hand    Sensory: intact  Numbness/Tingling: none noted today  Treatment:  REviewed importance of elevating L arm along with retrograde " massage, fist pumps to reduce edema, pt able to return demo from writer.   Reviewed importance of allowing L arm to relax into elbow ext when ambulating, noting bicep tightness today.     Modalities:   Reviewed benefit of purchase of home TENS/ e stim unit for UB function and shoulder pain relief.     Bioness completed with A plates for flexors and extensors this date. Established tx setting to tolerance including 60 David for extensors, 42  David for flexors, and 25 David for thenar. With LUE positioned on foam wedge, implemented neuromodulation programming x 10 minutes and grasp/release programming   x10 minutes to facilitate lymphatic drainage and promote muscle contraction.   Functional reach with bioness x 10 min , at 55 David for  ext, 35 flexors and 20 thenar. Good tolerance   Therapeutic exercise  Reviewed resting hand splint , patient wearing at night without issue  Educated pt in/performed  table top Finger press outs to encourage finger ext.   Supine bicep curls with 1# resistance, CGA from writer with L arm, 2 x 10 reps, added to HEP.   Supine dowel jose HEP completed/added to HEP, 2 sets of 10 for shoulder flexion and tricep punches, added to HEP  Reviewed scapular mobility, completing daily  Finger roll outs  educated pt in home completion  Initiated Borax HEP. Recipe and HEP  provided for home follow through . Completed gentle 3 point pinch, grasp and wrist roll outs. Good hand movement noted   Post-tx pain:0/10, moderate fatigue in L UE    Assessment/Plan Pt is highly motivated to return to function.Good follow through on HEP.  Limited by L UE weakness Noted increased AROM of her L hand today.  Pt to consider purchase of home TENS. To follow through with supine HEP, added Borax  along with edema control tech. Writer encouraged bilateral activities around house along with seated table top slides.     OP EDUCATION:  Education  Individual(s) Educated: Patient  Education Provided: POC discussed and agreed  upon, Risk and benefits of OT discussed with patient or other, Edema control, Orthotics wearing schedule and precautions  Home Program: PROM, AROM, Fine motor tasks, Modalities, Edema control, Orthotic wearing schedule, care and precautions, Handout issued  Equipment: Other (home tens unit)  Risk and Benefits Discussed with Patient/Caregiver/Other: yes  Patient/Caregiver Demonstrated Understanding: yes  Plan of Care Discussed and Agreed Upon: yes  Patient Response to Education: Patient/Caregiver Verbalized Understanding of Information, Patient/Caregiver Performed Return Demonstration of Exercises/Activities, Patient/Caregiver Asked Appropriate Questions    Goals:  Active       OT Goals       1. Patient will increase LUE shoulder AROM to 130 degrees flexion and abduction for improved functional use of LUE. (Progressing)       Start:  03/03/25    Expected End:  04/11/25            2. Patient will increase LUE strength at all planes to at least 4/5 for improved functional use of LUE. (Progressing)       Start:  03/03/25    Expected End:  04/11/25            3. Patient will achieve full grasp/release of L hand for improved ability to grasp/carry ADL objects with L hand. (Progressing)       Start:  03/03/25    Expected End:  04/11/25            4. Patient will increase L hand  strength by at least 15# for improved gripping with L hand during ADL's. (Progressing)       Start:  03/03/25    Expected End:  04/11/25            5. Patient will increase increase coordination in L hand AEB ability to move at least 10 blocks with B&B standardized testing. (Progressing)       Start:  03/03/25    Expected End:  04/11/25            6. Patient will complete UB dressing and hygiene/grooming tasks with Mod I. (Met)       Start:  03/03/25    Expected End:  05/23/25    Resolved:  03/13/25         7. Patient will complete LB dressing tasks with Min A. (Progressing)       Start:  03/03/25    Expected End:  05/23/25                8.  Patient will increase overall ease and independence AEB UEFI score of at least 50/80 for improved quality of life for patient. (Progressing)       Start:  03/03/25    Expected End:  05/23/25

## 2025-03-14 ENCOUNTER — TREATMENT (OUTPATIENT)
Dept: PHYSICAL THERAPY | Facility: CLINIC | Age: 80
End: 2025-03-14
Payer: COMMERCIAL

## 2025-03-14 DIAGNOSIS — G81.94 LEFT HEMIPARESIS (MULTI): ICD-10-CM

## 2025-03-14 DIAGNOSIS — I63.9 ISCHEMIC CEREBROVASCULAR ACCIDENT (CVA) (MULTI): ICD-10-CM

## 2025-03-14 PROCEDURE — 97112 NEUROMUSCULAR REEDUCATION: CPT | Mod: GP,CQ

## 2025-03-14 PROCEDURE — 97110 THERAPEUTIC EXERCISES: CPT | Mod: GP,CQ

## 2025-03-14 ASSESSMENT — PAIN - FUNCTIONAL ASSESSMENT: PAIN_FUNCTIONAL_ASSESSMENT: 0-10

## 2025-03-14 ASSESSMENT — PAIN SCALES - GENERAL: PAINLEVEL_OUTOF10: 2

## 2025-03-14 NOTE — PROGRESS NOTES
"Physical Therapy Treatment    Patient Name: Cynthia Muse  MRN: [id number]  Encounter date: 3/14/2025 PT Received On: 03/14/25  Time Calculation  Start Time: 1030  Stop Time: 1110  Time Calculation (min): 40 min  PT Therapeutic Procedures Time Entry  Neuromuscular Re-Education Time Entry: 20  Therapeutic Exercise Time Entry: 20    Visit # 5 of 16  Visits/Dates Authorized: D - PT - 1) [employer] Local [employer] - NO AUTH / $350 DED MET / 80% coins / $10,000 OOP not met / 40V MAX pt alone - 1 used / ds 2/14/25.   CPT 79301 not covered.  2) MEDICARE A/B - NO AUTH / $257 DEDUCT not met / $0 used PT/ST / [place] visits / Per RTE.     Current Problem:   Problem List Items Addressed This Visit             ICD-10-CM    Ischemic cerebrovascular accident (CVA) (Multi) I63.9    Left hemiparesis (Multi) G81.94           Precautions:    falls       Subjective   General:    Patient states she is doing HEP, nothing new to report since last session.  Just want to keep getting better  Pre-Treatment Symptoms:   Pain Assessment: 0-10  0-10 (Numeric) Pain Score: 2  Pain Location: Shoulder    Objective   Findings:   Difficulty getting out of chair in wait room. LOB but self corrected    Ambulates with AFO on L LE , and with quad cane             Treatments:      Therapeutic Exercise 42102:   Nustep L4 6 min LE only , seat 6  HSC DL 45-50# 2x12 , SL L only 35-30# 2x10  LAQ 5# 2x15 R/L   Shuttle leg press DL 50-# 3x12 , SL 37.5# 1x12 R/L  or leg press #30   Marches in place with UE support 2.5# ankle weight 2x10 R/L   STS 2x10 no UE support   Tloop side stepping pink 2 laps  Tloop lean on counter hip extension pink 1x10  DNP    Neuromuscular Re-Ed 65375:   // bars:  6\" mann single step over 2.5# ankle weight 1x10 R/L   Forward walk 2.5# ankle weight  4 laps (cued \"bigger steps\")  Backward walk 2.5# ankle weight 4 laps (cued \"bigger steps\")  Side stepping 2.5# ankle weight 4 laps (cued to not drag feet)   Staggered stance on blue travon pads EO/EC 30\"x1 ea " "R/L  nods and nos  NBOS firm/foam EC 30\" x 1         Gait training 81976: DNP  Ladder mat reciprocal stepping , with cane 3 laps , without cane 3 laps (min assist for LOB)    Deferred:  High intensity gait training in par bars for safety with frequent cuing to improve step height, length, while continuing a safe gait speed.    HEP / Access Codes:   URL: [email or web address]  Access Code: [email or web address]   Date: 02/28/2025  - Narrow Stance with Counter Support  - 2 x daily - 7 x weekly - 3 reps - 20 hold    Access Code: [email or web address]  Date: 03/03/2025  - Side Stepping with Resistance at Ankles and Counter Support  - 1-2 x daily - 15-20 ft x 4  distance  - Standing Hip Extension with Resistance at Ankles and Counter Support  - 1-2 x daily - 2 sets - 10 reps  - Sit to Stand Without Arm Support  - 1-2 x daily - 2 sets - 10 reps    Assessment:    Treatment focused on continued LE strengthening, balance training, and gait training. Patient had no complaints of pain during session. Good ec standing on blue foam.  Patient can be impulsive  Post-Treatment Symptoms:   Response to Interventions: No change in pain    Plan:    Continue to progress strength and balance    Goals:   Active       PT Problem       PT Goal 1       Start:  02/28/25    Expected End:  03/28/25       Pt to improve balance with improved modified tandem stance to 30 sec with eyes closed to facilitate zero incidents of falling since starting therapy.         PT Goal 2       Start:  02/28/25    Expected End:  05/09/25       Pt to improve LE strength of left side to 4+/5 or greater.         PT Goal 3       Start:  02/28/25    Expected End:  05/09/25            PT Goal 4       Start:  02/28/25    Expected End:  05/09/25       Pt able to ascend and descend x 12 steps with reciprocal pattern and railing for assistance to facilitate return to community outgoing.         Patient Stated Goal 1       Start:  02/28/25    Expected End:  05/09/25       Pt to be able to walk " without quad cane or assistive device.         Patient Stated Goal 2       Start:  02/28/25    Expected End:  05/09/25       Pt would like to get in and out of bed (soft mattress) without having to use grab bar.

## 2025-03-17 ENCOUNTER — TREATMENT (OUTPATIENT)
Dept: OCCUPATIONAL THERAPY | Facility: CLINIC | Age: 80
End: 2025-03-17
Payer: MEDICARE

## 2025-03-17 DIAGNOSIS — G81.94 LEFT HEMIPARESIS (MULTI): ICD-10-CM

## 2025-03-17 DIAGNOSIS — I63.9 ISCHEMIC CEREBROVASCULAR ACCIDENT (CVA) (MULTI): ICD-10-CM

## 2025-03-17 PROCEDURE — 97112 NEUROMUSCULAR REEDUCATION: CPT | Mod: GO | Performed by: OCCUPATIONAL THERAPIST

## 2025-03-17 PROCEDURE — 97110 THERAPEUTIC EXERCISES: CPT | Mod: GO | Performed by: OCCUPATIONAL THERAPIST

## 2025-03-17 ASSESSMENT — PAIN - FUNCTIONAL ASSESSMENT: PAIN_FUNCTIONAL_ASSESSMENT: 0-10

## 2025-03-17 ASSESSMENT — PAIN SCALES - GENERAL: PAINLEVEL_OUTOF10: 2

## 2025-03-17 NOTE — PROGRESS NOTES
"Occupational Therapy Treatment  Patient Name: Cynthia Muse  MRN: [id number]  OT Received on: 3/17/2025        Time Calculation  Start Time: 0845  Stop Time: 0930  Time Calculation (min): 45 min  OT Therapeutic Procedures Time Entry  Neuromuscular Re-Education Time Entry: 25  Therapeutic Exercise Time Entry: 20    Insurance:  Visit Number: 4 of 32  Insurance Type: MMO [employer]; 40 visits/yr      Subjective   Problem List Items Addressed This Visit             ICD-10-CM    Ischemic cerebrovascular accident (CVA) (Multi) I63.9    Left hemiparesis (Multi) G81.94     Current Problem/Reason for visit:  +CVA s/p TNK with L hemiparesis     Referred by: Dr. Izquierdo    Patient reports \"I am doing good.\"    Precautions: +L Hemiparesis; +Fall Risk    Performing HEP?: Yes    Pain:  Pain Assessment  Pain Assessment: 0-10  0-10 (Numeric) Pain Score: 2  LUE at biceps    Objective   Patient accompanied to clinic by daughter, who remained in waiting room during tx sx.    Physical Observation: Patient into clinic with +LLE AFO and LBQC; minor LOB upon ambulating to tx area, able to self-correct; +Flexor tone/synergy pattern in LUE; +Biceps tightness; +arthritic joint changes B hands  Edema: +Mild L hand  Sensory: +Impaired   Numbness/Tingling: Denies       Treatment:    Therapeutic Exercise:  Therapist facilitated supine scapular protraction/retraction and CW/CCW stabilization with hands-on assist distally to facilitate due to weakness; 10 reps x 3 sets.  Therapist facilitated supine A/AAROM at R shoulder for flexion/extension, abduction/adduction, ER/IR, as well as, elbow flexion/extension, FA sup/pro, and wrist flexion/extension with graded manual resistance as indicated to facilitate muscle isolation, reduce compensatory body movement, optimize muscular engagement, and promote neuromuscular re-education; 10 reps x 3 sets each.    Neuromuscular Re-education:  Therapist implemented weightbearing through LUE standing at raised mat table " with therapist assist to position and maintain positioning of LUE to promote tone normalization, proprioception, kinesthesia, and weight acceptance for strengthening throughout LUE.  Therapist implemented standing push-ups at wedge mat with assist to position and maintain LUE throughout; 10 reps x 3 sets.  Therapist implemented seated functional reaching task with LUE for retrieval and placement of cones x 5 x 3 trials; increasing pattern of movement with each trial for greater shoulder flexion and elbow extension; with progress and promoting improving volitional extension of digits throughout phases of task, as well as, coordination of open hand, wrist extension, FA neutral, elbow extension and shoulder flexion for placement of cone to target; with progression decreasing assistance to perform.        Post-tx pain: 2/10 L biceps area/deltoid insertion; fatigue reported at end of session    Assessment/Plan Patient with excellent tolerance for intervention this date; improving ROM and purposeful movement throughout all planes of LUE; will continue to advance as indicated to enhance functional use of LUE for improved quality of life.      OP EDUCATION:  Education  Individual(s) Educated: Patient  Home Program: PROMJEREMIAS, AROM (motor coordination, motor planning)    Goals:  Active       OT Goals       1. Patient will increase LUE shoulder AROM to 130 degrees flexion and abduction for improved functional use of LUE. (Progressing)       Start:  03/03/25    Expected End:  04/11/25            2. Patient will increase LUE strength at all planes to at least 4/5 for improved functional use of LUE. (Progressing)       Start:  03/03/25    Expected End:  04/11/25            3. Patient will achieve full grasp/release of L hand for improved ability to grasp/carry ADL objects with L hand. (Progressing)       Start:  03/03/25    Expected End:  04/11/25            4. Patient will increase L hand  strength by at least 15# for  improved gripping with L hand during ADL's. (Progressing)       Start:  03/03/25    Expected End:  04/11/25            5. Patient will increase increase coordination in L hand AEB ability to move at least 10 blocks with B&B standardized testing. (Progressing)       Start:  03/03/25    Expected End:  04/11/25            6. Patient will complete UB dressing and hygiene/grooming tasks with Mod I. (Met)       Start:  03/03/25    Expected End:  05/23/25    Resolved:  03/13/25         7. Patient will complete LB dressing tasks with Min A. (Progressing)       Start:  03/03/25    Expected End:  05/23/25                8. Patient will increase overall ease and independence AEB UEFI score of at least 50/80 for improved quality of life for patient. (Progressing)       Start:  03/03/25    Expected End:  05/23/25

## 2025-03-21 ENCOUNTER — TREATMENT (OUTPATIENT)
Dept: OCCUPATIONAL THERAPY | Facility: CLINIC | Age: 80
End: 2025-03-21
Payer: COMMERCIAL

## 2025-03-21 ENCOUNTER — TREATMENT (OUTPATIENT)
Dept: PHYSICAL THERAPY | Facility: CLINIC | Age: 80
End: 2025-03-21
Payer: MEDICARE

## 2025-03-21 DIAGNOSIS — G81.94 LEFT HEMIPARESIS (MULTI): ICD-10-CM

## 2025-03-21 DIAGNOSIS — I63.9 ISCHEMIC CEREBROVASCULAR ACCIDENT (CVA) (MULTI): ICD-10-CM

## 2025-03-21 PROCEDURE — 97110 THERAPEUTIC EXERCISES: CPT | Mod: GO | Performed by: OCCUPATIONAL THERAPIST

## 2025-03-21 PROCEDURE — 97116 GAIT TRAINING THERAPY: CPT | Mod: GP

## 2025-03-21 PROCEDURE — 97112 NEUROMUSCULAR REEDUCATION: CPT | Mod: GP

## 2025-03-21 PROCEDURE — 97110 THERAPEUTIC EXERCISES: CPT | Mod: GP

## 2025-03-21 ASSESSMENT — PAIN - FUNCTIONAL ASSESSMENT
PAIN_FUNCTIONAL_ASSESSMENT: 0-10
PAIN_FUNCTIONAL_ASSESSMENT: 0-10

## 2025-03-21 ASSESSMENT — PAIN SCALES - GENERAL
PAINLEVEL_OUTOF10: 2
PAINLEVEL_OUTOF10: 2

## 2025-03-21 NOTE — PROGRESS NOTES
"Occupational Therapy Treatment  Patient Name: Cynthia Muse  MRN: [id number]  OT Received on: 3/21/2025        Time Calculation  Start Time: 1045  Stop Time: 1130  Time Calculation (min): 45 min  OT Therapeutic Procedures Time Entry  Therapeutic Exercise Time Entry: 45    Insurance:  Visit Number: 5 of  32  Insurance Type: M[employer]; 40 visits/yr      Subjective   Problem List Items Addressed This Visit             ICD-10-CM    Ischemic cerebrovascular accident (CVA) (Multi) I63.9    Left hemiparesis (Multi) G81.94     Current Problem/Reason for visit:  +CVA s/p TNK with L hemiparesis     Referred by: Dr. Izquierdo    Patient reports \"I got that thing you girls were talking about [TENs unit].\"    Precautions:     Performing HEP?: Yes    Pain:  Pain Assessment  Pain Assessment: 0-10  0-10 (Numeric) Pain Score: 2  Pain Location: Shoulder  Pain Orientation: Left    Objective   Granddaughter accompanied patient to clinic; remained in waiting room throughout tx sx;    Physical Observation: Patient into clinic with +LLE AFO and LBQC; minor LOB upon ambulating to tx area, able to self-correct; +Flexor tone/synergy pattern in LUE; +Biceps tightness; +arthritic joint changes B hands  Edema: +Mild L hand  Sensory: +Impaired   Numbness/Tingling: Denies       Treatment:    Therapeutic Exercise:  UBE seated without resistance; use of ace wrap to maintain L hand positioning/ on handle throughout; 6 minutes; 3 FW/3 BW.  Therapist implemented supine ther ex with unweighted dowel jose and use of ace wrap to maintain L hand positioning/ throughout performance: chest press, straight arm raises, straight arm pulldowns with peach theraband; 10 reps 3 sets each.  Therapist facilitated supine scapular protraction/retraction and CW/CCW stabilization LUE with hands-on assistance due to weakness throughout LUE for ther ex completion; 10 reps x 3 sets each.  Therapist implemented seated biceps curls with dowel jose and use of ace wrap to " maintain L hand positioning/ throughout; 10 reps x 3 sets.  Therapist educated patient and her granddaughter on proper set-up and use of personal TEN's machine including placement of electrodes, lead placement, and device set-up for proper programming, duration, and intensity. Written handout provided for reference; verbal understanding per both patient and granddaughter (Surekha).      Post-tx pain: 2/10 L shoulder    Assessment/Plan Patient is making steady progress with skilled OT intervention at this time; will continue to advance intervention in order to enhance functional use of LUE and improved quality of life.       OP EDUCATION:  Education  Individual(s) Educated: Patient (Aminata)  Home Program:  (Use of personal TENs unit)    Goals:  Active       OT Goals       1. Patient will increase LUE shoulder AROM to 130 degrees flexion and abduction for improved functional use of LUE. (Progressing)       Start:  03/03/25    Expected End:  04/11/25            2. Patient will increase LUE strength at all planes to at least 4/5 for improved functional use of LUE. (Progressing)       Start:  03/03/25    Expected End:  04/11/25            3. Patient will achieve full grasp/release of L hand for improved ability to grasp/carry ADL objects with L hand. (Progressing)       Start:  03/03/25    Expected End:  04/11/25            4. Patient will increase L hand  strength by at least 15# for improved gripping with L hand during ADL's. (Progressing)       Start:  03/03/25    Expected End:  04/11/25            5. Patient will increase increase coordination in L hand AEB ability to move at least 10 blocks with B&B standardized testing. (Progressing)       Start:  03/03/25    Expected End:  04/11/25            6. Patient will complete UB dressing and hygiene/grooming tasks with Mod I. (Met)       Start:  03/03/25    Expected End:  05/23/25    Resolved:  03/13/25         7. Patient will complete LB dressing tasks  with Juan Daniel TIRADO (Progressing)       Start:  03/03/25    Expected End:  05/23/25                8. Patient will increase overall ease and independence AEB UEFI score of at least 50/80 for improved quality of life for patient. (Progressing)       Start:  03/03/25    Expected End:  05/23/25

## 2025-03-21 NOTE — PROGRESS NOTES
"Physical Therapy Treatment    Patient Name: Cynthia Muse  MRN: [id number]  Encounter date: 3/21/2025    Time Calculation  Start Time: 0945  Stop Time: 1027  Time Calculation (min): 42 min  PT Therapeutic Procedures Time Entry  Neuromuscular Re-Education Time Entry: 10  Therapeutic Exercise Time Entry: 22  Gait Training Time Entry: 10    Visit # 6 of 16  Visits/Dates Authorized: D - PT - 1) [employer] - NO AUTH / $350 DED MET / 80% coins / $10,000 OOP not met / 40V MAX pt alone - 1 used / ds 2/14/25.   CPT 69197 not covered.  2) MEDICARE A/B - NO AUTH / $257 DEDUCT not met / $0 used PT/ST / [place] visits / Per RTE.     Current Problem:   Problem List Items Addressed This Visit             ICD-10-CM    Ischemic cerebrovascular accident (CVA) (Multi) I63.9    Left hemiparesis (Multi) G81.94           Precautions:    falls       Subjective   General:    Patient states feeling good, HEP is going well. Eager to continue working with therapy.     Pre-Treatment Symptoms:   Pain Assessment: 0-10  0-10 (Numeric) Pain Score: 2  Pain Location: Shoulder  Pain Orientation: Left    Objective   Findings:   Difficulty getting out of chair in wait room. LOB but self corrected    Ambulates with AFO on L LE , and with quad cane             Treatments:      Therapeutic Exercise 05119:   Nustep L5 x4 min LE only , seat 6  HSC DL 50-60# 2x12 , SL 30# 2x12  LAQ 5# 2x15 R/L   Shuttle leg press DL 50-# 3x12 , SL 37.5# 2x12 R/L  or leg press #30   DKTC Tball 2x15  Iso glute bridge Tball 5\" 2x10  Marches in place with UE support 1.5# ankle weight 2x15 R/L     Deferred:  STS 2x10 no UE support   Tloop side stepping pink 2 laps  Tloop lean on counter hip extension pink 1x10  DNP    Neuromuscular Re-Ed 98003:   // bars:  Foam pad Romberg (EO/EC) x30\" each, semitandem (EO/EC) 2x30\" each lead foot  Foam pad tandem stance EO x1 min each lead foot (freq loss balance w/ R lead foot)      Deferred:   6\" mann single step over 2.5# ankle weight 1x10 " "R/L   Forward walk 2.5# ankle weight  4 laps (cued \"bigger steps\")  Backward walk 2.5# ankle weight 4 laps (cued \"bigger steps\")  Side stepping 2.5# ankle weight 4 laps (cued to not drag feet)   Staggered stance on blue travon pads EO/EC 30\"x1 ea R/L  nods and nos  NBOS firm/foam EC 30\" x 1     Gait training 30916: DNP  Ambulating 240 ft SPC look L/R x120 ft, up/down x120ft (verbal cues for inc gait speed, larger L step length)   Ambulating w/o SPC 240ft L/R x60ft, up/down x60 ft (verbal cues for inc gait sped, larger L step length)     Deferred:  Ladder mat reciprocal stepping , with cane 3 laps , without cane 3 laps (min assist for LOB)  High intensity gait training in par bars for safety with frequent cuing to improve step height, length, while continuing a safe gait speed.    HEP / Access Codes:   URL: [email or web address]  Access Code: [email or web address]   Date: 02/28/2025  - Narrow Stance with Counter Support  - 2 x daily - 7 x weekly - 3 reps - 20 hold    Access Code: [email or web address]  Date: 03/03/2025  - Side Stepping with Resistance at Ankles and Counter Support  - 1-2 x daily - 15-20 ft x 4  distance  - Standing Hip Extension with Resistance at Ankles and Counter Support  - 1-2 x daily - 2 sets - 10 reps  - Sit to Stand Without Arm Support  - 1-2 x daily - 2 sets - 10 reps    Assessment:    Session focused on continued LE strengthening, balance training, and gait training for larger stride lengths. Continues to demonstrate dec L step length, impaired foot clearance, and Impaired heel strike. Require mod verbal cueing for improved step length, L foot clearance. Continue to encourage inc nelson, step length, and foot clearance. Tolerated session well, no inc in pain reported following session.       Post-Treatment Symptoms:   Response to Interventions: No change in pain    Plan:    Continue to progress strength and balance    Goals:   Active       PT Problem       PT Goal 1       Start:  02/28/25    Expected End:  " 03/28/25       Pt to improve balance with improved modified tandem stance to 30 sec with eyes closed to facilitate zero incidents of falling since starting therapy.         PT Goal 2       Start:  02/28/25    Expected End:  05/09/25       Pt to improve LE strength of left side to 4+/5 or greater.         PT Goal 3       Start:  02/28/25    Expected End:  05/09/25            PT Goal 4       Start:  02/28/25    Expected End:  05/09/25       Pt able to ascend and descend x 12 steps with reciprocal pattern and railing for assistance to facilitate return to community outgoing.         Patient Stated Goal 1       Start:  02/28/25    Expected End:  05/09/25       Pt to be able to walk without quad cane or assistive device.         Patient Stated Goal 2       Start:  02/28/25    Expected End:  05/09/25       Pt would like to get in and out of bed (soft mattress) without having to use grab bar.

## 2025-03-24 ENCOUNTER — TREATMENT (OUTPATIENT)
Dept: OCCUPATIONAL THERAPY | Facility: CLINIC | Age: 80
End: 2025-03-24
Payer: COMMERCIAL

## 2025-03-24 ENCOUNTER — TREATMENT (OUTPATIENT)
Dept: PHYSICAL THERAPY | Facility: CLINIC | Age: 80
End: 2025-03-24
Payer: COMMERCIAL

## 2025-03-24 DIAGNOSIS — I63.9 ISCHEMIC CEREBROVASCULAR ACCIDENT (CVA) (MULTI): ICD-10-CM

## 2025-03-24 DIAGNOSIS — G81.94 LEFT HEMIPARESIS (MULTI): ICD-10-CM

## 2025-03-24 PROCEDURE — 97110 THERAPEUTIC EXERCISES: CPT | Mod: GO | Performed by: OCCUPATIONAL THERAPIST

## 2025-03-24 PROCEDURE — 97110 THERAPEUTIC EXERCISES: CPT | Mod: GP

## 2025-03-24 PROCEDURE — 97530 THERAPEUTIC ACTIVITIES: CPT | Mod: GO | Performed by: OCCUPATIONAL THERAPIST

## 2025-03-24 ASSESSMENT — PAIN SCALES - GENERAL
PAINLEVEL_OUTOF10: 2
PAINLEVEL_OUTOF10: 2

## 2025-03-24 ASSESSMENT — PAIN - FUNCTIONAL ASSESSMENT
PAIN_FUNCTIONAL_ASSESSMENT: 0-10
PAIN_FUNCTIONAL_ASSESSMENT: 0-10

## 2025-03-24 NOTE — PROGRESS NOTES
"Occupational Therapy Treatment  Patient Name: Cynthia Muse  MRN: [id number]  OT Received on: 3/24/2025        Time Calculation  Start Time: 1045  Stop Time: 1130  Time Calculation (min): 45 min  OT Therapeutic Procedures Time Entry  Therapeutic Activity Time Entry: 15  Therapeutic Exercise Time Entry: 30    Insurance:  Visit Number: 6 of 32  Insurance Type: [employer]; 40 visits/yr      Subjective   Problem List Items Addressed This Visit             ICD-10-CM    Ischemic cerebrovascular accident (CVA) (Multi) I63.9    Left hemiparesis (Multi) G81.94     Current Problem/Reason for visit:  +CVA s/p TNK with L hemiparesis     Referred by: Dr. Izquierdo    Patient reports \"I didn't have anyone around this weekend to help me with the electrical simulation, but we are hoping to get that done this afternoon.\"    Precautions: +L Hemiparesis; +Fall Risk    Performing HEP?: Yes    Pain:  Pain Assessment  Pain Assessment: 0-10  0-10 (Numeric) Pain Score: 2  Pain Location: Shoulder  Pain Orientation: Left  \"There is always some pain there.\"    Objective   OT tx sx following PT this date. \"They really worked me hard.\"    Physical Observation: Patient into clinic with +LLE AFO and LBQC; minor LOB upon ambulating to tx area, able to self-correct; +Flexor tone/synergy pattern in LUE; +Biceps tightness; +arthritic joint changes B hands  Edema: +Mild L hand  Sensory: +Impaired   Numbness/Tingling: Denies       Treatment:    Therapeutic Exercise:  UBE seated without resistance with use of ace wrap to maintain L hand positioning on handle x6 minutes; 3 FW/3 BW with short rest between directions.  Therapist facilitated L wrist extension with FA positioned on foam wedge; 10 reps x 3 sets.  Therapist facilitated L FA sup/pro while holding juggling pin at upper handle; use of ace wrap to maintain grasp on object; 10 reps x 3 sets.  L hand gripper for grasp/release; shorted AROM; 1 yellow band resistance; 10 reps x 3 sets; much effort; " hands-on assist to maintain neutral FA and wrist extension throughout performance.  Therapist facilitated scapular retraction with dowel jose and orange theraband; ace wrap for L hand ; 10 reps x 3 sets.  Therapist implemented LUE rows with vertical dowel jose and orange theraband; 10 reps x 3 sets.    Therapeutic Activity:  Integrated all above skills into functional reaching with object retrieval and placement in order to promote functional use of LUE and movement throughout functional patterns; 10 cones to/from stationary targets; hands-on assistance to promote neutral FA and wrist extension throughout, initially support proximally to maintain directionality of LUE movement, and hands-on assist to facilitate open hand in and placement on cone; with progression improved proximal control and volitional grasp/release of L objects with L hand; 3 trials.      Post-tx pain: 2/10    Assessment/Plan Patient is making consistent and steady progress with skilled OT intervention at this time; anticipate continued improvement and functional use of LUE with ongoing tx to address strength, ROM, and refine motor coordination.      OP EDUCATION:  Education  Individual(s) Educated: Patient  Home Program: AROM, Strengthening (motor planning, motor coordination)    Goals:  Active       OT Goals       1. Patient will increase LUE shoulder AROM to 130 degrees flexion and abduction for improved functional use of LUE. (Progressing)       Start:  03/03/25    Expected End:  04/11/25            2. Patient will increase LUE strength at all planes to at least 4/5 for improved functional use of LUE. (Progressing)       Start:  03/03/25    Expected End:  04/11/25            3. Patient will achieve full grasp/release of L hand for improved ability to grasp/carry ADL objects with L hand. (Progressing)       Start:  03/03/25    Expected End:  04/11/25            4. Patient will increase L hand  strength by at least 15# for improved  gripping with L hand during ADL's. (Progressing)       Start:  03/03/25    Expected End:  04/11/25            5. Patient will increase increase coordination in L hand AEB ability to move at least 10 blocks with B&B standardized testing. (Progressing)       Start:  03/03/25    Expected End:  04/11/25            6. Patient will complete UB dressing and hygiene/grooming tasks with Mod I. (Met)       Start:  03/03/25    Expected End:  05/23/25    Resolved:  03/13/25         7. Patient will complete LB dressing tasks with Min A. (Progressing)       Start:  03/03/25    Expected End:  05/23/25                8. Patient will increase overall ease and independence AEB UEFI score of at least 50/80 for improved quality of life for patient. (Progressing)       Start:  03/03/25    Expected End:  05/23/25

## 2025-03-24 NOTE — PROGRESS NOTES
"Physical Therapy Treatment    Patient Name: Cynthia Muse  MRN: [id number]  Encounter date: 3/24/2025    Time Calculation  Start Time: 0945  Stop Time: 1026  Time Calculation (min): 41 min  PT Therapeutic Procedures Time Entry  Therapeutic Exercise Time Entry: 41    Visit # 7 of 16  Visits/Dates Authorized: D - PT - 1) [employer] - NO AUTH / $350 DED MET / 80% coins / $10,000 OOP not met / 40V MAX pt alone - 1 used / ds 2/14/25.   CPT 22869 not covered.  2) MEDICARE A/B - NO AUTH / $257 DEDUCT not met / $0 used PT/ST / MN visits / Per RTE.     Current Problem:   Problem List Items Addressed This Visit             ICD-10-CM    Ischemic cerebrovascular accident (CVA) (Multi) I63.9    Left hemiparesis (Multi) G81.94         Precautions:    falls       Subjective   General:    Patient states feeling good, HEP is going well. Eager to continue working with therapy.     Pre-Treatment Symptoms:   Pain Assessment: 0-10  0-10 (Numeric) Pain Score: 2  Pain Location: Shoulder  Pain Orientation: Left    Objective   Findings:   Difficulty getting out of chair in wait room. LOB but self corrected    Ambulates with AFO on L LE , and with quad cane             Treatments:      Therapeutic Exercise 78229:   Nustep L5 x4 min LE only , seat 6  Resisted fwd/bwd walking 5# x3   Resisted L/R sidesteps 5# x2   Shuttle leg press DL 59--137# x12 , SL 37.5# 2x12 R/L  or leg press #30   HSC DL 50-60# 2x12 , SL 30# 2x15  Quad iso at 90 deg knee flex 5\" 2x10   HL hip add 5\" 2x15  HL hip abd (light TB) 2x15     Deferred:  STS 2x10 no UE support   Tloop side stepping pink 2 laps  Tloop lean on counter hip extension pink 1x10  DNP    Neuromuscular Re-Ed 07188:     Deferred:   Foam pad Romberg (EO/EC) x30\" each, semitandem (EO/EC) 2x30\" each lead foot  Foam pad tandem stance EO x1 min each lead foot (freq loss balance w/ R lead foot)    6\" mann single step over 2.5# ankle weight 1x10 R/L   Forward walk 2.5# ankle weight  4 laps (cued \"bigger " "steps\")  Backward walk 2.5# ankle weight 4 laps (cued \"bigger steps\")  Side stepping 2.5# ankle weight 4 laps (cued to not drag feet)   Staggered stance on blue travon pads EO/EC 30\"x1 ea R/L  nods and nos  NBOS firm/foam EC 30\" x 1     Gait training 81557:     Deferred:  Ambulating 240 ft SPC look L/R x120 ft, up/down x120ft (verbal cues for inc gait speed, larger L step length)   Ambulating w/o SPC 240ft L/R x60ft, up/down x60 ft (verbal cues for inc gait sped, larger L step length)   Ladder mat reciprocal stepping , with cane 3 laps , without cane 3 laps (min assist for LOB)  High intensity gait training in par bars for safety with frequent cuing to improve step height, length, while continuing a safe gait speed.    HEP / Access Codes:   URL: [email or web address]  Access Code: [email or web address]   Date: 02/28/2025  - Narrow Stance with Counter Support  - 2 x daily - 7 x weekly - 3 reps - 20 hold    Access Code: [email or web address]  Date: 03/03/2025  - Side Stepping with Resistance at Ankles and Counter Support  - 1-2 x daily - 15-20 ft x 4  distance  - Standing Hip Extension with Resistance at Ankles and Counter Support  - 1-2 x daily - 2 sets - 10 reps  - Sit to Stand Without Arm Support  - 1-2 x daily - 2 sets - 10 reps    Assessment:    Session focused on LE strengthening. Demonstrates impaired strength on L LE, fatigues quickly with unilateral exercises. Continue to incorporate single leg strengthening for progressions. Overall tolerated session well, reported no inc in pain following.       Post-Treatment Symptoms:   Response to Interventions: No change in pain    Plan:    Continue to progress strength and balance    Goals:   Active       PT Problem       PT Goal 1       Start:  02/28/25    Expected End:  03/28/25       Pt to improve balance with improved modified tandem stance to 30 sec with eyes closed to facilitate zero incidents of falling since starting therapy.         PT Goal 2       Start:  02/28/25    Expected " End:  05/09/25       Pt to improve LE strength of left side to 4+/5 or greater.         PT Goal 3       Start:  02/28/25    Expected End:  05/09/25            PT Goal 4       Start:  02/28/25    Expected End:  05/09/25       Pt able to ascend and descend x 12 steps with reciprocal pattern and railing for assistance to facilitate return to community outgoing.         Patient Stated Goal 1       Start:  02/28/25    Expected End:  05/09/25       Pt to be able to walk without quad cane or assistive device.         Patient Stated Goal 2       Start:  02/28/25    Expected End:  05/09/25       Pt would like to get in and out of bed (soft mattress) without having to use grab bar.

## 2025-03-26 ENCOUNTER — TREATMENT (OUTPATIENT)
Dept: OCCUPATIONAL THERAPY | Facility: CLINIC | Age: 80
End: 2025-03-26
Payer: COMMERCIAL

## 2025-03-26 ENCOUNTER — TREATMENT (OUTPATIENT)
Dept: PHYSICAL THERAPY | Facility: CLINIC | Age: 80
End: 2025-03-26
Payer: COMMERCIAL

## 2025-03-26 DIAGNOSIS — G81.94 LEFT HEMIPARESIS (MULTI): ICD-10-CM

## 2025-03-26 DIAGNOSIS — I63.9 ISCHEMIC CEREBROVASCULAR ACCIDENT (CVA) (MULTI): ICD-10-CM

## 2025-03-26 PROCEDURE — 97112 NEUROMUSCULAR REEDUCATION: CPT | Mod: GP,CQ

## 2025-03-26 PROCEDURE — 97110 THERAPEUTIC EXERCISES: CPT | Mod: GP,CQ

## 2025-03-26 PROCEDURE — 97110 THERAPEUTIC EXERCISES: CPT | Mod: GO | Performed by: OCCUPATIONAL THERAPIST

## 2025-03-26 ASSESSMENT — PAIN SCALES - GENERAL
PAINLEVEL_OUTOF10: 2
PAINLEVEL_OUTOF10: 2

## 2025-03-26 ASSESSMENT — PAIN - FUNCTIONAL ASSESSMENT
PAIN_FUNCTIONAL_ASSESSMENT: 0-10
PAIN_FUNCTIONAL_ASSESSMENT: 0-10

## 2025-03-26 NOTE — PROGRESS NOTES
"Occupational Therapy Treatment  Patient Name: Cynthia Muse  MRN: [id number]  OT Received on: 3/26/2025        Time Calculation  Start Time: 1045  Stop Time: 1130  Time Calculation (min): 45 min  OT Therapeutic Procedures Time Entry  Therapeutic Exercise Time Entry: 45    Insurance:  Visit Number: 7 of 32  Insurance Type: [employer]; 40 visits/yr      Subjective   Problem List Items Addressed This Visit             ICD-10-CM    Ischemic cerebrovascular accident (CVA) (Multi) I63.9    Left hemiparesis (Multi) G81.94     Current Problem/Reason for visit:  +CVA s/p TNK with L hemiparesis     Referred by: Dr. Izquierdo    Patient reports \"I used my electrical stimulation unit for 15 minutes the other night; my fingers were really moving!\"    Precautions: +L Hemiparesis; +Fall Risk    Performing HEP?: Yes    Pain:  Pain Assessment  Pain Assessment: 0-10  0-10 (Numeric) Pain Score: 2  Pain Location:  (\"Biceps area\")  Pain Orientation: Left    Objective     Physical Observation: Patient into clinic with +LLE AFO and LBQC; minor LOB upon ambulating to tx area, able to self-correct; +Flexor tone/synergy pattern in LUE; +Biceps tightness; +arthritic joint changes B hands  Edema: +Mild L hand  Sensory: +Impaired   Numbness/Tingling: Denies       Treatment:    Therapeutic Exercise:  Seated scapular elevation/depression and protraction/retraction x15 reps x 3 sets; hands-on facilitation for full AROM of scapula with performance.  Seated chest press with unweighted dowel jose; 15 reps x 3 sets.  Scapular retraction with dowel jose and orange theraband; +ace wrap to maintain L hand ; 15 reps x 3 sets.  Straight arm raises with unweighted dowel jose; 15 reps x 3 sets; +ace wrap L hand; hands-on assist to perform with proper form/tech and reduce compensatory movement.  Standing at countertop rollerboard for L shoulder flexion/extension, abduction/adduction, and lateral movements to stationary targets x10 reps each x 3 sets; hands-on " facilitation to reduce compensatory body movement and maintain appropriate directionality of movement.  Seated biceps curls with 1# wrist wrap x15 reps x 3 sets; hands-on assist to provide distal support and maintain directionality of movement.  L wrist extension with 1# wrap weight at L hand x15 reps x 3 sets; hands-on assist to isolate movement of L wrist throughout performance.   Therapist educated patient on use of BTE machine for functional strengthening; tools used this date include:  Gripper (resist 0)  Ergometer (resist 3)  Long lever (resist 15)  Handle (resist 4)  All tools x 2 charts  Hands-on assist to maintain L hand  throughout    Post-tx pain: Unchanged L biceps; 2/10    Assessment/Plan Patient continues to make steady progress with skilled OT intervention; showing good tolerance for all ther ex this date; will continue to address ROM, weakness, and coordination deficits to enhance functional use of LUE and improve quality of life for patient.      OP EDUCATION:  Education  Individual(s) Educated: Patient  Home Program: AROM, AAROM, Strengthening    Goals:  Active       OT Goals       1. Patient will increase LUE shoulder AROM to 130 degrees flexion and abduction for improved functional use of LUE. (Progressing)       Start:  03/03/25    Expected End:  04/11/25            2. Patient will increase LUE strength at all planes to at least 4/5 for improved functional use of LUE. (Progressing)       Start:  03/03/25    Expected End:  04/11/25            3. Patient will achieve full grasp/release of L hand for improved ability to grasp/carry ADL objects with L hand. (Progressing)       Start:  03/03/25    Expected End:  04/11/25            4. Patient will increase L hand  strength by at least 15# for improved gripping with L hand during ADL's. (Progressing)       Start:  03/03/25    Expected End:  04/11/25            5. Patient will increase increase coordination in L hand AEB ability to move at  least 10 blocks with B&B standardized testing. (Progressing)       Start:  03/03/25    Expected End:  04/11/25            6. Patient will complete UB dressing and hygiene/grooming tasks with Mod I. (Met)       Start:  03/03/25    Expected End:  05/23/25    Resolved:  03/13/25         7. Patient will complete LB dressing tasks with Min A. (Progressing)       Start:  03/03/25    Expected End:  05/23/25                8. Patient will increase overall ease and independence AEB UEFI score of at least 50/80 for improved quality of life for patient. (Progressing)       Start:  03/03/25    Expected End:  05/23/25

## 2025-03-26 NOTE — PROGRESS NOTES
"Physical Therapy Treatment    Patient Name: Cynthia Muse  MRN: [id number]  Encounter date: 3/26/2025    Time Calculation  Start Time: 1159  Stop Time: 1243  Time Calculation (min): 44 min  PT Therapeutic Procedures Time Entry  Neuromuscular Re-Education Time Entry: 10  Therapeutic Exercise Time Entry: 29  Gait Training Time Entry: 5    Visit # 8 of 16  Visits/Dates Authorized: D - PT - 1) [employer] - NO AUTH / $350 DED MET / 80% coins / $10,000 OOP not met / 40V MAX pt alone - 1 used / ds 2/14/25.   CPT 14360 not covered.  2) MEDICARE A/B - NO AUTH / $257 DEDUCT not met / $0 used PT/ST / [place] visits / Per RTE.     Current Problem:   Problem List Items Addressed This Visit             ICD-10-CM    Ischemic cerebrovascular accident (CVA) (Multi) I63.9    Left hemiparesis (Multi) G81.94         Precautions:    falls       Subjective   General:    Patient states she has no pain today. Patient reports she is noticing strength improvements in legs. OT is going well, L UE is progressing. HEP going well.     Pre-Treatment Symptoms:   Pain Assessment: 0-10  0-10 (Numeric) Pain Score: 2  Pain Location: Shoulder  Pain Orientation: Left    Objective   Findings:   Difficulty getting out of chair in wait room. LOB but self corrected    Ambulates with AFO on L LE , and with quad cane             Treatments:      Therapeutic Exercise 61920:   Nustep L5 x6 min LE only , seat 6  Resisted fwd/bwd walking 5# x3   Resisted L/R sidesteps 5# x2   HSC DL 50-60# 2x12 , SL 30# 2x15  Shuttle leg press DL 66--137# 1x12 ea , SL 37.5# 2x12 R/L      Deferred:  Quad iso at 90 deg knee flex 5\" 2x10   HL hip add 5\" 2x15  HL hip abd (light TB) 2x15   leg press #30   STS 2x10 no UE support   Tloop side stepping pink 2 laps  Tloop lean on counter hip extension pink 1x10      Neuromuscular Re-Ed 07283:   Firm/foam tandem stance EO 2 x30 sec each lead foot (freq loss balance w/ R lead foot)   Step taps to 5\" alternating from foam 2x10 R/L (cues for " "upright posture)    Deferred:   Foam pad Romberg (EO/EC) x30\" each, semitandem (EO/EC) 2x30\" each lead foot  6\" mann single step over 2.5# ankle weight 1x10 R/L   Forward walk 2.5# ankle weight  4 laps (cued \"bigger steps\")  Backward walk 2.5# ankle weight 4 laps (cued \"bigger steps\")  Side stepping 2.5# ankle weight 4 laps (cued to not drag feet)   Staggered stance on blue travon pads EO/EC 30\"x1 ea R/L  nods and nos  NBOS firm/foam EC 30\" x 1     Gait training 25726:   Ladder mat reciprocal stepping , with cane 3 laps , without cane 3 laps (min assist for LOB)    Deferred:  Ambulating 240 ft SPC look L/R x120 ft, up/down x120ft (verbal cues for inc gait speed, larger L step length)   Ambulating w/o SPC 240ft L/R x60ft, up/down x60 ft (verbal cues for inc gait sped, larger L step length)   High intensity gait training in par bars for safety with frequent cuing to improve step height, length, while continuing a safe gait speed.    HEP / Access Codes:   URL: [email or web address]  Access Code: [email or web address]   Date: 02/28/2025  - Narrow Stance with Counter Support  - 2 x daily - 7 x weekly - 3 reps - 20 hold    Access Code: [email or web address]  Date: 03/03/2025  - Side Stepping with Resistance at Ankles and Counter Support  - 1-2 x daily - 15-20 ft x 4  distance  - Standing Hip Extension with Resistance at Ankles and Counter Support  - 1-2 x daily - 2 sets - 10 reps  - Sit to Stand Without Arm Support  - 1-2 x daily - 2 sets - 10 reps    Assessment:    Patient did well with all exercises today, no complaints throughout. Patient challenged with lateral resisted stepping, fatigues quickly. Patient has moderate LOB with tandem stance on compliant surfaces, but is able to improve with reps.     Post-Treatment Symptoms:   Response to Interventions: No change in pain (tired)    Plan:    Continue to progress strength and balance    Goals:   Active       PT Problem       PT Goal 1       Start:  02/28/25    Expected End:  03/28/25    "    Pt to improve balance with improved modified tandem stance to 30 sec with eyes closed to facilitate zero incidents of falling since starting therapy.         PT Goal 2       Start:  02/28/25    Expected End:  05/09/25       Pt to improve LE strength of left side to 4+/5 or greater.         PT Goal 3       Start:  02/28/25    Expected End:  05/09/25            PT Goal 4       Start:  02/28/25    Expected End:  05/09/25       Pt able to ascend and descend x 12 steps with reciprocal pattern and railing for assistance to facilitate return to community outgoing.         Patient Stated Goal 1       Start:  02/28/25    Expected End:  05/09/25       Pt to be able to walk without quad cane or assistive device.         Patient Stated Goal 2       Start:  02/28/25    Expected End:  05/09/25       Pt would like to get in and out of bed (soft mattress) without having to use grab bar.

## 2025-03-27 ENCOUNTER — APPOINTMENT (OUTPATIENT)
Dept: OCCUPATIONAL THERAPY | Facility: CLINIC | Age: 80
End: 2025-03-27
Payer: COMMERCIAL

## 2025-03-31 ENCOUNTER — TREATMENT (OUTPATIENT)
Dept: PHYSICAL THERAPY | Facility: CLINIC | Age: 80
End: 2025-03-31
Payer: MEDICARE

## 2025-03-31 ENCOUNTER — TREATMENT (OUTPATIENT)
Dept: OCCUPATIONAL THERAPY | Facility: CLINIC | Age: 80
End: 2025-03-31
Payer: COMMERCIAL

## 2025-03-31 DIAGNOSIS — G81.94 LEFT HEMIPARESIS (MULTI): ICD-10-CM

## 2025-03-31 DIAGNOSIS — I63.9 ISCHEMIC CEREBROVASCULAR ACCIDENT (CVA) (MULTI): ICD-10-CM

## 2025-03-31 PROCEDURE — 97110 THERAPEUTIC EXERCISES: CPT | Mod: GO | Performed by: OCCUPATIONAL THERAPIST

## 2025-03-31 PROCEDURE — 97112 NEUROMUSCULAR REEDUCATION: CPT | Mod: GO | Performed by: OCCUPATIONAL THERAPIST

## 2025-03-31 PROCEDURE — 97110 THERAPEUTIC EXERCISES: CPT | Mod: GP,CQ

## 2025-03-31 PROCEDURE — 97112 NEUROMUSCULAR REEDUCATION: CPT | Mod: GP,CQ

## 2025-03-31 ASSESSMENT — PAIN SCALES - GENERAL
PAINLEVEL_OUTOF10: 1
PAINLEVEL_OUTOF10: 0 - NO PAIN

## 2025-03-31 ASSESSMENT — PAIN - FUNCTIONAL ASSESSMENT
PAIN_FUNCTIONAL_ASSESSMENT: 0-10
PAIN_FUNCTIONAL_ASSESSMENT: 0-10

## 2025-03-31 NOTE — PROGRESS NOTES
"Occupational Therapy Treatment  Patient Name: Cynthia Muse  MRN: [id number]  OT Received on: 3/31/2025        Time Calculation  Start Time: 1045  Stop Time: 1130  Time Calculation (min): 45 min  OT Therapeutic Procedures Time Entry  Neuromuscular Re-Education Time Entry: 20  Therapeutic Exercise Time Entry: 25    Insurance:  Visit Number: 8 of 32  Insurance Type: [employer]; 40 visits/yr      Subjective   Problem List Items Addressed This Visit             ICD-10-CM    Ischemic cerebrovascular accident (CVA) (Multi) I63.9    Left hemiparesis (Multi) G81.94     Current Problem/Reason for visit:  +CVA s/p TNK with L hemiparesis     Referred by: Dr. Izquierdo    Patient reports \"My biceps is feeling tight, but otherwise I feel good.\"    Precautions: +L Hemiparesis; +Fall Risk    Performing HEP?: Yes    Pain:  Pain Assessment  Pain Assessment: 0-10  0-10 (Numeric) Pain Score: 1  Pain Orientation: Left    Objective     Physical Observation: Patient into clinic with +LLE AFO and LBQC; minor LOB upon ambulating to tx area, able to self-correct; +Flexor tone/synergy pattern in LUE; +Biceps tightness; +arthritic joint changes B hands  Edema: +Mild L hand  Sensory: +Impaired   Numbness/Tingling: Denies       Treatment:    Therapeutic Exercise:  Therapist facilitated supine short arc shoulder flexion with LUE x10 reps x 3 sets; hands-on facilitation throughout for directionality of LUE due to gross weakness.  Therapist implemented seated ther ex with unweighted dowel and orange theraband for scapular retraction, straight arm pulldowns, and biceps curls; 10 reps x 3 sets each; use of ace wrap to maintain L hand grasp on dowel jose.    Neuromuscular Re-education:  Therapist implemented seated weight bearing through LUE with extended digits, wrist, and elbow; Fair tolerance; 10 reps x 3 sets; hands-on assistance to maintain positioning throughout.  Therapist implemented seated bean bag task to promote L hand placement on bag, grasp, " and release into floor level bucket; increased time allowance to complete; verbal prompting and hands-on facilitation for proper hand placement and movement through normalized movement patterns; 15 bags x 2 sets.    Post-tx pain: 0/10    Assessment/Plan Patient is making steady progress at this time with skilled OT intervention; increasing LUE strength, and emerging gross motor skills noted; will continue to advance intervention to enhance functional use of LUE.      OP EDUCATION:  Education  Individual(s) Educated: Patient  Home Program: AROM, AAROM, PROM, Strengthening    Goals:  Active       OT Goals       1. Patient will increase LUE shoulder AROM to 130 degrees flexion and abduction for improved functional use of LUE. (Progressing)       Start:  03/03/25    Expected End:  04/11/25            2. Patient will increase LUE strength at all planes to at least 4/5 for improved functional use of LUE. (Progressing)       Start:  03/03/25    Expected End:  04/11/25            3. Patient will achieve full grasp/release of L hand for improved ability to grasp/carry ADL objects with L hand. (Progressing)       Start:  03/03/25    Expected End:  04/11/25            4. Patient will increase L hand  strength by at least 15# for improved gripping with L hand during ADL's. (Progressing)       Start:  03/03/25    Expected End:  04/11/25            5. Patient will increase increase coordination in L hand AEB ability to move at least 10 blocks with B&B standardized testing. (Progressing)       Start:  03/03/25    Expected End:  04/11/25            6. Patient will complete UB dressing and hygiene/grooming tasks with Mod I. (Met)       Start:  03/03/25    Expected End:  05/23/25    Resolved:  03/13/25         7. Patient will complete LB dressing tasks with Min A. (Progressing)       Start:  03/03/25    Expected End:  05/23/25                8. Patient will increase overall ease and independence AEB UEFI score of at least 50/80  for improved quality of life for patient. (Progressing)       Start:  03/03/25    Expected End:  05/23/25

## 2025-03-31 NOTE — PROGRESS NOTES
"Physical Therapy Treatment    Patient Name: Cynthia Muse  MRN: [id number]  Encounter date: 3/31/2025    Time Calculation  Start Time: 1158  Stop Time: 1242  Time Calculation (min): 44 min  PT Therapeutic Procedures Time Entry  Neuromuscular Re-Education Time Entry: 15  Therapeutic Exercise Time Entry: 21  Gait Training Time Entry: 4    Visit # 9 of 16  Visits/Dates Authorized: D - PT - 1) [employer] - NO AUTH / $350 DED MET / 80% coins / $10,000 OOP not met / 40V MAX pt alone - 1 used / ds 2/14/25.   CPT 26055 not covered.  2) MEDICARE A/B - NO AUTH / $257 DEDUCT not met / $0 used PT/ST / [place] visits / Per RTE.     Current Problem:   Problem List Items Addressed This Visit             ICD-10-CM    Ischemic cerebrovascular accident (CVA) (Multi) I63.9    Left hemiparesis (Multi) G81.94         Precautions:    falls       Subjective   General:    Patient states she has no pain today. Patient reports she is noticing strength improvements in legs. OT is going well, L UE is progressing. HEP going well.     Pre-Treatment Symptoms:   Pain Assessment: 0-10  0-10 (Numeric) Pain Score: 0 - No pain    Objective   Findings:   Difficulty getting out of chair in wait room. LOB but self corrected    Ambulates with AFO on L LE , and with quad cane             Treatments:      Therapeutic Exercise 03732:   Nustep L5 x6 min LE only , seat 6  Tloop side stepping lime green 2 laps , seated break after  Tloop lean on counter hip extension lime green 2x10    Tloop hip abduction at counter lime green 2x10   HSC DL 60# 2x12 , SL 30# 1x15  LAQ 5# 3x15  Step ups 7\" no UE support 1x10 L only (3 instances of LOB , therapist caught patient)     Deferred:  STS 2x10 no UE support   Shuttle leg press DL 15--137# 1x12 ea , SL 37.5# 2x12 R/L    Resisted fwd/bwd walking 5# x3   Resisted L/R sidesteps 5# x2   Quad iso at 90 deg knee flex 5\" 2x10   HL hip add 5\" 2x15  HL hip abd (light TB) 2x15   leg press #30     Neuromuscular Re-Ed 03037:   6\" " "mann single step over 1x10 L only    6\" hurdles step to 4 laps , side step 3 laps (side step most challenging)   Stepping stones (black/blue/green travon pads) - 5 laps (moderate use of handrails)     Deferred:   Firm/foam tandem stance EO 2 x30 sec each lead foot (freq loss balance w/ R lead foot)   Step taps to 5\" alternating from foam 2x10 R/L (cues for upright posture)  Foam pad Romberg (EO/EC) x30\" each, semitandem (EO/EC) 2x30\" each lead foot  Forward walk 2.5# ankle weight  4 laps (cued \"bigger steps\")  Backward walk 2.5# ankle weight 4 laps (cued \"bigger steps\")  Side stepping 2.5# ankle weight 4 laps (cued to not drag feet)   Staggered stance on blue travon pads EO/EC 30\"x1 ea R/L  nods and nos  NBOS firm/foam EC 30\" x 1     Gait training 56498:   Cone weaving without AD 3 laps, no UE support    Deferred:  Ladder mat reciprocal stepping , with cane 3 laps , without cane 3 laps (min assist for LOB)  Ambulating 240 ft SPC look L/R x120 ft, up/down x120ft (verbal cues for inc gait speed, larger L step length)   Ambulating w/o SPC 240ft L/R x60ft, up/down x60 ft (verbal cues for inc gait sped, larger L step length)   High intensity gait training in par bars for safety with frequent cuing to improve step height, length, while continuing a safe gait speed.    HEP / Access Codes:   URL: [email or web address]  Access Code: [email or web address]   Date: 02/28/2025  - Narrow Stance with Counter Support  - 2 x daily - 7 x weekly - 3 reps - 20 hold    Access Code: [email or web address]  Date: 03/03/2025  - Side Stepping with Resistance at Ankles and Counter Support  - 1-2 x daily - 15-20 ft x 4  distance  - Standing Hip Extension with Resistance at Ankles and Counter Support  - 1-2 x daily - 2 sets - 10 reps  - Sit to Stand Without Arm Support  - 1-2 x daily - 2 sets - 10 reps    Assessment:    Patient challenged with hip abduction exercises, L especially, fatigues quickly. Patient required moderate assist with steps ups without UE " support, therapist needed to catch patient 3 times, due to L LE catching on step. Patient was fatigued at end of session.     Post-Treatment Symptoms:   Response to Interventions: Other (Comment) (agustín)    Plan:    Continue to progress strength and balance    Goals:   Active       PT Problem       PT Goal 1       Start:  02/28/25    Expected End:  03/28/25       Pt to improve balance with improved modified tandem stance to 30 sec with eyes closed to facilitate zero incidents of falling since starting therapy.         PT Goal 2       Start:  02/28/25    Expected End:  05/09/25       Pt to improve LE strength of left side to 4+/5 or greater.         PT Goal 3       Start:  02/28/25    Expected End:  05/09/25            PT Goal 4       Start:  02/28/25    Expected End:  05/09/25       Pt able to ascend and descend x 12 steps with reciprocal pattern and railing for assistance to facilitate return to community outgoing.         Patient Stated Goal 1       Start:  02/28/25    Expected End:  05/09/25       Pt to be able to walk without quad cane or assistive device.         Patient Stated Goal 2       Start:  02/28/25    Expected End:  05/09/25       Pt would like to get in and out of bed (soft mattress) without having to use grab bar.

## 2025-04-01 ENCOUNTER — APPOINTMENT (OUTPATIENT)
Dept: OCCUPATIONAL THERAPY | Facility: CLINIC | Age: 80
End: 2025-04-01
Payer: MEDICARE

## 2025-04-03 ENCOUNTER — TREATMENT (OUTPATIENT)
Dept: PHYSICAL THERAPY | Facility: CLINIC | Age: 80
End: 2025-04-03
Payer: COMMERCIAL

## 2025-04-03 ENCOUNTER — TREATMENT (OUTPATIENT)
Dept: OCCUPATIONAL THERAPY | Facility: CLINIC | Age: 80
End: 2025-04-03
Payer: MEDICARE

## 2025-04-03 DIAGNOSIS — G81.94 LEFT HEMIPARESIS (MULTI): ICD-10-CM

## 2025-04-03 DIAGNOSIS — I63.9 ISCHEMIC CEREBROVASCULAR ACCIDENT (CVA) (MULTI): ICD-10-CM

## 2025-04-03 PROCEDURE — 97112 NEUROMUSCULAR REEDUCATION: CPT | Mod: GO | Performed by: OCCUPATIONAL THERAPIST

## 2025-04-03 PROCEDURE — 97110 THERAPEUTIC EXERCISES: CPT | Mod: GP,CQ

## 2025-04-03 PROCEDURE — 97112 NEUROMUSCULAR REEDUCATION: CPT | Mod: GP,CQ

## 2025-04-03 ASSESSMENT — PAIN - FUNCTIONAL ASSESSMENT
PAIN_FUNCTIONAL_ASSESSMENT: 0-10
PAIN_FUNCTIONAL_ASSESSMENT: 0-10

## 2025-04-03 ASSESSMENT — PAIN SCALES - GENERAL
PAINLEVEL_OUTOF10: 0 - NO PAIN
PAINLEVEL_OUTOF10: 1

## 2025-04-03 NOTE — PROGRESS NOTES
"Occupational Therapy Treatment  Patient Name: Cynthia Muse  MRN: [id number]  OT Received on: 4/3/2025        Time Calculation  Start Time: 0930  Stop Time: 1015  Time Calculation (min): 45 min  OT Therapeutic Procedures Time Entry  Neuromuscular Re-Education Time Entry: 45    Insurance:  Visit Number: 9 of 32  Insurance Type: [employer]; 40 visits/yr      Subjective   Problem List Items Addressed This Visit             ICD-10-CM    Ischemic cerebrovascular accident (CVA) (Multi) I63.9    Left hemiparesis (Multi) G81.94     Current Problem/Reason for visit:  +CVA s/p TNK with L hemiparesis     Referred by: Dr. Izquierdo    Patient reports \"My arm feels pretty good.\"    Precautions: +L Hemiparesis; +Fall Risk    Performing HEP?: Yes    Pain:  Pain Assessment  Pain Assessment: 0-10  0-10 (Numeric) Pain Score: 1  Pain Location:  (Biceps)  Pain Orientation: Left    Objective     Granddaughter accompanied patient to clinic this date; remained in waiting room throughout tx sx.    Physical Observation: Patient into clinic with +LLE AFO and LBQC; minor LOB upon ambulating to tx area, able to self-correct; +Flexor tone/synergy pattern in LUE; +Biceps tightness; +arthritic joint changes B hands  Edema: +Mild L hand  Sensory: +Impaired   Numbness/Tingling: Denies       Treatment:    Neuromuscular Re-education:  With L FA positioned on foam wedge, therapist implemented use of functional electrical stimulation via Bioness:  Flexors= 25 Joshua  Extensors= 45 Joshua  Thenar= 20 Joshua  Initiated neuromodulation program with repeated flexion/extension of digits x8 minutes to promote muscle stimulation and encourage movement of stagnant edema in L hand.  Functional Training Program for grasp and release; 4 seconds grasp/4 seconds release x10 minutes while coordinating L hand placement on cones, , and placement to adjacent stack throughout; verbal prompting and hands-on assist to accurately coordinate patient engagement in task wit " phase of programming.  Therapist facilitated bean bag retrieval from tabletop with L hand and placement in floor level bucket with hands-on assistance to place L hand appropriately on bag, initiate grasp and release bag appropriately; 15 bags.      Post-tx pain: 1/10 L biceps     Assessment/Plan Patient exhibits good tolerance for intervention this date; adjustments to stim settings required with Bioness use; improving proximal strength and grasp/release noted in L hand at this time; will continue to address deficits to enhance functional use of L hand/UE.      OP EDUCATION:  Education  Individual(s) Educated: Patient  Home Program: Modalities (Motor planning, motor coordination)    Goals:  Active       OT Goals       1. Patient will increase LUE shoulder AROM to 130 degrees flexion and abduction for improved functional use of LUE. (Progressing)       Start:  03/03/25    Expected End:  04/11/25            2. Patient will increase LUE strength at all planes to at least 4/5 for improved functional use of LUE. (Progressing)       Start:  03/03/25    Expected End:  04/11/25            3. Patient will achieve full grasp/release of L hand for improved ability to grasp/carry ADL objects with L hand. (Progressing)       Start:  03/03/25    Expected End:  04/11/25            4. Patient will increase L hand  strength by at least 15# for improved gripping with L hand during ADL's. (Progressing)       Start:  03/03/25    Expected End:  04/11/25            5. Patient will increase increase coordination in L hand AEB ability to move at least 10 blocks with B&B standardized testing. (Progressing)       Start:  03/03/25    Expected End:  04/11/25            6. Patient will complete UB dressing and hygiene/grooming tasks with Mod I. (Met)       Start:  03/03/25    Expected End:  05/23/25    Resolved:  03/13/25         7. Patient will complete LB dressing tasks with Min A. (Progressing)       Start:  03/03/25    Expected End:   05/23/25                8. Patient will increase overall ease and independence AEB UEFI score of at least 50/80 for improved quality of life for patient. (Progressing)       Start:  03/03/25    Expected End:  05/23/25

## 2025-04-03 NOTE — PROGRESS NOTES
"Physical Therapy Treatment  Progress Note    Patient Name: Cynthia Muse  MRN: [id number]  Encounter date: 4/3/2025    Time Calculation  Start Time: 1017  Stop Time: 1102  Time Calculation (min): 45 min  PT Therapeutic Procedures Time Entry  Neuromuscular Re-Education Time Entry: 15  Therapeutic Exercise Time Entry: 30    Visit # 10 of 16  Visits/Dates Authorized: D - PT - 1) [employer] - NO AUTH / $350 DED MET / 80% coins / $10,000 OOP not met / 40V MAX pt alone - 1 used / ds 2/14/25.   CPT 96310 not covered.  2) MEDICARE A/B - NO AUTH / $257 DEDUCT not met / $0 used PT/ST / [place] visits / Per RTE.     Current Problem:   Problem List Items Addressed This Visit             ICD-10-CM    Ischemic cerebrovascular accident (CVA) (Multi) I63.9    Left hemiparesis (Multi) G81.94         Precautions:    falls       Subjective   General:    Patient states she has noticed improvements with strength, gait, and balance since starting PT. Patient denies any falls since starting therapy. HEP is going well.    Pre-Treatment Symptoms:   Pain Assessment: 0-10  0-10 (Numeric) Pain Score: 0 - No pain    Objective   Findings:   Ambulates with AFO on L LE , and with quad cane        Timed Up and Go Test  TUG Comment: 12.9 seconds (minimal use of quad cane)  Other Measures  30 Second Sit to Stand: 12 reps (was 11 reps at eval)  Other Outcome Measures: Stroke Impact Scale: 61/80 , 76% function , 2 minute walk test 293 ft no AD      Treatments:      Therapeutic Exercise 76016:   Assessments- 30 sec sts, TUG, 2 MWT  Tloop hip abduction at counter lime green 3x10   Step up and over 1x5 R/L , no UE support    Deferred:  Nustep L6 x6 min LE only , seat 6  Tloop side stepping lime green 2 laps , seated break after  Tloop lean on counter hip extension lime green 2x10    HSC DL 60# 2x12 , SL 30# 1x15  LAQ 5# 3x15  Step ups 7\" no UE support 1x10 L only (3 instances of LOB , therapist caught patient)   STS 2x10 no UE support   Shuttle leg press DL " "40--137# 1x12 ea , SL 37.5# 2x12 R/L    Resisted fwd/bwd walking 5# x3   Resisted L/R sidesteps 5# x2   Quad iso at 90 deg knee flex 5\" 2x10   HL hip add 5\" 2x15  HL hip abd (light TB) 2x15   leg press #30     Neuromuscular Re-Ed 64483:   Step taps from airex to 7\" alternating 2x10 R/L (cues for upright posture)  6\" hurdles step to 3 laps , side step 3 laps (side step most challenging)   Stepping stones (black/blue travon pads/airex) - 5 laps (moderate use of handrails)     Deferred:   6\" mann single step over 1x10 L only    Firm/foam tandem stance EO 2 x30 sec each lead foot (freq loss balance w/ R lead foot)   Foam pad Romberg (EO/EC) x30\" each, semitandem (EO/EC) 2x30\" each lead foot  Forward walk 2.5# ankle weight  4 laps (cued \"bigger steps\")  Backward walk 2.5# ankle weight 4 laps (cued \"bigger steps\")  Side stepping 2.5# ankle weight 4 laps (cued to not drag feet)   Staggered stance on blue travon pads EO/EC 30\"x1 ea R/L  nods and nos  NBOS firm/foam EC 30\" x 1     Gait training 64415:   Deferred:  Cone weaving without AD 3 laps, no UE support  Ladder mat reciprocal stepping , with cane 3 laps , without cane 3 laps (min assist for LOB)  Ambulating 240 ft SPC look L/R x120 ft, up/down x120ft (verbal cues for inc gait speed, larger L step length)   Ambulating w/o SPC 240ft L/R x60ft, up/down x60 ft (verbal cues for inc gait sped, larger L step length)   High intensity gait training in par bars for safety with frequent cuing to improve step height, length, while continuing a safe gait speed.    HEP / Access Codes:   URL: [email or web address]  Access Code: [email or web address]   Date: 02/28/2025  - Narrow Stance with Counter Support  - 2 x daily - 7 x weekly - 3 reps - 20 hold    Access Code: [email or web address]  Date: 03/03/2025  - Side Stepping with Resistance at Ankles and Counter Support  - 1-2 x daily - 15-20 ft x 4  distance  - Standing Hip Extension with Resistance at Ankles and Counter Support  - 1-2 x daily - 2 " sets - 10 reps  - Sit to Stand Without Arm Support  - 1-2 x daily - 2 sets - 10 reps    Assessment:    Treatment covered assessment testing, improvement with 30 sec STS. Patient also completed TUG and 2 MWT. Patient continues to fatigue quickly with L LE, especially with hip flexion and abduction. Hurdles and step taps are challenging due to L LE fatigue, not lifting L foot high enough at times and catching causing LOB. Patient will benefit from continued skilled physical therapy visits (6 additional visits, 16 total) to address L sided weakness, balance and overall function.     Post-Treatment Symptoms:   Response to Interventions: No change in pain (fatigued) 6 additional PT visits.     Plan:    Continue to progress strength and balance    Goals:   Active       PT Problem       PT Goal 1       Start:  02/28/25    Expected End:  03/28/25       Pt to improve balance with improved modified tandem stance to 30 sec with eyes closed to facilitate zero incidents of falling since starting therapy.         PT Goal 2       Start:  02/28/25    Expected End:  05/09/25       Pt to improve LE strength of left side to 4+/5 or greater.         PT Goal 3       Start:  02/28/25    Expected End:  05/09/25            PT Goal 4       Start:  02/28/25    Expected End:  05/09/25       Pt able to ascend and descend x 12 steps with reciprocal pattern and railing for assistance to facilitate return to community outgoing.         Patient Stated Goal 1       Start:  02/28/25    Expected End:  05/09/25       Pt to be able to walk without quad cane or assistive device.         Patient Stated Goal 2       Start:  02/28/25    Expected End:  05/09/25       Pt would like to get in and out of bed (soft mattress) without having to use grab bar.

## 2025-04-07 ENCOUNTER — TREATMENT (OUTPATIENT)
Dept: PHYSICAL THERAPY | Facility: CLINIC | Age: 80
End: 2025-04-07
Payer: COMMERCIAL

## 2025-04-07 ENCOUNTER — TREATMENT (OUTPATIENT)
Dept: OCCUPATIONAL THERAPY | Facility: CLINIC | Age: 80
End: 2025-04-07
Payer: COMMERCIAL

## 2025-04-07 DIAGNOSIS — I63.9 ISCHEMIC CEREBROVASCULAR ACCIDENT (CVA) (MULTI): ICD-10-CM

## 2025-04-07 DIAGNOSIS — G81.94 LEFT HEMIPARESIS (MULTI): ICD-10-CM

## 2025-04-07 PROCEDURE — 97110 THERAPEUTIC EXERCISES: CPT | Mod: GO | Performed by: OCCUPATIONAL THERAPIST

## 2025-04-07 PROCEDURE — 97116 GAIT TRAINING THERAPY: CPT | Mod: GP

## 2025-04-07 PROCEDURE — 97110 THERAPEUTIC EXERCISES: CPT | Mod: GP

## 2025-04-07 ASSESSMENT — PAIN - FUNCTIONAL ASSESSMENT
PAIN_FUNCTIONAL_ASSESSMENT: 0-10
PAIN_FUNCTIONAL_ASSESSMENT: 0-10

## 2025-04-07 ASSESSMENT — PAIN SCALES - GENERAL
PAINLEVEL_OUTOF10: 0 - NO PAIN
PAINLEVEL_OUTOF10: 0 - NO PAIN

## 2025-04-07 NOTE — PROGRESS NOTES
"Physical Therapy Treatment  Progress Note    Patient Name: Cynthia Muse  MRN: [id number]  Encounter date: 4/7/2025 PT Received On: 04/07/25  Response to Previous Treatment: Patient with no complaints from previous session.  Time Calculation  Start Time: 1104  Stop Time: 1147  Time Calculation (min): 43 min  PT Therapeutic Procedures Time Entry  Neuromuscular Re-Education Time Entry: 3  Therapeutic Exercise Time Entry: 23  Gait Training Time Entry: 17    Visit # 11 of 16  Visits/Dates Authorized: D - PT - 1) [employer] - NO AUTH / $350 DED MET / 80% coins / $10,000 OOP not met / 40V MAX pt alone - 1 used / ds 2/14/25.   CPT 97203 not covered.  2) MEDICARE A/B - NO AUTH / $257 DEDUCT not met / $0 used PT/ST / [place] visits / Per RTE.         Current Problem:   Problem List Items Addressed This Visit             ICD-10-CM    Ischemic cerebrovascular accident (CVA) (Multi) I63.9    Left hemiparesis (Multi) G81.94         Precautions:    falls       Subjective   General:    Patient notices that her exercises \"are going well\" in therapy and with HEP. Pt forgot to wear AFO today as she took it off when she went out for lunch with her friend from work. She is noticing improved gait reporting walking further, having better confidence, and improved balance. Still struggles with balance \"from time to time.\"    Pre-Treatment Symptoms:   Pain Assessment: 0-10  0-10 (Numeric) Pain Score: 0 - No pain    Objective   Findings:   Ambulates without AFO on L LE , and with quad cane     MMT LE:    Left:   Hip flexor 4/5   Hip abd 4-/5   Quads 4/5   HS 4-/5   DF 4-/5 (reduced ROM compared to L)   PF 4-/5  Right:   Hip flexor 4+/5   Hip abd 4/5   Quads 4+/5   HS 4/5   DF 4+/5 (reduced ROM compared to L)   PF 4/5      Performed again on 04/07/2025   30 sec STS 15 reps  TUG   With quad cane for assistance 13.1 sec   Without assistive device 14.1 sec  2 min walk test 275 ft  Other Measures  30 Second Sit to Stand: 15  Other Outcome Measures: TUG " "13.1 sec w/ AD. 2MW test 275 ft    Treatments:  Therapeutic Exercise 33145:   Nustep L4 x6 min LE only , seat 6  HSC DL 50# 2x15  LAQ 5# 3x12  Step ups 8 in at steps mult reps with UE support for RUE.  STS 2x10 no UE support     DNP  Tloop side stepping lime green 2 laps , seated break after  Tloop lean on counter hip extension lime green 2x10    Shuttle leg press DL 11--137# 1x12 ea , SL 37.5# 2x12 R/L    Resisted fwd/bwd walking 5# x3   Resisted L/R sidesteps 5# x2   Quad iso at 90 deg knee flex 5\" 2x10   HL hip add 5\" 2x15  HL hip abd (light TB) 2x15   leg press #30     Neuromuscular Re-Ed 17196:  (not billed)  Balance training with tandem stance EO and EC with greater difficulty with LLE in rear.    Deferred:   6\" mann single step over 1x10 L only    Firm/foam tandem stance EO 2 x30 sec each lead foot (freq loss balance w/ R lead foot)   Foam pad Romberg (EO/EC) x30\" each, semitandem (EO/EC) 2x30\" each lead foot  Forward walk 2.5# ankle weight  4 laps (cued \"bigger steps\")  Backward walk 2.5# ankle weight 4 laps (cued \"bigger steps\")  Side stepping 2.5# ankle weight 4 laps (cued to not drag feet)   Staggered stance on blue travon pads EO/EC 30\"x1 ea R/L  nods and nos  NBOS firm/foam EC 30\" x 1     Gait training 24531:   Ambulation with quad cane around facility and on [employer] track. Pt declined performance of SPC for advancement today despite encouragement for trail.   Gait training WITHOUT use of AFO in clinic. Pt had good performance without LOB but required cues for improved heel strike and able to improve dorsiflexion post cues.  Stair training x 4 steps with use of RUE on hand rail for both ascending/descending. Pt performed 4 step x 4 sets for total of 16 steps. Goal met for ascending/descending x 12 steps.  Benefits from cues to slow down performance.  At long counter top   Side stepping with cues for improved step length   Retro walking with CGA and SBA no UE support required.     HEP / Access " Codes:   URL: [email or web address]  Access Code: [email or web address]   Date: 02/28/2025  - Narrow Stance with Counter Support  - 2 x daily - 7 x weekly - 3 reps - 20 hold    Access Code: [email or web address]  Date: 03/03/2025  - Side Stepping with Resistance at Ankles and Counter Support  - 1-2 x daily - 15-20 ft x 4  distance  - Standing Hip Extension with Resistance at Ankles and Counter Support  - 1-2 x daily - 2 sets - 10 reps  - Sit to Stand Without Arm Support  - 1-2 x daily - 2 sets - 10 reps    Assessment:    Pt continues to show benefits from skilled physical therapy addressing L sided weakness, balance and overall function with improved activity tolerance, strength, and gait speed. Pt continues to require moderate cuing to improve gait quality for improved safety awareness and assistive device management.          Plan:    Continue to progress strength and balance    Goals:   Active       PT Problem       PT Goal 1 (Progressing)       Start:  02/28/25    Expected End:  03/28/25       Pt to improve balance with improved modified tandem stance to 30 sec with eyes closed to facilitate zero incidents of falling since starting therapy.         PT Goal 2 (Progressing)       Start:  02/28/25    Expected End:  05/09/25       Pt to improve LE strength of left side to 4+/5 or greater.         PT Goal 3 (Progressing)       Start:  04/07/25    Expected End:  05/19/25       New goal: pt to improve 2 min walk test by x 1 MCID or greater.         PT Goal 4 (Met)       Start:  02/28/25    Expected End:  05/09/25    Resolved:  04/07/25    Pt able to ascend and descend x 12 steps with reciprocal pattern and railing for assistance to facilitate return to community outgoing.         Patient Stated Goal 1 (Progressing)       Start:  02/28/25    Expected End:  05/09/25       Pt to be able to walk without quad cane or assistive device.         Patient Stated Goal 2 (Progressing)       Start:  02/28/25    Expected End:  05/09/25       Pt would  like to get in and out of bed (soft mattress) without having to use grab bar.

## 2025-04-07 NOTE — PROGRESS NOTES
"Occupational Therapy Treatment/Progress Note  Patient Name: Cynthia Muse  MRN: [id number]  OT Received on: 4/7/2025        Time Calculation  Start Time: 1000  Stop Time: 1045  Time Calculation (min): 45 min  OT Therapeutic Procedures Time Entry  Therapeutic Exercise Time Entry: 45    Insurance:  Visit Number: 10 of  32  Insurance Type: [employer]; 40 visits/yr      Subjective   Problem List Items Addressed This Visit             ICD-10-CM    Ischemic cerebrovascular accident (CVA) (Multi) I63.9    Left hemiparesis (Multi) G81.94     Current Problem/Reason for visit:  +CVA s/p TNK with L hemiparesis     Referred by: Dr. Izquierdo    Patient reports \"My left arm doesn't feel as heavy. And  notice my finger are opening more than they used to.\"    Precautions: +L Hemiparesis; +Fall Risk    Performing HEP?: Yes    Pain:  Pain Assessment  Pain Assessment: 0-10  0-10 (Numeric) Pain Score: 0 - No pain    Objective     Current UEFI= 16/80; 80% impaired  UEFI at evaluation= 14/80; 83% impaired     Box and Blocks:  R= 66 blocks/min L= 7 blocks/min       AROM MMT MMT     Right Left Right Left   SHOULDER Flexion WFL 55 WFL 3/5    Extension WFL  Neutral  WFL  4/5    Abduction WFL  ~60 WFL  3-/5    Internal Rotation WFL  To  stomach WFL  3/5    External Rotation WFL  Netural  WFL 3+/5     ELBOW Extension WFL  WFL  WFL  3+/5    Flexion  WFL WFL WFL  4/5     FOREARM Pronation WFL  WFL  WFL  3+/5    Supination WFL  ~70 WFL  3/5     WRIST Flexion WFL  WFL  WFL  3+/5    Extension WFL  Neutral  WFL  3+/5    Radial Deviation WFL  NT      Ulnar Deviation WFL NT          (lbs) Right Left   1     2 35# 12# (+2)   3     4     5         Granddaughter accompanied patient to clinic this date; remained in waiting room throughout tx sx.    Physical Observation: Patient into clinic with +LLE AFO and LBQC; minor LOB upon ambulating to tx area, able to self-correct; +Flexor tone/synergy pattern in LUE; +Biceps tightness; +arthritic joint changes B " hands  Edema: +Mild L hand  Sensory: +Impaired   Numbness/Tingling: Denies       Treatment:    Therapeutic Exercise:  Reassessment this date; results discussed with patient.  UBE seated without resistance x6 minutes; 3 FW/3 BW; use of ace wrap to maintain L hand  on handle throughout.  BTE machine for functional strengthening; tools used this date include:  Gripper (resist 8)  Handle (resist 5)  Long lever with FA neutral (resist 30)  All tools x2 charts each  Hands-on assistance throughout performance to maintain L  on tools and reduce compensatory body movement.      Post-tx pain: 0/10    Assessment/Plan Patient with improving strength throughout LUE at this time; improving L hand ROM and functional use. Patient making steady and consistent progress at this time with skilled OT intervention; recommending continued intervention 2x/week x 5 weeks to work toward all goals and goal components.      OP EDUCATION:  Education  Individual(s) Educated: Patient  Home Program: Strengthening    Goals:  Active       OT Goals       1. Patient will increase LUE shoulder AROM to 130 degrees flexion and abduction for improved functional use of LUE. (Progressing)       Start:  03/03/25    Expected End:  04/11/25            2. Patient will increase LUE strength at all planes to at least 4/5 for improved functional use of LUE. (Progressing)       Start:  03/03/25    Expected End:  04/11/25            3. Patient will achieve full grasp/release of L hand for improved ability to grasp/carry ADL objects with L hand. (Progressing)       Start:  03/03/25    Expected End:  04/11/25         Goal Note       DPC= 5 cm, 5 cm, 4 cm, 3 cm              4. Patient will increase L hand  strength by at least 15# for improved gripping with L hand during ADL's. (Progressing)       Start:  03/03/25    Expected End:  04/11/25            5. Patient will increase increase coordination in L hand AEB ability to move at least 10 blocks with B&B  standardized testing. (Progressing)       Start:  03/03/25    Expected End:  04/11/25            6. Patient will complete UB dressing and hygiene/grooming tasks with Mod I. (Met)       Start:  03/03/25    Expected End:  05/23/25    Resolved:  03/13/25         7. Patient will complete LB dressing tasks with Min A. (Progressing)       Start:  03/03/25    Expected End:  05/23/25             Goal Note       Patient is donning underwear, pants, socks, AFO, and shoes Mod I; requires assist to tie              8. Patient will increase overall ease and independence AEB UEFI score of at least 50/80 for improved quality of life for patient. (Progressing)       Start:  03/03/25    Expected End:  05/23/25             Goal Note       16/80; 80% impaired

## 2025-04-10 ENCOUNTER — TREATMENT (OUTPATIENT)
Dept: PHYSICAL THERAPY | Facility: CLINIC | Age: 80
End: 2025-04-10
Payer: COMMERCIAL

## 2025-04-10 ENCOUNTER — TREATMENT (OUTPATIENT)
Dept: OCCUPATIONAL THERAPY | Facility: CLINIC | Age: 80
End: 2025-04-10
Payer: COMMERCIAL

## 2025-04-10 DIAGNOSIS — G81.94 LEFT HEMIPARESIS (MULTI): ICD-10-CM

## 2025-04-10 DIAGNOSIS — I63.9 ISCHEMIC CEREBROVASCULAR ACCIDENT (CVA) (MULTI): ICD-10-CM

## 2025-04-10 PROCEDURE — 97110 THERAPEUTIC EXERCISES: CPT | Mod: GP,CQ

## 2025-04-10 PROCEDURE — 97110 THERAPEUTIC EXERCISES: CPT | Mod: GO | Performed by: OCCUPATIONAL THERAPIST

## 2025-04-10 ASSESSMENT — PAIN - FUNCTIONAL ASSESSMENT
PAIN_FUNCTIONAL_ASSESSMENT: 0-10
PAIN_FUNCTIONAL_ASSESSMENT: 0-10

## 2025-04-10 ASSESSMENT — PAIN SCALES - GENERAL
PAINLEVEL_OUTOF10: 0 - NO PAIN
PAINLEVEL_OUTOF10: 1

## 2025-04-10 NOTE — PROGRESS NOTES
"Physical Therapy Treatment  Progress Note    Patient Name: Cynthia Muse  MRN: [id number]  Encounter date: 4/10/2025 PT Received On: 04/10/25  Time Calculation  Start Time: 1015  Stop Time: 1055  Time Calculation (min): 40 min  PT Therapeutic Procedures Time Entry  Therapeutic Exercise Time Entry: 40    Visit # 12 of 16  Visits/Dates Authorized: D - PT - 1) [employer] - NO AUTH / $350 DED MET / 80% coins / $10,000 OOP not met / 40V MAX pt alone - 1 used / ds 2/14/25.   CPT 25972 not covered.  2) MEDICARE A/B - NO AUTH / $257 DEDUCT not met / $0 used PT/ST / MN visits / Per RTE.         Current Problem:   Problem List Items Addressed This Visit             ICD-10-CM    Ischemic cerebrovascular accident (CVA) (Multi) I63.9    Left hemiparesis (Multi) G81.94           Precautions:    falls       Subjective   General: Doing well today. Reports feel like she is getting stronger and doing more at home       Pre-Treatment Symptoms:   Pain Assessment: 0-10  0-10 (Numeric) Pain Score: 0 - No pain    Objective   Findings:   Ambulates without AFO on L LE , and with quad cane     MMT LE:    Left:   Hip flexor 4/5   Hip abd 4-/5   Quads 4/5   HS 4-/5   DF 4-/5 (reduced ROM compared to L)   PF 4-/5  Right:   Hip flexor 4+/5   Hip abd 4/5   Quads 4+/5   HS 4/5   DF 4+/5 (reduced ROM compared to L)   PF 4/5      Performed again on 04/07/2025   30 sec STS 15 reps  TUG   With quad cane for assistance 13.1 sec   Without assistive device 14.1 sec  2 min walk test 275 ft       Treatments:  Therapeutic Exercise 55257:   Nustep L4 x6 min LE only , seat 6  HSC DL 50# 2x15  LAQ 5# 3x12  Step ups 8 in at steps mult reps with UE support for RUE.  STS 2x10 no UE support   Tloop side stepping lime green 2 laps , seated break after  Tloop lean on counter hip extension lime green 2x10    Shuttle leg press DL 19--137# 1x12 ea , SL 37.5# 2x12 R/L  DNP  Quad iso at 90 deg knee flex 5\" 2x10   HL hip add 5\" 2x15  HL hip abd (light TB) 2x15   leg " "press #30   Practiced 5min with straight cane along counter with gait belt    DNP  Resisted fwd/bwd walking 5# x3   Resisted L/R sidesteps 5# x2     Neuromuscular Re-Ed 60077:  (not billed)  Balance training with tandem stance EO and EC with greater difficulty with LLE in rear.    Deferred:   6\" mann single step over 1x10 L only    Firm/foam tandem stance EO 2 x30 sec each lead foot (freq loss balance w/ R lead foot)   Foam pad Romberg (EO/EC) x30\" each, semitandem (EO/EC) 2x30\" each lead foot  Forward walk 2.5# ankle weight  4 laps (cued \"bigger steps\")  Backward walk 2.5# ankle weight 4 laps (cued \"bigger steps\")  Side stepping 2.5# ankle weight 4 laps (cued to not drag feet)   Staggered stance on blue travon pads EO/EC 30\"x1 ea R/L  nods and nos  NBOS firm/foam EC 30\" x 1     Gait training 90252: DNP  Ambulation with quad cane around facility and on [employer] track. Pt declined performance of SPC for advancement today despite encouragement for trail.   Gait training WITHOUT use of AFO in clinic. Pt had good performance without LOB but required cues for improved heel strike and able to improve dorsiflexion post cues.  Stair training x 4 steps with use of RUE on hand rail for both ascending/descending. Pt performed 4 step x 4 sets for total of 16 steps. Goal met for ascending/descending x 12 steps.  Benefits from cues to slow down performance.  At long counter top   Side stepping with cues for improved step length   Retro walking with CGA and SBA no UE support required.     HEP / Access Codes:   URL: [email or web address]  Access Code: [email or web address]   Date: 02/28/2025  - Narrow Stance with Counter Support  - 2 x daily - 7 x weekly - 3 reps - 20 hold    Access Code: [email or web address]  Date: 03/03/2025  - Side Stepping with Resistance at Ankles and Counter Support  - 1-2 x daily - 15-20 ft x 4  distance  - Standing Hip Extension with Resistance at Ankles and Counter Support  - 1-2 x daily - 2 sets - 10 reps  - Sit to Stand " Without Arm Support  - 1-2 x daily - 2 sets - 10 reps    Assessment:    Pt continues to show benefits from skilled physical therapy addressing L sided weakness, Response to Interventions: No change in painPatient gives good effort and works very hard in sessions. Improved ease of transfers sit to stand. Occasional cues needed with cane and step length, did try  straight cane for short distance with gait belt no lob noted but will still use quad cane.  STS speed improving. Able to do 6 sec SLS on right leg today   2 sec on left.    Plan:    Continue to progress strength and balance    Goals:   Active       PT Problem       PT Goal 1 (Progressing)       Start:  02/28/25    Expected End:  03/28/25       Pt to improve balance with improved modified tandem stance to 30 sec with eyes closed to facilitate zero incidents of falling since starting therapy.         PT Goal 2 (Progressing)       Start:  02/28/25    Expected End:  05/09/25       Pt to improve LE strength of left side to 4+/5 or greater.         PT Goal 3 (Progressing)       Start:  04/07/25    Expected End:  05/19/25       New goal: pt to improve 2 min walk test by x 1 MCID or greater.         PT Goal 4 (Met)       Start:  02/28/25    Expected End:  05/09/25    Resolved:  04/07/25    Pt able to ascend and descend x 12 steps with reciprocal pattern and railing for assistance to facilitate return to community outgoing.         Patient Stated Goal 1 (Progressing)       Start:  02/28/25    Expected End:  05/09/25       Pt to be able to walk without quad cane or assistive device.         Patient Stated Goal 2 (Progressing)       Start:  02/28/25    Expected End:  05/09/25       Pt would like to get in and out of bed (soft mattress) without having to use grab bar.

## 2025-04-10 NOTE — PROGRESS NOTES
"Occupational Therapy Treatment  Patient Name: Cynthia Muse  MRN: [id number]  OT Received on: 4/10/2025        Time Calculation  Start Time: 0930  Stop Time: 1015  Time Calculation (min): 45 min  OT Therapeutic Procedures Time Entry  Therapeutic Exercise Time Entry: 45    Insurance:  Visit Number: 11 of  32  Insurance Type: [employer]; 40 visits/yr      Subjective   Problem List Items Addressed This Visit             ICD-10-CM    Ischemic cerebrovascular accident (CVA) (Multi) I63.9    Left hemiparesis (Multi) G81.94     Current Problem/Reason for visit:  +CVA s/p TNK with L hemiparesis     Referred by: Dr. Izquierdo    Patient reports \"I am doing pretty well.\"    Precautions: +L Hemiparesis; +Fall Risk    Performing HEP?: Yes    Pain:  Pain Assessment  Pain Assessment: 0-10  0-10 (Numeric) Pain Score: 1  Pain Location:  (Biceps)  Pain Orientation: Left    Objective     Physical Observation: Patient into clinic with +LLE AFO and LBQC; minor LOB upon ambulating to tx area, able to self-correct; +Flexor tone/synergy pattern in LUE; +Biceps tightness; +arthritic joint changes B hands  Edema: +Mild L hand  Sensory: +Impaired   Numbness/Tingling: Denies       Treatment:    Therapeutic Exercise:  Standing push up at wedge at to promote weight bearing through extended wrist/elbow x10 reps x 3 sets; hands-on assist to maintain L hand placement throughout.  Therabll rolls standing at mat table with LUE only to promote shoulder flexion/extension with graded manual resistance; 10 reps x 3 sets.  Therapist implemented use of rollerboard at countertop for shoulder flexion/extension, abduction/adduction, and CW/CCW stabilization x10 reps x 3 sets each.  Biceps curls with 2# wrap weight  Scapular retraction with dowel jose and orange theraband; ace wrap for L hand   Supine dowel jose ther ex including short arc and long arc shoulder flexion and horizontal abduction/adduction x10 reps x 3 sets each.  Supine AROM ER/IR and straight " arm raises x10 reps x 3 sets.  AROM L wrist extension x10 reps x 3 sets.      Post-tx pain: 0/10    Assessment/Plan Patient exhibits good tolerance for all intervention this date; improving strength and ROM noted; will continue to advance intervention to increase gross strength throughout LUE and promote gross and fine coordination as able for improved functional use of LUE.      OP EDUCATION:  Education  Individual(s) Educated: Patient  Home Program: PROM, AAROM, AROM, Strengthening    Goals:  Active       OT Goals       1. Patient will increase LUE shoulder AROM to 130 degrees flexion and abduction for improved functional use of LUE. (Progressing)       Start:  03/03/25    Expected End:  04/11/25            2. Patient will increase LUE strength at all planes to at least 4/5 for improved functional use of LUE. (Progressing)       Start:  03/03/25    Expected End:  04/11/25            3. Patient will achieve full grasp/release of L hand for improved ability to grasp/carry ADL objects with L hand. (Progressing)       Start:  03/03/25    Expected End:  04/11/25         Goal Note       DPC= 5 cm, 5 cm, 4 cm, 3 cm              4. Patient will increase L hand  strength by at least 15# for improved gripping with L hand during ADL's. (Progressing)       Start:  03/03/25    Expected End:  04/11/25            5. Patient will increase increase coordination in L hand AEB ability to move at least 10 blocks with B&B standardized testing. (Progressing)       Start:  03/03/25    Expected End:  04/11/25            6. Patient will complete UB dressing and hygiene/grooming tasks with Mod I. (Met)       Start:  03/03/25    Expected End:  05/23/25    Resolved:  03/13/25         7. Patient will complete LB dressing tasks with Min A. (Progressing)       Start:  03/03/25    Expected End:  05/23/25             Goal Note       Patient is donning underwear, pants, socks, AFO, and shoes Mod I; requires assist to tie              8.  Patient will increase overall ease and independence AEB UEFI score of at least 50/80 for improved quality of life for patient. (Progressing)       Start:  03/03/25    Expected End:  05/23/25             Goal Note       16/80; 80% impaired

## 2025-04-14 ENCOUNTER — TREATMENT (OUTPATIENT)
Dept: PHYSICAL THERAPY | Facility: CLINIC | Age: 80
End: 2025-04-14
Payer: COMMERCIAL

## 2025-04-14 ENCOUNTER — CLINICAL SUPPORT (OUTPATIENT)
Dept: OCCUPATIONAL THERAPY | Facility: CLINIC | Age: 80
End: 2025-04-14
Payer: COMMERCIAL

## 2025-04-14 DIAGNOSIS — I63.9 ISCHEMIC CEREBROVASCULAR ACCIDENT (CVA) (MULTI): ICD-10-CM

## 2025-04-14 DIAGNOSIS — G81.94 LEFT HEMIPARESIS (MULTI): ICD-10-CM

## 2025-04-14 PROCEDURE — 97110 THERAPEUTIC EXERCISES: CPT | Mod: GP,CQ

## 2025-04-14 PROCEDURE — 97116 GAIT TRAINING THERAPY: CPT | Mod: GP,CQ

## 2025-04-14 PROCEDURE — 97110 THERAPEUTIC EXERCISES: CPT | Mod: GO,CO

## 2025-04-14 ASSESSMENT — PAIN SCALES - GENERAL
PAINLEVEL_OUTOF10: 0 - NO PAIN
PAINLEVEL_OUTOF10: 0 - NO PAIN

## 2025-04-14 ASSESSMENT — PAIN - FUNCTIONAL ASSESSMENT
PAIN_FUNCTIONAL_ASSESSMENT: 0-10
PAIN_FUNCTIONAL_ASSESSMENT: 0-10

## 2025-04-14 NOTE — PROGRESS NOTES
Occupational Therapy Treatment  Patient Name: yCnthia Muse  MRN: [id number]  OT Received on: 4/14/2025 OT Received On: 04/14/25      Time Calculation  Start Time: 1130  Stop Time: 1212  Time Calculation (min): 42 min  OT Therapeutic Procedures Time Entry  Therapeutic Exercise Time Entry: 42    Insurance:  Visit Number: 12 of 32 2/14/25 - [employer]MO [employer] LOCAL 880 / NO AUTH / 40 V LIMIT YEAR - 2 USED 2/14/25 / DED MET - OOP 50264 NOT MET / MEDICARE A/B SECONDARY TO MMO / LOCAL 880 CALL REF # [id number] & [employer] # [phone]  JS  Subjective   Problem List Items Addressed This Visit             ICD-10-CM       Neuro    Ischemic cerebrovascular accident (CVA) (Multi) I63.9    Left hemiparesis (Multi) G81.94     Referred by: Dr. Izquierdo, next follow up in July    Patient reports Taking tylenol every day in am. Not using heat or ice. Pt is using home e stim unit daily. Pt is pleasant and motivated. Noted increased participation in daily activity at home. Pt ambulated to therapy with small based quad cane. Goals and precautions reviewed with patient. She verbalized understanding.     Precautions: + L robb and fall risk.     Performing HEP?: Yes  Pain:0/10  Physical Observation: +Fluctuating tone throughout LUE; no noted scapular wining or subluxation at this time; +Arthritic joint changes to B hand   Edema: none     Sensory: none noted   Numbness/Tingling: none   Treatment:Discussed importance of natural arm ext of L UE at all times to reduce bicep tightness    Modalities:   Reviewed e stim pad placement for home use to promote finger and wrist ext.   E stim completed with patient activating finger and wrist extension , Bi phasic x 10 min at 27 MHZ    Therapeutic Exercise:  Reviewed iso Foam, completing daily at home  Reviewed Catina hand exerciser, 5#, educated to progress to 10#  Supine dowel jose, 1.5#, 3 sets of 10 reps for tricep punches, shoulder flexion  Supine single arm bicep curls 2# R, 1# L UE , 3 x 10 with  focus on full bicep ext to mat. Added to HEP  Seated green ( medium resistance ) theraband with dowel jose , 2 x 10 reps for tricep punches, shoulder ext and shoulder retraction. Educated on how to follow through with existing band and dowel jose while seated at home.   Post-tx pain:1/10 , slightly increased from start of session. Min fatigue.    Assessment/Plan Pt highly motivated to return to function. Writer noted increased hand movement today along with decreased pain. Pt to follow through with home e stim and hand, shoulder exercises in supine as tolerated. Next visit explore use of BTE for MMS.      OP EDUCATION:  Education  Individual(s) Educated: Patient  Education Provided: Risk and benefits of OT discussed with patient or other, POC discussed and agreed upon, Edema control  Home Program: PROM, AROM, Edema control  Equipment: Other (home tens unit)  Risk and Benefits Discussed with Patient/Caregiver/Other: yes  Patient/Caregiver Demonstrated Understanding: yes  Plan of Care Discussed and Agreed Upon: yes  Patient Response to Education: Patient/Caregiver Verbalized Understanding of Information, Patient/Caregiver Performed Return Demonstration of Exercises/Activities, Patient/Caregiver Asked Appropriate Questions    Goals:  Active       OT Goals       1. Patient will increase LUE shoulder AROM to 130 degrees flexion and abduction for improved functional use of LUE. (Progressing)       Start:  03/03/25    Expected End:  04/11/25            2. Patient will increase LUE strength at all planes to at least 4/5 for improved functional use of LUE. (Progressing)       Start:  03/03/25    Expected End:  04/11/25            3. Patient will achieve full grasp/release of L hand for improved ability to grasp/carry ADL objects with L hand. (Progressing)       Start:  03/03/25    Expected End:  04/11/25            4. Patient will increase L hand  strength by at least 15# for improved gripping with L hand during ADL's.  (Progressing)       Start:  03/03/25    Expected End:  04/11/25            5. Patient will increase increase coordination in L hand AEB ability to move at least 10 blocks with B&B standardized testing. (Progressing)       Start:  03/03/25    Expected End:  04/11/25            6. Patient will complete UB dressing and hygiene/grooming tasks with Mod I. (Met)       Start:  03/03/25    Expected End:  05/23/25    Resolved:  03/13/25         7. Patient will complete LB dressing tasks with Min A. (Progressing)       Start:  03/03/25    Expected End:  05/23/25                8. Patient will increase overall ease and independence AEB UEFI score of at least 50/80 for improved quality of life for patient. (Progressing)       Start:  03/03/25    Expected End:  05/23/25

## 2025-04-14 NOTE — PROGRESS NOTES
"Physical Therapy Treatment    Patient Name: Cynthia Muse  MRN: [id number]  Encounter date: 4/14/2025    Time Calculation  Start Time: 1045  Stop Time: 1130  Time Calculation (min): 45 min  PT Therapeutic Procedures Time Entry  Therapeutic Exercise Time Entry: 33  Gait Training Time Entry: 10    Visit # 13 of 16  Visits/Dates Authorized: D - PT - 1) MMO Local 880 - NO AUTH / $350 DED MET / 80% coins / $10,000 OOP not met / 40V MAX pt alone - 1 used / ds 2/14/25.   CPT 22133 not covered.  2) MEDICARE A/B - NO AUTH / $257 DEDUCT not met / $0 used PT/ST / MN visits / Per RTE.         Current Problem:   Problem List Items Addressed This Visit             ICD-10-CM    Ischemic cerebrovascular accident (CVA) (Multi) I63.9    Left hemiparesis (Multi) G81.94       Precautions:    falls       Subjective   General:    Patient states she is feeling usual pain in L shoulder today. Patient reports she was tired after last session, no more than usual. HEP going well.     Pre-Treatment Symptoms:   Pain Assessment: 0-10  0-10 (Numeric) Pain Score: 0 - No pain    Objective   Findings:   Ambulates without AFO on L LE , and with quad cane             Treatments:  Therapeutic Exercise 93888:   Nustep L5 x6 min LE only , seat 7  Resisted fwd/bwd walking 7.5# x3   Resisted L/R sidesteps 7.5# x2   HSC DL 50-55# 2x15 , SL 30# 1x15 R/L   LAQ 8# 3x15  Leg press DL 30# 2x15 , SL 10# 1x15 R/L       Step ups 8 in at steps mult reps with UE support for RUE.  STS 2x10 no UE support   Tloop side stepping lime green 2 laps , seated break after  Tloop lean on counter hip extension lime green 2x10    Shuttle leg press DL 10--137# 1x12 ea , SL 37.5# 2x12 R/L  DNP  Quad iso at 90 deg knee flex 5\" 2x10   HL hip add 5\" 2x15  HL hip abd (light TB) 2x15       Neuromuscular Re-Ed 41336:    Step taps 5\" 2x10 R/L ( cues to activate hs/quad   NBOS foam EO/EC 30\" x 1     DNP:  Balance training with tandem stance EO and EC with greater difficulty with LLE in " "rear.  6\" mann single step over 1x10 L only    Firm/foam tandem stance EO 2 x30 sec each lead foot (freq loss balance w/ R lead foot)   Foam pad Romberg (EO/EC) x30\" each, semitandem (EO/EC) 2x30\" each lead foot  Forward walk 2.5# ankle weight  4 laps (cued \"bigger steps\")  Backward walk 2.5# ankle weight 4 laps (cued \"bigger steps\")  Side stepping 2.5# ankle weight 4 laps (cued to not drag feet)   Staggered stance on blue travon pads EO/EC 30\"x1 ea R/L  nods and nos      Gait training 93504:  Ambulated around clinic with sp cane , close supervision  6\" hurdles with sp cane- step to pattern x2 laps , reciprocal pattern x2 laps    DNP:  Ambulation with quad cane around facility and on [place] track. Pt declined performance of SPC for advancement today despite encouragement for trail.   Gait training WITHOUT use of AFO in clinic. Pt had good performance without LOB but required cues for improved heel strike and able to improve dorsiflexion post cues.  Stair training x 4 steps with use of RUE on hand rail for both ascending/descending. Pt performed 4 step x 4 sets for total of 16 steps. Goal met for ascending/descending x 12 steps.  Benefits from cues to slow down performance.  At long counter top   Side stepping with cues for improved step length   Retro walking with CGA and SBA no UE support required.     HEP / Access Codes:   URL: [email or web address]  Access Code: [email or web address]   Date: 02/28/2025  - Narrow Stance with Counter Support  - 2 x daily - 7 x weekly - 3 reps - 20 hold    Access Code: [email or web address]  Date: 03/03/2025  - Side Stepping with Resistance at Ankles and Counter Support  - 1-2 x daily - 15-20 ft x 4  distance  - Standing Hip Extension with Resistance at Ankles and Counter Support  - 1-2 x daily - 2 sets - 10 reps  - Sit to Stand Without Arm Support  - 1-2 x daily - 2 sets - 10 reps    Assessment:   Patient did well with increased resistance today, no complaints other than fatigue. Verbal and tactile " cues needed for proper techniques and to slow down speed of reps. Patient is not as steady with sp cane, as she is with quad cane, but did well with all activities and had no LOB.     Post-Treatment Symptoms:   Response to Interventions: Other (Comment) (fatigue)    Plan:    Continue to progress strength and balance    Goals:   Active       PT Problem       PT Goal 1 (Progressing)       Start:  02/28/25    Expected End:  03/28/25       Pt to improve balance with improved modified tandem stance to 30 sec with eyes closed to facilitate zero incidents of falling since starting therapy.         PT Goal 2 (Progressing)       Start:  02/28/25    Expected End:  05/09/25       Pt to improve LE strength of left side to 4+/5 or greater.         PT Goal 3 (Progressing)       Start:  04/07/25    Expected End:  05/19/25       New goal: pt to improve 2 min walk test by x 1 MCID or greater.         PT Goal 4 (Met)       Start:  02/28/25    Expected End:  05/09/25    Resolved:  04/07/25    Pt able to ascend and descend x 12 steps with reciprocal pattern and railing for assistance to facilitate return to community outgoing.         Patient Stated Goal 1 (Progressing)       Start:  02/28/25    Expected End:  05/09/25       Pt to be able to walk without quad cane or assistive device.         Patient Stated Goal 2 (Progressing)       Start:  02/28/25    Expected End:  05/09/25       Pt would like to get in and out of bed (soft mattress) without having to use grab bar.

## 2025-04-17 ENCOUNTER — TREATMENT (OUTPATIENT)
Dept: OCCUPATIONAL THERAPY | Facility: CLINIC | Age: 80
End: 2025-04-17
Payer: COMMERCIAL

## 2025-04-17 DIAGNOSIS — I63.9 ISCHEMIC CEREBROVASCULAR ACCIDENT (CVA) (MULTI): ICD-10-CM

## 2025-04-17 DIAGNOSIS — G81.94 LEFT HEMIPARESIS (MULTI): ICD-10-CM

## 2025-04-17 PROCEDURE — 97110 THERAPEUTIC EXERCISES: CPT | Mod: GO | Performed by: OCCUPATIONAL THERAPIST

## 2025-04-17 PROCEDURE — 97112 NEUROMUSCULAR REEDUCATION: CPT | Mod: GO | Performed by: OCCUPATIONAL THERAPIST

## 2025-04-17 ASSESSMENT — PAIN SCALES - GENERAL: PAINLEVEL_OUTOF10: 0 - NO PAIN

## 2025-04-17 ASSESSMENT — PAIN - FUNCTIONAL ASSESSMENT: PAIN_FUNCTIONAL_ASSESSMENT: 0-10

## 2025-04-17 NOTE — PROGRESS NOTES
"Occupational Therapy Treatment  Patient Name: Cynthia Muse  MRN: [id number]  OT Received on: 4/17/2025        Time Calculation  Start Time: 1045  Stop Time: 1130  Time Calculation (min): 45 min  OT Therapeutic Procedures Time Entry  Neuromuscular Re-Education Time Entry: 15  Therapeutic Exercise Time Entry: 30    Insurance:  Visit Number: 13 of 32  Insurance Type: MMO [employer]; 40 visits/yr      Subjective   Problem List Items Addressed This Visit           ICD-10-CM    Ischemic cerebrovascular accident (CVA) (Multi) I63.9    Left hemiparesis (Multi) G81.94     Current Problem/Reason for visit:  +CVA s/p TNK with L hemiparesis     Referred by: Dr. Izquierdo    Patient reports \"My arm is feeling looser.\"    Precautions: +L Hemiparesis; +Fall Risk    Performing HEP?: Yes    Pain:  Pain Assessment  Pain Assessment: 0-10  0-10 (Numeric) Pain Score: 0 - No pain    Objective     Physical Observation: Patient into clinic with +LLE AFO and LBQC; minor LOB upon ambulating to tx area, able to self-correct; +Flexor tone/synergy pattern in LUE; +Biceps tightness; +arthritic joint changes B hands  Edema: +Mild L hand  Sensory: +Impaired   Numbness/Tingling: Denies       Treatment:    Therapeutic Exercise:  Rollerboard at countertop to/from stationary target for shoulder flexion/extension, progress to two targets to incorporate flexion/extension and abduction/adduction functional movement patterns; and CW/CCW for stabilization; 10 reps x 3 sets each; hands-on assistance to perform due to weakness and need for distal limb support for directionality of movement and quality of movement.  Theraball rolls at countertop with BUE's for shoulder flexion/extension x10 reps x 3 sets with slight hold at end range; hands-on assistance to perform and support LUE throughout due to weakness.  LUE single arm theraball rolls to promote purposeful shoulder flexion and extension; hands-on assist to perform due to weakness, impaired motor planning, and " "impaired coordination; 10 reps x 3 sets.  LUE pullbacks with orange theraband x15 reps x 3 sets.    Neuromuscular Re-education:  L hand cone task; maintaining grasp on cone and placement of cone from lap to stationary target in same plane x10 reps; progressed to adjacent targets in same plane for R<->L alternating cone placement x20 reps. Patient able to maintain  throughout with no drops; good accuracy demonstrated; mild compensatory body movement.  Bean bag throwing with L hand; volitional grasp and release for bag placement via therapist; much difficulty coordinating downward throw and digital extension for release into floor level bucket; x15 bags.    Post-tx pain: 0/10; \"It's tired.\"    Assessment/Plan Patient is making steady progress with skilled OT intervention; improving proximal strength and gross motor coordination; will continue to advance intervention as indicated/tolerated to optimize functional use of LUE.       OP EDUCATION:  Education  Individual(s) Educated: Patient  Home Program: AAROM, AROM, Strengthening    Goals:  Active       OT Goals       1. Patient will increase LUE shoulder AROM to 130 degrees flexion and abduction for improved functional use of LUE. (Progressing)       Start:  03/03/25    Expected End:  04/11/25            2. Patient will increase LUE strength at all planes to at least 4/5 for improved functional use of LUE. (Progressing)       Start:  03/03/25    Expected End:  04/11/25            3. Patient will achieve full grasp/release of L hand for improved ability to grasp/carry ADL objects with L hand. (Progressing)       Start:  03/03/25    Expected End:  04/11/25            4. Patient will increase L hand  strength by at least 15# for improved gripping with L hand during ADL's. (Progressing)       Start:  03/03/25    Expected End:  04/11/25            5. Patient will increase increase coordination in L hand AEB ability to move at least 10 blocks with B&B standardized " testing. (Progressing)       Start:  03/03/25    Expected End:  04/11/25            6. Patient will complete UB dressing and hygiene/grooming tasks with Mod I. (Met)       Start:  03/03/25    Expected End:  05/23/25    Resolved:  03/13/25         7. Patient will complete LB dressing tasks with Min A. (Progressing)       Start:  03/03/25    Expected End:  05/23/25                8. Patient will increase overall ease and independence AEB UEFI score of at least 50/80 for improved quality of life for patient. (Progressing)       Start:  03/03/25    Expected End:  05/23/25                    General Sunscreen Counseling: I recommended a broad spectrum sunscreen with a SPF of 30 or higher.  I explained that SPF 30 sunscreens block approximately 97 percent of the sun's harmful rays.  Sunscreens should be applied at least 15 minutes prior to expected sun exposure and then every 2 hours after that as long as sun exposure continues. If swimming or exercising sunscreen should be reapplied every 45 minutes to an hour after getting wet or sweating.  One ounce, or the equivalent of a shot glass full of sunscreen, is adequate to protect the skin not covered by a bathing suit. I also recommended a lip balm with a sunscreen as well. Sun protective clothing can be used in lieu of sunscreen but must be worn the entire time you are exposed to the sun's rays. Products Recommended: Heliocare tablets Detail Level: Simple

## 2025-04-21 ENCOUNTER — TREATMENT (OUTPATIENT)
Dept: PHYSICAL THERAPY | Facility: CLINIC | Age: 80
End: 2025-04-21
Payer: COMMERCIAL

## 2025-04-21 ENCOUNTER — TREATMENT (OUTPATIENT)
Dept: OCCUPATIONAL THERAPY | Facility: CLINIC | Age: 80
End: 2025-04-21
Payer: COMMERCIAL

## 2025-04-21 DIAGNOSIS — I63.9 ISCHEMIC CEREBROVASCULAR ACCIDENT (CVA) (MULTI): ICD-10-CM

## 2025-04-21 DIAGNOSIS — G81.94 LEFT HEMIPARESIS (MULTI): ICD-10-CM

## 2025-04-21 PROCEDURE — 97112 NEUROMUSCULAR REEDUCATION: CPT | Mod: GO | Performed by: OCCUPATIONAL THERAPIST

## 2025-04-21 PROCEDURE — 97112 NEUROMUSCULAR REEDUCATION: CPT | Mod: GP

## 2025-04-21 PROCEDURE — 97110 THERAPEUTIC EXERCISES: CPT | Mod: GO | Performed by: OCCUPATIONAL THERAPIST

## 2025-04-21 PROCEDURE — 97110 THERAPEUTIC EXERCISES: CPT | Mod: GP

## 2025-04-21 ASSESSMENT — PAIN - FUNCTIONAL ASSESSMENT
PAIN_FUNCTIONAL_ASSESSMENT: 0-10
PAIN_FUNCTIONAL_ASSESSMENT: 0-10

## 2025-04-21 ASSESSMENT — PAIN SCALES - GENERAL
PAINLEVEL_OUTOF10: 2
PAINLEVEL_OUTOF10: 2

## 2025-04-21 NOTE — PROGRESS NOTES
"Occupational Therapy Treatment  Patient Name: Cynthia Muse  MRN: [id number]  OT Received on: 4/21/2025        Time Calculation  Start Time: 1145  Stop Time: 1230  Time Calculation (min): 45 min  OT Therapeutic Procedures Time Entry  Neuromuscular Re-Education Time Entry: 20  Therapeutic Exercise Time Entry: 25    Insurance:  Visit Number: 14 of 32  Insurance Type: MMO [employer]; 40 visits/yr      Subjective   Problem List Items Addressed This Visit           ICD-10-CM    Ischemic cerebrovascular accident (CVA) (Multi) I63.9    Left hemiparesis (Multi) G81.94     Current Problem/Reason for visit:  +CVA s/p TNK with L hemiparesis     Referred by: Dr. Izquierdo    Patient reports \"My arm feels a little tight today.\"    Precautions: +L Hemiparesis; +Fall Risk    Performing HEP?: Yes    Pain:  Pain Assessment  Pain Assessment: 0-10  0-10 (Numeric) Pain Score: 2  Pain Location: Arm (Biceps)  Pain Orientation: Left    Objective   Physical Observation: Patient into clinic with +LLE AFO and LBQC; minor LOB upon ambulating to tx area, able to self-correct; +Flexor tone/synergy pattern in LUE; +Biceps tightness; +arthritic joint changes B hands  Edema: +Mild L hand  Sensory: +Impaired   Numbness/Tingling: Denies       Treatment:    Therapeutic Exercise:  Wall washes with BUE's and hands-on assist via therapist for LUE placement and to maintain positioning we reps; 15 reps x 3 sets.  Towel slides at countertop for shoulder flexion LUE; graded manual resistance as indicated via therapist for strengthening; 15 reps  Progressed to above ther ex while with maintaining grasp on cone x15 reps.  Theraball rolls at countertop with BUE's x96cjqu x 3 sets.  Scapular retraction with dowel jose and orange theraband x15 reps x 3 sets.  Alternating BUE push-outs seated with orange theraband x15 reps x 3 sets.  Standing alternating straight arm pulldowns with orange theraband x15 reps x 3 sets.  With FA positioned on foam wedge and 1# wrap weight " at hand, wrist extension x15 reps x 3 sets.    Neuromuscular Re-education:  Countertop push-ups to promote weight acceptance and break up tone in LUE; 15 reps x 2 sets.  Therapist implemented functional reaching task with cones x12 for  and placement to adjacent stack; stack/unstack; verbal prompting for purposeful digital extension for proper hand placement with each cone, hands-on assistance to promote full extension and proper hand positioning, patient demonstrates ability to maintain grasp and place to stack with good accuracy and no drops.  Tennis racket volley with use of ace wrap to maintain racket in hand; 25 reps x 2 sets; patient unable to maintain upright positioning of racket throughout task due to weakness; 100% accuracy during task.      Post-tx pain: 0/10    Assessment/Plan Patient continues to make steady progress with skilled OT intervention; improving functional use of LUE noted with participation in therapeutic activities; will continue to advance as indicated/tolerated.      OP EDUCATION:  Education  Individual(s) Educated: Patient  Home Program: AAROM, AROM, Strengthening (Motor planning, motor coordination, motor accuracy, dual tasking)    Goals:  Active       OT Goals       1. Patient will increase LUE shoulder AROM to 130 degrees flexion and abduction for improved functional use of LUE. (Progressing)       Start:  03/03/25    Expected End:  04/11/25            2. Patient will increase LUE strength at all planes to at least 4/5 for improved functional use of LUE. (Progressing)       Start:  03/03/25    Expected End:  04/11/25            3. Patient will achieve full grasp/release of L hand for improved ability to grasp/carry ADL objects with L hand. (Progressing)       Start:  03/03/25    Expected End:  04/11/25            4. Patient will increase L hand  strength by at least 15# for improved gripping with L hand during ADL's. (Progressing)       Start:  03/03/25    Expected End:   04/11/25            5. Patient will increase increase coordination in L hand AEB ability to move at least 10 blocks with B&B standardized testing. (Progressing)       Start:  03/03/25    Expected End:  04/11/25            6. Patient will complete UB dressing and hygiene/grooming tasks with Mod I. (Met)       Start:  03/03/25    Expected End:  05/23/25    Resolved:  03/13/25         7. Patient will complete LB dressing tasks with Min A. (Progressing)       Start:  03/03/25    Expected End:  05/23/25                8. Patient will increase overall ease and independence AEB UEFI score of at least 50/80 for improved quality of life for patient. (Progressing)       Start:  03/03/25    Expected End:  05/23/25

## 2025-04-21 NOTE — PROGRESS NOTES
"Physical Therapy Treatment    Patient Name: Cynthia Muse  MRN: [id number]  Encounter date: 4/21/2025 PT Received On: 04/21/25  Response to Previous Treatment: Patient with no complaints from previous session.  Time Calculation  Start Time: 1104  Stop Time: 1145  Time Calculation (min): 41 min  PT Therapeutic Procedures Time Entry  Neuromuscular Re-Education Time Entry: 15  Therapeutic Exercise Time Entry: 26    Visit # 14 of 26  Visits/Dates Authorized: D - PT - 1) [employer] [facility] - NO AUTH / $350 DED MET / 80% coins / $10,000 OOP not met / 40V MAX pt alone - 1 used / ds 2/14/25.   CPT 37015 not covered.  2) MEDICARE A/B - NO AUTH / $257 DEDUCT not met / $0 used PT/ST / [place] visits / Per RTE.             Current Problem:   Problem List Items Addressed This Visit           ICD-10-CM    Ischemic cerebrovascular accident (CVA) (Multi) I63.9    Left hemiparesis (Multi) G81.94       Precautions:    falls       Subjective   General:    Patient states she is feeling usual pain in L shoulder today. Patient reports she was tired after last session, no more than usual. HEP going well.     Pre-Treatment Symptoms:   Pain Assessment: 0-10  0-10 (Numeric) Pain Score: 2  Pain Location: Arm (\"biceps\")  Pain Orientation: Left    Objective   Findings:   Balance improvements overall but deficits when stepping to the Right with resistance of cables anchored on the Left.              Treatments:  Therapeutic Exercise 07876:   Nustep L5 x9 min LE only , seat 7  Resisted fwd/bwd walking 7.5-10# x3   Resisted L/R sidesteps 7.5# x2    Trialed R stepping with eccentric to left (see assessment)    HSC DL 50-55# 2x15   LAQ 8# 2x15 alternating R/L    Extra time required with rest breaks in between balance and strengthening exercises      DNP  Leg press DL 30# 2x15 , SL 10# 1x15 R/L DNP  Step ups 8 in at steps mult reps with UE support for RUE.  STS 2x10 no UE support   Tloop side stepping lime green 2 laps , seated break after  Tloop lean on counter " "hip extension lime green 2x10    Shuttle leg press DL 63--137# 1x12 ea , SL 37.5# 2x12 R/L  DNP  Quad iso at 90 deg knee flex 5\" 2x10   HL hip add 5\" 2x15  HL hip abd (light TB) 2x15       Neuromuscular Re-Ed 93364:    NBOS EO/EC 60 sec x 1    Firm   Foam  Step taps 5\" while on foam  x 2 min  Mod Tandem  EO 30 sec x 1    Foam   Alternating position R fwd L back switch R bkwd and L fwd x 30 sec       DNP:  Ambulation with quad cane around facility and on [employer] track. Pt declined performance of SPC for advancement today despite encouragement for trail.   Gait training WITHOUT use of AFO in clinic. Pt had good performance without LOB but required cues for improved heel strike and able to improve dorsiflexion post cues.  Stair training x 4 steps with use of RUE on hand rail for both ascending/descending. Pt performed 4 step x 4 sets for total of 16 steps. Goal met for ascending/descending x 12 steps.  Benefits from cues to slow down performance.  At long counter top   Side stepping with cues for improved step length   Retro walking with CGA and SBA no UE support required.   Ambulated around clinic with sp cane , close supervision  6\" hurdles with sp cane- step to pattern x2 laps , reciprocal pattern x2 laps    HEP / Access Codes:   URL: [email or web address]  Access Code: [email or web address]   Date: 02/28/2025  - Narrow Stance with Counter Support  - 2 x daily - 7 x weekly - 3 reps - 20 hold    Access Code: [email or web address]  Date: 03/03/2025  - Side Stepping with Resistance at Ankles and Counter Support  - 1-2 x daily - 15-20 ft x 4  distance  - Standing Hip Extension with Resistance at Ankles and Counter Support  - 1-2 x daily - 2 sets - 10 reps  - Sit to Stand Without Arm Support  - 1-2 x daily - 2 sets - 10 reps    Assessment:   Advanced resistance walking from 7.5 # to 10#. Pt continues to note challenges but able to tolerate forward walking however LOB noted with side stepping requiring mod/ Max A to recover. LOB " occurred when side stepping out to side (returning to left after stepping to right) Continues to have adequate control with side stepping out to Left and returning to the Right. Cont to guard closely with balance and resistive gait training secondary to cont balance deficits.    Post-Treatment Symptoms:    No pain with arm feeling better.     Plan:    Continue to progress strength and balance  Continue 2 x per week for 10 more visits     Goals:   Active       PT Problem       PT Goal 1 (Progressing)       Start:  02/28/25    Expected End:  03/28/25       Pt to improve balance with improved modified tandem stance to 30 sec with eyes closed to facilitate zero incidents of falling since starting therapy.         PT Goal 2 (Progressing)       Start:  02/28/25    Expected End:  05/09/25       Pt to improve LE strength of left side to 4+/5 or greater.         PT Goal 3 (Progressing)       Start:  04/07/25    Expected End:  05/19/25       New goal: pt to improve 2 min walk test by x 1 MCID or greater.         PT Goal 4 (Met)       Start:  02/28/25    Expected End:  05/09/25    Resolved:  04/07/25    Pt able to ascend and descend x 12 steps with reciprocal pattern and railing for assistance to facilitate return to community outgoing.         Patient Stated Goal 1 (Progressing)       Start:  02/28/25    Expected End:  05/09/25       Pt to be able to walk without quad cane or assistive device.         Patient Stated Goal 2 (Progressing)       Start:  02/28/25    Expected End:  05/09/25       Pt would like to get in and out of bed (soft mattress) without having to use grab bar.

## 2025-04-23 NOTE — PROGRESS NOTES
"Physical Therapy Treatment    Patient Name: Cynthia Muse  MRN: [id number]  Encounter date: 4/24/2025    Time Calculation  Start Time: 1100  Stop Time: 1145  Time Calculation (min): 45 min  PT Therapeutic Procedures Time Entry  Neuromuscular Re-Education Time Entry: 20  Therapeutic Exercise Time Entry: 25    Visit # 15 of 26  Visits/Dates Authorized: D - PT - 1) [facility] [place] NO AUTH / $350 DED MET / 80% coins / $10,000 OOP not met / 40V MAX pt alone - 1 used / ds 2/14/25.   CPT 40323 not covered.  2) MEDICARE A/B - NO AUTH / $257 DEDUCT not met / $0 used PT/ST / [place] visits / Per RTE.             Current Problem:   Problem List Items Addressed This Visit           ICD-10-CM    Ischemic cerebrovascular accident (CVA) (Multi) I63.9    Left hemiparesis (Multi) G81.94         Precautions:    falls       Subjective   General:    Pt reports good tolerance to last session, experiencing transient fatigue.  Pt reports currently experiencing no pain.  Pt reports no falls recently, though would like to eventually walk without the cane.      Pre-Treatment Symptoms:   Pain Assessment: 0-10  0-10 (Numeric) Pain Score: 0 - No pain    Objective   Findings:   Pt presents ambulating with large base quad cane, wider YANI,              Treatments:  Therapeutic Exercise 81450:   Nustep L5 x9 min LE only , seat 7  HSC DL 50-55# 2x15   [place] leg press DL 40-30# x 15 each;  SL  10#  x 15 reps each  STS x10 no UE support;  split stance R and L x 5 each without UE assist.   LAQ 8#  3 x 10  R/L      Resisted fwd/bwd walking 7.5-10# x3   Resisted L/R sidesteps 7.5# x2    Trialed R stepping with eccentric to left (see assessment)      DNP  Step ups 8 in at steps mult reps with UE support for RUE.    Tloop side stepping lime green 2 laps , seated break after  Tloop lean on counter hip extension lime green 2x10    Shuttle leg press DL 19--137# 1x12 ea , SL 37.5# 2x12 R/L  DNP  Quad iso at 90 deg knee flex 5\" 2x10   HL hip add 5\" 2x15  HL hip " "abd (light TB) 2x15       Neuromuscular Re-Ed 09331:    NBOS EO/EC 60 sec x 1   then with head turns and head nods x 10 each   Firm   Foam  Step taps 5\" while on foam  x 2 min  Mod Tandem R and L EO and EC  2 x 30\" each        DNP:  Ambulation with quad cane around facility and on [place] track. Pt declined performance of SPC for advancement today despite encouragement for trail.   Gait training WITHOUT use of AFO in clinic. Pt had good performance without LOB but required cues for improved heel strike and able to improve dorsiflexion post cues.  Stair training x 4 steps with use of RUE on hand rail for both ascending/descending. Pt performed 4 step x 4 sets for total of 16 steps. Goal met for ascending/descending x 12 steps.  Benefits from cues to slow down performance.  At long counter top   Side stepping with cues for improved step length   Retro walking with CGA and SBA no UE support required.   Ambulated around clinic with sp cane , close supervision  6\" hurdles with sp cane- step to pattern x2 laps , reciprocal pattern x2 laps    HEP / Access Codes:   URL: [email or web address]  Access Code: [email or web address]   Date: 02/28/2025  - Narrow Stance with Counter Support  - 2 x daily - 7 x weekly - 3 reps - 20 hold    Access Code: [email or web address]  Date: 03/03/2025  - Side Stepping with Resistance at Ankles and Counter Support  - 1-2 x daily - 15-20 ft x 4  distance  - Standing Hip Extension with Resistance at Ankles and Counter Support  - 1-2 x daily - 2 sets - 10 reps  - Sit to Stand Without Arm Support  - 1-2 x daily - 2 sets - 10 reps    Assessment:   Patient tolerated treatment well. Patient appears to be working hard in clinic and motivated to decrease pain and restore all functional deficits. Verbal and/or tactile cues given for proper form, and avoidance of substitution patterns with all exercises. All infection control policies followed during this visit. No observed antalgia with exercise performance .  Pt most " challenged with balance during tandem stance positions;  provided intermittent verbal and tactile cueing to maintain upright trunk posture during standing activity.      Post-Treatment Symptoms:    0/10    Plan:    Continue to progress strength and balance  Continue 2 x per week for 10 more visits     Goals:   Active       PT Problem       PT Goal 1 (Progressing)       Start:  02/28/25    Expected End:  03/28/25       Pt to improve balance with improved modified tandem stance to 30 sec with eyes closed to facilitate zero incidents of falling since starting therapy.         PT Goal 2 (Progressing)       Start:  02/28/25    Expected End:  05/09/25       Pt to improve LE strength of left side to 4+/5 or greater.         PT Goal 3 (Progressing)       Start:  04/07/25    Expected End:  05/19/25       New goal: pt to improve 2 min walk test by x 1 MCID or greater.         PT Goal 4 (Met)       Start:  02/28/25    Expected End:  05/09/25    Resolved:  04/07/25    Pt able to ascend and descend x 12 steps with reciprocal pattern and railing for assistance to facilitate return to community outgoing.         Patient Stated Goal 1 (Progressing)       Start:  02/28/25    Expected End:  05/09/25       Pt to be able to walk without quad cane or assistive device.         Patient Stated Goal 2 (Progressing)       Start:  02/28/25    Expected End:  05/09/25       Pt would like to get in and out of bed (soft mattress) without having to use grab bar.

## 2025-04-24 ENCOUNTER — TREATMENT (OUTPATIENT)
Dept: OCCUPATIONAL THERAPY | Facility: CLINIC | Age: 80
End: 2025-04-24
Payer: COMMERCIAL

## 2025-04-24 ENCOUNTER — TREATMENT (OUTPATIENT)
Dept: PHYSICAL THERAPY | Facility: CLINIC | Age: 80
End: 2025-04-24
Payer: COMMERCIAL

## 2025-04-24 DIAGNOSIS — G81.94 LEFT HEMIPARESIS (MULTI): ICD-10-CM

## 2025-04-24 DIAGNOSIS — I63.9 ISCHEMIC CEREBROVASCULAR ACCIDENT (CVA) (MULTI): ICD-10-CM

## 2025-04-24 PROCEDURE — 97110 THERAPEUTIC EXERCISES: CPT | Mod: GO,KX | Performed by: OCCUPATIONAL THERAPIST

## 2025-04-24 PROCEDURE — 97110 THERAPEUTIC EXERCISES: CPT | Mod: GP,CQ

## 2025-04-24 PROCEDURE — 97530 THERAPEUTIC ACTIVITIES: CPT | Mod: GO,KX | Performed by: OCCUPATIONAL THERAPIST

## 2025-04-24 PROCEDURE — 97112 NEUROMUSCULAR REEDUCATION: CPT | Mod: GP,CQ

## 2025-04-24 ASSESSMENT — PAIN - FUNCTIONAL ASSESSMENT
PAIN_FUNCTIONAL_ASSESSMENT: 0-10
PAIN_FUNCTIONAL_ASSESSMENT: 0-10

## 2025-04-24 ASSESSMENT — PAIN SCALES - GENERAL
PAINLEVEL_OUTOF10: 0 - NO PAIN
PAINLEVEL_OUTOF10: 0 - NO PAIN

## 2025-04-24 NOTE — PROGRESS NOTES
"Occupational Therapy Treatment  Patient Name: Cynthia Muse  MRN: [id number]  OT Received on: 4/24/2025        Time Calculation  Start Time: 1145  Stop Time: 1230  Time Calculation (min): 45 min  OT Therapeutic Procedures Time Entry  Therapeutic Activity Time Entry: 10  Therapeutic Exercise Time Entry: 35    Insurance:  Visit Number: 15 of 32  Insurance Type: [employer]; 40 visits/yr      Subjective   Problem List Items Addressed This Visit           ICD-10-CM    Ischemic cerebrovascular accident (CVA) (Multi) I63.9    Left hemiparesis (Multi) G81.94     Current Problem/Reason for visit:  +CVA s/p TNK with L hemiparesis     Referred by: Dr. Izquierdo    Patient reports \"I had a good PT session today.\"    Precautions: +L Hemiparesis; +Fall Risk    Performing HEP?: Yes    Pain:  Pain Assessment  Pain Assessment: 0-10  0-10 (Numeric) Pain Score: 0 - No pain    Objective     Patient seen for OT intervention following PT this date.     Physical Observation: Patient into clinic with +LLE AFO and LBQC; minor LOB upon ambulating to tx area, able to self-correct; +Flexor tone/synergy pattern in LUE; +Biceps tightness; +arthritic joint changes B hands  Edema: +Mild L hand  Sensory: +Impaired   Numbness/Tingling: Denies       Treatment:    Therapeutic Exercise:  LUE rollerboard ther ex at countertop for scpaular stabilization; CW/CCW circles x15 x 3 sets; hands-on assistance to perform.  LUE shoulder flexion/extension, adduction and abduction with use of wedge mat x15 reps x 3 sets; hands-on assistance to perform.  UBE seated without resistance x6 mintues; 2 minutes FW/BW without BUE's, 1 minute each direction with LUE only; hands-on assist to perform single arm; use of ace wrap to maintain L hand grasp on handle.  Seated ther ex with orange theraband including pushouts, pullbacks, and curls x10 reps x 3 sets; hands-on assist to perform due to weakness and compensatory body movement.  Red therabar bends with BUE's x15 reps x 3 " sets.    Therapeutic Activity:  Therapist implemented bean bag grasp, hold, and release task; therapist assiste with L hand digital extension for placement of bean bag; volitional grasp and release into bucket x12 bags x 2 sets.     Post-tx pain: 0/10    Assessment/Plan Patient puts forth much effort and participates actively throughout tx sx; will continue to address deficits, refine emerging coordination skills and optimize functional use of LUE.      OP EDUCATION:  Education  Individual(s) Educated: Patient  Home Program: AAROM, AROM, Fine motor tasks, Strengthening    Goals:  Active       OT Goals       1. Patient will increase LUE shoulder AROM to 130 degrees flexion and abduction for improved functional use of LUE. (Progressing)       Start:  03/03/25    Expected End:  04/11/25            2. Patient will increase LUE strength at all planes to at least 4/5 for improved functional use of LUE. (Progressing)       Start:  03/03/25    Expected End:  04/11/25            3. Patient will achieve full grasp/release of L hand for improved ability to grasp/carry ADL objects with L hand. (Progressing)       Start:  03/03/25    Expected End:  04/11/25            4. Patient will increase L hand  strength by at least 15# for improved gripping with L hand during ADL's. (Progressing)       Start:  03/03/25    Expected End:  04/11/25            5. Patient will increase increase coordination in L hand AEB ability to move at least 10 blocks with B&B standardized testing. (Progressing)       Start:  03/03/25    Expected End:  04/11/25            6. Patient will complete UB dressing and hygiene/grooming tasks with Mod I. (Met)       Start:  03/03/25    Expected End:  05/23/25    Resolved:  03/13/25         7. Patient will complete LB dressing tasks with Min A. (Progressing)       Start:  03/03/25    Expected End:  05/23/25                8. Patient will increase overall ease and independence AEB UEFI score of at least 50/80 for  improved quality of life for patient. (Progressing)       Start:  03/03/25    Expected End:  05/23/25

## 2025-04-28 ENCOUNTER — APPOINTMENT (OUTPATIENT)
Dept: OCCUPATIONAL THERAPY | Facility: CLINIC | Age: 80
End: 2025-04-28
Payer: MEDICARE

## 2025-04-28 ENCOUNTER — APPOINTMENT (OUTPATIENT)
Dept: PHYSICAL THERAPY | Facility: CLINIC | Age: 80
End: 2025-04-28
Payer: MEDICARE

## 2025-05-01 ENCOUNTER — TREATMENT (OUTPATIENT)
Dept: PHYSICAL THERAPY | Facility: CLINIC | Age: 80
End: 2025-05-01
Payer: MEDICARE

## 2025-05-01 ENCOUNTER — TREATMENT (OUTPATIENT)
Dept: OCCUPATIONAL THERAPY | Facility: CLINIC | Age: 80
End: 2025-05-01
Payer: MEDICARE

## 2025-05-01 DIAGNOSIS — I63.9 ISCHEMIC CEREBROVASCULAR ACCIDENT (CVA) (MULTI): ICD-10-CM

## 2025-05-01 DIAGNOSIS — G81.94 LEFT HEMIPARESIS (MULTI): ICD-10-CM

## 2025-05-01 PROCEDURE — 97110 THERAPEUTIC EXERCISES: CPT | Mod: GP,CQ

## 2025-05-01 PROCEDURE — 97110 THERAPEUTIC EXERCISES: CPT | Mod: GO | Performed by: OCCUPATIONAL THERAPIST

## 2025-05-01 PROCEDURE — 97112 NEUROMUSCULAR REEDUCATION: CPT | Mod: GP,CQ

## 2025-05-01 ASSESSMENT — PAIN SCALES - GENERAL
PAINLEVEL_OUTOF10: 0 - NO PAIN
PAINLEVEL_OUTOF10: 0 - NO PAIN

## 2025-05-01 ASSESSMENT — PAIN - FUNCTIONAL ASSESSMENT
PAIN_FUNCTIONAL_ASSESSMENT: 0-10
PAIN_FUNCTIONAL_ASSESSMENT: 0-10

## 2025-05-01 NOTE — PROGRESS NOTES
"Physical Therapy Treatment    Patient Name: Cynthia Muse  MRN: [id number]  Encounter date: 5/1/2025    Time Calculation  Start Time: 1100  Stop Time: 1145  Time Calculation (min): 45 min  PT Therapeutic Procedures Time Entry  Neuromuscular Re-Education Time Entry: 14  Therapeutic Exercise Time Entry: 31    Visit # 16 of 26  Visits/Dates Authorized: D - PT - 1) [facility] [place] [facility] - NO AUTH / $350 DED MET / 80% coins / $10,000 OOP not met / 40V MAX pt alone - 1 used / ds 2/14/25.   CPT 11395 not covered.  2) MEDICARE A/B - NO AUTH / $257 DEDUCT not met / $0 used PT/ST / [place] visits / Per RTE.             Current Problem:   Problem List Items Addressed This Visit           ICD-10-CM    Ischemic cerebrovascular accident (CVA) (Multi) I63.9    Left hemiparesis (Multi) G81.94           Precautions:    falls       Subjective   General:    Pt reports good tolerance to last session, experiencing transient fatigue of legs later that day, \"next day I'm good to go\".      Pre-Treatment Symptoms:   Pain Assessment: 0-10  0-10 (Numeric) Pain Score: 0 - No pain    Objective   Findings:   Pt presents ambulating with large base quad cane, wider YANI, and slightly fast nelson.               Treatments:  Therapeutic Exercise 60046:   Nustep L5 x8 min LE only , seat 7  HSC DL 50-55# 2x15   Knee extension, DL, 10#  3 x 10  Tloop side stepping , Light blue 2 laps , seated break after  STS  solid and then blue Airex pad x 10 ea no UE support;   split stance R and L x 7 each without UE assist.   [place] leg press DL 40-30# x 15 each;  SL  10#  x 15 reps each    DNP  Resisted fwd/bwd walking 7.5-10# x3   Resisted L/R sidesteps 7.5# x2    Trialed R stepping with eccentric to left (see assessment)  Step ups 8 in at steps mult reps with UE support for RUE.  Tloop lean on counter hip extension lime green 2x10    Shuttle leg press DL 39--137# 1x12 ea , SL 37.5# 2x12 R/L  DNP  Quad iso at 90 deg knee flex 5\" 2x10   HL hip add 5\" 2x15  HL hip abd " "(light TB) 2x15       Neuromuscular Re-Ed 44580:    NBOS EO/EC 60 sec x 1  on blue Airex pad  In // bars tandem walking fwd, R hand on rail, x 2 laps; then x 1 lap EC    DNP:  Step taps 5\" while on foam  x 2 min  Mod Tandem R and L EO and EC  2 x 30\" each  Ambulation with quad cane around facility and on [place] track. Pt declined performance of SPC for advancement today despite encouragement for trail.   Gait training WITHOUT use of AFO in clinic. Pt had good performance without LOB but required cues for improved heel strike and able to improve dorsiflexion post cues.  Stair training x 4 steps with use of RUE on hand rail for both ascending/descending. Pt performed 4 step x 4 sets for total of 16 steps. Goal met for ascending/descending x 12 steps.  Benefits from cues to slow down performance.  At long counter top   Side stepping with cues for improved step length   Retro walking with CGA and SBA no UE support required.   Ambulated around clinic with sp cane , close supervision  6\" hurdles with sp cane- step to pattern x2 laps , reciprocal pattern x2 laps    HEP / Access Codes:   URL: [email or web address]  Access Code: [email or web address]   Date: 02/28/2025  - Narrow Stance with Counter Support  - 2 x daily - 7 x weekly - 3 reps - 20 hold    Access Code: [email or web address]  Date: 03/03/2025  - Side Stepping with Resistance at Ankles and Counter Support  - 1-2 x daily - 15-20 ft x 4  distance  - Standing Hip Extension with Resistance at Ankles and Counter Support  - 1-2 x daily - 2 sets - 10 reps  - Sit to Stand Without Arm Support  - 1-2 x daily - 2 sets - 10 reps    Assessment:   Patient tolerated treatment well. Patient appears to be working hard in clinic and motivated to decrease pain and restore all functional deficits. Verbal and/or tactile cues given for proper form, and avoidance of substitution patterns with all exercises. All infection control policies followed during this visit. No observed antalgia with exercise " performance .  Pt most challenged with balance during tandem stance positions;  provided intermittent verbal and tactile cueing to maintain upright trunk posture during standing activity.  Pt challenged with EC balance/proprioceptive exercise.  Pt very motivated to progress.     Post-Treatment Symptoms:    0/10    Plan:    Continue to progress strength and balance  Continue 2 x per week for 10 more visits     Goals:   Active       PT Problem       PT Goal 1 (Progressing)       Start:  02/28/25    Expected End:  03/28/25       Pt to improve balance with improved modified tandem stance to 30 sec with eyes closed to facilitate zero incidents of falling since starting therapy.         PT Goal 2 (Progressing)       Start:  02/28/25    Expected End:  05/09/25       Pt to improve LE strength of left side to 4+/5 or greater.         PT Goal 3 (Progressing)       Start:  04/07/25    Expected End:  05/19/25       New goal: pt to improve 2 min walk test by x 1 MCID or greater.         PT Goal 4 (Met)       Start:  02/28/25    Expected End:  05/09/25    Resolved:  04/07/25    Pt able to ascend and descend x 12 steps with reciprocal pattern and railing for assistance to facilitate return to community outgoing.         Patient Stated Goal 1 (Progressing)       Start:  02/28/25    Expected End:  05/09/25       Pt to be able to walk without quad cane or assistive device.         Patient Stated Goal 2 (Progressing)       Start:  02/28/25    Expected End:  05/09/25       Pt would like to get in and out of bed (soft mattress) without having to use grab bar.

## 2025-05-01 NOTE — PROGRESS NOTES
"Occupational Therapy Treatment  Patient Name: Cynthia Muse  MRN: [id number]  OT Received on: 5/1/2025        Time Calculation  Start Time: 1145  Stop Time: 1230  Time Calculation (min): 45 min  OT Therapeutic Procedures Time Entry  Therapeutic Exercise Time Entry: 45    Insurance:  Visit Number: 16 of 32  Insurance Type: [employer]; 40 visits/yr      Subjective   Problem List Items Addressed This Visit           ICD-10-CM    Ischemic cerebrovascular accident (CVA) (Multi) I63.9    Left hemiparesis (Multi) G81.94     Current Problem/Reason for visit:  +CVA s/p TNK with L hemiparesis     Referred by: Dr. Izquierdo    Patient reports \"I think my  is getting a little better. Today I was able hold an Asprin bottle and this hand [left] and open it with my right hand.\"    Precautions: +L Hemiparesis; +Fall Risk    Performing HEP?: Yes    Pain:  Pain Assessment  Pain Assessment: 0-10  0-10 (Numeric) Pain Score: 0 - No pain    Objective     Patient seen for OT intervention following PT this date.     Physical Observation: Patient into clinic with +LLE AFO and LBQC; minor LOB upon ambulating to tx area, able to self-correct; +Flexor tone/synergy pattern in LUE; +Biceps tightness; +arthritic joint changes B hands  Edema: +Mild L hand  Sensory: +Impaired   Numbness/Tingling: Denies       Treatment:    Therapeutic Exercise:  UBE seated without resistance x6 mintues; 3 min FW/BW; use of ace wrap to maintain L hand grasp on handle; however, improved  noted on handle this date.  Supine dowel jose ther ex for shoulder flexion and horizontal abduction/adduction x10 reps x 3 sets; attempted abduction, however, discomfort FA-->hand very intense, but unable to tolerate.  Supine AROM for shoulder abduction, ER/IR, elbow flexion/extension with graded manual resistance x10 reps x 3 sets.  Red therabar bends x15 reps x 3 sets.  Attempted push-ups in stance through wedge mat, much difficulty achieving LUE positioning and gradual weight " bearing through extended wrist due to pain; 10 reps; unable to progress beyond this date.   Isometric ball squeezes with BUE's x15 reps x 3 sets.      Post-tx pain: Position dependent (supination/wrist extension) pain in LUE at 10/10; otherwise, no pain reported.    Assessment/Plan Patient's participation in ther ex somewhat limited this date by pain as described above; patient attempted to work through pain, however, tearful at times because of it and task terminated. Progressing ther ex as able to target major muscles/muscle groups in LUE for improved strength and ROM and greater volitional use.       OP EDUCATION:  Education  Individual(s) Educated: Patient  Home Program: Strengthening, AROM    Goals:  Active       OT Goals       1. Patient will increase LUE shoulder AROM to 130 degrees flexion and abduction for improved functional use of LUE. (Progressing)       Start:  03/03/25    Expected End:  04/11/25            2. Patient will increase LUE strength at all planes to at least 4/5 for improved functional use of LUE. (Progressing)       Start:  03/03/25    Expected End:  04/11/25            3. Patient will achieve full grasp/release of L hand for improved ability to grasp/carry ADL objects with L hand. (Progressing)       Start:  03/03/25    Expected End:  04/11/25            4. Patient will increase L hand  strength by at least 15# for improved gripping with L hand during ADL's. (Progressing)       Start:  03/03/25    Expected End:  04/11/25            5. Patient will increase increase coordination in L hand AEB ability to move at least 10 blocks with B&B standardized testing. (Progressing)       Start:  03/03/25    Expected End:  04/11/25            6. Patient will complete UB dressing and hygiene/grooming tasks with Mod I. (Met)       Start:  03/03/25    Expected End:  05/23/25    Resolved:  03/13/25         7. Patient will complete LB dressing tasks with Min A. (Progressing)       Start:  03/03/25     Expected End:  05/23/25                8. Patient will increase overall ease and independence AEB UEFI score of at least 50/80 for improved quality of life for patient. (Progressing)       Start:  03/03/25    Expected End:  05/23/25

## 2025-05-02 NOTE — PROGRESS NOTES
"Physical Therapy Treatment    Patient Name: Cynthia Muse  MRN: [id number]  Encounter date: 5/5/2025    Time Calculation  Start Time: 1100  Stop Time: 1144  Time Calculation (min): 44 min  PT Therapeutic Procedures Time Entry  Neuromuscular Re-Education Time Entry: 16  Therapeutic Exercise Time Entry: 28    Visit # 17 of 26  Visits/Dates Authorized: D - PT - 1) [facility] - NO AUTH / $350 DED MET / 80% coins / $10,000 OOP not met / 40V MAX pt alone - 1 used / ds 2/14/25.   CPT 75136 not covered.  2) MEDICARE A/B - NO AUTH / $257 DEDUCT not met / $0 used PT/ST / MN visits / Per RTE.             Current Problem:   Problem List Items Addressed This Visit           ICD-10-CM    Ischemic cerebrovascular accident (CVA) (Multi) I63.9    Left hemiparesis (Multi) G81.94             Precautions:    falls       Subjective   General:    Pt reports good tolerance to last session, experiencing transient fatigue.  Pt denies any pain at present and denies any falls since last session.       Pre-Treatment Symptoms:   Pain Assessment: 0-10  0-10 (Numeric) Pain Score: 0 - No pain    Objective   Findings:   Pt presents ambulating with large base quad cane, wider YANI, and slightly fast nelson.               Treatments:  Therapeutic Exercise 58246:   Nustep L5 x8 min LE only , seat 7  Tloop side stepping , Light blue 2 laps , seated break after  HSC DL 50-55# 2x15   Knee extension, DL, 10#  3 x 10  [place] leg press DL 30# 2 x 15 each;  SL  10#  x 15 reps each  STS  split stance R and L x 10 each without UE assist.   Fwd step up 5\" x 10 each (in // bars)    DNP  Resisted fwd/bwd walking 7.5-10# x3   Resisted L/R sidesteps 7.5# x2    Trialed R stepping with eccentric to left (see assessment)  Step ups 8 in at steps mult reps with UE support for RUE.  Tloop lean on counter hip extension lime green 2x10    Shuttle leg press DL 82--137# 1x12 ea , SL 37.5# 2x12 R/L  DNP  Quad iso at 90 deg knee flex 5\" 2x10   HL hip add 5\" 2x15  HL hip " "abd (light TB) 2x15       Neuromuscular Re-Ed 04022:    NBOS EO/EC 60 sec x 1  on blue Airex pad (external pertubation with EO)  MIP on blue airex, one hand to no hand assist 2 x 10   In // bars tandem walking fwd, R hand on rail, x 2 laps EO, then x 1 lap EC    DNP:  Step taps 5\" while on foam  x 2 min  Mod Tandem R and L EO and EC  2 x 30\" each  Ambulation with quad cane around facility and on [employer] track. Pt declined performance of SPC for advancement today despite encouragement for trail.   Gait training WITHOUT use of AFO in clinic. Pt had good performance without LOB but required cues for improved heel strike and able to improve dorsiflexion post cues.  Stair training x 4 steps with use of RUE on hand rail for both ascending/descending. Pt performed 4 step x 4 sets for total of 16 steps. Goal met for ascending/descending x 12 steps.  Benefits from cues to slow down performance.  At long counter top   Side stepping with cues for improved step length   Retro walking with CGA and SBA no UE support required.   Ambulated around clinic with sp cane , close supervision  6\" hurdles with sp cane- step to pattern x2 laps , reciprocal pattern x2 laps    HEP / Access Codes:   URL: [email or web address]  Access Code: [email or web address]   Date: 02/28/2025  - Narrow Stance with Counter Support  - 2 x daily - 7 x weekly - 3 reps - 20 hold    Access Code: [email or web address]  Date: 03/03/2025  - Side Stepping with Resistance at Ankles and Counter Support  - 1-2 x daily - 15-20 ft x 4  distance  - Standing Hip Extension with Resistance at Ankles and Counter Support  - 1-2 x daily - 2 sets - 10 reps  - Sit to Stand Without Arm Support  - 1-2 x daily - 2 sets - 10 reps    Assessment:   Patient tolerated treatment well. Patient appears to be working hard in clinic and motivated to decrease pain and restore all functional deficits. Verbal and/or tactile cues given for proper form, and avoidance of substitution patterns with all exercises. All " infection control policies followed during this visit. No observed antalgia with exercise performance .  Pt most challenged with balance during tandem stance positions;  provided intermittent verbal and tactile cueing to maintain upright trunk posture during standing activity.  Pt continues to demonstrate challenged with EC balance/proprioceptive exercise, though did demonstrate some improvement vs last session.  Pt demonstrated moderate L quad eccentric weakness with step backward/down motion.     Post-Treatment Symptoms:    0/10    Plan:    Continue to progress strength and balance  Continue 2 x per week for 10 more visits     Goals:   Active       PT Problem       PT Goal 1 (Progressing)       Start:  02/28/25    Expected End:  03/28/25       Pt to improve balance with improved modified tandem stance to 30 sec with eyes closed to facilitate zero incidents of falling since starting therapy.         PT Goal 2 (Progressing)       Start:  02/28/25    Expected End:  05/09/25       Pt to improve LE strength of left side to 4+/5 or greater.         PT Goal 3 (Progressing)       Start:  04/07/25    Expected End:  05/19/25       New goal: pt to improve 2 min walk test by x 1 MCID or greater.         PT Goal 4 (Met)       Start:  02/28/25    Expected End:  05/09/25    Resolved:  04/07/25    Pt able to ascend and descend x 12 steps with reciprocal pattern and railing for assistance to facilitate return to community outgoing.         Patient Stated Goal 1 (Progressing)       Start:  02/28/25    Expected End:  05/09/25       Pt to be able to walk without quad cane or assistive device.         Patient Stated Goal 2 (Progressing)       Start:  02/28/25    Expected End:  05/09/25       Pt would like to get in and out of bed (soft mattress) without having to use grab bar.

## 2025-05-05 ENCOUNTER — TREATMENT (OUTPATIENT)
Dept: OCCUPATIONAL THERAPY | Facility: CLINIC | Age: 80
End: 2025-05-05
Payer: MEDICARE

## 2025-05-05 ENCOUNTER — TREATMENT (OUTPATIENT)
Dept: PHYSICAL THERAPY | Facility: CLINIC | Age: 80
End: 2025-05-05
Payer: COMMERCIAL

## 2025-05-05 DIAGNOSIS — I63.9 ISCHEMIC CEREBROVASCULAR ACCIDENT (CVA) (MULTI): ICD-10-CM

## 2025-05-05 DIAGNOSIS — G81.94 LEFT HEMIPARESIS (MULTI): ICD-10-CM

## 2025-05-05 PROCEDURE — 97530 THERAPEUTIC ACTIVITIES: CPT | Mod: GO | Performed by: OCCUPATIONAL THERAPIST

## 2025-05-05 PROCEDURE — 97112 NEUROMUSCULAR REEDUCATION: CPT | Mod: GP,CQ

## 2025-05-05 PROCEDURE — 97110 THERAPEUTIC EXERCISES: CPT | Mod: GO | Performed by: OCCUPATIONAL THERAPIST

## 2025-05-05 PROCEDURE — 97110 THERAPEUTIC EXERCISES: CPT | Mod: GP,CQ

## 2025-05-05 ASSESSMENT — PAIN - FUNCTIONAL ASSESSMENT
PAIN_FUNCTIONAL_ASSESSMENT: 0-10
PAIN_FUNCTIONAL_ASSESSMENT: 0-10

## 2025-05-05 ASSESSMENT — PAIN SCALES - GENERAL
PAINLEVEL_OUTOF10: 0 - NO PAIN
PAINLEVEL_OUTOF10: 1

## 2025-05-05 NOTE — PROGRESS NOTES
"Occupational Therapy Treatment  Patient Name: Cynthia Muse  MRN: [id number]  OT Received on: 5/5/2025        Time Calculation  Start Time: 1000  Stop Time: 1045  Time Calculation (min): 45 min  OT Therapeutic Procedures Time Entry  Therapeutic Activity Time Entry: 20  Therapeutic Exercise Time Entry: 25    Insurance:  Visit Number: 17 of 32  Insurance Type: [employer]; 40 visits/yr      Subjective   Problem List Items Addressed This Visit           ICD-10-CM    Ischemic cerebrovascular accident (CVA) (Multi) I63.9    Left hemiparesis (Multi) G81.94     Current Problem/Reason for visit:  +CVA s/p TNK with L hemiparesis     Referred by: Dr. Izquierdo    Patient reports \"My arm is feeling tight today.\"    Precautions: +L Hemiparesis; +Fall Risk    Performing HEP?: Yes    Pain:  Pain Assessment  Pain Assessment: 0-10  0-10 (Numeric) Pain Score: 1  Pain Location: Arm (Biceps)  Pain Orientation: Left    Objective     Patient accompanied to clinic this date by daughter, Deana, who remained present throughout tx sx.     Physical Observation: Patient into clinic with +LLE AFO and LBQC; +Flexor tone/synergy pattern in LUE; +Biceps tightness; +arthritic joint changes B hands  Edema: +Mild L hand  Sensory: +Impaired   Numbness/Tingling: Denies       Treatment:    Therapeutic Exercise:  Therapist implemented theraball rolls at countertop initially with BUE's x10 reps and then progressing to LUE only; 10 reps x 3 sets.  Rollerboard for scapular stabilization CW/CCW with visual target to maintain size with reps; 10 reps  x3 sets each.  Rollerboard for shoulder flexion/extension from stationary start and finished    Red therabar bends x15 reps x 3 sets.  Pushouts with dowel jose and orange theraband with BUE's x15 reps x 3 sets.  LUE pull backs with orange theraband x15 reps x 3 sets.  Biceps curls with 2# wrap weigth x145 reps x 3 sets.  L wrist extension x15 reps x 3 sets.    Therapeutic Activity:  Functional reaching for cone " "retrieval and placement with L hand; hands-on assistance to promote digital extension in preparation for hand placement on cone appropriately x12 cones, on/off; verbal prompting for purposeful and exaggerated release of cone as indicated; patient demonstrates good proximal UE control, as well as, improved wrist stability and control noted this date.   Bean bags task for purposeful grasp and release of bags from countertop into bucket at counter while standing x12 bags; hands-on assistance for hand placement in preparation for .  Theraball tapping with tennis racket; use of ace wrap to maintain proper  on handle; poor force generated x15 reps x 3 sets; hands-on assist intermittently due to weakness and impaired motor planning and coordination.       Post-tx pain: 0/10; \"My arm always feels better when we are done working together.\"    Assessment/Plan Patient actively participates throughout tx sx; puts forth much effort and is highly motivated; she is receptive to all recommendations and integrates feedback as provided into performance to optimize quality and consistency throughout tx sx.      OP EDUCATION:  Education  Individual(s) Educated: Patient  Home Program: Strengthening, AROM, PROM, AAROM (Motor planning, motor coordination)    Goals:  Active       OT Goals       1. Patient will increase LUE shoulder AROM to 130 degrees flexion and abduction for improved functional use of LUE. (Progressing)       Start:  03/03/25    Expected End:  04/11/25            2. Patient will increase LUE strength at all planes to at least 4/5 for improved functional use of LUE. (Progressing)       Start:  03/03/25    Expected End:  04/11/25            3. Patient will achieve full grasp/release of L hand for improved ability to grasp/carry ADL objects with L hand. (Progressing)       Start:  03/03/25    Expected End:  04/11/25            4. Patient will increase L hand  strength by at least 15# for improved gripping " with L hand during ADL's. (Progressing)       Start:  03/03/25    Expected End:  04/11/25            5. Patient will increase increase coordination in L hand AEB ability to move at least 10 blocks with B&B standardized testing. (Progressing)       Start:  03/03/25    Expected End:  04/11/25            6. Patient will complete UB dressing and hygiene/grooming tasks with Mod I. (Met)       Start:  03/03/25    Expected End:  05/23/25    Resolved:  03/13/25         7. Patient will complete LB dressing tasks with Min A. (Progressing)       Start:  03/03/25    Expected End:  05/23/25                8. Patient will increase overall ease and independence AEB UEFI score of at least 50/80 for improved quality of life for patient. (Progressing)       Start:  03/03/25    Expected End:  05/23/25

## 2025-05-08 ENCOUNTER — TREATMENT (OUTPATIENT)
Dept: PHYSICAL THERAPY | Facility: CLINIC | Age: 80
End: 2025-05-08
Payer: COMMERCIAL

## 2025-05-08 ENCOUNTER — TREATMENT (OUTPATIENT)
Dept: OCCUPATIONAL THERAPY | Facility: CLINIC | Age: 80
End: 2025-05-08
Payer: MEDICARE

## 2025-05-08 DIAGNOSIS — G81.94 LEFT HEMIPARESIS (MULTI): ICD-10-CM

## 2025-05-08 DIAGNOSIS — I63.9 ISCHEMIC CEREBROVASCULAR ACCIDENT (CVA) (MULTI): ICD-10-CM

## 2025-05-08 PROCEDURE — 97112 NEUROMUSCULAR REEDUCATION: CPT | Mod: GP

## 2025-05-08 PROCEDURE — 97110 THERAPEUTIC EXERCISES: CPT | Mod: GO,CO

## 2025-05-08 PROCEDURE — 97110 THERAPEUTIC EXERCISES: CPT | Mod: GP

## 2025-05-08 ASSESSMENT — PAIN SCALES - GENERAL
PAINLEVEL_OUTOF10: 1
PAINLEVEL_OUTOF10: 0 - NO PAIN

## 2025-05-08 ASSESSMENT — PAIN - FUNCTIONAL ASSESSMENT
PAIN_FUNCTIONAL_ASSESSMENT: 0-10
PAIN_FUNCTIONAL_ASSESSMENT: 0-10

## 2025-05-08 ASSESSMENT — PAIN DESCRIPTION - DESCRIPTORS: DESCRIPTORS: SORE

## 2025-05-08 NOTE — PROGRESS NOTES
"Physical Therapy Treatment    Patient Name: Cynthia Muse  MRN: [id number]  Encounter date: 5/8/2025 PT Received On: 05/08/25  Response to Previous Treatment: Patient with no complaints from previous session.  Time Calculation  Start Time: 1103  Stop Time: 1142  Time Calculation (min): 39 min  PT Therapeutic Procedures Time Entry  Neuromuscular Re-Education Time Entry: 9  Therapeutic Exercise Time Entry: 30    Visit # 18 of 26  Visits/Dates Authorized: D - PT - 1) [employer] [facility] - NO AUTH / $350 DED MET / 80% coins / $10,000 OOP not met / 40V MAX pt alone - 1 used / ds 2/14/25.   CPT 68137 not covered.  2) MEDICARE A/B - NO AUTH / $257 DEDUCT not met / $0 used PT/ST / [place] visits / Per RTE.       Current Problem:   Problem List Items Addressed This Visit           ICD-10-CM    Ischemic cerebrovascular accident (CVA) (Multi) I63.9    Left hemiparesis (Multi) G81.94       Precautions:    falls       Subjective   General:   Reason for Referral: physical therapyPt presents in chair with daughter awaiting PT appointment following completion of OT. Pt denies pain at present but did notice L shoulder pain with ROM while laying down.     Pre-Treatment Symptoms:   Pain Assessment: 0-10  0-10 (Numeric) Pain Score: 0 - No pain    Objective   Findings:   Pt presents ambulating with large base quad cane, wider YANI, and slightly fast nelson.               Treatments:    Therapeutic Exercise 84205:   Nustep L5 x9 min LE only , seat 7  brief seated break after    HSC DL 55# 3x12   (cues to go slow with movement)  Knee extension, DL, 10# ankle weights on ea 3 x 15   (cues to go slow with movement)  [place] leg press DL 40# 3 x 10 each    Tloop side stepping , Light blue around knees 3 laps  STS  Light blue around knees x 10 reps    DNP  Fwd step up 5\" x 10 each (in // bars)  Resisted fwd/bwd walking 7.5-10# x3   Resisted L/R sidesteps 7.5# x2    Trialed R stepping with eccentric to left (see assessment)  Step ups 8 in at steps mult reps " "with UE support for RUE.  Tloop lean on counter hip extension lime green 2x10    Shuttle leg press DL 46--137# 1x12 ea , SL 37.5# 2x12 R/L  DNP  Quad iso at 90 deg knee flex 5\" 2x10   HL hip add 5\" 2x15  HL hip abd (light TB) 2x15       Neuromuscular Re-Ed 30330:    30-60 sec holds x mult reps  on blue Airex pad    Normal YANI    EO    EC   NBOS     EO    EC   Perturbation training with rhymic pushes while on foam    EO only but maintaining NBOS    on blue airex,   MIPone hand to no hand assist x 10  alt  HR 2 x 10 reps    DNP:  Step taps 5\" while on foam  x 2 min  Mod Tandem R and L EO and EC  2 x 30\" each  Ambulation with quad cane around facility and on [employer] track. Pt declined performance of SPC for advancement today despite encouragement for trail.   Gait training WITHOUT use of AFO in clinic. Pt had good performance without LOB but required cues for improved heel strike and able to improve dorsiflexion post cues.  Stair training x 4 steps with use of RUE on hand rail for both ascending/descending. Pt performed 4 step x 4 sets for total of 16 steps. Goal met for ascending/descending x 12 steps.  Benefits from cues to slow down performance.  At long counter top   Side stepping with cues for improved step length   Retro walking with CGA and SBA no UE support required.   Ambulated around clinic with sp cane , close supervision  6\" hurdles with sp cane- step to pattern x2 laps , reciprocal pattern x2 laps    HEP / Access Codes:   URL: [email or web address]  Access Code: [email or web address]   Date: 02/28/2025  - Narrow Stance with Counter Support  - 2 x daily - 7 x weekly - 3 reps - 20 hold    Access Code: [email or web address]  Date: 03/03/2025  - Side Stepping with Resistance at Ankles and Counter Support  - 1-2 x daily - 15-20 ft x 4  distance  - Standing Hip Extension with Resistance at Ankles and Counter Support  - 1-2 x daily - 2 sets - 10 reps  - Sit to Stand Without Arm Support  - 1-2 x daily - 2 sets - 10 " reps    Assessment:   Patient continues to benefit from reminders for slower performance of exercises especially with eccentric tissue lengthening to improve strengthening principles. Tolerance to todays treatment was good. Verbal and/or tactile cues given for proper form, and avoidance of substitution patterns with all exercises.     Cont with balance during tandem stance positions;  provided intermittent verbal and tactile cueing to maintain upright trunk posture during standing activity.  Pt continues to demonstrate challenged with EC balance/proprioceptive exercise, though did demonstrate some improvement vs last session.  Pt demonstrated moderate L quad eccentric weakness with step backward/down motion.     Post-Treatment Symptoms:    0/10 fatigued     Plan:    Continue to progress strength and balance  Continue 2 x per week for 10 more visits     Goals:   Active       PT Problem       PT Goal 1 (Progressing)       Start:  02/28/25    Expected End:  03/28/25       Pt to improve balance with improved modified tandem stance to 30 sec with eyes closed to facilitate zero incidents of falling since starting therapy.         PT Goal 2 (Progressing)       Start:  02/28/25    Expected End:  05/09/25       Pt to improve LE strength of left side to 4+/5 or greater.         PT Goal 3 (Progressing)       Start:  04/07/25    Expected End:  05/19/25       New goal: pt to improve 2 min walk test by x 1 MCID or greater.         PT Goal 4 (Met)       Start:  02/28/25    Expected End:  05/09/25    Resolved:  04/07/25    Pt able to ascend and descend x 12 steps with reciprocal pattern and railing for assistance to facilitate return to community outgoing.         Patient Stated Goal 1 (Progressing)       Start:  02/28/25    Expected End:  05/09/25       Pt to be able to walk without quad cane or assistive device.         Patient Stated Goal 2 (Progressing)       Start:  02/28/25    Expected End:  05/09/25       Pt would like to  get in and out of bed (soft mattress) without having to use grab bar.

## 2025-05-08 NOTE — PROGRESS NOTES
"Occupational Therapy Treatment  Patient Name: Cynthia Muse  MRN: [id number]  OT Received on: 5/8/2025 OT Received On: 05/08/25  Time Calculation  Start Time: 0951  Stop Time: 1041  Time Calculation (min): 50 min  OT Therapeutic Procedures Time Entry  Therapeutic Exercise Time Entry: 50  Insurance:  Visit Number: 18 of 32  2/14/25 - MMO [employer]US [employer] LOCAL 880 / NO AUTH / 40 V LIMIT YEAR - 2 USED 2/14/25 / DED MET - OOP 06154 NOT MET / MEDICARE A/B SECONDARY TO MMO / LOCAL 880 CALL REF # [id number] & AVAILITY # [phone]  JS    UEFI completed with patient today at 21/80 ( was 16/80 )  Subjective   Problem List Items Addressed This Visit           ICD-10-CM       Neuro    Ischemic cerebrovascular accident (CVA) (Multi) I63.9    Left hemiparesis (Multi) G81.94     Referred by: Dr. Izquierdo, next follow up in August 18th    Patient reports Taking tylenol every day in am. Not using heat or ice. Pt is using home e stim unit daily. Pt is pleasant and motivated. Noted increased participation in daily activity at home through increased score on UEFI.  Pt ambulated to therapy with small based quad cane. Goals and precautions reviewed with patient and daughter.  They both verbalized understanding.     Precautions: + L robb and fall risk.     Performing HEP?: Yes    Pain:1/10, bicep\" tightness and sore\"    Physical Observation: +Fluctuating tone throughout LUE; no noted scapular wining or subluxation at this time; +Arthritic joint changes to B hand   Edema: none     Sensory: none noted   Numbness/Tingling: none   Treatment:Discussed importance of natural arm ext of L UE at all times to reduce bicep tightness    Modalities:   Reviewed e stim pad placement for home use to promote finger and wrist ext.Pt completing daily with no questions or concerns.     Therapeutic Exercise:  Reviewed iso Foam, completing daily at home. Added finger roll outs to program to encourage finger ext.   Reviewed and completed finger presses and table " slides to encourage finger ext.   Reviewed  personal use of [employer] hand exerciser, 5#, has  progressed  to 10#  Completed Supine dowel jose, 2# ( was 1.5#) , 3 sets of 15 ( was 10) reps for tricep punches, shoulder flexion and scaption  Supine  single arm tricep ext without resistance, min physical cues from writer.   Reviewed iso ball completion at home.   Supine single arm bicep ext with focus on full bicep ext to mat. Added to HEP  Seated green ( medium resistance ) theraband with dowel jose , 3 ( was 2 sets)  x 15 ( was 10) reps for tricep punches, shoulder ext and shoulder retraction. Educated on how to follow through with existing band and dowel jose while seated at home.   Post-tx pain:5/10 in bicep , slightly increased from start of session. Min ( same) fatigue.    Assessment/Plan Pt highly motivated to return to function. Noted increased score on UEFI. Pt continues to be limited in L UE due to muscle tone and bicep pain.  Pt to follow through with home e stim and hand,  theraband HEP  and shoulder exercises in supine as tolerated. Next visit explore use of BTE for MMS.      OP EDUCATION:  Education  Individual(s) Educated: Patient, Other (daughter)  Education Provided: Fall precautons, Risk and benefits of OT discussed with patient or other, POC discussed and agreed upon  Home Program: Other, PROM, AROM, Modalities (home e stim unit use)  Equipment: Other (home e stim unit)  Risk and Benefits Discussed with Patient/Caregiver/Other: yes  Patient/Caregiver Demonstrated Understanding: yes  Plan of Care Discussed and Agreed Upon: yes  Patient Response to Education: Patient/Caregiver Verbalized Understanding of Information, Patient/Caregiver Performed Return Demonstration of Exercises/Activities, Patient/Caregiver Asked Appropriate Questions    Goals:  Active       OT Goals       1. Patient will increase LUE shoulder AROM to 130 degrees flexion and abduction for improved functional use of LUE. (Progressing)        Start:  03/03/25    Expected End:  04/11/25            2. Patient will increase LUE strength at all planes to at least 4/5 for improved functional use of LUE. (Progressing)       Start:  03/03/25    Expected End:  04/11/25            3. Patient will achieve full grasp/release of L hand for improved ability to grasp/carry ADL objects with L hand. (Progressing)       Start:  03/03/25    Expected End:  04/11/25            4. Patient will increase L hand  strength by at least 15# for improved gripping with L hand during ADL's. (Progressing)       Start:  03/03/25    Expected End:  04/11/25            5. Patient will increase increase coordination in L hand AEB ability to move at least 10 blocks with B&B standardized testing. (Progressing)       Start:  03/03/25    Expected End:  04/11/25            6. Patient will complete UB dressing and hygiene/grooming tasks with Mod I. (Met)       Start:  03/03/25    Expected End:  05/23/25    Resolved:  03/13/25         7. Patient will complete LB dressing tasks with Min A. (Progressing)       Start:  03/03/25    Expected End:  05/23/25                8. Patient will increase overall ease and independence AEB UEFI score of at least 50/80 for improved quality of life for patient. (Progressing)       Start:  03/03/25    Expected End:  05/23/25

## 2025-05-09 NOTE — PROGRESS NOTES
"Physical Therapy Treatment    Patient Name: Cynthia Muse  MRN: [id number]  Encounter date: 5/12/2025    Time Calculation  Start Time: 1059  Stop Time: 1142  Time Calculation (min): 43 min  PT Therapeutic Procedures Time Entry  Neuromuscular Re-Education Time Entry: 20  Therapeutic Exercise Time Entry: 23    Visit # 19 of 26  Visits/Dates Authorized: D - PT - 1) MMO Local 880 - NO AUTH / $350 DED MET / 80% coins / $10,000 OOP not met / 40V MAX pt alone - 1 used / ds 2/14/25.   CPT 96255 not covered.  2) MEDICARE A/B - NO AUTH / $257 DEDUCT not met / $0 used PT/ST / MN visits / Per RTE.       Current Problem:   Problem List Items Addressed This Visit           ICD-10-CM    Ischemic cerebrovascular accident (CVA) (Multi) I63.9    Left hemiparesis (Multi) G81.94         Precautions:    falls       Subjective   General:    Pt reports good tolerance to last session and continued compliance with HEP.   Pt reports she does feel as if she is slowly gaining strength, still challenged with balance activities, though denies any falls since last session.      Pre-Treatment Symptoms:   Pain Assessment: 0-10  0-10 (Numeric) Pain Score: 0 - No pain    Objective   Findings:   Pt presents ambulating with large base quad cane, wider YANI, and slightly fast nelson.             Treatments:    Therapeutic Exercise 42676:   Nustep L5 x9 min LE only , seat 7  HSC DL 55# 3x12   Knee extension, DL, 10# ankle weights on ea 3 x 15   Rye leg press DL 40# 3 x 12 each  STS  Light blue around knees 2 x 10 reps  Tloop side stepping , Light blue around knees 2 laps at counter top;  1/2 lap (R hand on counter side) fwd ambulation.      DNP  Fwd step up 5\" x 10 each (in // bars)  Resisted fwd/bwd walking 7.5-10# x3   Resisted L/R sidesteps 7.5# x2    Trialed R stepping with eccentric to left (see assessment)  Step ups 8 in at steps mult reps with UE support for RUE.  Tloop lean on counter hip extension lime green 2x10    Shuttle leg press DL " "77--137# 1x12 ea , SL 37.5# 2x12 R/L  DNP  Quad iso at 90 deg knee flex 5\" 2x10   HL hip add 5\" 2x15  HL hip abd (light TB) 2x15       Neuromuscular Re-Ed 44734:    30 sec holds x mult reps  on blue Airex pad    Normal YANI    EO    EC   NBOS     EO    EC   Perturbation training with rhymic pushes while on foam    EO only but maintaining NBOS  on blue airex:  MIP one hand to no hand assist x 10  alt  HR 1 x 10 on Airex;  x 10 solid floor.      DNP:  Step taps 5\" while on foam  x 2 min  Mod Tandem R and L EO and EC  2 x 30\" each  Ambulation with quad cane around facility and on [employer] track. Pt declined performance of SPC for advancement today despite encouragement for trail.   Gait training WITHOUT use of AFO in clinic. Pt had good performance without LOB but required cues for improved heel strike and able to improve dorsiflexion post cues.  Stair training x 4 steps with use of RUE on hand rail for both ascending/descending. Pt performed 4 step x 4 sets for total of 16 steps. Goal met for ascending/descending x 12 steps.  Benefits from cues to slow down performance.  At long counter top   Side stepping with cues for improved step length   Retro walking with CGA and SBA no UE support required.   Ambulated around clinic with sp cane , close supervision  6\" hurdles with sp cane- step to pattern x2 laps , reciprocal pattern x2 laps    HEP / Access Codes:   URL: [email or web address]  Access Code: [email or web address]   Date: 02/28/2025  - Narrow Stance with Counter Support  - 2 x daily - 7 x weekly - 3 reps - 20 hold    Access Code: [email or web address]  Date: 03/03/2025  - Side Stepping with Resistance at Ankles and Counter Support  - 1-2 x daily - 15-20 ft x 4  distance  - Standing Hip Extension with Resistance at Ankles and Counter Support  - 1-2 x daily - 2 sets - 10 reps  - Sit to Stand Without Arm Support  - 1-2 x daily - 2 sets - 10 reps    Assessment:   Patient tolerated treatment well. Patient appears to be working hard in " clinic and motivated to decrease pain and restore all functional deficits. Verbal and/or tactile cues given for proper form, and avoidance of substitution patterns with all exercises. All infection control policies followed during this visit. No observed antalgia with exercise performance.  Pt did demonstrate much slower/more controled nelson ambulating with large base QC.  Pt demonstrating improving EC static balance, still challenged with EC dynamic balance exercises.         Post-Treatment Symptoms:    0/10 fatigued     Plan:    Continue to progress strength and balance  Continue 2 x per week for 10 more visits     Goals:   Active       PT Problem       PT Goal 1 (Progressing)       Start:  02/28/25    Expected End:  03/28/25       Pt to improve balance with improved modified tandem stance to 30 sec with eyes closed to facilitate zero incidents of falling since starting therapy.         PT Goal 2 (Progressing)       Start:  02/28/25    Expected End:  05/09/25       Pt to improve LE strength of left side to 4+/5 or greater.         PT Goal 3 (Progressing)       Start:  04/07/25    Expected End:  05/19/25       New goal: pt to improve 2 min walk test by x 1 MCID or greater.         PT Goal 4 (Met)       Start:  02/28/25    Expected End:  05/09/25    Resolved:  04/07/25    Pt able to ascend and descend x 12 steps with reciprocal pattern and railing for assistance to facilitate return to community outgoing.         Patient Stated Goal 1 (Progressing)       Start:  02/28/25    Expected End:  05/09/25       Pt to be able to walk without quad cane or assistive device.         Patient Stated Goal 2 (Progressing)       Start:  02/28/25    Expected End:  05/09/25       Pt would like to get in and out of bed (soft mattress) without having to use grab bar.

## 2025-05-12 ENCOUNTER — TREATMENT (OUTPATIENT)
Dept: OCCUPATIONAL THERAPY | Facility: CLINIC | Age: 80
End: 2025-05-12
Payer: MEDICARE

## 2025-05-12 ENCOUNTER — TREATMENT (OUTPATIENT)
Dept: PHYSICAL THERAPY | Facility: CLINIC | Age: 80
End: 2025-05-12
Payer: MEDICARE

## 2025-05-12 DIAGNOSIS — I63.9 ISCHEMIC CEREBROVASCULAR ACCIDENT (CVA) (MULTI): ICD-10-CM

## 2025-05-12 DIAGNOSIS — G81.94 LEFT HEMIPARESIS (MULTI): ICD-10-CM

## 2025-05-12 PROCEDURE — 97110 THERAPEUTIC EXERCISES: CPT | Mod: GO,CO

## 2025-05-12 PROCEDURE — 97112 NEUROMUSCULAR REEDUCATION: CPT | Mod: GP,CQ

## 2025-05-12 PROCEDURE — 97110 THERAPEUTIC EXERCISES: CPT | Mod: GP,CQ

## 2025-05-12 ASSESSMENT — PAIN - FUNCTIONAL ASSESSMENT
PAIN_FUNCTIONAL_ASSESSMENT: 0-10
PAIN_FUNCTIONAL_ASSESSMENT: 0-10

## 2025-05-12 ASSESSMENT — PAIN SCALES - GENERAL
PAINLEVEL_OUTOF10: 0 - NO PAIN
PAINLEVEL_OUTOF10: 0 - NO PAIN

## 2025-05-12 NOTE — PROGRESS NOTES
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 Izquierdo, next follow up in August 18th    Patient reports\" it is going ok. I am using my e stim unit twice a day\".  Taking tylenol every day in am. Not using heat or ice. Pt is using home e stim unit daily. Pt is pleasant and motivated. Noted increased participation in daily activity , washing dishes putting them away with R arm.  Pt ambulated to therapy with small based quad cane. Goals and precautions reviewed with patient  She verbalized understanding.     Precautions: + L robb and fall risk.     Performing HEP?: Yes    Pain: 0/10    Physical Observation: +Fluctuating tone throughout LUE; no noted scapular wining or subluxation at this time; +Arthritic joint changes to B hand   Edema: none     Sensory: none noted   Numbness/Tingling: none   Treatment:Discussed importance of natural arm ext of L UE at all times to reduce bicep tightness    Modalities:   Reviewed e stim pad placement , adjusted location for home use to promote optimal finger and wrist ext.Pt verbalized understanding.   Bioness completed with A plates for flexors and extensors this date. Settings at 55 David for extensors, 30  David for flexors, and 25 David for thenar. With LUE positioned on foam wedge, implemented neuromodulation " programming x 10 minutes and grasp/release programming to facilitate lymphatic drainage and promote muscle contraction.   Functional reach with bioness x 10 min , at 43 Joshua for  ext, 25 flexors and 25 thenar. Good tolerance. Able to grasp and release bean bags x 20 reps.     Therapeutic Exercise:  Reviewed iso Foam, completing daily at home. Including  finger roll outs to program to encourage finger ext.   Reviewed and completed finger presses and table slides to encourage finger ext.   Reviewed  personal use of [employer] hand exerciser, 5#, has  progressed  to 10#, pt to increase reps of HEP as tolerated  Reviewed iso ball completion at home.   Supine single arm bicep ext with focus on full bicep ext to mat. Added to HEP  Reviewed Seated green ( medium resistance ) theraband attached to door knob at home. Completing daily  Completed seated isometric dowel jose exercises x 10 reps with 5 sec hold each for tricep presses  and shoulder retraction against resistance of writer.   Post-tx pain:0/10 . Min ( same) fatigue.    Assessment/Plan Pt highly motivated to return to function. Noted increased ability to perform 3 point pinch ( laterally along thumb).  Pt to  continue with follow through with home e stim and hand,  theraband HEP  and shoulder exercises in supine as tolerated. Next visit explore use of BTE for MMS.      OP EDUCATION:  Education  Individual(s) Educated: Patient  Education Provided: Risk and benefits of OT discussed with patient or other, Fall precautons, POC discussed and agreed upon  Home Program: PROM, AROM, Other (home e stim unit)  Risk and Benefits Discussed with Patient/Caregiver/Other: yes  Patient/Caregiver Demonstrated Understanding: yes  Plan of Care Discussed and Agreed Upon: yes  Patient Response to Education: Patient/Caregiver Verbalized Understanding of Information, Patient/Caregiver Performed Return Demonstration of Exercises/Activities, Patient/Caregiver Asked Appropriate  Questions    Goals:  Active       OT Goals       1. Patient will increase LUE shoulder AROM to 130 degrees flexion and abduction for improved functional use of LUE. (Progressing)       Start:  03/03/25    Expected End:  04/11/25            2. Patient will increase LUE strength at all planes to at least 4/5 for improved functional use of LUE. (Progressing)       Start:  03/03/25    Expected End:  04/11/25            3. Patient will achieve full grasp/release of L hand for improved ability to grasp/carry ADL objects with L hand. (Progressing)       Start:  03/03/25    Expected End:  04/11/25            4. Patient will increase L hand  strength by at least 15# for improved gripping with L hand during ADL's. (Progressing)       Start:  03/03/25    Expected End:  04/11/25            5. Patient will increase increase coordination in L hand AEB ability to move at least 10 blocks with B&B standardized testing. (Progressing)       Start:  03/03/25    Expected End:  04/11/25            6. Patient will complete UB dressing and hygiene/grooming tasks with Mod I. (Met)       Start:  03/03/25    Expected End:  05/23/25    Resolved:  03/13/25         7. Patient will complete LB dressing tasks with Min A. (Progressing)       Start:  03/03/25    Expected End:  05/23/25                8. Patient will increase overall ease and independence AEB UEFI score of at least 50/80 for improved quality of life for patient. (Progressing)       Start:  03/03/25    Expected End:  05/23/25

## 2025-05-13 ENCOUNTER — TELEPHONE (OUTPATIENT)
Dept: PRIMARY CARE | Facility: CLINIC | Age: 80
End: 2025-05-13
Payer: COMMERCIAL

## 2025-05-13 NOTE — TELEPHONE ENCOUNTER
Her blood pressure was 59/69 yesterday and the day before it was about the same.  I was not sure she was telling me correctly.  I asked her how long she had been checking it and she said just yesterday and today.  I asked if anyone could check it for her again today. She said her daughter is at work and doesn't live with her.  She said her grand daughter will be with her on Thursday so I asked her to have her check the blood pressure and call the office with the reading.  She said said she she didn't have any sx like dizziness.

## 2025-05-15 ENCOUNTER — TREATMENT (OUTPATIENT)
Dept: PHYSICAL THERAPY | Facility: CLINIC | Age: 80
End: 2025-05-15
Payer: MEDICARE

## 2025-05-15 ENCOUNTER — TELEPHONE (OUTPATIENT)
Dept: PRIMARY CARE | Facility: CLINIC | Age: 80
End: 2025-05-15

## 2025-05-15 ENCOUNTER — TREATMENT (OUTPATIENT)
Dept: OCCUPATIONAL THERAPY | Facility: CLINIC | Age: 80
End: 2025-05-15
Payer: MEDICARE

## 2025-05-15 DIAGNOSIS — G81.94 LEFT HEMIPARESIS (MULTI): ICD-10-CM

## 2025-05-15 DIAGNOSIS — I63.9 ISCHEMIC CEREBROVASCULAR ACCIDENT (CVA) (MULTI): ICD-10-CM

## 2025-05-15 PROCEDURE — 97110 THERAPEUTIC EXERCISES: CPT | Mod: GO | Performed by: OCCUPATIONAL THERAPIST

## 2025-05-15 PROCEDURE — 97110 THERAPEUTIC EXERCISES: CPT | Mod: GP,KX

## 2025-05-15 ASSESSMENT — PAIN - FUNCTIONAL ASSESSMENT: PAIN_FUNCTIONAL_ASSESSMENT: 0-10

## 2025-05-15 ASSESSMENT — PAIN SCALES - GENERAL: PAINLEVEL_OUTOF10: 0 - NO PAIN

## 2025-05-15 NOTE — PROGRESS NOTES
"Occupational Therapy Treatment/Progress Note  Patient Name: Cynthia Muse  MRN: [id number]  OT Received on: 5/15/2025        Time Calculation  Start Time: 1000  Stop Time: 1045  Time Calculation (min): 45 min  OT Therapeutic Procedures Time Entry  Therapeutic Exercise Time Entry: 45    Insurance:  Visit Number: 20 of 32  Insurance Type: MMO [employer]; 40 visits/yr      Subjective   Problem List Items Addressed This Visit           ICD-10-CM    Ischemic cerebrovascular accident (CVA) (Multi) I63.9    Left hemiparesis (Multi) G81.94     Current Problem/Reason for visit:  +CVA s/p TNK with L hemiparesis     Referred by: Dr. Izquierdo    Patient reports \"I have been using this hand to  as much as I can. I can hold something with it and use my right hand to cut with scissors now.\"    Precautions: +L Hemiparesis; +Fall Risk    Performing HEP?: Yes    Pain:  Pain Assessment  Pain Assessment: 0-10  0-10 (Numeric) Pain Score: 0 - No pain    Objective   Current UEFI= 20/80; 75% impaired  UEFI at evaluation= 14/80; 83% impaired     Box and Blocks:  R= 66 blocks/min L= 7 blocks/min (NC)      AROM MMT MMT     Right Left Right Left   SHOULDER Flexion WFL 70 (+15) WFL 3+/5    Extension WFL  Neutral  WFL  4+/5    Abduction WFL  65 (+5) WFL  3/5    Internal Rotation WFL  To  stomach WFL  3+/5    External Rotation WFL  Netural  WFL 4+/5     ELBOW Extension WFL  WFL  WFL  4/5    Flexion  WFL WFL WFL  4+/5     FOREARM Pronation WFL  WFL  WFL  3+/5    Supination WFL  55 WFL  3/5     WRIST Flexion WFL  WFL  WFL  4/5    Extension WFL  15 (+15) WFL  3+/5    Radial Deviation WFL  NT      Ulnar Deviation WFL NT          (lbs) Right Left   1     2 35# 13# (+1)   3     4     5     Partial fist L hand; RF/SF to palm    Physical Observation: Patient into clinic with +LLE AFO and LBQC; +Flexor tone/synergy pattern in LUE; +Biceps tightness; +arthritic joint changes B hands  Edema: +Mild L hand  Sensory: +Impaired   Numbness/Tingling: Denies "         Treatment:    Therapeutic Exercise:  Reassessment this date; results discussed with patient.  BTE machine for functional strengthening; tools used this date:  Gripper (resist 10)  Handle (resist 5)  Long lever with FA neutral and pronated (resist 50)  Ergometer CW/CCW (resist 3)  All tools x 2 charts  Hands-on assist PRN to maintain L hand  throughout performance as needed    Post-tx pain: 0/10    Assessment/Plan Patient is making steady progress with skilled OT intervention at this time; improving strength throughout LUE, as well as, improving functional use of L hand and greater independence with daily activities per reports and UEFI. Patient would continue to benefit from skilled OT intervention 1-2x/week x 10 more visits to progress functional capacity and optimize use of L, hemiparetic UE.       OP EDUCATION:  Education  Individual(s) Educated: Patient  Home Program: Strengthening    Goals:  Active       OT Goals       1. Patient will increase LUE shoulder AROM to 130 degrees flexion and abduction for improved functional use of LUE. (Progressing)       Start:  03/03/25    Expected End:  04/11/25            2. Patient will increase LUE strength at all planes to at least 4/5 for improved functional use of LUE. (Progressing)       Start:  03/03/25    Expected End:  04/11/25            3. Patient will achieve full grasp/release of L hand for improved ability to grasp/carry ADL objects with L hand. (Progressing)       Start:  03/03/25    Expected End:  04/11/25            4. Patient will increase L hand  strength by at least 15# for improved gripping with L hand during ADL's. (Progressing)       Start:  03/03/25    Expected End:  04/11/25            5. Patient will increase increase coordination in L hand AEB ability to move at least 10 blocks with B&B standardized testing. (Progressing)       Start:  03/03/25    Expected End:  04/11/25            6. Patient will complete UB dressing and  hygiene/grooming tasks with Mod I. (Met)       Start:  03/03/25    Expected End:  05/23/25    Resolved:  03/13/25         7. Patient will complete LB dressing tasks with Juan Daniel TIRADO (Met)       Start:  03/03/25    Expected End:  05/23/25    Resolved:  05/15/25             8. Patient will increase overall ease and independence AEB UEFI score of at least 50/80 for improved quality of life for patient. (Progressing)       Start:  03/03/25    Expected End:  05/23/25

## 2025-05-15 NOTE — PROGRESS NOTES
"Physical Therapy Treatment    Patient Name: Cynthia Muse  MRN: [id number]  Encounter date: 5/15/2025    Time Calculation  Start Time: 1100  Stop Time: 1139  Time Calculation (min): 39 min  PT Therapeutic Procedures Time Entry  Therapeutic Exercise Time Entry: 39    Visit # 20 of 26  Visits/Dates Authorized: D - PT - 1) [employer] - NO AUTH / $350 DED MET / 80% coins / $10,000 OOP not met / 40V MAX pt alone - 1 used / ds 2/14/25.   CPT 05447 not covered.  2) MEDICARE A/B - NO AUTH / $257 DEDUCT not met / $0 used PT/ST / [place] visits / Per RTE.       Current Problem:   Problem List Items Addressed This Visit           ICD-10-CM    Ischemic cerebrovascular accident (CVA) (Multi) I63.9    Left hemiparesis (Multi) G81.94         Precautions:    falls       Subjective   General:    Pt reports good tolerance to last session and continued compliance with HEP.   Pt reports she does feel as if she is slowly gaining strength, still challenged with balance activities, though denies any falls since last session.      Pre-Treatment Symptoms:        Objective   Findings:   Pt presents ambulating with large base quad cane, wider YANI, and slightly fast nelson.             Treatments:    Therapeutic Exercise 10157:   Nustep L6 x7 min LE only , seat 7    Stair exercises x 15 reps ea 2 sets (first with R second with L)   Step ups    Fwd    Lateral    Stair ascending and descending x 2 min with reciprocal pattern encouraged to use BUE      HSC DL 55# 3x12     Knee extension SL R/L 3 x 15    5# ea   9# ea    [place] leg press DL 50# 3 x 15 each  Side stepping blue around knees       DNP  Fwd step up 5\" x 10 each (in // bars)  Resisted fwd/bwd walking 7.5-10# x3   Resisted L/R sidesteps 7.5# x2    Trialed R stepping with eccentric to left (see assessment)  Step ups 8 in at steps mult reps with UE support for RUE.  Tloop lean on counter hip extension lime green 2x10    Shuttle leg press DL 88--137# 1x12 ea , SL 37.5# 2x12 R/L  " "DNP  Quad iso at 90 deg knee flex 5\" 2x10   HL hip add 5\" 2x15  HL hip abd (light TB) 2x15     deferred  Neuromuscular Re-Ed 25596:    30 sec holds x mult reps  on blue Airex pad    Normal YANI    EO    EC   NBOS     EO    EC   Perturbation training with rhymic pushes while on foam    EO only but maintaining NBOS  on blue airex:  MIP one hand to no hand assist x 10  alt  HR 1 x 10 on Airex;  x 10 solid floor.      DNP:  Step taps 5\" while on foam  x 2 min  Mod Tandem R and L EO and EC  2 x 30\" each  Ambulation with quad cane around facility and on [employer] track. Pt declined performance of SPC for advancement today despite encouragement for trail.   Gait training WITHOUT use of AFO in clinic. Pt had good performance without LOB but required cues for improved heel strike and able to improve dorsiflexion post cues.  Stair training x 4 steps with use of RUE on hand rail for both ascending/descending. Pt performed 4 step x 4 sets for total of 16 steps. Goal met for ascending/descending x 12 steps.  Benefits from cues to slow down performance.  At long counter top   Side stepping with cues for improved step length   Retro walking with CGA and SBA no UE support required.   Ambulated around clinic with sp cane , close supervision  6\" hurdles with sp cane- step to pattern x2 laps , reciprocal pattern x2 laps    HEP / Access Codes:   URL: [email or web address]  Access Code: [email or web address]   Date: 02/28/2025  - Narrow Stance with Counter Support  - 2 x daily - 7 x weekly - 3 reps - 20 hold    Access Code: [email or web address]  Date: 03/03/2025  - Side Stepping with Resistance at Ankles and Counter Support  - 1-2 x daily - 15-20 ft x 4  distance  - Standing Hip Extension with Resistance at Ankles and Counter Support  - 1-2 x daily - 2 sets - 10 reps  - Sit to Stand Without Arm Support  - 1-2 x daily - 2 sets - 10 reps    Assessment:   Patient had good tolerance with functional strength training with use of b treatment well. Patient appears " to be working hard in clinic and motivated to decrease pain and restore all funcottom step of stairs for fwd and lateral stepping. Cont to benefit from providing verbal and/or tactile cues to improve  form, and avoidance of substitution patterns with all exercises. This session pt encouraged to use LUE for gripping of hand rail    Post-Treatment Symptoms:    0/10 fatigued     Plan:    Continue to progress strength and balance    Goals:   Active       PT Problem       PT Goal 1 (Progressing)       Start:  02/28/25    Expected End:  03/28/25       Pt to improve balance with improved modified tandem stance to 30 sec with eyes closed to facilitate zero incidents of falling since starting therapy.         PT Goal 2 (Progressing)       Start:  02/28/25    Expected End:  05/09/25       Pt to improve LE strength of left side to 4+/5 or greater.         PT Goal 3 (Progressing)       Start:  04/07/25    Expected End:  05/19/25       New goal: pt to improve 2 min walk test by x 1 MCID or greater.         PT Goal 4 (Met)       Start:  02/28/25    Expected End:  05/09/25    Resolved:  04/07/25    Pt able to ascend and descend x 12 steps with reciprocal pattern and railing for assistance to facilitate return to community outgoing.         Patient Stated Goal 1 (Progressing)       Start:  02/28/25    Expected End:  05/09/25       Pt to be able to walk without quad cane or assistive device.         Patient Stated Goal 2 (Progressing)       Start:  02/28/25    Expected End:  05/09/25       Pt would like to get in and out of bed (soft mattress) without having to use grab bar.

## 2025-05-19 ENCOUNTER — TREATMENT (OUTPATIENT)
Dept: OCCUPATIONAL THERAPY | Facility: CLINIC | Age: 80
End: 2025-05-19
Payer: MEDICARE

## 2025-05-19 DIAGNOSIS — G81.94 LEFT HEMIPARESIS (MULTI): ICD-10-CM

## 2025-05-19 DIAGNOSIS — I63.9 ISCHEMIC CEREBROVASCULAR ACCIDENT (CVA) (MULTI): ICD-10-CM

## 2025-05-19 PROCEDURE — 97112 NEUROMUSCULAR REEDUCATION: CPT | Mod: GO | Performed by: OCCUPATIONAL THERAPIST

## 2025-05-19 PROCEDURE — 97110 THERAPEUTIC EXERCISES: CPT | Mod: GO | Performed by: OCCUPATIONAL THERAPIST

## 2025-05-19 ASSESSMENT — PAIN SCALES - GENERAL: PAINLEVEL_OUTOF10: 0 - NO PAIN

## 2025-05-19 ASSESSMENT — PAIN - FUNCTIONAL ASSESSMENT: PAIN_FUNCTIONAL_ASSESSMENT: 0-10

## 2025-05-19 NOTE — PROGRESS NOTES
"Occupational Therapy Treatment  Patient Name: Cynthia Muse  MRN: [id number]  OT Received on: 5/19/2025        Time Calculation  Start Time: 0945  Stop Time: 1030  Time Calculation (min): 45 min  OT Therapeutic Procedures Time Entry  Neuromuscular Re-Education Time Entry: 10  Therapeutic Exercise Time Entry: 35    Insurance:  Visit Number: 21 of 32  Insurance Type: [employer]; 40 visits/yr      Subjective   Problem List Items Addressed This Visit           ICD-10-CM    Ischemic cerebrovascular accident (CVA) (Multi) I63.9    Left hemiparesis (Multi) G81.94     Current Problem/Reason for visit:  +CVA s/p TNK with L hemiparesis     Referred by: Dr. Izquierdo    Patient reports \"I'm doing as much as I can.\"    Precautions: +L Hemiparesis; +Fall Risk    Performing HEP?: Yes    Pain:  Pain Assessment  Pain Assessment: 0-10  0-10 (Numeric) Pain Score: 0 - No pain    Objective     Physical Observation: Patient into clinic with +LLE AFO and LBQC; +Flexor tone/synergy pattern in LUE; +Biceps tightness; +arthritic joint changes B hands; +atrophy to L hand, +joint stiffness L hand  Edema: +Mild L hand  Sensory: +Impaired   Numbness/Tingling: Denies       Treatment:    Therapeutic Exercise:  UBE seated without resistance; ace wrap L hand for gripping; 3 minutes FW/BW; BUE's x2 minues both directions; 1 minute LUE only both directions.  Wall washes LUE with hands-on assist for proximal and distal limb stability and ROM through full available ROM due to weakness; 10 reps x 3 sets.  Scapular retraction with dowel jose and bright green theraband with use of ace wrap for L hand gripping; 15 reps x 3 sets.  Grasp/release of cones for retrieval and placement to promote shoulder flexion, elbow extension, FA neutral, wrist stability, and digital extension/flexion to perform task x 8 cones x 2 trials.    Neuromuscular Re-education:  Therapist implemented use of Bioness for LUE to promote optimal digital extension and flexion for purposeful " grasp/release of objects and improved functional use of LUE.   Settings:  Extensors=43 Joshua  Flexors= 30 Joshua  Thenar= 25 Joshua  Programming:  Neuromodulation for rapid and repeated flexion/extension of digits in preparation for task-synchronized grasp/release.  Grasp/Release to coordinate functional movement patterns throughout LUE and retrieve and place cones to/from adjacent targets.  Therapist provided verbal and tactile prompting throughout programming to facilitate optimal engagement and coordinate timing with device for optimal benefit. Hands-on assist provided as indicated throughout to reduce proximal compensatory body movement, as well as, distal management of device due to weakness.      Post-tx pain: 0/10    Assessment/Plan Patient is highly motivated and eager to enhance functional use of LUE; active participation throughout tx sx; much effort demonstrated; progressing intervention as indicated/tolerated.      OP EDUCATION:  Education  Individual(s) Educated: Patient  Home Program: AROM, Strengthening, Modalities (Motor planning, motor coordination)    Goals:  Active       OT Goals       1. Patient will increase LUE shoulder AROM to 130 degrees flexion and abduction for improved functional use of LUE. (Progressing)       Start:  03/03/25    Expected End:  04/11/25            2. Patient will increase LUE strength at all planes to at least 4/5 for improved functional use of LUE. (Progressing)       Start:  03/03/25    Expected End:  04/11/25            3. Patient will achieve full grasp/release of L hand for improved ability to grasp/carry ADL objects with L hand. (Progressing)       Start:  03/03/25    Expected End:  04/11/25            4. Patient will increase L hand  strength by at least 15# for improved gripping with L hand during ADL's. (Progressing)       Start:  03/03/25    Expected End:  04/11/25            5. Patient will increase increase coordination in L hand AEB ability to move at least 10  blocks with B&B standardized testing. (Progressing)       Start:  03/03/25    Expected End:  04/11/25            6. Patient will complete UB dressing and hygiene/grooming tasks with Mod I. (Met)       Start:  03/03/25    Expected End:  05/23/25    Resolved:  03/13/25         7. Patient will complete LB dressing tasks with Min A. (Met)       Start:  03/03/25    Expected End:  05/23/25    Resolved:  05/15/25             8. Patient will increase overall ease and independence AEB UEFI score of at least 50/80 for improved quality of life for patient. (Progressing)       Start:  03/03/25    Expected End:  05/23/25

## 2025-05-22 ENCOUNTER — APPOINTMENT (OUTPATIENT)
Dept: OCCUPATIONAL THERAPY | Facility: CLINIC | Age: 80
End: 2025-05-22
Payer: MEDICARE

## 2025-05-22 ENCOUNTER — APPOINTMENT (OUTPATIENT)
Dept: PHYSICAL THERAPY | Facility: CLINIC | Age: 80
End: 2025-05-22
Payer: MEDICARE

## 2025-05-29 ENCOUNTER — TREATMENT (OUTPATIENT)
Dept: PHYSICAL THERAPY | Facility: CLINIC | Age: 80
End: 2025-05-29
Payer: MEDICARE

## 2025-05-29 ENCOUNTER — TREATMENT (OUTPATIENT)
Dept: OCCUPATIONAL THERAPY | Facility: CLINIC | Age: 80
End: 2025-05-29
Payer: MEDICARE

## 2025-05-29 DIAGNOSIS — G81.94 LEFT HEMIPARESIS (MULTI): ICD-10-CM

## 2025-05-29 DIAGNOSIS — I63.9 ISCHEMIC CEREBROVASCULAR ACCIDENT (CVA) (MULTI): ICD-10-CM

## 2025-05-29 PROCEDURE — 97110 THERAPEUTIC EXERCISES: CPT | Mod: GO | Performed by: OCCUPATIONAL THERAPIST

## 2025-05-29 PROCEDURE — 97112 NEUROMUSCULAR REEDUCATION: CPT | Mod: GO | Performed by: OCCUPATIONAL THERAPIST

## 2025-05-29 PROCEDURE — 97110 THERAPEUTIC EXERCISES: CPT | Mod: GP,CQ

## 2025-05-29 ASSESSMENT — PAIN SCALES - GENERAL
PAINLEVEL_OUTOF10: 0 - NO PAIN
PAINLEVEL_OUTOF10: 0 - NO PAIN

## 2025-05-29 ASSESSMENT — PAIN - FUNCTIONAL ASSESSMENT
PAIN_FUNCTIONAL_ASSESSMENT: 0-10
PAIN_FUNCTIONAL_ASSESSMENT: 0-10

## 2025-05-29 NOTE — PROGRESS NOTES
"Physical Therapy Treatment    Patient Name: Cynthia Muse  MRN: [id number]  Encounter date: 5/29/2025 PT Received On: 05/29/25  Time Calculation  Start Time: 1113  Stop Time: 1151  Time Calculation (min): 38 min  PT Therapeutic Procedures Time Entry  Therapeutic Exercise Time Entry: 38    Visit # 21 of 26  Visits/Dates Authorized: D - PT - 1) [employer] - NO AUTH / $350 DED MET / 80% coins / $10,000 OOP not met / 40V MAX pt alone - 1 used / ds 2/14/25.   CPT 27033 not covered.  2) MEDICARE A/B - NO AUTH / $257 DEDUCT not met / $0 used PT/ST / [place] visits / Per RTE.       Current Problem:   Problem List Items Addressed This Visit           ICD-10-CM    Ischemic cerebrovascular accident (CVA) (Multi) I63.9    Left hemiparesis (Multi) G81.94           Precautions:    falls       Subjective   General:    Pt reports good tolerance to last session and continued compliance with HEP.      Pre-Treatment Symptoms:   Pain Assessment: 0-10  0-10 (Numeric) Pain Score: 0 - No pain    Objective   Findings:   Pt presents ambulating with large base quad cane, wider YANI, and slightly fast nelson.             Treatments:    Therapeutic Exercise 31878:   Nustep L6 x7 min LE only , seat 7  Ambulated 360 feet intermittent use of cane    Stair exercises x 15 reps ea 2 sets (first with R second with L)   Step ups    Fwd    Lateral    Stair ascending and descending x 2 min with reciprocal pattern encouraged to use BUE  HSC DL 55# 3x12   Knee extension SL R/L 3 x 15 HOLD today knee pain   5# ea   9# ea    [place] leg press DL 50# 3 x 15 each  Side stepping blue around knees       DNP  Fwd step up 5\" x 10 each (in // bars)  Resisted fwd/bwd walking 7.5-10# x3   Step ups 8 in at steps mult reps with UE support for RUE.  Tloop lean on counter hip extension lime green 2x10    Shuttle leg press DL 35--137# 1x12 ea , SL 37.5# 2x12 R/L  DNP  Quad iso at 90 deg knee flex 5\" 2x10   HL hip add 5\" 2x15  HL hip abd (light TB) 2x15 " "      Neuromuscular Re-Ed 84301:    30 sec holds x mult reps  on blue Airex pad    Normal YANI    EO    EC   NBOS     EO    EC    Step taps 5\" while on foam  x 2 min  Mod Tandem R and L EO and EC  2 x 30\" each    Paloff press to work core control and balance    DNP  Ambulation with quad cane around facility and on [employer] track. Pt declined performance of SPC for advancement today despite encouragement for trail.   Gait training WITHOUT use of AFO in clinic. Pt had good performance without LOB but required cues for improved heel strike and able to improve dorsiflexion post cues.  Stair training x 4 steps with use of RUE on hand rail for both ascending/descending. Pt performed 4 step x 4 sets for total of 16 steps. Goal met for ascending/descending x 12 steps.  Benefits from cues to slow down performance.    HEP / Access Codes:   URL: [email or web address]  Access Code: [email or web address]   Date: 02/28/2025  - Narrow Stance with Counter Support  - 2 x daily - 7 x weekly - 3 reps - 20 hold    Access Code: [email or web address]  Date: 03/03/2025  - Side Stepping with Resistance at Ankles and Counter Support  - 1-2 x daily - 15-20 ft x 4  distance  - Standing Hip Extension with Resistance at Ankles and Counter Support  - 1-2 x daily - 2 sets - 10 reps  - Sit to Stand Without Arm Support  - 1-2 x daily - 2 sets - 10 reps    Assessment:   Patient had good tolerance with functional strength training . Challenged with ambulating 360 feet no break.  Did need rest after ambulation distance but only needed 60 sec and ready to move on. Patient gives good effort and very pleasant in clinic. Fatigued at end of session    Post-Treatment Symptoms:   Response to Interventions: No change in pain    Plan:    Continue to progress strength and balance    Goals:   Active       PT Problem       PT Goal 1 (Progressing)       Start:  02/28/25    Expected End:  03/28/25       Pt to improve balance with improved modified tandem stance to 30 sec with eyes " closed to facilitate zero incidents of falling since starting therapy.         PT Goal 2 (Progressing)       Start:  02/28/25    Expected End:  05/09/25       Pt to improve LE strength of left side to 4+/5 or greater.         PT Goal 3 (Progressing)       Start:  04/07/25    Expected End:  05/19/25       New goal: pt to improve 2 min walk test by x 1 MCID or greater.         PT Goal 4 (Met)       Start:  02/28/25    Expected End:  05/09/25    Resolved:  04/07/25    Pt able to ascend and descend x 12 steps with reciprocal pattern and railing for assistance to facilitate return to community outgoing.         Patient Stated Goal 1 (Progressing)       Start:  02/28/25    Expected End:  05/09/25       Pt to be able to walk without quad cane or assistive device.         Patient Stated Goal 2 (Progressing)       Start:  02/28/25    Expected End:  05/09/25       Pt would like to get in and out of bed (soft mattress) without having to use grab bar.

## 2025-05-29 NOTE — PROGRESS NOTES
"Occupational Therapy Treatment  Patient Name: Cynthia Muse  MRN: [id number]  OT Received on: 5/29/2025        Time Calculation  Start Time: 1000  Stop Time: 1045  Time Calculation (min): 45 min  OT Therapeutic Procedures Time Entry  Neuromuscular Re-Education Time Entry: 15  Therapeutic Exercise Time Entry: 30    Insurance:  Visit Number: 22 of 32  Insurance Type: [employer]; 40 visits/yr      Subjective   Problem List Items Addressed This Visit           ICD-10-CM    Ischemic cerebrovascular accident (CVA) (Multi) I63.9    Left hemiparesis (Multi) G81.94     Current Problem/Reason for visit:  +CVA s/p TNK with L hemiparesis     Referred by: Dr. Izquierdo    Patient reports \"I used this hand [L hand] with the electric can opener the other day and I started taking a shower by myself.\"    Precautions:     Performing HEP?: Yes    Pain:  Pain Assessment  Pain Assessment: 0-10  0-10 (Numeric) Pain Score: 0 - No pain    Objective     Physical Observation: Patient into clinic with +LLE AFO and LBQC; +Flexor tone/synergy pattern in LUE; +Biceps tightness; +arthritic joint changes B hands; +atrophy to L hand, +joint stiffness L hand  Edema: +Mild L hand  Sensory: +Impaired   Numbness/Tingling: Denies       Treatment:    Therapeutic Exercise:  UBE seated without resistance x6 minutes; 3 FW/3 BW; use of ace wrap to maintain L hand  on handle throughout.  Standing at countertop, rollerboard for CW/CCW scapular stabilization x10 reps x 3 sets.  Standing at countertop, rollerboard for shoulder flexion/extension and abduction/adduction to stationary targets x10 reps x 3 sets each; good accuracy to targets demonstrated.  Progressed to shoulder flexion/extension at wedge mat with hands-on assist for distal limb support to perform x10 reps x 3 sets.  Dowel jose to wall for shoulder flexion; initially utilizing ace wrap to maintain L hand ; ultimately performed last set without; hands-on assist throughout performance for proximal " and distal support and stability throughout performance; 10 reps x 3 sets.  [place]aball isometric presses at countertop x15 reps x 2 sets.  Theraball rolls with light manual resistance at countertop, initially with BUE's progressing to LUE only x15 reps x 3 sets; hands-on assistance for proximal and distal LUE support and ball control.  Scapular retraction with dowel jose and orange theraband x15 reps x 3 sets; no ace wrap used.    Neuromuscular Re-education:  Therapist implemented functional reaching/cone stacking task standing at countertop x20 cones for  and placement of cones to adjacent stacks x10 each; emphasis on purposeful and volitional grasp/release; hands-on assistance throughout to promote hand placement and optimize use of L hand throughout.      Post-tx pain: 0/10    Assessment/Plan Patient is making steady progress since SOC; consistently improving ROM, strength, and functional use of LUE; recommending continued OT intervention 1-2x/week x 4 weeks to enahnce LUE function and quality of life for patient.      OP EDUCATION:  Education  Individual(s) Educated: Patient  Home Program: AROM, AAROM, Strengthening    Goals:  Active       OT Goals       1. Patient will increase LUE shoulder AROM to 130 degrees flexion and abduction for improved functional use of LUE. (Progressing)       Start:  03/03/25    Expected End:  04/11/25            2. Patient will increase LUE strength at all planes to at least 4/5 for improved functional use of LUE. (Progressing)       Start:  03/03/25    Expected End:  04/11/25            3. Patient will achieve full grasp/release of L hand for improved ability to grasp/carry ADL objects with L hand. (Progressing)       Start:  03/03/25    Expected End:  04/11/25            4. Patient will increase L hand  strength by at least 15# for improved gripping with L hand during ADL's. (Progressing)       Start:  03/03/25    Expected End:  04/11/25            5. Patient will  increase increase coordination in L hand AEB ability to move at least 10 blocks with B&B standardized testing. (Progressing)       Start:  03/03/25    Expected End:  04/11/25            6. Patient will complete UB dressing and hygiene/grooming tasks with Mod I. (Met)       Start:  03/03/25    Expected End:  05/23/25    Resolved:  03/13/25         7. Patient will complete LB dressing tasks with Min A. (Met)       Start:  03/03/25    Expected End:  05/23/25    Resolved:  05/15/25             8. Patient will increase overall ease and independence AEB UEFI score of at least 50/80 for improved quality of life for patient. (Progressing)       Start:  03/03/25    Expected End:  05/23/25

## 2025-06-04 NOTE — PROGRESS NOTES
"Physical Therapy Treatment    Patient Name: Cynthia Muse  MRN: [id number]  Encounter date: 6/5/2025    Time Calculation  Start Time: 1045  Stop Time: 1128  Time Calculation (min): 43 min  PT Therapeutic Procedures Time Entry  Neuromuscular Re-Education Time Entry: 18  Therapeutic Exercise Time Entry: 30    Visit # 22 of 26  Visits/Dates Authorized: D - PT - 1) O [employer] - NO AUTH / $350 DED MET / 80% coins / $10,000 OOP not met / 40V MAX pt alone - 1 used / ds 2/14/25.   CPT 05322 not covered.  2) MEDICARE A/B - NO AUTH / $257 DEDUCT not met / $0 used PT/ST / MN visits / Per RTE.       Current Problem:   Problem List Items Addressed This Visit           ICD-10-CM    Ischemic cerebrovascular accident (CVA) (Multi) I63.9    Left hemiparesis (Multi) G81.94             Precautions:    falls       Subjective   General:    Pt reports good tolerance to last session and continued compliance with HEP. Pt denies any pain at present and does feel a little stronger then previous.         Pre-Treatment Symptoms:        Objective   Findings:   Pt presents ambulating with large base quad cane, wider YANI, though slightly longer stride length vs last time with this clinician.            Treatments:    Therapeutic Exercise 49855:   Nustep L6 x7 min LE only , seat 7  [place] leg press DL 50# 3 x 15 each  HSC DL 55# 2x15;  50#  x 15   Knee extension SL R/L 4# AW  3 x 10  Ambulated 2 times perimeter of treatment area, including navigating around equipment and through narrow areas with intermittent cueing to slow nelson.   Stair ascending and descending x 2 min with reciprocal pattern encouraged to use BUE      DNP  Fwd step up 5\" x 10 each (in // bars)  Resisted fwd/bwd walking 7.5-10# x3   Step ups 8 in at steps mult reps with UE support for RUE.  Tloop lean on counter hip extension lime green 2x10    Shuttle leg press DL 15--137# 1x12 ea , SL 37.5# 2x12 R/L  DNP  Quad iso at 90 deg knee flex 5\" 2x10   HL hip add 5\" 2x15  HL " "hip abd (light TB) 2x15       Neuromuscular Re-Ed 31232:    30 sec holds x mult reps  on floor then blue Airex pad    Normal YANI    EO  with perturbations.     EC with gentle perturbations.     NBOS  EC  solid and Airex pad          Step taps 5\" while on foam  x 2 min  Mod Tandem R and L EO and EC  2 x 30\" each    Paloff press to work core control and balance    DNP  Ambulation with quad cane around facility and on [place] track. Pt declined performance of SPC for advancement today despite encouragement for trail.   Gait training WITHOUT use of AFO in clinic. Pt had good performance without LOB but required cues for improved heel strike and able to improve dorsiflexion post cues.  Stair training x 4 steps with use of RUE on hand rail for both ascending/descending. Pt performed 4 step x 4 sets for total of 16 steps. Goal met for ascending/descending x 12 steps.  Benefits from cues to slow down performance.    HEP / Access Codes:   URL: [email or web address]  Access Code: [email or web address]   Date: 02/28/2025  - Narrow Stance with Counter Support  - 2 x daily - 7 x weekly - 3 reps - 20 hold    Access Code: [email or web address]  Date: 03/03/2025  - Side Stepping with Resistance at Ankles and Counter Support  - 1-2 x daily - 15-20 ft x 4  distance  - Standing Hip Extension with Resistance at Ankles and Counter Support  - 1-2 x daily - 2 sets - 10 reps  - Sit to Stand Without Arm Support  - 1-2 x daily - 2 sets - 10 reps    Assessment:   Patient tolerated treatment well. Patient appears to be working hard in clinic and motivated to decrease pain and restore all functional deficits. Verbal and/or tactile cues given for proper form, and avoidance of substitution patterns with all exercises. All infection control policies followed during this visit. No observed antalgia with exercise performance  pt working very hard in clinic, and has made nice progressions with EC proprioceptive work vs last time with this clinician.       Post-Treatment " Symptoms:    0 of 10;  fatigue of the glute muscles. Per pt.      Plan:    Continue to progress strength and balance    Goals:   Active       PT Problem       PT Goal 1 (Progressing)       Start:  02/28/25    Expected End:  03/28/25       Pt to improve balance with improved modified tandem stance to 30 sec with eyes closed to facilitate zero incidents of falling since starting therapy.         PT Goal 2 (Progressing)       Start:  02/28/25    Expected End:  05/09/25       Pt to improve LE strength of left side to 4+/5 or greater.         PT Goal 3 (Progressing)       Start:  04/07/25    Expected End:  05/19/25       New goal: pt to improve 2 min walk test by x 1 MCID or greater.         PT Goal 4 (Met)       Start:  02/28/25    Expected End:  05/09/25    Resolved:  04/07/25    Pt able to ascend and descend x 12 steps with reciprocal pattern and railing for assistance to facilitate return to community outgoing.         Patient Stated Goal 1 (Progressing)       Start:  02/28/25    Expected End:  05/09/25       Pt to be able to walk without quad cane or assistive device.         Patient Stated Goal 2 (Progressing)       Start:  02/28/25    Expected End:  05/09/25       Pt would like to get in and out of bed (soft mattress) without having to use grab bar.

## 2025-06-05 ENCOUNTER — TREATMENT (OUTPATIENT)
Facility: CLINIC | Age: 80
End: 2025-06-05
Payer: MEDICARE

## 2025-06-05 DIAGNOSIS — I63.9 ISCHEMIC CEREBROVASCULAR ACCIDENT (CVA) (MULTI): ICD-10-CM

## 2025-06-05 DIAGNOSIS — G81.94 LEFT HEMIPARESIS (MULTI): ICD-10-CM

## 2025-06-05 PROCEDURE — 97110 THERAPEUTIC EXERCISES: CPT | Mod: GP,CQ

## 2025-06-05 PROCEDURE — 97112 NEUROMUSCULAR REEDUCATION: CPT | Mod: GO | Performed by: OCCUPATIONAL THERAPIST

## 2025-06-05 PROCEDURE — 97112 NEUROMUSCULAR REEDUCATION: CPT | Mod: GP,CQ

## 2025-06-05 PROCEDURE — 97110 THERAPEUTIC EXERCISES: CPT | Mod: GO | Performed by: OCCUPATIONAL THERAPIST

## 2025-06-05 ASSESSMENT — PAIN - FUNCTIONAL ASSESSMENT: PAIN_FUNCTIONAL_ASSESSMENT: 0-10

## 2025-06-05 ASSESSMENT — PAIN SCALES - GENERAL: PAINLEVEL_OUTOF10: 0 - NO PAIN

## 2025-06-05 NOTE — PROGRESS NOTES
"Occupational Therapy Treatment  Patient Name: Cynthia Muse  MRN: [id number]  OT Received on: 6/5/2025        Time Calculation  Start Time: 1130  Stop Time: 1215  Time Calculation (min): 45 min  OT Therapeutic Procedures Time Entry  Neuromuscular Re-Education Time Entry: 20  Therapeutic Exercise Time Entry: 25    Insurance:  Visit Number: 23 of 32  Insurance Type: [employer]; 40 visits/yr      Subjective   Problem List Items Addressed This Visit           ICD-10-CM    Ischemic cerebrovascular accident (CVA) (Multi) I63.9    Left hemiparesis (Multi) G81.94     Current Problem/Reason for visit:  +CVA s/p TNK with L hemiparesis     Referred by: Dr. Izquierdo    Patient reports \"Today was the first day I was able to hold all [seven] of my medicine bottles with my left hand and open them.\"    Precautions: +L Hemiparesis; +Fall Risk    Performing HEP?: Yes    Pain:  Pain Assessment  Pain Assessment: 0-10  0-10 (Numeric) Pain Score: 0 - No pain    Objective     Physical Observation: Patient into clinic with +LLE AFO and LBQC; +Flexor tone/synergy pattern in LUE; +Biceps tightness; +arthritic joint changes B hands; +atrophy to L hand, +joint stiffness L hand  Edema: +Mild L hand  Sensory: +Impaired   Numbness/Tingling: Denies         Treatment:    Therapeutic Exercise:  UBE seated without resistance x6 minutes; alternating directions every minute; use of ace wrap to maintain L hand  on handle throughout.  Seated LUE row with orange theraband; patient able to maintain L hand  on handle throughout without assist, only verbal prompting occasionally to engage ; 15 reps x 3 sets.  Seated scapular retraction with dowel jose and orange theraband x15 reps x 3 sets; patient maintains  on dowel without assistance.  Seated chest press with 6# medicine x10 reps x 2 sets; much effort; moderate compensatory body movement demonstrated; patient maintains  on handle of ball without assist, however, requires verbal prompting " to maintain .    Neuromuscular Re-education:  Therapist implemented cone task x12 for cone stack/unstack to/from adjacent targets with emphasis on purposeful and deliberate L hand placement on cone with grasp and release of all digits as able; much improved from previous attempts/tx sessions with improved extension of digits, FA/wrist positioning and stability noted with performance; progressed to functional reaching with cone grasp/release to stationary elevated target/pole requiring 65 degrees shoulder flexion with each trial; increase effort with mild/moderate compensatory body movement due to fatigue at end x12 cones.  Therapist implemented bean bag  and drop while seated with emphasis on L thumb placement, digital extension and volitional use of LUE; 15 bags; Fair- performance, however, increased self-awareness and self-correction for proper hand placement/positioning with each trial.      Post-tx pain: 0/10    Assessment/Plan Patient continues to make steady progress with skilled OT intervention; improvements noted in quality of LUE movement/use and form/body mechanics with ther ex; will continue to advance intervention to provide optimal challenge for patient to enhance functional use of LUE.      OP EDUCATION:  Education  Individual(s) Educated: Patient  Home Program: AROM, AAROM, Strengthening    Goals:  Active       OT Goals       1. Patient will increase LUE shoulder AROM to 130 degrees flexion and abduction for improved functional use of LUE. (Progressing)       Start:  03/03/25    Expected End:  04/11/25            2. Patient will increase LUE strength at all planes to at least 4/5 for improved functional use of LUE. (Progressing)       Start:  03/03/25    Expected End:  04/11/25            3. Patient will achieve full grasp/release of L hand for improved ability to grasp/carry ADL objects with L hand. (Progressing)       Start:  03/03/25    Expected End:  04/11/25            4. Patient will  increase L hand  strength by at least 15# for improved gripping with L hand during ADL's. (Progressing)       Start:  03/03/25    Expected End:  04/11/25            5. Patient will increase increase coordination in L hand AEB ability to move at least 10 blocks with B&B standardized testing. (Progressing)       Start:  03/03/25    Expected End:  04/11/25            6. Patient will complete UB dressing and hygiene/grooming tasks with Mod I. (Met)       Start:  03/03/25    Expected End:  05/23/25    Resolved:  03/13/25         7. Patient will complete LB dressing tasks with Min A. (Met)       Start:  03/03/25    Expected End:  05/23/25    Resolved:  05/15/25             8. Patient will increase overall ease and independence AEB UEFI score of at least 50/80 for improved quality of life for patient. (Progressing)       Start:  03/03/25    Expected End:  05/23/25

## 2025-06-06 NOTE — PROGRESS NOTES
"Physical Therapy Treatment    Patient Name: Cynthia Muse  MRN: [id number]  Encounter date: 6/9/2025         Visit # 23 of 26  Visits/Dates Authorized: D - PT - 1) [employer] - NO AUTH / $350 DED MET / 80% coins / $10,000 OOP not met / 40V MAX pt alone - 1 used / ds 2/14/25.   CPT 47972 not covered.  2) MEDICARE A/B - NO AUTH / $257 DEDUCT not met / $0 used PT/ST / MN visits / Per RTE.       Current Problem:   Problem List Items Addressed This Visit    None              Precautions:    falls       Subjective   General:    Pt reports good tolerance to last session and continued compliance with HEP. Pt denies any pain at present and does feel a little stronger then previous.         Pre-Treatment Symptoms:        Objective   Findings:   Pt presents ambulating with large base quad cane, wider YANI, though slightly longer stride length vs last time with this clinician.            Treatments:    Therapeutic Exercise 39262:   Nustep L6 x7 min LE only , seat 7  [place] leg press DL 50# 3 x 15 each  HSC DL 55# 2x15;  50#  x 15   Knee extension SL R/L 4# AW  3 x 10  Ambulated 2 times perimeter of treatment area, including navigating around equipment and through narrow areas with intermittent cueing to slow nelson.   Stair ascending and descending x 2 min with reciprocal pattern encouraged to use BUE      DNP  Fwd step up 5\" x 10 each (in // bars)  Resisted fwd/bwd walking 7.5-10# x3   Step ups 8 in at steps mult reps with UE support for RUE.  Tloop lean on counter hip extension lime green 2x10    [place]uttle leg press DL 84--137# 1x12 ea , SL 37.5# 2x12 R/L  DNP  Quad iso at 90 deg knee flex 5\" 2x10   HL hip add 5\" 2x15  HL hip abd (light TB) 2x15       Neuromuscular Re-Ed 88499:    30 sec holds x mult reps  on floor then blue Airex pad    Normal YANI    EO  with perturbations.     EC with gentle perturbations.     NBOS  EC  solid and Airex pad          Step taps 5\" while on foam  x 2 min  Mod Tandem R and L EO and EC  2 x " "30\" each    Paloff press to work core control and balance    DNP  Ambulation with quad cane around facility and on [place] track. Pt declined performance of SPC for advancement today despite encouragement for trail.   Gait training WITHOUT use of AFO in clinic. Pt had good performance without LOB but required cues for improved heel strike and able to improve dorsiflexion post cues.  Stair training x 4 steps with use of RUE on hand rail for both ascending/descending. Pt performed 4 step x 4 sets for total of 16 steps. Goal met for ascending/descending x 12 steps.  Benefits from cues to slow down performance.    HEP / Access Codes:   URL: [email or web address]  Access Code: [email or web address]   Date: 02/28/2025  - Narrow Stance with Counter Support  - 2 x daily - 7 x weekly - 3 reps - 20 hold    Access Code: [email or web address]  Date: 03/03/2025  - Side Stepping with Resistance at Ankles and Counter Support  - 1-2 x daily - 15-20 ft x 4  distance  - Standing Hip Extension with Resistance at Ankles and Counter Support  - 1-2 x daily - 2 sets - 10 reps  - Sit to Stand Without Arm Support  - 1-2 x daily - 2 sets - 10 reps    Assessment:   Patient tolerated treatment well. Patient appears to be working hard in clinic and motivated to decrease pain and restore all functional deficits. Verbal and/or tactile cues given for proper form, and avoidance of substitution patterns with all exercises. All infection control policies followed during this visit. No observed antalgia with exercise performance  pt working very hard in clinic, and has made nice progressions with EC proprioceptive work vs last time with this clinician.       Post-Treatment Symptoms:    0 of 10;  fatigue of the glute muscles. Per pt.      Plan:    Continue to progress strength and balance    Goals:   Active       PT Problem       PT Goal 1 (Progressing)       Start:  02/28/25    Expected End:  03/28/25       Pt to improve balance with improved modified tandem stance to 30 " sec with eyes closed to facilitate zero incidents of falling since starting therapy.         PT Goal 2 (Progressing)       Start:  02/28/25    Expected End:  05/09/25       Pt to improve LE strength of left side to 4+/5 or greater.         PT Goal 3 (Progressing)       Start:  04/07/25    Expected End:  05/19/25       New goal: pt to improve 2 min walk test by x 1 MCID or greater.         PT Goal 4 (Met)       Start:  02/28/25    Expected End:  05/09/25    Resolved:  04/07/25    Pt able to ascend and descend x 12 steps with reciprocal pattern and railing for assistance to facilitate return to community outgoing.         Patient Stated Goal 1 (Progressing)       Start:  02/28/25    Expected End:  05/09/25       Pt to be able to walk without quad cane or assistive device.         Patient Stated Goal 2 (Progressing)       Start:  02/28/25    Expected End:  05/09/25       Pt would like to get in and out of bed (soft mattress) without having to use grab bar.

## 2025-06-09 ENCOUNTER — APPOINTMENT (OUTPATIENT)
Facility: CLINIC | Age: 80
End: 2025-06-09
Payer: MEDICARE

## 2025-06-09 ENCOUNTER — DOCUMENTATION (OUTPATIENT)
Facility: CLINIC | Age: 80
End: 2025-06-09
Payer: MEDICARE

## 2025-06-09 ENCOUNTER — APPOINTMENT (OUTPATIENT)
Facility: CLINIC | Age: 80
End: 2025-06-09
Payer: COMMERCIAL

## 2025-06-09 NOTE — PROGRESS NOTES
Physical Therapy                 Therapy Communication Note    Patient Name: Cynthia Muse  MRN: [id number]  Encounter Date: 6/9/2025    Discipline: Physical Therapy    Missed Time: Cancel    Comment:

## 2025-06-09 NOTE — PROGRESS NOTES
Occupational Therapy                 Therapy Communication Note    Patient Name: Cynthia Muse  MRN: [id number]  Department:   Room: Room/bed info not found  Today's Date: 6/9/2025     Discipline: Occupational Therapy    Missed Visit Reason:  Patient's grand daughter called in to cancel. No reason provided.     Missed Time: Cancel

## 2025-06-12 ENCOUNTER — TREATMENT (OUTPATIENT)
Facility: CLINIC | Age: 80
End: 2025-06-12
Payer: MEDICARE

## 2025-06-12 DIAGNOSIS — G81.94 LEFT HEMIPARESIS (MULTI): ICD-10-CM

## 2025-06-12 DIAGNOSIS — I63.9 ISCHEMIC CEREBROVASCULAR ACCIDENT (CVA) (MULTI): ICD-10-CM

## 2025-06-12 PROCEDURE — 97110 THERAPEUTIC EXERCISES: CPT | Mod: GP,CQ

## 2025-06-12 PROCEDURE — 97112 NEUROMUSCULAR REEDUCATION: CPT | Mod: GP,CQ

## 2025-06-12 PROCEDURE — 97110 THERAPEUTIC EXERCISES: CPT | Mod: GO | Performed by: OCCUPATIONAL THERAPIST

## 2025-06-12 ASSESSMENT — PAIN - FUNCTIONAL ASSESSMENT
PAIN_FUNCTIONAL_ASSESSMENT: 0-10
PAIN_FUNCTIONAL_ASSESSMENT: 0-10

## 2025-06-12 ASSESSMENT — PAIN SCALES - GENERAL
PAINLEVEL_OUTOF10: 0 - NO PAIN
PAINLEVEL_OUTOF10: 0 - NO PAIN

## 2025-06-12 NOTE — PROGRESS NOTES
"Occupational Therapy Treatment  Patient Name: Cynthia Muse  MRN: [id number]  OT Received on: 6/12/2025        Time Calculation  Start Time: 1145  Stop Time: 1230  Time Calculation (min): 45 min  OT Therapeutic Procedures Time Entry  Therapeutic Exercise Time Entry: 45    Insurance:  Visit Number: 24 of 32  Insurance Type: [employer]; 40 visits/yr-      Subjective   Problem List Items Addressed This Visit           ICD-10-CM    Ischemic cerebrovascular accident (CVA) (Multi) I63.9    Left hemiparesis (Multi) G81.94     Current Problem/Reason for visit:  +CVA s/p TNK with L hemiparesis     Referred by: Dr. Izquierdo    Patient reports \"I've taken my last two showers by myself and they have gone really well.\"    Precautions: +L Hemiparesis; +Fall Risk    Performing HEP?: Yes    Pain:  Pain Assessment  Pain Assessment: 0-10  0-10 (Numeric) Pain Score: 0 - No pain    Objective     Patient seen following PT tx sx this date.      (lbs) Right Left   1     2 35# 15# (+2)   3     4     5         Physical Observation: Patient into clinic with +LLE AFO and LBQC; +Flexor tone/synergy pattern in LUE; +Biceps tightness; +arthritic joint changes B hands; +atrophy to L hand, +joint stiffness L hand  Edema: +Mild L hand  Sensory: +Impaired   Numbness/Tingling: Denies     Treatment:    Therapeutic Exercise:  UBE seated without resistance x6 minutes; alternating directions every minute; use of ace wrap to maintain L hand .  Supine chest press with 6# medicine ball x10 reps x 3 sets.  Supine straight arm pulldowns with dowel jose and orange theraband x10 reps x 3 sets.  Supine orange theraband for LUE strengthening including shoulder flexion, adduction, abduction, and ER x10 reps x 3 sets each.  Supine L elbow flexion/extension with graded manual resistance as appropriate x10 reps x 3 sets.  L wrist extension against gravity with graded manual resistance x10 reps x 3 sets.  L hand composite digital flexion and extension x10 reps x " 2 sets; much improved extension of digits this date.     Post-tx pain: 0/10    Assessment/Plan Patient continues to make steady progress with skilled OT intervention; much improved volitional grasp/release noted this date, as well as, proximal strength and distal stability; will continue to advance intervention as indicated/tolerated to enhance functional use of LUE for improved independence and quality of life.       OP EDUCATION:  Education  Individual(s) Educated: Patient  Home Program: AROM, AAROM, Strengthening    Goals:  Active       OT Goals       1. Patient will increase LUE shoulder AROM to 130 degrees flexion and abduction for improved functional use of LUE. (Progressing)       Start:  03/03/25    Expected End:  04/11/25            2. Patient will increase LUE strength at all planes to at least 4/5 for improved functional use of LUE. (Progressing)       Start:  03/03/25    Expected End:  04/11/25            3. Patient will achieve full grasp/release of L hand for improved ability to grasp/carry ADL objects with L hand. (Progressing)       Start:  03/03/25    Expected End:  04/11/25            4. Patient will increase L hand  strength by at least 15# for improved gripping with L hand during ADL's. (Progressing)       Start:  03/03/25    Expected End:  04/11/25            5. Patient will increase increase coordination in L hand AEB ability to move at least 10 blocks with B&B standardized testing. (Progressing)       Start:  03/03/25    Expected End:  04/11/25            6. Patient will complete UB dressing and hygiene/grooming tasks with Mod I. (Met)       Start:  03/03/25    Expected End:  05/23/25    Resolved:  03/13/25         7. Patient will complete LB dressing tasks with Min A. (Met)       Start:  03/03/25    Expected End:  05/23/25    Resolved:  05/15/25             8. Patient will increase overall ease and independence AEB UEFI score of at least 50/80 for improved quality of life for patient.  (Progressing)       Start:  03/03/25    Expected End:  05/23/25

## 2025-06-12 NOTE — PROGRESS NOTES
"Physical Therapy Treatment    Patient Name: Cynthia Muse  MRN: [id number]  Encounter date: 6/12/2025    Time Calculation  Start Time: 1100  Stop Time: 1145  Time Calculation (min): 45 min  PT Therapeutic Procedures Time Entry  Therapeutic Exercise Time Entry: 27  Neuromuscular Re-Education Time Entry: 18    Visit # 23 of 26  Visits/Dates Authorized: D - PT - 1) [facility] - NO AUTH / $350 DED MET / 80% coins / $10,000 OOP not met / 40V MAX pt alone - 1 used / ds 2/14/25.   CPT 59299 not covered.  2) MEDICARE A/B - NO AUTH / $257 DEDUCT not met / $0 used PT/ST / [place] visits / Per RTE.       Current Problem:   Problem List Items Addressed This Visit           ICD-10-CM    Ischemic cerebrovascular accident (CVA) (Multi) I63.9    Left hemiparesis (Multi) G81.94               Precautions:    falls       Subjective   General:    Pt reports good tolerance to last session; reports good compliance with HEP performance.  Pt reports no falls since last session.        Pre-Treatment Symptoms:   Pain Assessment: 0-10  0-10 (Numeric) Pain Score: 0 - No pain    Objective   Findings:   Pt presents ambulating with large base quad cane, wider YANI, though slightly longer stride length vs last time with this clinician.  Pt presents with small reddened area medial aspect L LE  just proximal to medial malleolus (\"been there quite awhile\") no drainage, no open skin.  Pt instructed to contact PCP if any bright redness, any drainage, or any severe itchiness develops.  Pt acknowledges understanding. Pt reports she was able to shower self for first time yesterday.  Pt reports she was also one load of laundry, though couldn't fold secondary to L hand.              Treatments:    Therapeutic Exercise 67816:   Nustep L6 x7 min LE only , seat 7  HSC DL 50# 1x15;  55#  x 15   [place] leg press DL 50# x 15  55#  2x 15 each  Knee extension SL R/L 4# AW 2 x 15  Stair ascending and descending x 2 reps R and L , one and two hand hold assist  STS LMT no " "UE assist, x 5 reps ;  then  x 10 holding red ball (1000grams)     DNP  Fwd step up 5\" x 10 each (in // bars)  Step ups 8 in at steps mult reps with UE support for RUE.  Tloop lean on counter hip extension lime green 2x10      Neuromuscular Re-Ed 46255:    Resisted fwd/bwd walking 7.5#  x5;  Resisted R and L ambulation, 7.5# x 3 reps each    DNP (time)  30 sec holds x mult reps  on floor then blue Airex pad    Normal YANI    EO  with perturbations.     EC with gentle perturbations.     NBOS  EC  solid and Airex pad      Step taps 5\" while on foam  x 2 min  Mod Tandem R and L EO and EC  2 x 30\" each  Paloff press to work core control and balance    HEP / Access Codes:   URL: [email or web address]  Access Code: [email or web address]   Date: 02/28/2025  - Narrow Stance with Counter Support  - 2 x daily - 7 x weekly - 3 reps - 20 hold    Access Code: [email or web address]  Date: 03/03/2025  - Side Stepping with Resistance at Ankles and Counter Support  - 1-2 x daily - 15-20 ft x 4  distance  - Standing Hip Extension with Resistance at Ankles and Counter Support  - 1-2 x daily - 2 sets - 10 reps  - Sit to Stand Without Arm Support  - 1-2 x daily - 2 sets - 10 reps    Assessment:   Patient tolerated treatment well. Patient appears to be working hard in clinic and motivated to decrease pain and restore all functional deficits. Verbal and/or tactile cues given for proper form, and avoidance of substitution patterns with all exercises. All infection control policies followed during this visit. No observed antalgia with exercise performance  pt working very hard in clinic, and has made nice progressions with EC proprioceptive work vs last time with this clinician. Pt challenged with resisted 4 way ambulation, required CGA with all , especially R and L      Post-Treatment Symptoms:    0 of 10;  fatigue.       Plan:    Continue to progress strength and balance    Goals:   Active       PT Problem       PT Goal 1 (Progressing)       Start:  02/28/25 "    Expected End:  03/28/25       Pt to improve balance with improved modified tandem stance to 30 sec with eyes closed to facilitate zero incidents of falling since starting therapy.         PT Goal 2 (Progressing)       Start:  02/28/25    Expected End:  05/09/25       Pt to improve LE strength of left side to 4+/5 or greater.         PT Goal 3 (Progressing)       Start:  04/07/25    Expected End:  05/19/25       New goal: pt to improve 2 min walk test by x 1 MCID or greater.         PT Goal 4 (Met)       Start:  02/28/25    Expected End:  05/09/25    Resolved:  04/07/25    Pt able to ascend and descend x 12 steps with reciprocal pattern and railing for assistance to facilitate return to community outgoing.         Patient Stated Goal 1 (Progressing)       Start:  02/28/25    Expected End:  05/09/25       Pt to be able to walk without quad cane or assistive device.         Patient Stated Goal 2 (Progressing)       Start:  02/28/25    Expected End:  05/09/25       Pt would like to get in and out of bed (soft mattress) without having to use grab bar.

## 2025-06-16 ENCOUNTER — TREATMENT (OUTPATIENT)
Facility: CLINIC | Age: 80
End: 2025-06-16
Payer: MEDICARE

## 2025-06-16 DIAGNOSIS — I63.9 ISCHEMIC CEREBROVASCULAR ACCIDENT (CVA) (MULTI): ICD-10-CM

## 2025-06-16 DIAGNOSIS — G81.94 LEFT HEMIPARESIS (MULTI): ICD-10-CM

## 2025-06-16 PROCEDURE — 97110 THERAPEUTIC EXERCISES: CPT | Mod: GO | Performed by: OCCUPATIONAL THERAPIST

## 2025-06-16 PROCEDURE — 97116 GAIT TRAINING THERAPY: CPT | Mod: GP

## 2025-06-16 PROCEDURE — 97110 THERAPEUTIC EXERCISES: CPT | Mod: GP

## 2025-06-16 PROCEDURE — 97112 NEUROMUSCULAR REEDUCATION: CPT | Mod: GO | Performed by: OCCUPATIONAL THERAPIST

## 2025-06-16 ASSESSMENT — PAIN SCALES - GENERAL: PAINLEVEL_OUTOF10: 0 - NO PAIN

## 2025-06-16 ASSESSMENT — PAIN - FUNCTIONAL ASSESSMENT: PAIN_FUNCTIONAL_ASSESSMENT: 0-10

## 2025-06-16 NOTE — PROGRESS NOTES
Physical Therapy Treatment    Patient Name: Cynthia Muse  MRN: [id number]  Encounter date: 2025    Time Calculation  Start Time: 1103  Stop Time: 1142  Time Calculation (min): 39 min  PT Therapeutic Procedures Time Entry  Therapeutic Exercise Time Entry: 16  Gait Training Time Entry: 23    Visit # 24 of 26  Visits/Dates Authorized: D - PT - 1) [employer] [facility] - NO AUTH / $350 DED MET / 80% coins / $10,000 OOP not met / 40V MAX pt alone - 1 used / ds 25.   CPT 88025 not covered.  2) MEDICARE A/B - NO AUTH / $257 DEDUCT not met / $0 used PT/ST / [place] visits / Per RTE.       Current Problem:   Problem List Items Addressed This Visit           ICD-10-CM    Ischemic cerebrovascular accident (CVA) (Multi) I63.9    Left hemiparesis (Multi) G81.94     Precautions:    falls       Subjective   General:    Pt reports good tolerance to last session; reports good compliance with HEP performance.  Pt reports no falls since last session.        Pre-Treatment Symptoms:    No pain    Objective   Findings:   6 min walk test 520 feet with use of quad cane (unable to complete full time. See assessment)           Treatments:    Gait trainin  Gait training with and without assistive device requiring encouragement to use quad cane secondary to increased safety and pt demonstrating more appropriate gait speed. Pt had x 1 major LOB requiring mod/max A to recover in hallway as she was fatigued during x 6 min walk test and refused to take seated rest breaks when offered by clinician. Pt did take x 1 standing rest break following LOB and assist of clinician to steady self.  Pts unsteadiness remains primary concern for safety and given her decreased safety awarenss, possibly attributed to cognitive impacts from CVA, pt benefits from cues to slow down her nelson. Nelson naturally slows down when using quad cane and should cont to be encouraged to use quad cane at home.     Resisted walking 10#  x4-5 reps ea;   Fwd  bwd   Resisted  sideways  ambulation 5# x 2-3 reps each   L   R Greatest difficulty stepping out to the R (as pt cont to have some L sided LE weakness).    Therapeutic Exercise 52222:   Nustep L3 x8 min LE only , seat 7 (reduced resistance from 6 to 3 as nu-step performed after walking.   HR 2 x 10 reps  HSC DL   40# x 15 reps  55# 2 x 10 reps  [place] leg press DL 55# 3 sets x 15 each    Deferred d/t time restraints  Knee extension SL R/L 4# AW 2 x 15  Stair ascending and descending x 2 reps R and L , one and two hand hold assist  STS LMT no UE assist, x 5 reps ;  then  x 10 holding red ball (1000grams)       HEP / Access Codes:   URL: [email or web address]  Access Code: [email or web address]   Date: 02/28/2025  - Narrow Stance with Counter Support  - 2 x daily - 7 x weekly - 3 reps - 20 hold    Access Code: [email or web address]  Date: 03/03/2025  - Side Stepping with Resistance at Ankles and Counter Support  - 1-2 x daily - 15-20 ft x 4  distance  - Standing Hip Extension with Resistance at Ankles and Counter Support  - 1-2 x daily - 2 sets - 10 reps  - Sit to Stand Without Arm Support  - 1-2 x daily - 2 sets - 10 reps    Assessment:   Patient tolerated treatment well but did struggle with muscle endurance with reduced activation of L dorsiflexors and noticing fatigue in R glute and had reports of x 1 LOB requiring mod / max A to recover. Pts greatest barrier in therapy today was her safety awareness denying will to take either standing/seated rest breaks during 6 min walk test with pt having tendencies to push to fatigue leading to LOB as a result of fatigue. Pt required education and cuing to slow down her nelson and needed encouragement to use quad cane during therapy sessions as she continues to carry device in hands intermittently during gait training. Cont to remain challenged with resisted 4 way ambulation, required CGA with all , especially R and L      Post-Treatment Symptoms:   fatigue.       Plan:    Continue to progress strength and  balance    Goals:   Active       PT Problem       PT Goal 1 (Progressing)       Start:  02/28/25    Expected End:  03/28/25       Pt to improve balance with improved modified tandem stance to 30 sec with eyes closed to facilitate zero incidents of falling since starting therapy.         PT Goal 2 (Progressing)       Start:  02/28/25    Expected End:  05/09/25       Pt to improve LE strength of left side to 4+/5 or greater.         PT Goal 3 (Progressing)       Start:  04/07/25    Expected End:  05/19/25       New goal: pt to improve 2 min walk test by x 1 MCID or greater.         PT Goal 4 (Met)       Start:  02/28/25    Expected End:  05/09/25    Resolved:  04/07/25    Pt able to ascend and descend x 12 steps with reciprocal pattern and railing for assistance to facilitate return to community outgoing.         Patient Stated Goal 1 (Progressing)       Start:  02/28/25    Expected End:  05/09/25       Pt to be able to walk without quad cane or assistive device.         Patient Stated Goal 2 (Progressing)       Start:  02/28/25    Expected End:  05/09/25       Pt would like to get in and out of bed (soft mattress) without having to use grab bar.

## 2025-06-16 NOTE — PROGRESS NOTES
"Occupational Therapy Treatment  Patient Name: Cynthia Muse  MRN: [id number]  OT Received on: 6/16/2025        Time Calculation  Start Time: 1145  Stop Time: 1230  Time Calculation (min): 45 min  OT Therapeutic Procedures Time Entry  Therapeutic Exercise Time Entry: 20  Neuromuscular Re-Education Time Entry: 25    Insurance:  Visit Number: 25 of 32  Insurance Type: [employer]; 40 visits/yr      Subjective   Problem List Items Addressed This Visit           ICD-10-CM    Ischemic cerebrovascular accident (CVA) (Multi) I63.9    Left hemiparesis (Multi) G81.94     Current Problem/Reason for visit:  +CVA s/p TNK with L hemiparesis     Referred by: Dr. Izquierdo    Patient reports \"I am doing pretty well.\"    Precautions: +L Hemiparesis; +Fall Risk    Performing HEP?: Yes    Pain:  Pain Assessment  Pain Assessment: 0-10  0-10 (Numeric) Pain Score: 0 - No pain    Objective     Patient seen following PT tx sx this date.     Physical Observation: Patient into clinic with +LLE AFO and LBQC; +Flexor tone/synergy pattern in LUE; +Biceps tightness; +arthritic joint changes B hands; +atrophy to L hand, +joint stiffness L hand  Edema: +Mild L hand  Sensory: +Impaired   Numbness/Tingling: Denies       Treatment:    Therapeutic Exercise:  UBE seated without resistance x6 minutes; 3 minutes each direction--1 minute LUE only;  use of ace wrap to maintain L hand .  Red therabar bends x15 reps x 3 sets.  Rollerboard at countertop CW/CCW and shoulder flexion/extension x25 reps x 2 sets each.    Neuromuscular Re-education:  Therapist implemented object  and placement tasks with gross grasp (10 cones to/from target) and progressed to 3-pt pinch and 2-pt maintained with ace wrap for 2-inch (5 blocks x 2 trials) wooden block , progressing to 1-inch (10 blocks x 3 trials) wooden block , and then to 1/2 inch ring  and placement to vertical target, as well as, to bucket; hands-on assistance to facilitated proper " hand placement during grasp and release; however, with progression improved volitional  ad release with IF and thumb.    Post-tx pain: 0/10    Assessment/Plan Patient is making steady progress at this time; improving use of R hand with noted improvements in proximal LUE strength during ther ex; will continue to advance intervention as indicated/tolerated to optimize functional capacity.      OP EDUCATION:  Education  Individual(s) Educated: Patient  Home Program: AAROM, AROM, Fine motor tasks    Goals:  Active       OT Goals       1. Patient will increase LUE shoulder AROM to 130 degrees flexion and abduction for improved functional use of LUE. (Progressing)       Start:  03/03/25    Expected End:  04/11/25            2. Patient will increase LUE strength at all planes to at least 4/5 for improved functional use of LUE. (Progressing)       Start:  03/03/25    Expected End:  04/11/25            3. Patient will achieve full grasp/release of L hand for improved ability to grasp/carry ADL objects with L hand. (Progressing)       Start:  03/03/25    Expected End:  04/11/25            4. Patient will increase L hand  strength by at least 15# for improved gripping with L hand during ADL's. (Progressing)       Start:  03/03/25    Expected End:  04/11/25            5. Patient will increase increase coordination in L hand AEB ability to move at least 10 blocks with B&B standardized testing. (Progressing)       Start:  03/03/25    Expected End:  04/11/25            6. Patient will complete UB dressing and hygiene/grooming tasks with Mod I. (Met)       Start:  03/03/25    Expected End:  05/23/25    Resolved:  03/13/25         7. Patient will complete LB dressing tasks with Min A. (Met)       Start:  03/03/25    Expected End:  05/23/25    Resolved:  05/15/25             8. Patient will increase overall ease and independence AEB UEFI score of at least 50/80 for improved quality of life for patient. (Progressing)        Start:  03/03/25    Expected End:  05/23/25

## 2025-06-18 NOTE — PROGRESS NOTES
Physical Therapy Treatment    Patient Name: Cynthia Muse  MRN: [id number]  Encounter date: 2025         Visit # 25 of 26  Visits/Dates Authorized: D - PT - 1) [employer] [facility] - NO AUTH / $350 DED MET / 80% coins / $10,000 OOP not met / 40V MAX pt alone - 1 used / ds 25.   CPT 45581 not covered.  2) MEDICARE A/B - NO AUTH / $257 DEDUCT not met / $0 used PT/ST / [place] visits / Per RTE.       Current Problem:   Problem List Items Addressed This Visit    None      Precautions:    falls       Subjective   General:    Pt reports good tolerance to last session; reports good compliance with HEP performance.  Pt reports no falls since last session.        Pre-Treatment Symptoms:    No pain    Objective   Findings:   6 min walk test 520 feet with use of quad cane (unable to complete full time. See assessment)           Treatments:    Gait trainin  Gait training with and without assistive device requiring encouragement to use quad cane secondary to increased safety and pt demonstrating more appropriate gait speed. Pt had x 1 major LOB requiring mod/max A to recover in hallway as she was fatigued during x 6 min walk test and refused to take seated rest breaks when offered by clinician. Pt did take x 1 standing rest break following LOB and assist of clinician to steady self.  Pts unsteadiness remains primary concern for safety and given her decreased safety awarenss, possibly attributed to cognitive impacts from CVA, pt benefits from cues to slow down her nelson. Nelson naturally slows down when using quad cane and should cont to be encouraged to use quad cane at home.     Resisted walking 10#  x4-5 reps ea;   Fwd  bwd   Resisted sideways  ambulation 5# x 2-3 reps each   L   R Greatest difficulty stepping out to the R (as pt cont to have some L sided LE weakness).    Therapeutic Exercise 07811:   Nustep L3 x8 min LE only , seat 7 (reduced resistance from 6 to 3 as nu-step performed after walking.   HR 2 x 10 reps  HSC  DL   40# x 15 reps  55# 2 x 10 reps  [place] leg press DL 55# 3 sets x 15 each    Deferred d/t time restraints  Knee extension SL R/L 4# AW 2 x 15  Stair ascending and descending x 2 reps R and L , one and two hand hold assist  STS LMT no UE assist, x 5 reps ;  then  x 10 holding red ball (1000grams)       HEP / Access Codes:   URL: [email or web address]  Access Code: [email or web address]   Date: 02/28/2025  - Narrow Stance with Counter Support  - 2 x daily - 7 x weekly - 3 reps - 20 hold    Access Code: [email or web address]  Date: 03/03/2025  - Side Stepping with Resistance at Ankles and Counter Support  - 1-2 x daily - 15-20 ft x 4  distance  - Standing Hip Extension with Resistance at Ankles and Counter Support  - 1-2 x daily - 2 sets - 10 reps  - Sit to Stand Without Arm Support  - 1-2 x daily - 2 sets - 10 reps    Assessment:   Patient tolerated treatment well but did struggle with muscle endurance with reduced activation of L dorsiflexors and noticing fatigue in R glute and had reports of x 1 LOB requiring mod / max A to recover. Pts greatest barrier in therapy today was her safety awareness denying will to take either standing/seated rest breaks during 6 min walk test with pt having tendencies to push to fatigue leading to LOB as a result of fatigue. Pt required education and cuing to slow down her nelson and needed encouragement to use quad cane during therapy sessions as she continues to carry device in hands intermittently during gait training. Cont to remain challenged with resisted 4 way ambulation, required CGA with all , especially R and L      Post-Treatment Symptoms:   fatigue.       Plan:    Continue to progress strength and balance    Goals:   Active       PT Problem       PT Goal 1 (Progressing)       Start:  02/28/25    Expected End:  03/28/25       Pt to improve balance with improved modified tandem stance to 30 sec with eyes closed to facilitate zero incidents of falling since starting therapy.         PT  Goal 2 (Progressing)       Start:  02/28/25    Expected End:  05/09/25       Pt to improve LE strength of left side to 4+/5 or greater.         PT Goal 3 (Progressing)       Start:  04/07/25    Expected End:  05/19/25       New goal: pt to improve 2 min walk test by x 1 MCID or greater.         PT Goal 4 (Met)       Start:  02/28/25    Expected End:  05/09/25    Resolved:  04/07/25    Pt able to ascend and descend x 12 steps with reciprocal pattern and railing for assistance to facilitate return to community outgoing.         Patient Stated Goal 1 (Progressing)       Start:  02/28/25    Expected End:  05/09/25       Pt to be able to walk without quad cane or assistive device.         Patient Stated Goal 2 (Progressing)       Start:  02/28/25    Expected End:  05/09/25       Pt would like to get in and out of bed (soft mattress) without having to use grab bar.

## 2025-06-19 ENCOUNTER — APPOINTMENT (OUTPATIENT)
Facility: CLINIC | Age: 80
End: 2025-06-19
Payer: COMMERCIAL

## 2025-06-19 ENCOUNTER — APPOINTMENT (OUTPATIENT)
Facility: CLINIC | Age: 80
End: 2025-06-19
Payer: MEDICARE

## 2025-06-21 DIAGNOSIS — E11.22 TYPE 2 DIABETES MELLITUS WITH STAGE 3A CHRONIC KIDNEY DISEASE, WITHOUT LONG-TERM CURRENT USE OF INSULIN (MULTI): ICD-10-CM

## 2025-06-21 DIAGNOSIS — N18.31 TYPE 2 DIABETES MELLITUS WITH STAGE 3A CHRONIC KIDNEY DISEASE, WITHOUT LONG-TERM CURRENT USE OF INSULIN (MULTI): ICD-10-CM

## 2025-06-21 RX ORDER — GLIMEPIRIDE 2 MG/1
1 TABLET ORAL
Qty: 45 TABLET | Refills: 0 | Status: SHIPPED | OUTPATIENT
Start: 2025-06-21 | End: 2025-09-19

## 2025-06-23 ENCOUNTER — TREATMENT (OUTPATIENT)
Facility: CLINIC | Age: 80
End: 2025-06-23
Payer: MEDICARE

## 2025-06-23 DIAGNOSIS — G81.94 LEFT HEMIPARESIS (MULTI): ICD-10-CM

## 2025-06-23 DIAGNOSIS — I63.9 ISCHEMIC CEREBROVASCULAR ACCIDENT (CVA) (MULTI): ICD-10-CM

## 2025-06-23 PROCEDURE — 97112 NEUROMUSCULAR REEDUCATION: CPT | Mod: GP

## 2025-06-23 PROCEDURE — 97110 THERAPEUTIC EXERCISES: CPT | Mod: GP

## 2025-06-23 PROCEDURE — 97112 NEUROMUSCULAR REEDUCATION: CPT | Mod: GO,CO

## 2025-06-23 PROCEDURE — 97110 THERAPEUTIC EXERCISES: CPT | Mod: GO,CO

## 2025-06-23 ASSESSMENT — PAIN - FUNCTIONAL ASSESSMENT: PAIN_FUNCTIONAL_ASSESSMENT: 0-10

## 2025-06-23 ASSESSMENT — PAIN SCALES - GENERAL: PAINLEVEL_OUTOF10: 0 - NO PAIN

## 2025-06-23 NOTE — PROGRESS NOTES
Physical Therapy Treatment and progress note     Patient Name: Cynthia Muse  MRN: [id number]  Encounter date: 6/23/2025    Time Calculation  Start Time: 1104  Stop Time: 1145  Time Calculation (min): 41 min  PT Therapeutic Procedures Time Entry  Therapeutic Exercise Time Entry: 15  Neuromuscular Re-Education Time Entry: 15    Visit # 25 of 35  Visits/Dates Authorized: D - PT - 1) [employer] - NO AUTH / $350 DED MET / 80% coins / $10,000 OOP not met / 40V MAX pt alone - 1 used / ds 2/14/25.   CPT 12057 not covered.  2) MEDICARE A/B - NO AUTH / $257 DEDUCT not met / $0 used PT/ST / [place] visits / Per RTE.       Current Problem:   Problem List Items Addressed This Visit           ICD-10-CM    Ischemic cerebrovascular accident (CVA) (Multi) I63.9    Left hemiparesis (Multi) G81.94       Precautions:    falls       Subjective   General:    Pt reports continued improvements with better compliance with use of quad cane for Assistive device.       Pre-Treatment Symptoms:    No pain    Objective   Findings:    5 x STS (17 sec)  30 sec STS x 9 reps)   right LE strength   Hip    Flex 4 / 5    Abd 4- / 5    Add 4+ / 5   Knee     Ext 4+ / 5    Flex 4 / 5   Ankle    PF 4+ / 5    DF 5 / 5    left LE strength   Hip    Flex 4 / 5    Abd 4- / 5    Add 4+ / 5   Knee     Ext 4 / 5    Flex 4 / 5   Ankle    PF 4+ / 5    DF 4 / 5    Balance:  Tandem stance R in Rear >30 EO and 5-10 sec EC   Tandem stance L in Rear 20 EO and 2 sec EC     Performed on (06/16/2025)  6 min walk test 520 feet with use of quad cane (unable to complete full time    Stroke impact scale 72/80    Treatments:    Therapeutic Exercise 62091:   Performed Sci fit recumb bike instead (L 2.5 x 8 min with pt enjoying more so than nu-step)  HSC DL   55# 3x 15 reps rest breaks between  Static HS stretch    Knee extension SL R/L 5# AW 2 x 15  STS mult reps with cues to not use UE.    Deferred d/t time restraints  Stair ascending and descending x 2 reps R and L , one and two hand  hold assist  STS LMT no UE assist, x 5 reps ;  then  x 10 holding red ball (1000grams)     Neuro renetta [id number]  On firm surface (carpet)  Tandem standing mult repss and sets with R/L in rear  SLS EO only as pt struggling to maintain SL stance without LOB.    On blue foam  Romberg  Mod tandem    Deferred   Gait trainin  Gait training with and without assistive device requiring encouragement to use quad cane secondary to increased safety and pt demonstrating more appropriate gait speed. Pt had x 1 major LOB requiring mod/max A to recover in hallway as she was fatigued during x 6 min walk test and refused to take seated rest breaks when offered by clinician. Pt did take x 1 standing rest break following LOB and assist of clinician to steady self.  Pts unsteadiness remains primary concern for safety and given her decreased safety awarenss, possibly attributed to cognitive impacts from CVA, pt benefits from cues to slow down her nelson. Nelson naturally slows down when using quad cane and should cont to be encouraged to use quad cane at home.     Resisted walking 10#  x4-5 reps ea;   Fwd  bwd   Resisted sideways  ambulation 5# x 2-3 reps each   L   R Greatest difficulty stepping out to the R (as pt cont to have some L sided LE weakness).    HEP / Access Codes:   URL: [email or web address]  Access Code: [email or web address]   Date: 2025  - Narrow Stance with Counter Support  - 2 x daily - 7 x weekly - 3 reps - 20 hold    Access Code: [email or web address]  Date: 2025  - Side Stepping with Resistance at Ankles and Counter Support  - 1-2 x daily - 15-20 ft x 4  distance  - Standing Hip Extension with Resistance at Ankles and Counter Support  - 1-2 x daily - 2 sets - 10 reps  - Sit to Stand Without Arm Support  - 1-2 x daily - 2 sets - 10 reps    Assessment:    Pt had good tolerance to session demonstrating good form and technique requiring supervision and cues throughout session for improved performance and safety. Pt  reports difficulty with balance training in clinic today more so than compared to at home. Noted benefits from balance training was seen with emphasis on improving balance and gait in future sessions. Pt is progressing well making progress towards all goals with pt achieving bed mobility goal.  New goal added to be able to complete SLS x 30 sec with eyes open to improve pts SLS stability and facilitate improved balance to reduce dependence of quad cane for assistive device.     Post-Treatment Symptoms:   fatigue.       Plan:   Pt to cont for 10 additional visits for 35 visits total.    New Goal: pt to stand on x 1 foot for 30 sec or greater with EO to aid in improved single leg stability and aid in reduced falls risk.  Goals:   Active       PT Problem       PT Goal 1 (Progressing)       Start:  02/28/25    Expected End:  08/18/25       Pt to improve balance with improved modified tandem stance to 30 sec with eyes closed to facilitate zero incidents of falling since starting therapy.         PT Goal 2 (Progressing)       Start:  02/28/25    Expected End:  08/18/25       Pt to improve LE strength of left side to 4+/5 or greater.         PT Goal 3 (Progressing)       Start:  04/07/25    Expected End:  08/18/25       New goal: pt to improve 2 min walk test by x 1 MCID or greater.         PT Goal 4 (Met)       Start:  02/28/25    Expected End:  05/09/25    Resolved:  04/07/25    Pt able to ascend and descend x 12 steps with reciprocal pattern and railing for assistance to facilitate return to community outgoing.         Patient Stated Goal 1 (Progressing)       Start:  02/28/25    Expected End:  08/18/25       Pt to be able to walk without quad cane or assistive device.         Patient Stated Goal 2 (Met)       Start:  02/28/25    Expected End:  08/18/25    Resolved:  06/23/25    Pt would like to get in and out of bed (soft mattress) without having to use grab bar.

## 2025-06-23 NOTE — PROGRESS NOTES
"Occupational Therapy Treatment  Patient Name: Cynthia Muse  MRN: [id number]  OT Received on: 6/23/2025 OT Received On: 06/23/25  Time Calculation  Start Time: 1212  Stop Time: 1300  Time Calculation (min): 48 min  OT Therapeutic Procedures Time Entry  Therapeutic Exercise Time Entry: 38  Neuromuscular Re-Education Time Entry: 10  Insurance:  Visit Number: 26 of 32  2/14/25 - MMO SUPERMED PLUS [employer] LOCAL 880 / NO AUTH / 40 V LIMIT YEAR - 2 USED 2/14/25 / DED MET - OOP 13191 NOT MET / MEDICARE A/B SECONDARY TO MMO / LOCAL 880 CALL REF # [id number] & AVAILITY # [id number]  JS    Subjective   Problem List Items Addressed This Visit           ICD-10-CM       Neuro    Ischemic cerebrovascular accident (CVA) (Multi) I63.9    Left hemiparesis (Multi) G81.94     Referred by: Dr. Izquierdo, next follow up in August 18th    Patient reports\" I can move my shoulder better now\".  Taking tylenol every day in am. Not using heat or ice. Pt is using home e stim unit daily. Pt is pleasant and motivated. Noted increased participation in daily activity , washing dishes putting them away with R arm.  Pt ambulated to therapy with small based quad cane. Goals and precautions reviewed with patient  She verbalized understanding.     Precautions: + L robb and fall risk.     Performing HEP?: Yes    Pain: 0/10    Physical Observation: +Fluctuating tone throughout LUE; no noted scapular wining or subluxation at this time; +Arthritic joint changes to B hand   Edema: none     Sensory: none noted   Numbness/Tingling: none   Treatment:  Modalities:   Reviewed home  e stim pad placement for  finger and wrist ext.Pt completing daily  Educated patient in/completed new e stim program for thumb ext. , completed 10 min of russian e stim at 45 MHZ, written diagram/instructions  provided for home follow through    Therapeutic Exercise:  Reviewed  personal use of Catina hand exerciser, has  progressed  to 10#, pt to increase reps of HEP as tolerated  Reviewed iso " ball completion at home. Continues as  part of HEP  Completed graded clothespins, yellow, 2# resistance x 6 reps using 3 point pinch  Completed seated stacking cones for shoulder flexion and hand open/close x 12 cones, 2 reps  Seated  UBE completed at level 0  with use of ace bandage for  L hand  assist  at 3 min forward and 3 min reverse.   Seated single arm bicep curls with 1#, also  short arc ROM flexion and abduction , 2 x 10 reps with focus on full bicep ext to side of chair. Added to HEP  Reviewed Seated green ( medium resistance ) theraband attached to door knob at home. Completing daily  Completed seated  single arm dowel jose exercises   with green theraband 2  x 10 reps  for tricep presses  , shoulder ext  and shoulder retraction   Post-tx pain:0/10 . Min ( same) fatigue.    Assessment/Plan Pt highly motivated to return to function. Noted increased ability to perform 3 point pinch and shoulder flexion and ext.   Pt to  continue with follow through with home e stim  for finger and thumb ext, theraband HEP  and shoulder exercises in supine as tolerated. Next visit explore use of BTE for MMS.  OP EDUCATION:  Education  Individual(s) Educated: Patient  Education Provided: Fall precautons, Risk and benefits of OT discussed with patient or other, POC discussed and agreed upon  Home Program: AAROM, AROM, Fine motor tasks, Handout issued, Modalities  Equipment: Other (personal e stim unit)  Risk and Benefits Discussed with Patient/Caregiver/Other: yes  Patient/Caregiver Demonstrated Understanding: yes  Plan of Care Discussed and Agreed Upon: yes  Patient Response to Education: Patient/Caregiver Verbalized Understanding of Information, Patient/Caregiver Performed Return Demonstration of Exercises/Activities, Patient/Caregiver Asked Appropriate Questions    Goals:  Active       OT Goals       1. Patient will increase LUE shoulder AROM to 130 degrees flexion and abduction for improved functional use of LUE.  (Progressing)       Start:  03/03/25    Expected End:  04/11/25            2. Patient will increase LUE strength at all planes to at least 4/5 for improved functional use of LUE. (Progressing)       Start:  03/03/25    Expected End:  04/11/25            3. Patient will achieve full grasp/release of L hand for improved ability to grasp/carry ADL objects with L hand. (Progressing)       Start:  03/03/25    Expected End:  04/11/25            4. Patient will increase L hand  strength by at least 15# for improved gripping with L hand during ADL's. (Progressing)       Start:  03/03/25    Expected End:  04/11/25            5. Patient will increase increase coordination in L hand AEB ability to move at least 10 blocks with B&B standardized testing. (Progressing)       Start:  03/03/25    Expected End:  04/11/25            6. Patient will complete UB dressing and hygiene/grooming tasks with Mod I. (Met)       Start:  03/03/25    Expected End:  05/23/25    Resolved:  03/13/25         7. Patient will complete LB dressing tasks with Min A. (Met)       Start:  03/03/25    Expected End:  05/23/25    Resolved:  05/15/25             8. Patient will increase overall ease and independence AEB UEFI score of at least 50/80 for improved quality of life for patient. (Progressing)       Start:  03/03/25    Expected End:  05/23/25

## 2025-06-25 NOTE — PROGRESS NOTES
"Physical Therapy Treatment and progress note     Patient Name: Cynthia Muse  MRN: [id number]  Encounter date: 6/26/2025    Time Calculation  Start Time: 1015  Stop Time: 1100  Time Calculation (min): 45 min  PT Therapeutic Procedures Time Entry  Therapeutic Exercise Time Entry: 30  Neuromuscular Re-Education Time Entry: 15    Visit # 26 of 35  Visits/Dates Authorized: D - PT - 1) [employer] - NO AUTH / $350 DED MET / 80% coins / $10,000 OOP not met / 40V MAX pt alone - 1 used / ds 2/14/25.   CPT 22200 not covered.  2) MEDICARE A/B - NO AUTH / $257 DEDUCT not met / $0 used PT/ST / [place] visits / Per RTE.       Current Problem:   Problem List Items Addressed This Visit           ICD-10-CM    Ischemic cerebrovascular accident (CVA) (Multi) I63.9    Left hemiparesis (Multi) G81.94         Precautions:    falls       Subjective   General:    Pt reports good compliance with HEP; pt reports, in general, legs are feeling stronger then one month ago.  Pt denies any falls since last session. Pt reports compliance with HEP performance.       Pre-Treatment Symptoms:    0/10     Objective   Findings:    Pt arrives ambulating with large base quad cane and very subtle decreased stride length with R LE (decreased stance time L LE)    Treatments:    Therapeutic Exercise 57093:   Stair ascending and descending x 4 reps reciprocally,  R and L , one and two hand hold assist  STS LMT no UE assist, x 5 reps ;  then  x 10 holding red ball (1000grams)  STS LMT staggered stance R and L x 10 reps each  No UE assist  Knee extension SL R/L 5# AW 3 x 10   HSC DL   55# 15 reps ;  57.5# x 15 ;  x 10   rest breaks between to allow Static HS stretch   Sci fit recumb bike (L 2.5 x 6 min  s=3  [place] leg press, DL 40# x 15 ; 50# x 15; 57.5# x 15     Neuro renetta [id number]  On firm surface (carpet)  Tandem standing mult reps and sets with R/L in rear x 2 reps each  On foam, romberg EO and EC x 30\" each  Toe taps to foam, stand on firm, x 10 reps    Deferred " secondary to time.    Gait trainin  Gait training with and without assistive device requiring encouragement to use quad cane secondary to increased safety and pt demonstrating more appropriate gait speed. Pt had x 1 major LOB requiring mod/max A to recover in hallway as she was fatigued during x 6 min walk test and refused to take seated rest breaks when offered by clinician. Pt did take x 1 standing rest break following LOB and assist of clinician to steady self.  Pts unsteadiness remains primary concern for safety and given her decreased safety awarenss, possibly attributed to cognitive impacts from CVA, pt benefits from cues to slow down her nelson. Nelson naturally slows down when using quad cane and should cont to be encouraged to use quad cane at home.     Resisted walking 10#  x4-5 reps ea;   Fwd  bwd   Resisted sideways  ambulation 5# x 2-3 reps each   L   R Greatest difficulty stepping out to the R (as pt cont to have some L sided LE weakness).    HEP / Access Codes:   URL: [email or web address]  Access Code: [email or web address]   Date: 2025  - Narrow Stance with Counter Support  - 2 x daily - 7 x weekly - 3 reps - 20 hold    Access Code: [email or web address]  Date: 2025  - Side Stepping with Resistance at Ankles and Counter Support  - 1-2 x daily - 15-20 ft x 4  distance  - Standing Hip Extension with Resistance at Ankles and Counter Support  - 1-2 x daily - 2 sets - 10 reps  - Sit to Stand Without Arm Support  - 1-2 x daily - 2 sets - 10 reps    Assessment:   Patient tolerated treatment well. Patient appears to be working hard in clinic and motivated to decrease pain and restore all functional deficits. Verbal and/or tactile cues given for proper form, and avoidance of substitution patterns with all exercises. All infection control policies followed during this visit. No observed antalgia with exercise performance; pt continues to demonstrate excellent motivation and ability to progress both  strength and balance/ proprioceptive exercises.     Post-Treatment Symptoms:  0/10 pain  fatigue.       Plan:   Pt to cont for 10 additional visits for 35 visits total.    New Goal: pt to stand on x 1 foot for 30 sec or greater with EO to aid in improved single leg stability and aid in reduced falls risk.  Goals:   Active       PT Problem       PT Goal 1 (Progressing)       Start:  02/28/25    Expected End:  08/18/25       Pt to improve balance with improved modified tandem stance to 30 sec with eyes closed to facilitate zero incidents of falling since starting therapy.         PT Goal 2 (Progressing)       Start:  02/28/25    Expected End:  08/18/25       Pt to improve LE strength of left side to 4+/5 or greater.         PT Goal 3 (Progressing)       Start:  04/07/25    Expected End:  08/18/25       New goal: pt to improve 2 min walk test by x 1 MCID or greater.         PT Goal 4 (Met)       Start:  02/28/25    Expected End:  05/09/25    Resolved:  04/07/25    Pt able to ascend and descend x 12 steps with reciprocal pattern and railing for assistance to facilitate return to community outgoing.         Patient Stated Goal 1 (Progressing)       Start:  02/28/25    Expected End:  08/18/25       Pt to be able to walk without quad cane or assistive device.         Patient Stated Goal 2 (Met)       Start:  02/28/25    Expected End:  08/18/25    Resolved:  06/23/25    Pt would like to get in and out of bed (soft mattress) without having to use grab bar.

## 2025-06-26 ENCOUNTER — TREATMENT (OUTPATIENT)
Facility: CLINIC | Age: 80
End: 2025-06-26
Payer: MEDICARE

## 2025-06-26 DIAGNOSIS — I63.9 ISCHEMIC CEREBROVASCULAR ACCIDENT (CVA) (MULTI): ICD-10-CM

## 2025-06-26 DIAGNOSIS — G81.94 LEFT HEMIPARESIS (MULTI): ICD-10-CM

## 2025-06-26 PROCEDURE — 97112 NEUROMUSCULAR REEDUCATION: CPT | Mod: GP,CQ

## 2025-06-26 PROCEDURE — 97110 THERAPEUTIC EXERCISES: CPT | Mod: GO,CO

## 2025-06-26 PROCEDURE — 97110 THERAPEUTIC EXERCISES: CPT | Mod: GP,CQ

## 2025-06-26 ASSESSMENT — PAIN - FUNCTIONAL ASSESSMENT: PAIN_FUNCTIONAL_ASSESSMENT: 0-10

## 2025-06-26 ASSESSMENT — PAIN SCALES - GENERAL: PAINLEVEL_OUTOF10: 0 - NO PAIN

## 2025-06-26 NOTE — PROGRESS NOTES
"Occupational Therapy Treatment  Patient Name: Cynthia Muse  MRN: [id number]  OT Received on: 6/26/2025 OT Received On: 06/26/25  Time Calculation  Start Time: 1100  Stop Time: 1145  Time Calculation (min): 45 min  OT Therapeutic Procedures Time Entry  Therapeutic Exercise Time Entry: 45  Insurance:  Visit Number: 27 of 32 2/14/25 - MMO SUPERMED PLUS [employer]W LOCAL 880 / NO AUTH / 40 V LIMIT YEAR - 2 USED 2/14/25 / DED MET - OOP 90160 NOT MET / MEDICARE A/B SECONDARY TO MMO / LOCAL 880 CALL REF # [id number] & AV[employer]ILITY # [id number]  JS    Subjective   Problem List Items Addressed This Visit           ICD-10-CM       Neuro    Ischemic cerebrovascular accident (CVA) (Multi) I63.9    Left hemiparesis (Multi) G81.94     Referred by: Dr. Izquierdo, next follow up in August 18th    Patient reports\" I am doing ok. I felt a little off this morning, I may need to be drinking more water\".  Writer took patient BP at 145/75. Recommended patient monitor and increase water intake. Taking tylenol every day in am. Not using heat or ice. Pt is using home e stim unit daily. Pt is pleasant and motivated. Noted increased participation in daily activity , doing own laundry with some help folding.  Pt ambulated to therapy with small based quad cane. Goals and precautions reviewed with patient  She verbalized understanding.    (lbs) Right Left   1       2 38( Was 35#) 15( Was 15#)      Current UEFI= 34/80  UEFI at evaluation= 14/80; 83% impaired      Box and Blocks:   R=  72 ( was 66) blocks/min    L=  7( same) blocks/min      Precautions: + L robb and fall risk.     Performing HEP?: Yes    Pain: 0/10    Physical Observation: +Fluctuating tone throughout LUE; no noted scapular wining or subluxation at this time; +Arthritic joint changes to B hand   Edema: none     Sensory: none noted   Numbness/Tingling: none   Treatment:  Modalities:   Reviewed home  e stim pad placement for  finger and wrist ext.Pt completing daily  Reviewed  new e stim " program for thumb ext. , completed twice since last session.   Therapeutic Exercise:  Educated in importance of all functional use of L UE   Reviewed  personal use of Catina hand exerciser, has  progressed  to 10#, pt to increase reps of HEP as tolerated, increase to 15# as tolerated  Reviewed iso ball completion at home. Continues as  part of HEP  Completed graded clothespins, yellow, 2#,  increased red 4# resistance x 6 reps using 3 point pinch  Completed seated stacking cones for shoulder flexion and hand open/close x 12 cones, 2 reps   Standing  ( was Seated)  UBE completed at level  2.0  ( was 0 ) with use of ace bandage for  L hand  assist  at 3 min forward and 3 min reverse.   Completed  Seated green ( medium resistance ) theraband attached to door knob at home. Completing daily  Post-tx pain:0/10 . Min ( same) fatigue.    Assessment/Plan Pt highly motivated to return to function. Noted increased ability to perform 3 point pinch , increased score on UEFI and UBE resistance  now standing.   Pt to  continue with follow through with home e stim  for finger and added  thumb ext, theraband HEP, isometric ball for shoulder and  hand  and shoulder exercises in supine as tolerated. Next visit explore use of BTE for MMS, check UB ROM and MMS.   OP EDUCATION:  Education  Individual(s) Educated: Patient  Education Provided: Fall precautons, Risk and benefits of OT discussed with patient or other, POC discussed and agreed upon  Home Program: AAROM, AROM, Fine motor tasks, Handout issued, Modalities  Equipment: Other (personal e stim unit)  Risk and Benefits Discussed with Patient/Caregiver/Other: yes  Patient/Caregiver Demonstrated Understanding: yes  Plan of Care Discussed and Agreed Upon: yes  Patient Response to Education: Patient/Caregiver Verbalized Understanding of Information, Patient/Caregiver Performed Return Demonstration of Exercises/Activities, Patient/Caregiver Asked Appropriate  Questions    Goals:  Active       OT Goals       1. Patient will increase LUE shoulder AROM to 130 degrees flexion and abduction for improved functional use of LUE. (Progressing)       Start:  03/03/25    Expected End:  04/11/25            2. Patient will increase LUE strength at all planes to at least 4/5 for improved functional use of LUE. (Progressing)       Start:  03/03/25    Expected End:  04/11/25            3. Patient will achieve full grasp/release of L hand for improved ability to grasp/carry ADL objects with L hand. (Progressing)       Start:  03/03/25    Expected End:  04/11/25            4. Patient will increase L hand  strength by at least 15# for improved gripping with L hand during ADL's. (Progressing)       Start:  03/03/25    Expected End:  04/11/25            5. Patient will increase increase coordination in L hand AEB ability to move at least 10 blocks with B&B standardized testing. (Progressing)       Start:  03/03/25    Expected End:  04/11/25            6. Patient will complete UB dressing and hygiene/grooming tasks with Mod I. (Met)       Start:  03/03/25    Expected End:  05/23/25    Resolved:  03/13/25         7. Patient will complete LB dressing tasks with Min A. (Met)       Start:  03/03/25    Expected End:  05/23/25    Resolved:  05/15/25             8. Patient will increase overall ease and independence AEB UEFI score of at least 50/80 for improved quality of life for patient. (Progressing)       Start:  03/03/25    Expected End:  05/23/25

## 2025-06-30 ENCOUNTER — APPOINTMENT (OUTPATIENT)
Facility: CLINIC | Age: 80
End: 2025-06-30
Payer: COMMERCIAL

## 2025-06-30 ENCOUNTER — APPOINTMENT (OUTPATIENT)
Facility: CLINIC | Age: 80
End: 2025-06-30
Payer: MEDICARE

## 2025-06-30 NOTE — PROGRESS NOTES
"Subjective   Patient ID: Cynthia Muse is a 79 y.o. female who presents for Not feeling Right / Head Pressure.    HPI   Pt comes in \"not feeling herself\".  She c.o head pressure for the last 5 days.   No uri symptoms.    She is s/p right cva with left hemiparesis and has been undergoing PT/OT since 11/2024.  She has not returned to work at [employer] yet.  Her blood pressures have been elevated .  Not due to low sugars since drinking orange juice didn't help.  Has no machine at home.  No new weakness noted of left arm/leg.  PT going well    PMH sig for controlled DM, CKD stage 3A stable, htn, HLD.    Past Medical History:   Diagnosis Date    Cerebrovascular accident (CVA), unspecified mechanism (Multi) 11/14/2024    Chronic kidney disease (CKD) stage G3a/A2, moderately decreased glomerular filtration rate (GFR) between 45-59 mL/min/1.73 square meter and albuminuria creatinine ratio between  mg/g (C* (Multi)     [place]    HTN (hypertension)     Hyperlipidemia     Ischemic stroke (Multi) 11/18/2024    Left hemiparesis (Multi) 11/19/2024    Osteoporosis     Type 2 diabetes mellitus     Vitamin D deficiency      Current Outpatient Medications   Medication Sig Dispense Refill    acetaminophen (Tylenol) 325 mg tablet Take 1 tablet (325 mg) by mouth every 4 hours.      amLODIPine (Norvasc) 10 mg tablet Take 1 tablet (10 mg) by mouth once daily. 90 tablet 1    aspirin 81 mg EC tablet Take 1 tablet (81 mg) by mouth once daily.      carvedilol (Coreg) 6.25 mg tablet Take 1 tablet (6.25 mg) by mouth 2 times a day. 180 tablet 1    cholecalciferol (Vitamin D-3) 50 MCG (2000 UT) tablet Take 1 tablet (50 mcg) by mouth once daily.      glimepiride (Amaryl) 2 mg tablet Take 0.5 tablets (1 mg) by mouth once daily in the morning. Take before meals. 45 tablet 0    rosuvastatin (Crestor) 10 mg tablet Take 1 tablet (10 mg) by mouth once daily. 90 tablet 1     No current facility-administered medications for this visit.       Review of " Systems see hpi    Objective   /60   Pulse 68   Wt 69.4 kg (153 lb)   BMI 27.98 kg/m²     Physical Exam  Vitals reviewed.   Constitutional:       Appearance: Normal appearance.   HENT:      Head: Normocephalic and atraumatic.   Eyes:      Extraocular Movements: Extraocular movements intact.      Pupils: Pupils are equal, round, and reactive to light.   Cardiovascular:      Rate and Rhythm: Normal rate and regular rhythm.      Heart sounds: Normal heart sounds.   Pulmonary:      Effort: Pulmonary effort is normal.      Breath sounds: Normal breath sounds.   Lymphadenopathy:      Cervical: No cervical adenopathy.   Neurological:      Mental Status: She is alert.      Comments: Using cane in right hand; left arm/hand/leg with weakness.  Right arm/hand/leg normal         Assessment/Plan   Assessment & Plan  Pressure in head  I think it may be her blood pressure being elevated causing the pressure. Maxed out on amlodipine and don't want to increase carvedilol since pulse already in 60's.  Her hctz and ace were stopped in Nov due to worsening kidney function.  Pt has not seen her nephrologist recently.  Orders:    CT head wo IV contrast; Future    History of CVA (cerebrovascular accident)    Orders:    CT head wo IV contrast; Future    Left hemiparesis (Multi)  Cont ot/pt ; form filled out again for [employer].  No longer FMLA       Chronic kidney disease (CKD) stage G3a/A2, moderately decreased glomerular filtration rate (GFR) between 45-59 mL/min/1.73 square meter and albuminuria creatinine ratio between  mg/g (C* (Multi)  Will check kidney function before deciding whether or not to add ace/arb or hctz to bring down pressure  Orders:    Basic Metabolic Panel; Future

## 2025-07-03 ENCOUNTER — OFFICE VISIT (OUTPATIENT)
Dept: PRIMARY CARE | Facility: CLINIC | Age: 80
End: 2025-07-03
Payer: COMMERCIAL

## 2025-07-03 VITALS
HEART RATE: 68 BPM | WEIGHT: 153 LBS | BODY MASS INDEX: 27.98 KG/M2 | DIASTOLIC BLOOD PRESSURE: 60 MMHG | SYSTOLIC BLOOD PRESSURE: 156 MMHG

## 2025-07-03 DIAGNOSIS — G81.94 LEFT HEMIPARESIS (MULTI): ICD-10-CM

## 2025-07-03 DIAGNOSIS — R51.9 PRESSURE IN HEAD: Primary | ICD-10-CM

## 2025-07-03 DIAGNOSIS — Z86.73 HISTORY OF CVA (CEREBROVASCULAR ACCIDENT): ICD-10-CM

## 2025-07-03 DIAGNOSIS — N18.31 CHRONIC KIDNEY DISEASE (CKD) STAGE G3A/A2, MODERATELY DECREASED GLOMERULAR FILTRATION RATE (GFR) BETWEEN 45-59 ML/MIN/1.73 SQUARE METER AND ALBUMINURIA CREATININE RATIO BETWEEN 30-299 MG/G (C* (MULTI): ICD-10-CM

## 2025-07-03 LAB
ANION GAP SERPL CALCULATED.4IONS-SCNC: 10 MMOL/L (CALC) (ref 7–17)
BUN SERPL-MCNC: 41 MG/DL (ref 7–25)
BUN/CREAT SERPL: 23 (CALC) (ref 6–22)
CALCIUM SERPL-MCNC: 9.7 MG/DL (ref 8.6–10.4)
CHLORIDE SERPL-SCNC: 106 MMOL/L (ref 98–110)
CO2 SERPL-SCNC: 24 MMOL/L (ref 20–32)
CREAT SERPL-MCNC: 1.76 MG/DL (ref 0.6–1)
EGFRCR SERPLBLD CKD-EPI 2021: 29 ML/MIN/1.73M2
GLUCOSE SERPL-MCNC: 113 MG/DL (ref 65–139)
POTASSIUM SERPL-SCNC: 4.4 MMOL/L (ref 3.5–5.3)
SODIUM SERPL-SCNC: 140 MMOL/L (ref 135–146)

## 2025-07-03 PROCEDURE — 99214 OFFICE O/P EST MOD 30 MIN: CPT | Performed by: FAMILY MEDICINE

## 2025-07-03 PROCEDURE — 3078F DIAST BP <80 MM HG: CPT | Performed by: FAMILY MEDICINE

## 2025-07-03 PROCEDURE — 3077F SYST BP >= 140 MM HG: CPT | Performed by: FAMILY MEDICINE

## 2025-07-03 PROCEDURE — 1159F MED LIST DOCD IN RCRD: CPT | Performed by: FAMILY MEDICINE

## 2025-07-03 PROCEDURE — 1126F AMNT PAIN NOTED NONE PRSNT: CPT | Performed by: FAMILY MEDICINE

## 2025-07-03 ASSESSMENT — PATIENT HEALTH QUESTIONNAIRE - PHQ9
1. LITTLE INTEREST OR PLEASURE IN DOING THINGS: NOT AT ALL
1. LITTLE INTEREST OR PLEASURE IN DOING THINGS: NOT AT ALL
SUM OF ALL RESPONSES TO PHQ9 QUESTIONS 1 AND 2: 0
2. FEELING DOWN, DEPRESSED OR HOPELESS: NOT AT ALL
2. FEELING DOWN, DEPRESSED OR HOPELESS: NOT AT ALL
SUM OF ALL RESPONSES TO PHQ9 QUESTIONS 1 AND 2: 0

## 2025-07-03 ASSESSMENT — ANXIETY QUESTIONNAIRES
2. NOT BEING ABLE TO STOP OR CONTROL WORRYING: NOT AT ALL
3. WORRYING TOO MUCH ABOUT DIFFERENT THINGS: NOT AT ALL
6. BECOMING EASILY ANNOYED OR IRRITABLE: NOT AT ALL
IF YOU CHECKED OFF ANY PROBLEMS ON THIS QUESTIONNAIRE, HOW DIFFICULT HAVE THESE PROBLEMS MADE IT FOR YOU TO DO YOUR WORK, TAKE CARE OF THINGS AT HOME, OR GET ALONG WITH OTHER PEOPLE: NOT DIFFICULT AT ALL
5. BEING SO RESTLESS THAT IT IS HARD TO SIT STILL: NOT AT ALL
7. FEELING AFRAID AS IF SOMETHING AWFUL MIGHT HAPPEN: NOT AT ALL
4. TROUBLE RELAXING: NOT AT ALL
1. FEELING NERVOUS, ANXIOUS, OR ON EDGE: NOT AT ALL
GAD7 TOTAL SCORE: 0

## 2025-07-03 ASSESSMENT — PAIN SCALES - GENERAL: PAINLEVEL_OUTOF10: 0-NO PAIN

## 2025-07-03 NOTE — ASSESSMENT & PLAN NOTE
Will check kidney function before deciding whether or not to add ace/arb or hctz to bring down pressure  Orders:    Basic Metabolic Panel; Future

## 2025-07-04 DIAGNOSIS — I10 ESSENTIAL HYPERTENSION: Primary | ICD-10-CM

## 2025-07-04 RX ORDER — LISINOPRIL 5 MG/1
5 TABLET ORAL DAILY
Qty: 30 TABLET | Refills: 0 | Status: SHIPPED | OUTPATIENT
Start: 2025-07-04 | End: 2025-08-03

## 2025-07-04 NOTE — RESULT ENCOUNTER NOTE
Called pt and advised her her renal function is stable.  Will start low dose ace lisinopril 5 mg daily along with her amlodipine and coreg.  She is to call us in 2 wks with bp readings and will also amanda bmp to make sure renal function stable on ACE.

## 2025-07-07 ENCOUNTER — HOSPITAL ENCOUNTER (OUTPATIENT)
Dept: RADIOLOGY | Facility: HOSPITAL | Age: 80
Discharge: HOME | End: 2025-07-07
Payer: COMMERCIAL

## 2025-07-07 ENCOUNTER — TREATMENT (OUTPATIENT)
Facility: CLINIC | Age: 80
End: 2025-07-07
Payer: COMMERCIAL

## 2025-07-07 DIAGNOSIS — Z86.73 HISTORY OF CVA (CEREBROVASCULAR ACCIDENT): ICD-10-CM

## 2025-07-07 DIAGNOSIS — R51.9 PRESSURE IN HEAD: ICD-10-CM

## 2025-07-07 DIAGNOSIS — I63.9 ISCHEMIC CEREBROVASCULAR ACCIDENT (CVA) (MULTI): ICD-10-CM

## 2025-07-07 DIAGNOSIS — G81.94 LEFT HEMIPARESIS (MULTI): ICD-10-CM

## 2025-07-07 LAB
APPEARANCE UR: ABNORMAL
BACTERIA #/AREA URNS HPF: ABNORMAL /HPF
BILIRUB UR QL STRIP: NEGATIVE
COLOR UR: YELLOW
GLUCOSE UR QL STRIP: NEGATIVE
HGB UR QL STRIP: NEGATIVE
HYALINE CASTS #/AREA URNS LPF: ABNORMAL /LPF
KETONES UR QL STRIP: NEGATIVE
LEUKOCYTE ESTERASE UR QL STRIP: ABNORMAL
NITRITE UR QL STRIP: NEGATIVE
PH UR STRIP: 5.5 [PH] (ref 5–8)
PROT UR QL STRIP: ABNORMAL
RBC #/AREA URNS HPF: ABNORMAL /HPF
SERVICE CMNT-IMP: ABNORMAL
SP GR UR STRIP: 1.01 (ref 1–1.03)
SQUAMOUS #/AREA URNS HPF: ABNORMAL /HPF
WBC #/AREA URNS HPF: ABNORMAL /HPF

## 2025-07-07 PROCEDURE — 97110 THERAPEUTIC EXERCISES: CPT | Mod: GO,CO

## 2025-07-07 PROCEDURE — 70450 CT HEAD/BRAIN W/O DYE: CPT

## 2025-07-07 PROCEDURE — 70450 CT HEAD/BRAIN W/O DYE: CPT | Performed by: RADIOLOGY

## 2025-07-07 ASSESSMENT — PAIN - FUNCTIONAL ASSESSMENT: PAIN_FUNCTIONAL_ASSESSMENT: 0-10

## 2025-07-07 ASSESSMENT — PAIN SCALES - GENERAL: PAINLEVEL_OUTOF10: 0 - NO PAIN

## 2025-07-07 NOTE — PROGRESS NOTES
"Occupational Therapy Treatment  Patient Name: Cynthia Muse  MRN: [id number]  OT Received on: 7/7/2025 OT Received On: 07/07/25  Time Calculation  Start Time: 1208  Stop Time: 1246  Time Calculation (min): 38 min  OT Therapeutic Procedures Time Entry  Therapeutic Exercise Time Entry: 38  Insurance:  Visit Number: 28 of 32  2/14/25 - MMO SUPERMED PLUS [employer] LOCAL 880 / NO AUTH / 40 V LIMIT YEAR - 2 USED 2/14/25 / DED MET - OOP 24875 NOT MET / MEDICARE A/B SECONDARY TO MMO / LOCAL 880 CALL REF # [id number] & [employer] # [phone]  JS    Subjective   Problem List Items Addressed This Visit           ICD-10-CM       Neuro    Ischemic cerebrovascular accident (CVA) (Multi) I63.9    Left hemiparesis (Multi) G81.94     Referred by: Dr. Izquierdo, next follow up in August 18th    Patient reports\" they adjusted my blood pressure meds yesterday\". Taking tylenol every day in am. Not using heat or ice. Pt is using home e stim unit daily. Pt is pleasant and motivated. Noted increased participation in daily activity , doing own laundry with some help folding.  Pt ambulated to therapy with small based quad cane. Goals and precautions reviewed with patient  and daughter, they both  verbalized understanding. Daughter present and supportive during entire session.   Precautions: + L robb and fall risk.     Performing HEP?: Yes    Pain: 0/10  Upper Extremity ROM              AROM MMT MMT       Right Left Right Left   SHOULDER Flexion WFL 90 WFL 3-/5     Extension WFL  55 ( was Neutral ) WFL  3/5     Abduction WFL   68 ( was 60)  WFL  3-/5     Internal Rotation WFL  To  stomach WFL  3/5     External Rotation WFL   To hip, low lumbar( was Netural ) WFL 3-/5      ELBOW Extension WFL  WFL  WFL  3+/5     Flexion  WFL WFL WFL  3+/5      FOREARM Pronation WFL  WFL  WFL  3/5     Supination WFL  ~70 WFL  3-/5      WRIST Flexion WFL  WFL  WFL  3/5     Extension WFL  Neutral  WFL  3-/5     Radial Deviation WFL  NT         Ulnar Deviation WFL NT    "     Physical Observation: +Fluctuating tone throughout LUE; no noted scapular wining or subluxation at this time; +Arthritic joint changes to B hand   Edema: none     Sensory: none noted   Numbness/Tingling: none   Treatment:  Modalities:   Reviewed home  e stim pad placement for  finger and wrist ext.Pt completing daily  Reviewed  new e stim program for thumb ext. , completing daily  Therapeutic Exercise:  Educated in importance of all functional use of L UE   Reviewed  personal use of Dormir hand exerciser, has  progressed  to 10#, pt to increase reps of HEP as tolerated, increase to 15# as tolerated   Seated UBE completed at level  2.5  ( was 2.0 ) with use of ace bandage for  L hand  assist  at 3 min forward and 3 min reverse.   Reviewed Seated green ( medium resistance ) theraband attached to door knob, completing daily  Reviewed importance of  and completed standing weight bearing at table through L UE to decrease tone. Pt to complete daily.   Completed standing BTE machine for functional strengthening; tools used this date include:  Gripper  5 #(was  0)   Initiated 3 point pinch at 3#  Ergometer  at 3# (same)  Long lever lift at 30# ( was 15)   Initiated Long lever push/pull at 39#   D Handle at 3# ( same)   All tools x 2 charts  Post-tx pain:0/10 . Min ( same as end of last session) fatigue.    Assessment/Plan Pt highly motivated to return to function.   Noted increased AROM on this date and increased endurance/tolerance to exercise.  Pt to  continue with follow through with home e stim  for finger and added  thumb ext, theraband HEP, isometric ball for shoulder and  hand  and shoulder exercises in supine as tolerated. Next visit check  MMS, AROM of supination, wrist flexion and ext.   OP EDUCATION:  Education  Individual(s) Educated: Patient, Other (daughter)  Education Provided: Fall precautons, Risk and benefits of OT discussed with patient or other, POC discussed and agreed upon  Home Program: JEREMIAS  AROM, Fine motor tasks, Handout issued, Modalities  Equipment: Other (personal e stim unit)  Risk and Benefits Discussed with Patient/Caregiver/Other: yes  Patient/Caregiver Demonstrated Understanding: yes  Plan of Care Discussed and Agreed Upon: yes  Patient Response to Education: Patient/Caregiver Verbalized Understanding of Information, Patient/Caregiver Performed Return Demonstration of Exercises/Activities, Patient/Caregiver Asked Appropriate Questions    Goals:  Active       OT Goals       1. Patient will increase LUE shoulder AROM to 130 degrees flexion and abduction for improved functional use of LUE. (Progressing)       Start:  03/03/25    Expected End:  04/11/25            2. Patient will increase LUE strength at all planes to at least 4/5 for improved functional use of LUE. (Progressing)       Start:  03/03/25    Expected End:  04/11/25            3. Patient will achieve full grasp/release of L hand for improved ability to grasp/carry ADL objects with L hand. (Progressing)       Start:  03/03/25    Expected End:  04/11/25            4. Patient will increase L hand  strength by at least 15# for improved gripping with L hand during ADL's. (Progressing)       Start:  03/03/25    Expected End:  04/11/25            5. Patient will increase increase coordination in L hand AEB ability to move at least 10 blocks with B&B standardized testing. (Progressing)       Start:  03/03/25    Expected End:  04/11/25            6. Patient will complete UB dressing and hygiene/grooming tasks with Mod I. (Met)       Start:  03/03/25    Expected End:  05/23/25    Resolved:  03/13/25         7. Patient will complete LB dressing tasks with Min A. (Met)       Start:  03/03/25    Expected End:  05/23/25    Resolved:  05/15/25             8. Patient will increase overall ease and independence AEB UEFI score of at least 50/80 for improved quality of life for patient. (Progressing)       Start:  03/03/25    Expected End:  05/23/25

## 2025-07-08 NOTE — RESULT ENCOUNTER NOTE
Still spilling protein in urine; will amanda at next appt; urine concentrated: drink more fluids!!

## 2025-07-09 ENCOUNTER — APPOINTMENT (OUTPATIENT)
Facility: CLINIC | Age: 80
End: 2025-07-09
Payer: COMMERCIAL

## 2025-07-09 ENCOUNTER — DOCUMENTATION (OUTPATIENT)
Facility: CLINIC | Age: 80
End: 2025-07-09
Payer: COMMERCIAL

## 2025-07-09 NOTE — PROGRESS NOTES
Occupational Therapy                 Therapy Communication Note    Patient Name: Cynthia Muse  MRN: [id number]  Department:   Room: Room/bed info not found  Today's Date: 7/9/2025     Discipline: Occupational Therapy    Missed Visit Reason:  Patient called in to cancel, family currently visiting from out of town. Plans on attending next scheduled OT session.     Missed Time: Cancel

## 2025-07-09 NOTE — PROGRESS NOTES
"Physical Therapy Treatment and progress note     Patient Name: Cynthia Muse  MRN: [id number]  Encounter date: 7/10/2025    Time Calculation  Start Time: 1015  Stop Time: 1055  Time Calculation (min): 40 min  PT Therapeutic Procedures Time Entry  Therapeutic Exercise Time Entry: 25  Gait Training Time Entry: 15    Visit # 27 of 35  Visits/Dates Authorized: D - PT - 1) MMO Local 880 - NO AUTH / $350 DED MET / 80% coins / $10,000 OOP not met / 40V MAX pt alone - 1 used / ds 2/14/25.   CPT 16915 not covered.  2) MEDICARE A/B - NO AUTH / $257 DEDUCT not met / $0 used PT/ST / [place] visits / Per RTE.       Current Problem:   Problem List Items Addressed This Visit           ICD-10-CM    Ischemic cerebrovascular accident (CVA) (Multi) I63.9    Left hemiparesis (Multi) G81.94           Precautions:    falls       Subjective   General:    Pt reports good compliance with HEP; pt reports, in general, legs are feeling stronger then one month ago.  Pt denies any falls since last session. Pt reports compliance with HEP performance.  Pt reports she purchased a new cane yesterday (hurry cane) and \"I really like it\".  Pt reports she still keeps quad cane in car, in case needed.        Pre-Treatment Symptoms:    0/10     Objective   Findings:    Pt arrives ambulating with hurry cane with increased stride length vs prior sessions somewhat fast nelson     Treatments:    Therapeutic Exercise 05773:    Sci fit recumb bike (L 2.5 x 7 min  s=3)  HSC DL   55# 15 reps ;  60# x 15 ;  x 10   rest breaks between to allow Static HS stretch  [place] leg press, DL 47.5# 3 x 15   Knee extension SL R/L 5# AW 3 x 10  STS LMT no UE assist, x 10 reps    STS LMT staggered stance R and L x 10 reps each  No UE assist -DNP this date     Neuro renetta [id number] DNP this date; see gait below  On firm surface (carpet)  Tandem standing mult reps and sets with R/L in rear x 2 reps each  On foam, romberg EO and EC x 30\" each  Toe taps to foam, stand on firm, x 10 " reps    Deferred secondary to time.    Gait trainin  -Gait training with hurry cane, while in clinic, with intermittent verbal cueing to slow down speed of ambulation.  -then ambulated with deisi cane from department down beltrán to elevator, then seated rest in main lobby.  Then took pt outdoors and switched to large base QC and ambulated on different surfaces (concrete, bricks), including compliant surfaces (playground mats; grass), including turns on each surface. Pt took two seated rest breaks throughout.    -ambulated pt from final seated rest break back to her car, using LBQC, and navigating up/down curbs.        HEP / Access Codes:   URL: [email or web address]  Access Code: [email or web address]   Date: 2025  - Narrow Stance with Counter Support  - 2 x daily - 7 x weekly - 3 reps - 20 hold    Access Code: [email or web address]  Date: 2025  - Side Stepping with Resistance at Ankles and Counter Support  - 1-2 x daily - 15-20 ft x 4  distance  - Standing Hip Extension with Resistance at Ankles and Counter Support  - 1-2 x daily - 2 sets - 10 reps  - Sit to Stand Without Arm Support  - 1-2 x daily - 2 sets - 10 reps    Assessment:   Patient tolerated treatment well. Patient appears to be working hard in clinic and motivated to decrease pain and restore all functional deficits. Verbal and/or tactile cues given for proper form, and avoidance of substitution patterns with all exercises. All infection control policies followed during this visit. No observed antalgia with exercise performance;  today's session focused on having pt ambulate on different surfaces, including compliant in outdoor environment (pt would like to eventually ambulated in/on her yard, has not done so since CVA).   Pt was provided CGA throughout, though no LOB experienced or felt.  Pt provided intermittent verbal cueing to prepare for different surfaces, and to keep pace slow.     Post-Treatment Symptoms:  0/10 pain  fatigue.       Plan:   Pt to  cont for 10 additional visits for 35 visits total.    New Goal: pt to stand on x 1 foot for 30 sec or greater with EO to aid in improved single leg stability and aid in reduced falls risk.  Goals:   Active       PT Problem       PT Goal 1 (Progressing)       Start:  02/28/25    Expected End:  08/18/25       Pt to improve balance with improved modified tandem stance to 30 sec with eyes closed to facilitate zero incidents of falling since starting therapy.         PT Goal 2 (Progressing)       Start:  02/28/25    Expected End:  08/18/25       Pt to improve LE strength of left side to 4+/5 or greater.         PT Goal 3 (Progressing)       Start:  04/07/25    Expected End:  08/18/25       New goal: pt to improve 2 min walk test by x 1 MCID or greater.         PT Goal 4 (Met)       Start:  02/28/25    Expected End:  05/09/25    Resolved:  04/07/25    Pt able to ascend and descend x 12 steps with reciprocal pattern and railing for assistance to facilitate return to community outgoing.         Patient Stated Goal 1 (Progressing)       Start:  02/28/25    Expected End:  08/18/25       Pt to be able to walk without quad cane or assistive device.         Patient Stated Goal 2 (Met)       Start:  02/28/25    Expected End:  08/18/25    Resolved:  06/23/25    Pt would like to get in and out of bed (soft mattress) without having to use grab bar.

## 2025-07-10 ENCOUNTER — TREATMENT (OUTPATIENT)
Facility: CLINIC | Age: 80
End: 2025-07-10
Payer: COMMERCIAL

## 2025-07-10 DIAGNOSIS — I63.9 ISCHEMIC CEREBROVASCULAR ACCIDENT (CVA) (MULTI): ICD-10-CM

## 2025-07-10 DIAGNOSIS — G81.94 LEFT HEMIPARESIS (MULTI): ICD-10-CM

## 2025-07-10 PROCEDURE — 97116 GAIT TRAINING THERAPY: CPT | Mod: GP,CQ

## 2025-07-10 PROCEDURE — 97110 THERAPEUTIC EXERCISES: CPT | Mod: GP,CQ

## 2025-07-15 ENCOUNTER — TREATMENT (OUTPATIENT)
Facility: CLINIC | Age: 80
End: 2025-07-15
Payer: COMMERCIAL

## 2025-07-15 DIAGNOSIS — G81.94 LEFT HEMIPARESIS (MULTI): ICD-10-CM

## 2025-07-15 DIAGNOSIS — I63.9 ISCHEMIC CEREBROVASCULAR ACCIDENT (CVA) (MULTI): ICD-10-CM

## 2025-07-15 PROCEDURE — 97112 NEUROMUSCULAR REEDUCATION: CPT | Mod: GP

## 2025-07-15 PROCEDURE — 97110 THERAPEUTIC EXERCISES: CPT | Mod: GP

## 2025-07-15 NOTE — PROGRESS NOTES
Physical Therapy Treatment and progress note     Patient Name: Cynthia Muse  MRN: [id number]  Encounter date: 7/15/2025    Time Calculation  Start Time: 1314  Stop Time: 1400  Time Calculation (min): 46 min  PT Therapeutic Procedures Time Entry  Therapeutic Exercise Time Entry: 15  Neuromuscular Re-Education Time Entry: 31    Visit # 28 of 35  Visits/Dates Authorized: D - PT - 1) [employer]MO Local 880 - NO AUTH / $350 DED MET / 80% coins / $10,000 OOP not met / 40V MAX pt alone - 1 used / ds 2/14/25.   CPT 12161 not covered.  2) MEDICARE A/B - NO AUTH / $257 DEDUCT not met / $0 used PT/ST / [place] visits / Per RTE.       Current Problem:   Problem List Items Addressed This Visit           ICD-10-CM    Ischemic cerebrovascular accident (CVA) (Multi) I63.9    Left hemiparesis (Multi) G81.94       Precautions:    falls     Subjective   General:    Pt reports her balance is still of major concern. Getting better but she wants to improve it. Pt saw doctor last Thursday for complaints of feeling off in her head (denies dizziness or headache). Has been taking BP meds and pt advised to take BP daily.       Pre-Treatment Symptoms:    0/10     Objective   Findings:   SOB but able to recover with cardiovascular / aerobic exercise.  /61 mmHg 1st reading  /60 mmHg 2nd reading  /62 mmHg 3rd reading    Treatments:    Therapeutic Exercise 85810:    Sci fit recumb bike (x 8 min  s=3)  HSC DL   55#  3 sets x 15 reps   rest breaks between to allow Static HS stretch  [place] leg press, DL 40# 3 x 15     deferred  Knee extension SL R/L 5# AW 3 x 10  STS LMT no UE assist, x 10 reps  STS LMT staggered stance R and L x 10 reps each  No UE assist -DNP this date     Neuro renetta [id number]   Resisted walking with functional  at 10#    Fwd walks   Alt step taps  Fwd walks   Alt step taps   Side stepping    Right    left   Blue foam step ups    Lead with right    Lead with left    deferred  On firm surface (carpet)  Tandem standing mult  "reps and sets with R/L in rear x 2 reps each  On foam, romberg EO and EC x 30\" each  Toe taps to foam, stand on firm, x 10 reps    Deferred secondary to time.    Gait trainin  -Gait training with hurry cane, while in clinic, with intermittent verbal cueing to slow down speed of ambulation.  -then ambulated with hurry cane from department down beltrán to elevator, then seated rest in main lobby.  Then took pt outdoors and switched to large base QC and ambulated on different surfaces (concrete, bricks), including compliant surfaces (playground mats; grass), including turns on each surface. Pt took two seated rest breaks throughout.    -ambulated pt from final seated rest break back to her car, using LBQC, and navigating up/down curbs.        HEP / Access Codes:   URL: [email or web address]  Access Code: [email or web address]   Date: 2025  - Narrow Stance with Counter Support  - 2 x daily - 7 x weekly - 3 reps - 20 hold    Access Code: [email or web address]  Date: 2025  - Side Stepping with Resistance at Ankles and Counter Support  - 1-2 x daily - 15-20 ft x 4  distance  - Standing Hip Extension with Resistance at Ankles and Counter Support  - 1-2 x daily - 2 sets - 10 reps  - Sit to Stand Without Arm Support  - 1-2 x daily - 2 sets - 10 reps    Assessment:   Patient tolerated treatment well being appropriately challenged by exercise advancement with balance training to improve dynamic stabilization on firm and unstable surfaces. Additional advancement of resistance via cables up to 10# for mult directional stepping. Required monitoring of BP during session secondary to recent increase in BP per pt.    Post-Treatment Symptoms:  0/10 pain  fatigue.       Plan:   Pt to cont for 10 additional visits for 35 visits total.    New Goal: pt to stand on x 1 foot for 30 sec or greater with EO to aid in improved single leg stability and aid in reduced falls risk.  Goals:   Active       PT Problem       PT Goal 1 (Progressing)       " Start:  02/28/25    Expected End:  08/18/25       Pt to improve balance with improved modified tandem stance to 30 sec with eyes closed to facilitate zero incidents of falling since starting therapy.         PT Goal 2 (Progressing)       Start:  02/28/25    Expected End:  08/18/25       Pt to improve LE strength of left side to 4+/5 or greater.         PT Goal 3 (Progressing)       Start:  04/07/25    Expected End:  08/18/25       New goal: pt to improve 2 min walk test by x 1 MCID or greater.         PT Goal 4 (Met)       Start:  02/28/25    Expected End:  05/09/25    Resolved:  04/07/25    Pt able to ascend and descend x 12 steps with reciprocal pattern and railing for assistance to facilitate return to community outgoing.         Patient Stated Goal 1 (Progressing)       Start:  02/28/25    Expected End:  08/18/25       Pt to be able to walk without quad cane or assistive device.         Patient Stated Goal 2 (Met)       Start:  02/28/25    Expected End:  08/18/25    Resolved:  06/23/25    Pt would like to get in and out of bed (soft mattress) without having to use grab bar.

## 2025-07-21 ENCOUNTER — TELEPHONE (OUTPATIENT)
Dept: PRIMARY CARE | Facility: CLINIC | Age: 80
End: 2025-07-21
Payer: COMMERCIAL

## 2025-07-21 DIAGNOSIS — N18.31 CHRONIC KIDNEY DISEASE (CKD) STAGE G3A/A2, MODERATELY DECREASED GLOMERULAR FILTRATION RATE (GFR) BETWEEN 45-59 ML/MIN/1.73 SQUARE METER AND ALBUMINURIA CREATININE RATIO BETWEEN 30-299 MG/G (C* (MULTI): Primary | ICD-10-CM

## 2025-07-21 NOTE — TELEPHONE ENCOUNTER
She is calling to update on lisinopril.  She said her bp was 137/89 on Saturday, then 151/59 and 147/64 on Sunday

## 2025-07-23 LAB
ANION GAP SERPL CALCULATED.4IONS-SCNC: 11 MMOL/L (CALC) (ref 7–17)
BUN SERPL-MCNC: 56 MG/DL (ref 7–25)
BUN/CREAT SERPL: 21 (CALC) (ref 6–22)
CALCIUM SERPL-MCNC: 8.9 MG/DL (ref 8.6–10.4)
CHLORIDE SERPL-SCNC: 109 MMOL/L (ref 98–110)
CO2 SERPL-SCNC: 21 MMOL/L (ref 20–32)
CREAT SERPL-MCNC: 2.66 MG/DL (ref 0.6–1)
EGFRCR SERPLBLD CKD-EPI 2021: 18 ML/MIN/1.73M2
GLUCOSE SERPL-MCNC: 123 MG/DL (ref 65–139)
POTASSIUM SERPL-SCNC: 5.4 MMOL/L (ref 3.5–5.3)
SODIUM SERPL-SCNC: 141 MMOL/L (ref 135–146)

## 2025-07-24 ENCOUNTER — TELEPHONE (OUTPATIENT)
Dept: PRIMARY CARE | Facility: CLINIC | Age: 80
End: 2025-07-24
Payer: COMMERCIAL

## 2025-07-24 DIAGNOSIS — N18.31 CHRONIC KIDNEY DISEASE (CKD) STAGE G3A/A2, MODERATELY DECREASED GLOMERULAR FILTRATION RATE (GFR) BETWEEN 45-59 ML/MIN/1.73 SQUARE METER AND ALBUMINURIA CREATININE RATIO BETWEEN 30-299 MG/G (C* (MULTI): ICD-10-CM

## 2025-07-24 NOTE — TELEPHONE ENCOUNTER
She can't see Dr. Camarlilo until November and she needs a referral.  She is asking if there is someone else she may be able to see sooner, or if you can try to get her in with his office sooner than November.

## 2025-07-28 ENCOUNTER — TREATMENT (OUTPATIENT)
Facility: CLINIC | Age: 80
End: 2025-07-28
Payer: COMMERCIAL

## 2025-07-28 DIAGNOSIS — I63.9 ISCHEMIC CEREBROVASCULAR ACCIDENT (CVA) (MULTI): ICD-10-CM

## 2025-07-28 DIAGNOSIS — G81.94 LEFT HEMIPARESIS (MULTI): ICD-10-CM

## 2025-07-28 PROCEDURE — 97110 THERAPEUTIC EXERCISES: CPT | Mod: GO,CO

## 2025-07-28 ASSESSMENT — PAIN SCALES - GENERAL: PAINLEVEL_OUTOF10: 0 - NO PAIN

## 2025-07-28 ASSESSMENT — PAIN - FUNCTIONAL ASSESSMENT: PAIN_FUNCTIONAL_ASSESSMENT: 0-10

## 2025-07-28 NOTE — PROGRESS NOTES
"Occupational Therapy Treatment  Patient Name: Cynthia Muse  MRN: [id number]  OT Received on: 7/28/2025 OT Received On: 07/28/25  Time Calculation  Start Time: 1034  Stop Time: 1120  Time Calculation (min): 46 min  OT Therapeutic Procedures Time Entry  Therapeutic Exercise Time Entry: 46  Insurance:  Visit Number: 29 of 32  2/14/25 - MMO [employer]US [employer] LOCAL 880 / NO AUTH / 40 V LIMIT YEAR - 2 USED 2/14/25 / DED MET - OOP 13967 NOT MET / MEDICARE A/B SECONDARY TO MMO / LOCAL 880 CALL REF # [id number] & [employer] # [phone]  JS    Subjective   Problem List Items Addressed This Visit           ICD-10-CM       Neuro    Ischemic cerebrovascular accident (CVA) (Multi) I63.9    Left hemiparesis (Multi) G81.94     Referred by: Dr. Izquierdo, next follow up in August 18th    Patient reports\" I am living by myself now full time\". Taking tylenol every day in am. Using  heat and  ice PRN. Pt is using home e stim 2-3 times . Pt is pleasant and motivated. Noted increased participation in daily activity , doing own laundry with some help folding.  Pt ambulated to therapy with small based quad cane. Precautions: + L robb and fall risk.     Performing HEP?: Yes    Pain: 0/10  Upper Extremity ROM              AROM MMT MMT       Right Left Right Left   SHOULDER Flexion WFL 90 WFL 3-/5     Extension WFL  70( was Neutral ) WFL  3/5     Abduction WFL   68 ( was 60)  WFL  3-/5     Internal Rotation WFL  To  stomach WFL  3/5     External Rotation WFL   To hip, low lumbar( was Netural ) WFL 3-/5      ELBOW Extension WFL  WFL  WFL  3+/5     Flexion  WFL WFL WFL  3+/5      FOREARM Pronation WFL  WFL  WFL  3/5     Supination WFL  ~70 WFL  3-/5      WRIST Flexion WFL  25 WFL  3/5     Extension WFL  18 ( was neutral)  WFL  3-/5     Radial Deviation WFL  NT         Ulnar Deviation WFL NT        Physical Observation: +Fluctuating tone throughout LUE; no noted scapular wining or subluxation at this time; +Arthritic joint changes to B hand   Edema: " none     Sensory: none noted   Numbness/Tingling: none   Treatment:  Modalities:   Reviewed  e stim program for thumb ext. , completing 2-3 times a week  Therapeutic Exercise:  Completed /initiated roll outs with cylinder for L finger ext, putty press outs. Added to HEP  Reviewed importance of all functional use of L UE   Reviewed  personal use of Rolyan hand exerciser, has  progressed  to 10#, pt increase to 15# as tolerated   Seated UBE completed at level  3.5  ( was 2.5 ) with use of ace bandage for  L hand  assist  at 3 min forward and 3 min reverse.   Reviewed Seated green ( medium resistance ) theraband attached to door knob, completing daily,   Reviewed importance of  and completed standing weight bearing at table through L UE to decrease tone. Pt to complete daily.   Completed standing BTE machine for functional strengthening; tools used this date include:  Gripper  7 #(was  5)   3 point pinch at 3#( same)   Long lever lift at 35# ( was 30)  Long lever push/pull at 25#   D Handle at 3# ( same)   All tools x 2 charts  Post-tx pain:0/10 . Min ( same as end of last session) fatigue.  Assessment/Plan Pt highly motivated to return to function.   Noted increased AROM on this date and increased endurance/tolerance to exercise.  Pt to  continue with follow through with home e stim  for finger and added  thumb ext, theraband HEP, isometric ball for shoulder and  hand  and shoulder exercises in supine as tolerated.   OP EDUCATION:  Education  Individual(s) Educated: Patient, Other (daughter)  Education Provided: Fall precautons, Risk and benefits of OT discussed with patient or other, POC discussed and agreed upon  Home Program: AAROM, AROM, Fine motor tasks, Handout issued, Modalities  Risk and Benefits Discussed with Patient/Caregiver/Other: yes  Patient/Caregiver Demonstrated Understanding: yes  Plan of Care Discussed and Agreed Upon: yes  Patient Response to Education: Patient/Caregiver Verbalized  Understanding of Information, Patient/Caregiver Performed Return Demonstration of Exercises/Activities, Patient/Caregiver Asked Appropriate Questions    Goals:  Active       OT Goals       1. Patient will increase LUE shoulder AROM to 130 degrees flexion and abduction for improved functional use of LUE. (Progressing)       Start:  03/03/25    Expected End:  04/11/25            2. Patient will increase LUE strength at all planes to at least 4/5 for improved functional use of LUE. (Progressing)       Start:  03/03/25    Expected End:  04/11/25            3. Patient will achieve full grasp/release of L hand for improved ability to grasp/carry ADL objects with L hand. (Progressing)       Start:  03/03/25    Expected End:  04/11/25            4. Patient will increase L hand  strength by at least 15# for improved gripping with L hand during ADL's. (Progressing)       Start:  03/03/25    Expected End:  04/11/25            5. Patient will increase increase coordination in L hand AEB ability to move at least 10 blocks with B&B standardized testing. (Progressing)       Start:  03/03/25    Expected End:  04/11/25            6. Patient will complete UB dressing and hygiene/grooming tasks with Mod I. (Met)       Start:  03/03/25    Expected End:  05/23/25    Resolved:  03/13/25         7. Patient will complete LB dressing tasks with Min A. (Met)       Start:  03/03/25    Expected End:  05/23/25    Resolved:  05/15/25             8. Patient will increase overall ease and independence AEB UEFI score of at least 50/80 for improved quality of life for patient. (Progressing)       Start:  03/03/25    Expected End:  05/23/25

## 2025-08-04 ENCOUNTER — APPOINTMENT (OUTPATIENT)
Facility: CLINIC | Age: 80
End: 2025-08-04
Payer: COMMERCIAL

## 2025-08-04 ENCOUNTER — DOCUMENTATION (OUTPATIENT)
Facility: CLINIC | Age: 80
End: 2025-08-04
Payer: COMMERCIAL

## 2025-08-04 NOTE — PROGRESS NOTES
Occupational Therapy                 Therapy Communication Note    Patient Name: Cynthia Muse  MRN: [id number]  Department:   Room: Room/bed info not found  Today's Date: 8/4/2025     Discipline: Occupational Therapy    Missed Visit:   Pt re-scheduled due to arriving to appointment at wrong time. Plans on attending next scheduled OT session.

## 2025-08-11 ENCOUNTER — TREATMENT (OUTPATIENT)
Facility: CLINIC | Age: 80
End: 2025-08-11
Payer: COMMERCIAL

## 2025-08-11 DIAGNOSIS — I63.9 ISCHEMIC CEREBROVASCULAR ACCIDENT (CVA) (MULTI): ICD-10-CM

## 2025-08-11 DIAGNOSIS — G81.94 LEFT HEMIPARESIS (MULTI): ICD-10-CM

## 2025-08-11 PROCEDURE — 97110 THERAPEUTIC EXERCISES: CPT | Mod: GO,CO

## 2025-08-11 ASSESSMENT — PAIN - FUNCTIONAL ASSESSMENT: PAIN_FUNCTIONAL_ASSESSMENT: 0-10

## 2025-08-11 ASSESSMENT — PAIN SCALES - GENERAL: PAINLEVEL_OUTOF10: 0 - NO PAIN

## 2025-08-13 ENCOUNTER — TREATMENT (OUTPATIENT)
Facility: CLINIC | Age: 80
End: 2025-08-13
Payer: COMMERCIAL

## 2025-08-13 DIAGNOSIS — I63.9 ISCHEMIC CEREBROVASCULAR ACCIDENT (CVA) (MULTI): ICD-10-CM

## 2025-08-13 DIAGNOSIS — G81.94 LEFT HEMIPARESIS (MULTI): ICD-10-CM

## 2025-08-13 PROCEDURE — 97112 NEUROMUSCULAR REEDUCATION: CPT | Mod: GP

## 2025-08-13 PROCEDURE — 97110 THERAPEUTIC EXERCISES: CPT | Mod: GP

## 2025-08-18 ENCOUNTER — APPOINTMENT (OUTPATIENT)
Facility: CLINIC | Age: 80
End: 2025-08-18
Payer: COMMERCIAL

## 2025-08-18 ENCOUNTER — APPOINTMENT (OUTPATIENT)
Dept: PRIMARY CARE | Facility: CLINIC | Age: 80
End: 2025-08-18
Payer: COMMERCIAL

## 2025-08-18 VITALS
SYSTOLIC BLOOD PRESSURE: 136 MMHG | OXYGEN SATURATION: 98 % | WEIGHT: 155.8 LBS | DIASTOLIC BLOOD PRESSURE: 80 MMHG | HEART RATE: 57 BPM | BODY MASS INDEX: 28.5 KG/M2

## 2025-08-18 DIAGNOSIS — Z86.73 HISTORY OF CVA (CEREBROVASCULAR ACCIDENT): ICD-10-CM

## 2025-08-18 DIAGNOSIS — E11.22 TYPE 2 DIABETES MELLITUS WITH STAGE 3A CHRONIC KIDNEY DISEASE, WITHOUT LONG-TERM CURRENT USE OF INSULIN (MULTI): Primary | ICD-10-CM

## 2025-08-18 DIAGNOSIS — I10 ESSENTIAL HYPERTENSION: ICD-10-CM

## 2025-08-18 DIAGNOSIS — N18.31 TYPE 2 DIABETES MELLITUS WITH STAGE 3A CHRONIC KIDNEY DISEASE, WITHOUT LONG-TERM CURRENT USE OF INSULIN (MULTI): Primary | ICD-10-CM

## 2025-08-18 DIAGNOSIS — N18.31 CHRONIC KIDNEY DISEASE (CKD) STAGE G3A/A2, MODERATELY DECREASED GLOMERULAR FILTRATION RATE (GFR) BETWEEN 45-59 ML/MIN/1.73 SQUARE METER AND ALBUMINURIA CREATININE RATIO BETWEEN 30-299 MG/G (C* (MULTI): ICD-10-CM

## 2025-08-18 DIAGNOSIS — E78.2 MIXED HYPERLIPIDEMIA: ICD-10-CM

## 2025-08-18 LAB — POC HEMOGLOBIN A1C: 5.9 % (ref 4.2–6.5)

## 2025-08-18 PROCEDURE — 3079F DIAST BP 80-89 MM HG: CPT | Performed by: FAMILY MEDICINE

## 2025-08-18 PROCEDURE — 1126F AMNT PAIN NOTED NONE PRSNT: CPT | Performed by: FAMILY MEDICINE

## 2025-08-18 PROCEDURE — 99214 OFFICE O/P EST MOD 30 MIN: CPT | Mod: 25 | Performed by: FAMILY MEDICINE

## 2025-08-18 PROCEDURE — 1159F MED LIST DOCD IN RCRD: CPT | Performed by: FAMILY MEDICINE

## 2025-08-18 PROCEDURE — 83036 HEMOGLOBIN GLYCOSYLATED A1C: CPT | Mod: QW | Performed by: FAMILY MEDICINE

## 2025-08-18 PROCEDURE — 99214 OFFICE O/P EST MOD 30 MIN: CPT | Performed by: FAMILY MEDICINE

## 2025-08-18 PROCEDURE — 3075F SYST BP GE 130 - 139MM HG: CPT | Performed by: FAMILY MEDICINE

## 2025-08-18 RX ORDER — GLIMEPIRIDE 2 MG/1
1 TABLET ORAL
Qty: 45 TABLET | Refills: 1 | Status: SHIPPED | OUTPATIENT
Start: 2025-08-18 | End: 2026-02-14

## 2025-08-18 RX ORDER — CARVEDILOL 6.25 MG/1
6.25 TABLET ORAL 2 TIMES DAILY
Qty: 180 TABLET | Refills: 1 | Status: SHIPPED | OUTPATIENT
Start: 2025-08-18

## 2025-08-18 RX ORDER — AMLODIPINE BESYLATE 10 MG/1
10 TABLET ORAL DAILY
Qty: 90 TABLET | Refills: 1 | Status: SHIPPED | OUTPATIENT
Start: 2025-08-18 | End: 2026-02-14

## 2025-08-18 ASSESSMENT — PAIN SCALES - GENERAL: PAINLEVEL_OUTOF10: 0-NO PAIN

## 2025-08-18 ASSESSMENT — PATIENT HEALTH QUESTIONNAIRE - PHQ9
SUM OF ALL RESPONSES TO PHQ9 QUESTIONS 1 AND 2: 0
2. FEELING DOWN, DEPRESSED OR HOPELESS: NOT AT ALL
1. LITTLE INTEREST OR PLEASURE IN DOING THINGS: NOT AT ALL

## 2025-08-19 LAB
ANION GAP SERPL CALCULATED.4IONS-SCNC: 12 MMOL/L (CALC) (ref 7–17)
BUN SERPL-MCNC: 43 MG/DL (ref 7–25)
BUN/CREAT SERPL: 24 (CALC) (ref 6–22)
CALCIUM SERPL-MCNC: 9.3 MG/DL (ref 8.6–10.4)
CHLORIDE SERPL-SCNC: 102 MMOL/L (ref 98–110)
CHOLEST SERPL-MCNC: 114 MG/DL
CHOLEST/HDLC SERPL: 2.5 (CALC)
CO2 SERPL-SCNC: 23 MMOL/L (ref 20–32)
CREAT SERPL-MCNC: 1.82 MG/DL (ref 0.6–1)
EGFRCR SERPLBLD CKD-EPI 2021: 28 ML/MIN/1.73M2
GLUCOSE SERPL-MCNC: 108 MG/DL (ref 65–99)
HDLC SERPL-MCNC: 45 MG/DL
LDLC SERPL CALC-MCNC: 50 MG/DL (CALC)
NONHDLC SERPL-MCNC: 69 MG/DL (CALC)
POTASSIUM SERPL-SCNC: 4 MMOL/L (ref 3.5–5.3)
SODIUM SERPL-SCNC: 137 MMOL/L (ref 135–146)
TRIGL SERPL-MCNC: 102 MG/DL

## 2025-08-20 ENCOUNTER — TREATMENT (OUTPATIENT)
Facility: CLINIC | Age: 80
End: 2025-08-20
Payer: COMMERCIAL

## 2025-08-20 DIAGNOSIS — I63.9 ISCHEMIC CEREBROVASCULAR ACCIDENT (CVA) (MULTI): ICD-10-CM

## 2025-08-20 DIAGNOSIS — G81.94 LEFT HEMIPARESIS (MULTI): ICD-10-CM

## 2025-08-20 PROCEDURE — 97110 THERAPEUTIC EXERCISES: CPT | Mod: GO,CO

## 2025-08-20 PROCEDURE — 97110 THERAPEUTIC EXERCISES: CPT | Mod: GP,KX | Performed by: PHYSICAL THERAPIST

## 2025-08-20 PROCEDURE — 97112 NEUROMUSCULAR REEDUCATION: CPT | Mod: GP,KX | Performed by: PHYSICAL THERAPIST

## 2025-08-20 ASSESSMENT — PAIN SCALES - GENERAL: PAINLEVEL_OUTOF10: 0 - NO PAIN

## 2025-08-20 ASSESSMENT — PAIN - FUNCTIONAL ASSESSMENT: PAIN_FUNCTIONAL_ASSESSMENT: 0-10

## 2025-08-21 LAB
ALBUMIN/CREAT UR: 343 MG/G CREAT
APPEARANCE UR: ABNORMAL
BACTERIA #/AREA URNS HPF: ABNORMAL /HPF
BILIRUB UR QL STRIP: NEGATIVE
COLOR UR: YELLOW
CREAT UR-MCNC: 115 MG/DL (ref 20–275)
GLUCOSE UR QL STRIP: NEGATIVE
HGB UR QL STRIP: ABNORMAL
HYALINE CASTS #/AREA URNS LPF: ABNORMAL /LPF
KETONES UR QL STRIP: NEGATIVE
LEUKOCYTE ESTERASE UR QL STRIP: ABNORMAL
MICROALBUMIN UR-MCNC: 39.5 MG/DL
NITRITE UR QL STRIP: NEGATIVE
PH UR STRIP: 5.5 [PH] (ref 5–8)
PROT UR QL STRIP: ABNORMAL
RBC #/AREA URNS HPF: ABNORMAL /HPF
SERVICE CMNT-IMP: ABNORMAL
SP GR UR STRIP: 1.02 (ref 1–1.03)
SQUAMOUS #/AREA URNS HPF: ABNORMAL /HPF
WBC #/AREA URNS HPF: ABNORMAL /HPF

## 2025-08-22 DIAGNOSIS — Z86.73 HISTORY OF CVA (CEREBROVASCULAR ACCIDENT): ICD-10-CM

## 2025-08-23 RX ORDER — ROSUVASTATIN CALCIUM 10 MG/1
10 TABLET, COATED ORAL DAILY
Qty: 90 TABLET | Refills: 3 | Status: SHIPPED | OUTPATIENT
Start: 2025-08-23

## 2025-08-27 ENCOUNTER — TREATMENT (OUTPATIENT)
Facility: CLINIC | Age: 80
End: 2025-08-27
Payer: COMMERCIAL

## 2025-08-27 DIAGNOSIS — I63.9 ISCHEMIC CEREBROVASCULAR ACCIDENT (CVA) (MULTI): ICD-10-CM

## 2025-08-27 DIAGNOSIS — G81.94 LEFT HEMIPARESIS (MULTI): ICD-10-CM

## 2025-08-27 PROCEDURE — 97112 NEUROMUSCULAR REEDUCATION: CPT | Mod: GP,KX | Performed by: PHYSICAL THERAPIST

## 2025-08-27 PROCEDURE — 97110 THERAPEUTIC EXERCISES: CPT | Mod: GP,KX | Performed by: PHYSICAL THERAPIST

## 2025-08-27 PROCEDURE — 97110 THERAPEUTIC EXERCISES: CPT | Mod: GO,CO

## 2025-08-27 ASSESSMENT — PAIN SCALES - GENERAL: PAINLEVEL_OUTOF10: 0 - NO PAIN

## 2025-08-27 ASSESSMENT — PAIN - FUNCTIONAL ASSESSMENT: PAIN_FUNCTIONAL_ASSESSMENT: 0-10

## 2025-09-03 ENCOUNTER — TREATMENT (OUTPATIENT)
Facility: CLINIC | Age: 80
End: 2025-09-03
Payer: COMMERCIAL

## 2025-09-03 DIAGNOSIS — G81.94 LEFT HEMIPARESIS (MULTI): ICD-10-CM

## 2025-09-03 DIAGNOSIS — I63.9 ISCHEMIC CEREBROVASCULAR ACCIDENT (CVA) (MULTI): ICD-10-CM

## 2025-09-03 PROCEDURE — 97110 THERAPEUTIC EXERCISES: CPT | Mod: GO,CO

## 2025-09-03 PROCEDURE — 97112 NEUROMUSCULAR REEDUCATION: CPT | Mod: GP,KX

## 2025-09-03 PROCEDURE — 97110 THERAPEUTIC EXERCISES: CPT | Mod: GP,KX

## 2025-09-03 ASSESSMENT — PAIN SCALES - GENERAL: PAINLEVEL_OUTOF10: 0 - NO PAIN

## 2025-09-03 ASSESSMENT — PAIN - FUNCTIONAL ASSESSMENT: PAIN_FUNCTIONAL_ASSESSMENT: 0-10
